# Patient Record
Sex: MALE | Race: WHITE | Employment: OTHER | URBAN - METROPOLITAN AREA
[De-identification: names, ages, dates, MRNs, and addresses within clinical notes are randomized per-mention and may not be internally consistent; named-entity substitution may affect disease eponyms.]

---

## 2021-10-30 ENCOUNTER — APPOINTMENT (OUTPATIENT)
Dept: GENERAL RADIOLOGY | Age: 61
End: 2021-10-30
Payer: MEDICARE

## 2021-10-30 ENCOUNTER — HOSPITAL ENCOUNTER (EMERGENCY)
Age: 61
Discharge: HOME | End: 2021-10-31
Attending: EMERGENCY MEDICINE
Payer: MEDICARE

## 2021-10-30 ENCOUNTER — APPOINTMENT (OUTPATIENT)
Dept: CT IMAGING | Age: 61
End: 2021-10-30
Payer: MEDICARE

## 2021-10-30 VITALS
RESPIRATION RATE: 19 BRPM | HEIGHT: 67 IN | HEART RATE: 85 BPM | DIASTOLIC BLOOD PRESSURE: 84 MMHG | BODY MASS INDEX: 22.76 KG/M2 | TEMPERATURE: 98.4 F | OXYGEN SATURATION: 100 % | WEIGHT: 145 LBS | SYSTOLIC BLOOD PRESSURE: 138 MMHG

## 2021-10-30 DIAGNOSIS — K70.9 ALCOHOLIC LIVER DISEASE (HCC): ICD-10-CM

## 2021-10-30 DIAGNOSIS — F41.1 ANXIETY STATE: Primary | ICD-10-CM

## 2021-10-30 DIAGNOSIS — E87.1 HYPONATREMIA: ICD-10-CM

## 2021-10-30 DIAGNOSIS — E87.8 HYPOCHLOREMIA: ICD-10-CM

## 2021-10-30 DIAGNOSIS — E87.6 HYPOKALEMIA: ICD-10-CM

## 2021-10-30 LAB
A/G RATIO: 0.9 (ref 1.1–2.2)
ALBUMIN SERPL-MCNC: 2.8 G/DL (ref 3.4–5)
ALP BLD-CCNC: 933 U/L (ref 40–129)
ALT SERPL-CCNC: 90 U/L (ref 10–40)
AMPHETAMINE SCREEN, URINE: ABNORMAL
ANION GAP SERPL CALCULATED.3IONS-SCNC: 9 MMOL/L (ref 3–16)
AST SERPL-CCNC: 75 U/L (ref 15–37)
BANDED NEUTROPHILS RELATIVE PERCENT: 3 % (ref 0–7)
BARBITURATE SCREEN URINE: ABNORMAL
BASOPHILS ABSOLUTE: 0 K/UL (ref 0–0.2)
BASOPHILS RELATIVE PERCENT: 0 %
BENZODIAZEPINE SCREEN, URINE: ABNORMAL
BILIRUB SERPL-MCNC: 2.9 MG/DL (ref 0–1)
BILIRUBIN URINE: ABNORMAL
BLOOD, URINE: NEGATIVE
BUN BLDV-MCNC: 10 MG/DL (ref 7–20)
CALCIUM SERPL-MCNC: 8.9 MG/DL (ref 8.3–10.6)
CANNABINOID SCREEN URINE: POSITIVE
CHLORIDE BLD-SCNC: 91 MMOL/L (ref 99–110)
CLARITY: CLEAR
CO2: 25 MMOL/L (ref 21–32)
COCAINE METABOLITE SCREEN URINE: ABNORMAL
COLOR: YELLOW
CREAT SERPL-MCNC: 1.4 MG/DL (ref 0.8–1.3)
EOSINOPHILS ABSOLUTE: 0 K/UL (ref 0–0.6)
EOSINOPHILS RELATIVE PERCENT: 0 %
EPITHELIAL CELLS, UA: ABNORMAL /HPF (ref 0–5)
ETHANOL: NORMAL MG/DL (ref 0–0.08)
GFR AFRICAN AMERICAN: >60
GFR NON-AFRICAN AMERICAN: 51
GLUCOSE BLD-MCNC: 143 MG/DL (ref 70–99)
GLUCOSE URINE: NEGATIVE MG/DL
HCT VFR BLD CALC: 29.9 % (ref 40.5–52.5)
HEMOGLOBIN: 9.7 G/DL (ref 13.5–17.5)
KETONES, URINE: NEGATIVE MG/DL
LEUKOCYTE ESTERASE, URINE: ABNORMAL
LITHIUM DOSE AMOUNT: ABNORMAL
LITHIUM LEVEL: 1.7 MMOL/L (ref 0.6–1.2)
LYMPHOCYTES ABSOLUTE: 0.6 K/UL (ref 1–5.1)
LYMPHOCYTES RELATIVE PERCENT: 4 %
Lab: ABNORMAL
MAGNESIUM: 2 MG/DL (ref 1.8–2.4)
MCH RBC QN AUTO: 32.5 PG (ref 26–34)
MCHC RBC AUTO-ENTMCNC: 32.3 G/DL (ref 31–36)
MCV RBC AUTO: 100.7 FL (ref 80–100)
METHADONE SCREEN, URINE: ABNORMAL
MICROCYTES: ABNORMAL
MICROSCOPIC EXAMINATION: YES
MONOCYTES ABSOLUTE: 0.7 K/UL (ref 0–1.3)
MONOCYTES RELATIVE PERCENT: 5 %
NEUTROPHILS ABSOLUTE: 12.6 K/UL (ref 1.7–7.7)
NEUTROPHILS RELATIVE PERCENT: 88 %
NITRITE, URINE: NEGATIVE
OPIATE SCREEN URINE: ABNORMAL
OXYCODONE URINE: ABNORMAL
PDW BLD-RTO: 15 % (ref 12.4–15.4)
PH UA: 7
PH UA: 7 (ref 5–8)
PHENCYCLIDINE SCREEN URINE: ABNORMAL
PLATELET # BLD: 226 K/UL (ref 135–450)
PLATELET SLIDE REVIEW: ADEQUATE
PMV BLD AUTO: 8.1 FL (ref 5–10.5)
POLYCHROMASIA: ABNORMAL
POTASSIUM REFLEX MAGNESIUM: 3.3 MMOL/L (ref 3.5–5.1)
PROPOXYPHENE SCREEN: ABNORMAL
PROTEIN UA: ABNORMAL MG/DL
RBC # BLD: 2.97 M/UL (ref 4.2–5.9)
RBC UA: ABNORMAL /HPF (ref 0–4)
SLIDE REVIEW: ABNORMAL
SODIUM BLD-SCNC: 125 MMOL/L (ref 136–145)
SPECIFIC GRAVITY UA: 1.01 (ref 1–1.03)
STOMATOCYTES: ABNORMAL
TOTAL PROTEIN: 5.8 G/DL (ref 6.4–8.2)
TROPONIN: <0.01 NG/ML
URINE REFLEX TO CULTURE: ABNORMAL
URINE TYPE: ABNORMAL
UROBILINOGEN, URINE: 2 E.U./DL
WBC # BLD: 13.9 K/UL (ref 4–11)
WBC UA: ABNORMAL /HPF (ref 0–5)

## 2021-10-30 PROCEDURE — 84484 ASSAY OF TROPONIN QUANT: CPT

## 2021-10-30 PROCEDURE — 2580000003 HC RX 258: Performed by: EMERGENCY MEDICINE

## 2021-10-30 PROCEDURE — 80178 ASSAY OF LITHIUM: CPT

## 2021-10-30 PROCEDURE — 99284 EMERGENCY DEPT VISIT MOD MDM: CPT

## 2021-10-30 PROCEDURE — 96375 TX/PRO/DX INJ NEW DRUG ADDON: CPT

## 2021-10-30 PROCEDURE — 83735 ASSAY OF MAGNESIUM: CPT

## 2021-10-30 PROCEDURE — 85025 COMPLETE CBC W/AUTO DIFF WBC: CPT

## 2021-10-30 PROCEDURE — 2500000003 HC RX 250 WO HCPCS: Performed by: EMERGENCY MEDICINE

## 2021-10-30 PROCEDURE — 80053 COMPREHEN METABOLIC PANEL: CPT

## 2021-10-30 PROCEDURE — 96365 THER/PROPH/DIAG IV INF INIT: CPT

## 2021-10-30 PROCEDURE — 70450 CT HEAD/BRAIN W/O DYE: CPT

## 2021-10-30 PROCEDURE — 93005 ELECTROCARDIOGRAM TRACING: CPT | Performed by: NURSE PRACTITIONER

## 2021-10-30 PROCEDURE — 6360000002 HC RX W HCPCS: Performed by: EMERGENCY MEDICINE

## 2021-10-30 PROCEDURE — 81001 URINALYSIS AUTO W/SCOPE: CPT

## 2021-10-30 PROCEDURE — 80307 DRUG TEST PRSMV CHEM ANLYZR: CPT

## 2021-10-30 PROCEDURE — 82077 ASSAY SPEC XCP UR&BREATH IA: CPT

## 2021-10-30 PROCEDURE — 71046 X-RAY EXAM CHEST 2 VIEWS: CPT

## 2021-10-30 RX ORDER — LORAZEPAM 1 MG/1
4 TABLET ORAL
Status: DISCONTINUED | OUTPATIENT
Start: 2021-10-30 | End: 2021-10-31 | Stop reason: HOSPADM

## 2021-10-30 RX ORDER — LORAZEPAM 1 MG/1
2 TABLET ORAL
Status: DISCONTINUED | OUTPATIENT
Start: 2021-10-30 | End: 2021-10-31 | Stop reason: HOSPADM

## 2021-10-30 RX ORDER — SODIUM CHLORIDE 0.9 % (FLUSH) 0.9 %
5-40 SYRINGE (ML) INJECTION PRN
Status: DISCONTINUED | OUTPATIENT
Start: 2021-10-30 | End: 2021-10-31 | Stop reason: HOSPADM

## 2021-10-30 RX ORDER — SODIUM CHLORIDE 9 MG/ML
25 INJECTION, SOLUTION INTRAVENOUS PRN
Status: DISCONTINUED | OUTPATIENT
Start: 2021-10-30 | End: 2021-10-31 | Stop reason: HOSPADM

## 2021-10-30 RX ORDER — LORAZEPAM 1 MG/1
1 TABLET ORAL
Status: DISCONTINUED | OUTPATIENT
Start: 2021-10-30 | End: 2021-10-31 | Stop reason: HOSPADM

## 2021-10-30 RX ORDER — LORAZEPAM 2 MG/ML
1 INJECTION INTRAMUSCULAR ONCE
Status: COMPLETED | OUTPATIENT
Start: 2021-10-30 | End: 2021-10-30

## 2021-10-30 RX ORDER — LORAZEPAM 1 MG/1
3 TABLET ORAL
Status: DISCONTINUED | OUTPATIENT
Start: 2021-10-30 | End: 2021-10-31 | Stop reason: HOSPADM

## 2021-10-30 RX ORDER — LORAZEPAM 2 MG/ML
3 INJECTION INTRAMUSCULAR
Status: DISCONTINUED | OUTPATIENT
Start: 2021-10-30 | End: 2021-10-31 | Stop reason: HOSPADM

## 2021-10-30 RX ORDER — LORAZEPAM 2 MG/ML
2 INJECTION INTRAMUSCULAR ONCE
Status: DISCONTINUED | OUTPATIENT
Start: 2021-10-30 | End: 2021-10-30

## 2021-10-30 RX ORDER — LORAZEPAM 2 MG/ML
4 INJECTION INTRAMUSCULAR
Status: DISCONTINUED | OUTPATIENT
Start: 2021-10-30 | End: 2021-10-31 | Stop reason: HOSPADM

## 2021-10-30 RX ORDER — LORAZEPAM 2 MG/ML
1 INJECTION INTRAMUSCULAR
Status: DISCONTINUED | OUTPATIENT
Start: 2021-10-30 | End: 2021-10-31 | Stop reason: HOSPADM

## 2021-10-30 RX ORDER — LORAZEPAM 2 MG/ML
2 INJECTION INTRAMUSCULAR
Status: DISCONTINUED | OUTPATIENT
Start: 2021-10-30 | End: 2021-10-31 | Stop reason: HOSPADM

## 2021-10-30 RX ORDER — SODIUM CHLORIDE 0.9 % (FLUSH) 0.9 %
5-40 SYRINGE (ML) INJECTION EVERY 12 HOURS SCHEDULED
Status: DISCONTINUED | OUTPATIENT
Start: 2021-10-30 | End: 2021-10-31 | Stop reason: HOSPADM

## 2021-10-30 RX ADMIN — FOLIC ACID: 5 INJECTION, SOLUTION INTRAMUSCULAR; INTRAVENOUS; SUBCUTANEOUS at 22:39

## 2021-10-30 RX ADMIN — LORAZEPAM 1 MG: 2 INJECTION INTRAMUSCULAR; INTRAVENOUS at 21:47

## 2021-10-31 LAB
EKG ATRIAL RATE: 95 BPM
EKG DIAGNOSIS: NORMAL
EKG P AXIS: 72 DEGREES
EKG P-R INTERVAL: 174 MS
EKG Q-T INTERVAL: 398 MS
EKG QRS DURATION: 96 MS
EKG QTC CALCULATION (BAZETT): 500 MS
EKG R AXIS: 47 DEGREES
EKG T AXIS: 55 DEGREES
EKG VENTRICULAR RATE: 95 BPM

## 2021-10-31 PROCEDURE — 93010 ELECTROCARDIOGRAM REPORT: CPT | Performed by: INTERNAL MEDICINE

## 2021-10-31 RX ORDER — HYDROXYZINE 50 MG/1
50 TABLET, FILM COATED ORAL EVERY 6 HOURS PRN
Qty: 30 TABLET | Refills: 0 | Status: ON HOLD | OUTPATIENT
Start: 2021-10-31 | End: 2021-11-11

## 2021-10-31 RX ORDER — MULTIVIT-MIN/IRON FUM/FOLIC AC 7.5 MG-4
1 TABLET ORAL DAILY
Qty: 30 TABLET | Refills: 3 | Status: SHIPPED | OUTPATIENT
Start: 2021-10-31

## 2021-10-31 RX ORDER — POTASSIUM CHLORIDE 20 MEQ/1
20 TABLET, EXTENDED RELEASE ORAL 2 TIMES DAILY
Qty: 10 TABLET | Refills: 0 | Status: SHIPPED | OUTPATIENT
Start: 2021-10-31 | End: 2021-11-18

## 2021-10-31 RX ORDER — SODIUM CHLORIDE 1000 MG
1 TABLET, SOLUBLE MISCELLANEOUS 3 TIMES DAILY
Qty: 15 TABLET | Refills: 0 | Status: ON HOLD | OUTPATIENT
Start: 2021-10-31 | End: 2021-11-11 | Stop reason: HOSPADM

## 2021-10-31 ASSESSMENT — ENCOUNTER SYMPTOMS
SHORTNESS OF BREATH: 1
ABDOMINAL PAIN: 0
DIARRHEA: 0
COUGH: 0
NAUSEA: 0
SORE THROAT: 0
COLOR CHANGE: 0
BACK PAIN: 0
VOMITING: 0
WHEEZING: 0

## 2021-10-31 NOTE — ED NOTES
Bed: 27  Expected date:   Expected time:   Means of arrival:   Comments:  803 Mike Yin RN  10/30/21 6117

## 2021-10-31 NOTE — ED NOTES
Discharge instructions gone over extensively with patient. Cab called for patient. Patient upon discharge stated \"Didn't you give me any ativan to go home with. \" Patient instructed on his prescriptions (4) given to him.       Amaris Davenport RN  10/31/21 8904

## 2021-10-31 NOTE — ED PROVIDER NOTES
**ADVANCED PRACTICE PROVIDER, I HAVE EVALUATED THIS [facility]  ED  EMERGENCY DEPARTMENT ENCOUNTER      Pt Name: Siri Henry  KBJ:[id number]  Yessi 1960  Date of evaluation: 10/30/2021  Provider: ED Freire CNP      Chief Complaint:    Chief Complaint   Patient presents with    Anxiety     pt to ED via EMS with c/o anxiety. pt walked to [place] and EMS call was for \"convulsions\" per EMS pt has not taken lithium or drank water in 2 weeks, pt states he doesn't know why he hasnt taken it         Nursing Notes, Past Medical Hx, Past Surgical Hx, Social Hx, Allergies, and Family Hx were all reviewed and agreed with or any disagreements were addressed in the HPI.    HPI: (Location, Duration, Timing, Severity, Quality, Assoc Sx, Context, Modifying factors)    Chief Complaint of tremors    This is a  57 y.o. male who presents to the emergency department via EMS after walking to the gas station, patient is complaining of tremors and walk to the gas station because the fire department is right next door, paramedics came to evaluate him and brought him to the ER. Patient states \"I need help I cannot quit seeking\". He does admit to cigarette smoking, smokes 2 packs a day. Does report having chronic shortness of breath no acute cough, congestion, fever or chills. No chest pain pleuritic chest pain associated with the shortness of breath and smoking. Patient states that he used to drink alcohol however he just quit on Monday. Patient states that he used to use all kinds of drugs however, when I ask him about drug use he denies it. He states \"I just need help\". Only thing he is concerned about is the tremors and shaking. He denies any headache neck pain or neck stiffness. No one-sided weakness or neglect. No abdominal pain, no nausea vomiting or diarrhea. No pain on exam, no additional complaints, no additional aggravating relieving factors.   Patient presents awake, alert and in no acute distress or toxic appearance. PastMedical/Surgical History:  History reviewed. No pertinent past medical history. Procedure Laterality Date    HERNIA REPAIR         Medications:  Previous Medications    No medications on file         Review of Systems:  (2-9 systems needed)  Review of Systems   Constitutional: Negative for chills and fever. HENT: Negative for congestion and sore throat. Respiratory: Positive for shortness of breath. Negative for cough and wheezing. He does admit to being a cigarette smoker, smokes 2 packs a day, states that he has chronic shortness of breath because of it. However no chest pain pleuritic chest pain cough or congestion   Cardiovascular: Negative for chest pain. Gastrointestinal: Negative for abdominal pain, diarrhea, nausea and vomiting. Genitourinary: Negative for difficulty urinating, dysuria, frequency and hematuria. Musculoskeletal: Negative for back pain. Skin: Negative for color change. Neurological: Positive for tremors. Negative for weakness, numbness and headaches. Patient is complaining of tremors and walk to the gas station because the fire department is right next door, paramedics came to evaluate him and brought him to the ER. Patient states \"I need help I cannot quit seeking\". \"Positives and Pertinent negatives as per HPI\"    Physical Exam:  Physical Exam  Vitals and nursing note reviewed. Constitutional:       Appearance: He is well-developed. He is not diaphoretic. HENT:      Head: Normocephalic. Right Ear: External ear normal.      Left Ear: External ear normal.   Eyes:      General: No scleral icterus. Right eye: No discharge. Left eye: No discharge. Cardiovascular:      Rate and Rhythm: Normal rate and regular rhythm. Pulmonary:      Effort: Pulmonary effort is normal. No respiratory distress. Breath sounds: Normal breath sounds.    Abdominal: Palpations: Abdomen is soft. Tenderness: There is no abdominal tenderness. Musculoskeletal:         General: Normal range of motion. Cervical back: Normal range of motion and neck supple. Skin:     General: Skin is warm. Capillary Refill: Capillary refill takes less than 2 seconds. Coloration: Skin is not pale. Neurological:      General: No focal deficit present. Mental Status: He is alert and oriented to person, place, and time. GCS: GCS eye subscore is 4. GCS verbal subscore is 5. GCS motor subscore is 6. Cranial Nerves: Cranial nerves are intact. Comments: Patient is awake, alert following all commands correctly however he does have some fine tremors and shaking on exam.  But they are controlled when I asked him to stop.    Psychiatric:         Behavior: Behavior normal.         MEDICAL DECISION MAKING    Vitals:    Vitals:    10/30/21 2119 10/30/21 2147 10/30/21 2356   BP: 102/78 127/88 138/84   Pulse: 103 94 85   Resp: 18  19   Temp: 98.4 °F (36.9 °C)     TempSrc: Oral     SpO2: 99%  100%   Weight: 145 lb (65.8 kg)     Height: 5' 7\" (1.702 m)         LABS:  Labs Reviewed   CBC WITH AUTO DIFFERENTIAL - Abnormal; Notable for the following components:       Result Value    WBC 13.9 (*)     RBC 2.97 (*)     Hemoglobin 9.7 (*)     Hematocrit 29.9 (*)     .7 (*)     Neutrophils Absolute 12.6 (*)     Lymphocytes Absolute 0.6 (*)     Microcytes Occasional (*)     Polychromasia Occasional (*)     Stomatocytes Occasional (*)     All other components within normal limits    Narrative:     Performed at:  [facility]) - [address]   Phone [phone]   COMPREHENSIVE METABOLIC PANEL W/ REFLEX TO MG FOR LOW K - Abnormal; Notable for the following components:    Sodium 125 (*)     Potassium reflex Magnesium 3.3 (*)     Chloride 91 (*)     Glucose 143 (*)     CREATININE 1.4 (*)     GFR Non- 51 (*) Total Protein 5.8 (*)     Albumin 2.8 (*)     Albumin/Globulin Ratio 0.9 (*)     Total Bilirubin 2.9 (*)     Alkaline Phosphatase 933 (*)     ALT 90 (*)     AST 75 (*)     All other components within normal limits    Narrative:     Performed at:  [address], [facility] [employer]   Phone [phone]   URINE RT REFLEX TO CULTURE - Abnormal; Notable for the following components:    Bilirubin Urine SMALL (*)     Protein, UA TRACE (*)     Urobilinogen, Urine 2.0 (*)     Leukocyte Esterase, Urine TRACE (*)     All other components within normal limits    Narrative:     Performed at:  [place] [address] [employer]   Phone [phone]   [address] - Abnormal; Notable for the following components:    Cannabinoid Scrn, Ur POSITIVE (*)     All other components within normal limits    Narrative:     Performed at:  [place] [address] [employer]   Phone [phone]   LITHIUM LEVEL - Abnormal; Notable for the following components:    Lithium Lvl 1.7 (*)     All other components within normal limits    Narrative:     Performed at:  [address], [facility] [employer]   Phone [phone]   MICROSCOPIC URINALYSIS - Abnormal; Notable for the following components:    Epithelial Cells, UA 6-10 (*)     All other components within normal limits    Narrative:     Performed at:  [place] [address] [employer]   Phone [phone]   TROPONIN    Narrative:     Performed at:  [place] [address] [employer]   Phone [phone]   ETHANOL    Narrative:     Performed at:  [address], [place] [employer]   Phone [phone]   MAGNESIUM    Narrative: Performed at:  [facility]) [facility]  [address] [place] [address],  [place], [address] [place] [address] [place]   Phone  of labs reviewed and were negative at this time or not returned at the time of this note. RADIOLOGY:   Non-plain film images such as CT, Ultrasound and MRI are read by the radiologist. Dayna CABALLERO APRN - CNP have directly visualized the radiologic plain film image(s) with the below findings:      Interpretation per the Radiologist below, if available at the time of this note:    XR CHEST (2 VW)   Final Result   No acute cardiopulmonary abnormality. CT Head WO Contrast   Final Result   1. No acute intracranial abnormality. 2. Opacification of the left maxillary sinus that likely represents chronic   sinusitis. MEDICAL DECISION MAKING / ED COURSE:    Because of high probability of sudden clinical deterioration of the patient's condition and risk of further deterioration, critical care time required my full attention to the patient's condition; which included chart data review, documentation, medication ordering, reviewing the patient's old records, reevaluation patient's cardiac, pulmonary and neurological status. Reevaluation of vital signs. Consultations with ED attending and admitting physician. Ordering, interpreting reviewing diagnostic testing. Therefore a critical care time was 18 minutes of direct attention to the patient's condition did not include time spent on procedures.     PROCEDURES:   Procedures    None    Patient was given:  Medications   sodium chloride flush 0.9 % injection 5-40 mL (has no administration in time range)   sodium chloride flush 0.9 % injection 5-40 mL (has no administration in time range)   0.9 % sodium chloride infusion (has no administration in time range)   LORazepam (ATIVAN) tablet 1 mg (has no administration in time range)     Or   LORazepam (ATIVAN) injection 1 mg (has no administration in time range)     Or LORazepam (ATIVAN) tablet 2 mg (has no administration in time range)     Or   LORazepam (ATIVAN) injection 2 mg (has no administration in time range)     Or   LORazepam (ATIVAN) tablet 3 mg (has no administration in time range)     Or   LORazepam (ATIVAN) injection 3 mg (has no administration in time range)     Or   LORazepam (ATIVAN) tablet 4 mg (has no administration in time range)     Or   LORazepam (ATIVAN) injection 4 mg (has no administration in time range)   sodium chloride 0.9 % 2,548 mL with folic acid 1 mg, adult multi-vitamin with vitamin k 10 mL, thiamine 100 mg ( IntraVENous Stopped 10/30/21 0218)   LORazepam (ATIVAN) injection 1 mg (1 mg IntraVENous Given 10/30/21 2147)        Patient is complaining of tremors and walk to the gas station because the fire department is right next door, paramedics came to evaluate him and brought him to the ER. Patient states \"I need help I cannot quit seeking\". He does admit to cigarette smoking, smokes 2 packs a day. Does report having chronic shortness of breath no acute cough, congestion, fever or chills. After evaluation and examination patient IV access, blood work, urinalysis, chest x-ray, EKG and CT scan of the head were ordered I do believe that the some of this could be related to his alcohol use although he states he has not drink since Monday. I did place the patient on the [facility] recall and he was given a rally pack. Urinalysis shows no acute infection. Drug screen is positive for cannabinoids. EKG shows sinus rhythm rate of 95 bpm, no acute ST ovation, please see the attending physician documentation for EKG interpretation. CBC shows some underlying anemia with an H&H of 9.7 and 29.9, I do believe this is most likely related to his history of alcohol use. Liver functions are elevated with alk phos of 933, ALT of 90 and AST of 75 and the bilirubin is 2.9. He does have some underlying dehydration. ?  Sodium is 125, potassium is 3.3 however his gap is closed. I believe the creatinine of 1.4 and GFR 51 improved with fluids, patient was ordered a rally pack. Troponin is negative. EtOH is negative. Magnesium level is 2. Patient states not been taking his lithium, his lithium is 1.7 today. Chest x-ray shows no acute cardiopulmonary findings. CT of the head shows no acute intracranial abnormality. Opacification of the left maxillary sinus that likely represents chronic sinusitis. Patient was given fluids, rally pack, and after fluids he states he is feeling much better, I again I not sure why he was having these tremors and why he was feeling so terrible. However, he is denying any acute complaints on exam, no concerns for acute coronary syndrome, pneumonia, pneumothorax, PE or other emergent etiology. Therefore, shared medical decision was made to the patient, the attending physician and myself only agreed the patient could be discharged home with outpatient follow-up. Patient was discharged home by the attending physician with instructions to follow-up with the PCP. Discharged home with prescription for hydroxyzine, multivitamin, potassium and sodium tablets. Educated follow-up with PCP on Monday for follow-up. Educated to stop using marijuana as well. The patient tolerated their visit well. I evaluated the patient. The physician was available for consultation as needed. The patient and / or the family were informed of the results of any tests, a time was given to answer questions, a plan was proposed and they agreed with plan. Patient verbalized understanding of discharge instructions and was discharged in the department in stable condition. CLINICAL IMPRESSION:  1. Anxiety state    2. Alcoholic liver disease ([address]    3. Hypokalemia    4. Hyponatremia    5.  Hypochloremia        DISPOSITION Decision To Discharge 10/31/2021 12:30:33 AM      PATIENT REFERRED TO:  [facility]) Pre-Services  [phone]  Schedule an appointment as soon as possible for a visit         DISCHARGE MEDICATIONS:  New Prescriptions    HYDROXYZINE (ATARAX) 50 MG TABLET    Take 1 tablet by mouth every 6 hours as needed for Anxiety    MULTIPLE VITAMINS-MINERALS (MULTIVITAMIN WITH MINERALS) TABLET    Take 1 tablet by mouth daily    POTASSIUM CHLORIDE (KLOR-CON M) 20 MEQ EXTENDED RELEASE TABLET    Take 1 tablet by mouth 2 times daily for 5 days    SODIUM CHLORIDE 1 G TABLET    Take 1 tablet by mouth 3 times daily for 5 days       DISCONTINUED MEDICATIONS:  Discontinued Medications    No medications on file              (Please note the MDM and HPI sections of this note were completed with a voice recognition program.  Efforts were made to edit the dictations but occasionally words are mis-transcribed.)    Electronically signed, ED Prajapati CNP,          ED Prajapati CNP  10/31/21 0125       ED Prajapati CNP  11/02/21 0034

## 2021-10-31 NOTE — ED PROVIDER NOTES
I independently performed a history and physical on [facility]. All diagnostic, treatment, and disposition decisions were made by myself in conjunction with the advanced practice provider. For further details of [facility] [place] emergency department encounter, please see Lidia Lang NP's documentation. Patient reports to me that he has not felt well for over a week. He states he last had alcohol last Monday. He states that he has had some nausea and is drinking very little water. He has not taken his lithium. He has difficulty elaborating on his symptoms but clearly denies any chest pain or shortness of breath or abdominal pain. States \"I just do not feel right\". On exam patient does have a slight resting tremor and is jittery and anxious in appearance. Heart tachycardic but regular and lungs clear to auscultation and abdomen benign. No significant injuries noted. EKG  The Ekg interpreted by me shows  normal sinus rhythm with a rate of 95  Axis is   Normal  QTc is  500ms  Intervals and Durations are unremarkable. ST Segments: normal  No prior EKG available for comparison. The total Critical Care time is 40 minutes which excludes separately billable procedures. The critical care was concerning IV fluid bolus for dehydration. This time is exclusive of any time documented by any other providers. XR CHEST (2 VW)  No acute cardiopulmonary abnormality. CT Head WO Contrast  1. No acute intracranial abnormality. 2. Opacification of the left maxillary sinus that likely represents chronic sinusitis.      Results for orders placed or performed during the hospital encounter of 10/30/21   CBC Auto Differential   Result Value Ref Range    WBC 13.9 (H) 4.0 - 11.0 K/uL    RBC 2.97 (L) 4.20 - 5.90 M/uL    Hemoglobin 9.7 (L) 13.5 - 17.5 g/dL    Hematocrit 29.9 (L) 40.5 - 52.5 %    .7 (H) 80.0 - 100.0 fL    MCH 32.5 26.0 - 34.0 pg    MCHC 32.3 31.0 - 36.0 g/dL    RDW 15.0 12.4 - 15.4 %    Platelets 761 056 - 283 K/uL    MPV 8.1 5.0 - 10.5 fL    PLATELET SLIDE REVIEW Adequate     SLIDE REVIEW see below     Neutrophils % 88.0 %    Lymphocytes % 4.0 %    Monocytes % 5.0 %    Eosinophils % 0.0 %    Basophils % 0.0 %    Neutrophils Absolute 12.6 (H) 1.7 - 7.7 K/uL    Lymphocytes Absolute 0.6 (L) 1.0 - 5.1 K/uL    Monocytes Absolute 0.7 0.0 - 1.3 K/uL    Eosinophils Absolute 0.0 0.0 - 0.6 K/uL    Basophils Absolute 0.0 0.0 - 0.2 K/uL    Bands Relative 3 0 - 7 %    Microcytes Occasional (A)     Polychromasia Occasional (A)     Stomatocytes Occasional (A)    Comprehensive Metabolic Panel w/ Reflex to MG   Result Value Ref Range    Sodium 125 (L) 136 - 145 mmol/L    Potassium reflex Magnesium 3.3 (L) 3.5 - 5.1 mmol/L    Chloride 91 (L) 99 - 110 mmol/L    CO2 25 21 - 32 mmol/L    Anion Gap 9 3 - 16    Glucose 143 (H) 70 - 99 mg/dL    BUN 10 7 - 20 mg/dL    CREATININE 1.4 (H) 0.8 - 1.3 mg/dL    GFR Non- 51 (A) >60    GFR African American >60 >60    Calcium 8.9 8.3 - 10.6 mg/dL    Total Protein 5.8 (L) 6.4 - 8.2 g/dL    Albumin 2.8 (L) 3.4 - 5.0 g/dL    Albumin/Globulin Ratio 0.9 (L) 1.1 - 2.2    Total Bilirubin 2.9 (H) 0.0 - 1.0 mg/dL    Alkaline Phosphatase 933 (H) 40 - 129 U/L    ALT 90 (H) 10 - 40 U/L    AST 75 (H) 15 - 37 U/L   Urinalysis Reflex to Culture    Specimen: Urine, clean catch   Result Value Ref Range    Color, UA Yellow Straw/Yellow    Clarity, UA Clear Clear    Glucose, Ur Negative Negative mg/dL    Bilirubin Urine SMALL (A) Negative    Ketones, Urine Negative Negative mg/dL    Specific Gravity, UA 1.010 1.005 - 1.030    Blood, Urine Negative Negative    pH, UA 7.0 5.0 - 8.0    Protein, UA TRACE (A) Negative mg/dL    Urobilinogen, Urine 2.0 (A) <2.0 E.U./dL    Nitrite, Urine Negative Negative    Leukocyte Esterase, Urine TRACE (A) Negative    Microscopic Examination YES     Urine Type NotGiven     Urine Reflex to Culture Not Indicated    Troponin   Result Value Ref Range Troponin <0.01 <0.01 ng/mL   Urine Drug Screen   Result Value Ref Range    Amphetamine Screen, Urine Neg Negative <1000ng/mL    Barbiturate Screen, Ur Neg Negative <200 ng/mL    Benzodiazepine Screen, Urine Neg Negative <200 ng/mL    Cannabinoid Scrn, Ur POSITIVE (A) Negative <50 ng/mL    Cocaine Metabolite Screen, Urine Neg Negative <300 ng/mL    Opiate Scrn, Ur Neg Negative <300 ng/mL    PCP Screen, Urine Neg Negative <25 ng/mL    Methadone Screen, Urine Neg Negative <300 ng/mL    Propoxyphene Scrn, Ur Neg Negative <300 ng/mL    Oxycodone Urine Neg Negative <100 ng/ml    pH, UA 7.0     Drug Screen Comment: see below    Ethanol   Result Value Ref Range    Ethanol Lvl None Detected mg/dL   Lithium level   Result Value Ref Range    Lithium Lvl 1.7 (H) 0.6 - 1.2 mmol/L    Lithium Dose Amount Unknown    Magnesium   Result Value Ref Range    Magnesium 2.00 1.80 - 2.40 mg/dL   Microscopic Urinalysis   Result Value Ref Range    WBC, UA 3-5 0 - 5 /HPF    RBC, UA None seen 0 - 4 /HPF    Epithelial Cells, UA 6-10 (A) 0 - 5 /HPF   EKG 12 Lead   Result Value Ref Range    Ventricular Rate 95 BPM    Atrial Rate 95 BPM    P-R Interval 174 ms    QRS Duration 96 ms    Q-T Interval 398 ms    QTc Calculation (Bazett) 500 ms    P Axis 72 degrees    R Axis 47 degrees    T Axis 55 degrees    Diagnosis       Normal sinus rhythmProlonged QTAbnormal ECGNo previous ECGs available     Patient is feeling better and would like to be discharged. Advised return immediately for any new or worsening symptoms and to take all prescriptions as prescribed. Also advised to discontinue using any illegal drugs and abusing alcohol. I estimate there is LOW risk for ACUTE CORONARY SYNDROME, INTRACRANIAL HEMORRHAGE, MALIGNANT DYSRHYTHMIA, MENINGITIS, PNEUMONIA, PULMONARY EMBOLISM, SEPSIS, SUBARACHNOID HEMORRHAGE, SUBDURAL HEMATOMA, STROKE, or URINARY TRACT INFECTION, thus I consider the discharge disposition reasonable.  Mohsen Yu and I have discussed the diagnosis and risks, and we agree with discharging home to follow-up with their primary doctor. We also discussed returning to the Emergency Department immediately if new or worsening symptoms occur. We have discussed the symptoms which are most concerning (e.g., changing or worsening pain, weakness, vomiting, fever) that necessitate immediate return. Final Impression    1. Anxiety state    2. Alcoholic liver disease ([address]    3. Hypokalemia    4. Hyponatremia    5. Hypochloremia        Blood pressure 138/84, pulse 85, temperature 98.4 °F (36.9 °C), temperature source Oral, resp. rate 19, height 5' 7\" (1.702 m), weight 145 lb (65.8 kg), SpO2 100 %.             Jose Selby MD  10/31/21 4345

## 2021-11-02 NOTE — ED PROVIDER NOTES
**ADVANCED PRACTICE PROVIDER, I HAVE EVALUATED THIS [facility]  ED  EMERGENCY DEPARTMENT ENCOUNTER      Pt Name: Dane Natarajan  HKV:[id number]  Nikkiegfafsaneh 1960  Date of evaluation: 10/30/2021  Provider: ED Yang CNP      Chief Complaint:    Chief Complaint   Patient presents with    Anxiety     pt to ED via EMS with c/o anxiety. pt walked to [facility] and EMS call was for \"convulsions\" per EMS pt has not taken lithium or drank water in 2 weeks, pt states he doesn't know why he hasnt taken it         Nursing Notes, Past Medical Hx, Past Surgical Hx, Social Hx, Allergies, and Family Hx were all reviewed and agreed with or any disagreements were addressed in the HPI.    HPI: (Location, Duration, Timing, Severity, Quality, Assoc Sx, Context, Modifying factors)    Chief Complaint of tremors    This is a  57 y.o. male who presents to the emergency department via EMS after walking to the gas station, patient is complaining of tremors and walk to the [employer] station because the fire department is right next door, paramedics came to evaluate him and brought him to the ER. Patient states \"I need help I cannot quit seeking\". He does admit to cigarette smoking, smokes 2 packs a day. Does report having chronic shortness of breath no acute cough, congestion, fever or chills. No chest pain pleuritic chest pain associated with the shortness of breath and smoking. Patient states that he used to drink alcohol however he just quit on Monday. Patient states that he used to use all kinds of drugs however, when I ask him about drug use he denies it. He states \"I just need help\". Only thing he is concerned about is the tremors and shaking. He denies any headache neck pain or neck stiffness. No one-sided weakness or neglect. No abdominal pain, no nausea vomiting or diarrhea. No pain on exam, no additional complaints, no additional aggravating relieving factors.   Patient presents awake, alert and in no acute distress or toxic appearance. PastMedical/Surgical History:  History reviewed. No pertinent past medical history. Procedure Laterality Date    HERNIA REPAIR         Medications:  Previous Medications    No medications on file         Review of Systems:  (2-9 systems needed)  Review of Systems   Constitutional: Negative for chills and fever. HENT: Negative for congestion and sore throat. Respiratory: Positive for shortness of breath. Negative for cough and wheezing. He does admit to being a cigarette smoker, smokes 2 packs a day, states that he has chronic shortness of breath because of it. However no chest pain pleuritic chest pain cough or congestion   Cardiovascular: Negative for chest pain. Gastrointestinal: Negative for abdominal pain, diarrhea, nausea and vomiting. Genitourinary: Negative for difficulty urinating, dysuria, frequency and hematuria. Musculoskeletal: Negative for back pain. Skin: Negative for color change. Neurological: Positive for tremors. Negative for weakness, numbness and headaches. Patient is complaining of tremors and walk to the gas station because the fire department is right next door, paramedics came to evaluate him and brought him to the ER. Patient states \"I need help I cannot quit seeking\". \"Positives and Pertinent negatives as per HPI\"    Physical Exam:  Physical Exam  Vitals and nursing note reviewed. Constitutional:       Appearance: He is well-developed. He is not diaphoretic. HENT:      Head: Normocephalic. Right Ear: External ear normal.      Left Ear: External ear normal.   Eyes:      General: No scleral icterus. Right eye: No discharge. Left eye: No discharge. Cardiovascular:      Rate and Rhythm: Normal rate and regular rhythm. Pulmonary:      Effort: Pulmonary effort is normal. No respiratory distress. Breath sounds: Normal breath sounds.    Abdominal: Palpations: Abdomen is soft. Tenderness: There is no abdominal tenderness. Musculoskeletal:         General: Normal range of motion. Cervical back: Normal range of motion and neck supple. Skin:     General: Skin is warm. Capillary Refill: Capillary refill takes less than 2 seconds. Coloration: Skin is not pale. Neurological:      General: No focal deficit present. Mental Status: He is alert and oriented to person, place, and time. GCS: GCS eye subscore is 4. GCS verbal subscore is 5. GCS motor subscore is 6. Cranial Nerves: Cranial nerves are intact. Comments: Patient is awake, alert following all commands correctly however he does have some fine tremors and shaking on exam.  But they are controlled when I asked him to stop.    Psychiatric:         Behavior: Behavior normal.         MEDICAL DECISION MAKING    Vitals:    Vitals:    10/30/21 2119 10/30/21 2147 10/30/21 2356   BP: 102/78 127/88 138/84   Pulse: 103 94 85   Resp: 18  19   Temp: 98.4 °F (36.9 °C)     TempSrc: Oral     SpO2: 99%  100%   Weight: 145 lb (65.8 kg)     Height: 5' 7\" (1.702 m)         LABS:  Labs Reviewed   CBC WITH AUTO DIFFERENTIAL - Abnormal; Notable for the following components:       Result Value    WBC 13.9 (*)     RBC 2.97 (*)     Hemoglobin 9.7 (*)     Hematocrit 29.9 (*)     .7 (*)     Neutrophils Absolute 12.6 (*)     Lymphocytes Absolute 0.6 (*)     Microcytes Occasional (*)     Polychromasia Occasional (*)     Stomatocytes Occasional (*)     All other components within normal limits    Narrative:     Performed at:  [facility]) - [address]   Phone [phone]   COMPREHENSIVE METABOLIC PANEL W/ REFLEX TO MG FOR LOW K - Abnormal; Notable for the following components:    Sodium 125 (*)     Potassium reflex Magnesium 3.3 (*)     Chloride 91 (*)     Glucose 143 (*)     CREATININE 1.4 (*)     GFR Non- 51 (*) Total Protein 5.8 (*)     Albumin 2.8 (*)     Albumin/Globulin Ratio 0.9 (*)     Total Bilirubin 2.9 (*)     Alkaline Phosphatase 933 (*)     ALT 90 (*)     AST 75 (*)     All other components within normal limits    Narrative:     Performed at:  [address], [facility]   Phone [phone]   URINE RT REFLEX TO CULTURE - Abnormal; Notable for the following components:    Bilirubin Urine SMALL (*)     Protein, UA TRACE (*)     Urobilinogen, Urine 2.0 (*)     Leukocyte Esterase, Urine TRACE (*)     All other components within normal limits    Narrative:     Performed at:  [address], [facility]   Phone [phone]   [address] - Abnormal; Notable for the following components:    Cannabinoid Scrn, Ur POSITIVE (*)     All other components within normal limits    Narrative:     Performed at:  [address], [facility]   Phone [phone]   LITHIUM LEVEL - Abnormal; Notable for the following components:    Lithium Lvl 1.7 (*)     All other components within normal limits    Narrative:     Performed at:  [address], [facility]   Phone [phone]   MICROSCOPIC URINALYSIS - Abnormal; Notable for the following components:    Epithelial Cells, UA 6-10 (*)     All other components within normal limits    Narrative:     Performed at:  [place] [address] [facility]   Phone [phone]   TROPONIN    Narrative:     Performed at:  [address], [facility]   Phone [phone]   ETHANOL    Narrative:     Performed at:  [address], [facility]   Phone [phone]   MAGNESIUM    Narrative: LORazepam (ATIVAN) tablet 2 mg (has no administration in time range)     Or   LORazepam (ATIVAN) injection 2 mg (has no administration in time range)     Or   LORazepam (ATIVAN) tablet 3 mg (has no administration in time range)     Or   LORazepam (ATIVAN) injection 3 mg (has no administration in time range)     Or   LORazepam (ATIVAN) tablet 4 mg (has no administration in time range)     Or   LORazepam (ATIVAN) injection 4 mg (has no administration in time range)   sodium chloride 0.9 % 5,918 mL with folic acid 1 mg, adult multi-vitamin with vitamin k 10 mL, thiamine 100 mg ( IntraVENous Stopped 10/30/21 0958)   LORazepam (ATIVAN) injection 1 mg (1 mg IntraVENous Given 10/30/21 2147)        Patient is complaining of tremors and walk to the gas station because the fire department is right next door, paramedics came to evaluate him and brought him to the ER. Patient states \"I need help I cannot quit seeking\". He does admit to cigarette smoking, smokes 2 packs a day. Does report having chronic shortness of breath no acute cough, congestion, fever or chills. After evaluation and examination patient IV access, blood work, urinalysis, chest x-ray, EKG and CT scan of the head were ordered I do believe that the some of this could be related to his alcohol use although he states he has not drink since Monday. I did place the patient on the [facility] recall and he was given a rally pack. Urinalysis shows no acute infection. Drug screen is positive for cannabinoids. EKG shows sinus rhythm rate of 95 bpm, no acute ST ovation, please see the attending physician documentation for EKG interpretation. CBC shows some underlying anemia with an H&H of 9.7 and 29.9, I do believe this is most likely related to his history of alcohol use. Liver functions are elevated with alk phos of 933, ALT of 90 and AST of 75 and the bilirubin is 2.9. He does have some underlying dehydration. ?  Sodium is 125, potassium is 3.3 however his gap is closed. I believe the creatinine of 1.4 and GFR 51 improved with fluids, patient was ordered a rally pack. Troponin is negative. EtOH is negative. Magnesium level is 2. Patient states not been taking his lithium, his lithium is 1.7 today. Chest x-ray shows no acute cardiopulmonary findings. CT of the head shows no acute intracranial abnormality. Opacification of the left maxillary sinus that likely represents chronic sinusitis. Patient was given fluids, rally pack, and after fluids he states he is feeling much better, I again I not sure why he was having these tremors and why he was feeling so terrible. However, he is denying any acute complaints on exam, no concerns for acute coronary syndrome, pneumonia, pneumothorax, PE or other emergent etiology. Therefore, shared medical decision was made to the patient, the attending physician and myself only agreed the patient could be discharged home with outpatient follow-up. Patient was discharged home by the attending physician with instructions to follow-up with the PCP. Discharged home with prescription for hydroxyzine, multivitamin, potassium and sodium tablets. Educated follow-up with PCP on Monday for follow-up. Educated to stop using marijuana as well. The patient tolerated their visit well. I evaluated the patient. The physician was available for consultation as needed. The patient and / or the family were informed of the results of any tests, a time was given to answer questions, a plan was proposed and they agreed with plan. Patient verbalized understanding of discharge instructions and was discharged in the department in stable condition. CLINICAL IMPRESSION:  1. Anxiety state    2. Alcoholic liver disease ([address]    3. Hypokalemia    4. Hyponatremia    5.  Hypochloremia        DISPOSITION Decision To Discharge 10/31/2021 12:30:33 AM      PATIENT REFERRED TO:  [facility]) Pre-Services  [phone]  Schedule an appointment as soon as

## 2021-11-04 ENCOUNTER — APPOINTMENT (OUTPATIENT)
Dept: CT IMAGING | Age: 61
DRG: 091 | End: 2021-11-04
Payer: MEDICARE

## 2021-11-04 ENCOUNTER — OFFICE VISIT (OUTPATIENT)
Dept: FAMILY MEDICINE CLINIC | Age: 61
End: 2021-11-04
Payer: MEDICARE

## 2021-11-04 ENCOUNTER — APPOINTMENT (OUTPATIENT)
Dept: GENERAL RADIOLOGY | Age: 61
DRG: 091 | End: 2021-11-04
Payer: MEDICARE

## 2021-11-04 ENCOUNTER — HOSPITAL ENCOUNTER (EMERGENCY)
Age: 61
Discharge: HOME | DRG: 091 | End: 2021-11-04
Attending: EMERGENCY MEDICINE
Payer: MEDICARE

## 2021-11-04 VITALS
OXYGEN SATURATION: 100 % | HEIGHT: 67 IN | TEMPERATURE: 99.3 F | DIASTOLIC BLOOD PRESSURE: 72 MMHG | HEART RATE: 89 BPM | BODY MASS INDEX: 22.76 KG/M2 | SYSTOLIC BLOOD PRESSURE: 113 MMHG | RESPIRATION RATE: 18 BRPM | WEIGHT: 145 LBS

## 2021-11-04 VITALS
SYSTOLIC BLOOD PRESSURE: 112 MMHG | HEART RATE: 70 BPM | BODY MASS INDEX: 21.46 KG/M2 | DIASTOLIC BLOOD PRESSURE: 70 MMHG | OXYGEN SATURATION: 96 % | WEIGHT: 137 LBS

## 2021-11-04 DIAGNOSIS — K70.9 ALCOHOLIC LIVER DISEASE (HCC): ICD-10-CM

## 2021-11-04 DIAGNOSIS — R74.8 ELEVATED LIVER ENZYMES: ICD-10-CM

## 2021-11-04 DIAGNOSIS — Z79.899 LITHIUM USE: ICD-10-CM

## 2021-11-04 DIAGNOSIS — F10.10 ALCOHOL ABUSE: ICD-10-CM

## 2021-11-04 DIAGNOSIS — E87.1 HYPONATREMIA: ICD-10-CM

## 2021-11-04 DIAGNOSIS — D75.839 THROMBOCYTOSIS: ICD-10-CM

## 2021-11-04 DIAGNOSIS — Z76.89 ENCOUNTER TO ESTABLISH CARE: Primary | ICD-10-CM

## 2021-11-04 DIAGNOSIS — R53.83 FATIGUE, UNSPECIFIED TYPE: Primary | ICD-10-CM

## 2021-11-04 DIAGNOSIS — R41.82 ALTERED MENTAL STATUS, UNSPECIFIED ALTERED MENTAL STATUS TYPE: ICD-10-CM

## 2021-11-04 DIAGNOSIS — D53.9 MACROCYTIC ANEMIA: ICD-10-CM

## 2021-11-04 DIAGNOSIS — R17 JAUNDICE: ICD-10-CM

## 2021-11-04 LAB
ACETAMINOPHEN LEVEL: <5 UG/ML (ref 10–30)
ALBUMIN SERPL-MCNC: 3.1 G/DL (ref 3.4–5)
ALP BLD-CCNC: 582 U/L (ref 40–129)
ALT SERPL-CCNC: 51 U/L (ref 10–40)
AMMONIA: 24 UMOL/L (ref 16–60)
AMPHETAMINE SCREEN, URINE: NORMAL
ANION GAP SERPL CALCULATED.3IONS-SCNC: 7 MMOL/L (ref 3–16)
ANISOCYTOSIS: ABNORMAL
AST SERPL-CCNC: 41 U/L (ref 15–37)
BANDED NEUTROPHILS RELATIVE PERCENT: 3 % (ref 0–7)
BARBITURATE SCREEN URINE: NORMAL
BASOPHILS ABSOLUTE: 0 K/UL (ref 0–0.2)
BASOPHILS RELATIVE PERCENT: 0 %
BENZODIAZEPINE SCREEN, URINE: NORMAL
BILIRUB SERPL-MCNC: 1.2 MG/DL (ref 0–1)
BILIRUBIN DIRECT: 0.7 MG/DL (ref 0–0.3)
BILIRUBIN URINE: NEGATIVE
BILIRUBIN, INDIRECT: 0.5 MG/DL (ref 0–1)
BLOOD, URINE: NEGATIVE
BUN BLDV-MCNC: 9 MG/DL (ref 7–20)
CALCIUM SERPL-MCNC: 9.4 MG/DL (ref 8.3–10.6)
CANNABINOID SCREEN URINE: NORMAL
CHLORIDE BLD-SCNC: 101 MMOL/L (ref 99–110)
CLARITY: CLEAR
CO2: 24 MMOL/L (ref 21–32)
COCAINE METABOLITE SCREEN URINE: NORMAL
COLOR: YELLOW
CREAT SERPL-MCNC: 0.9 MG/DL (ref 0.8–1.3)
EKG ATRIAL RATE: 81 BPM
EKG DIAGNOSIS: NORMAL
EKG P AXIS: 75 DEGREES
EKG P-R INTERVAL: 172 MS
EKG Q-T INTERVAL: 404 MS
EKG QRS DURATION: 92 MS
EKG QTC CALCULATION (BAZETT): 469 MS
EKG R AXIS: 53 DEGREES
EKG T AXIS: 60 DEGREES
EKG VENTRICULAR RATE: 81 BPM
EOSINOPHILS ABSOLUTE: 0 K/UL (ref 0–0.6)
EOSINOPHILS RELATIVE PERCENT: 0 %
ETHANOL: NORMAL MG/DL (ref 0–0.08)
GFR AFRICAN AMERICAN: >60
GFR NON-AFRICAN AMERICAN: >60
GLUCOSE BLD-MCNC: 103 MG/DL (ref 70–99)
GLUCOSE URINE: NEGATIVE MG/DL
HCT VFR BLD CALC: 27 % (ref 40.5–52.5)
HEMOGLOBIN: 8.7 G/DL (ref 13.5–17.5)
HYPOCHROMIA: ABNORMAL
KETONES, URINE: NEGATIVE MG/DL
LEUKOCYTE ESTERASE, URINE: NEGATIVE
LITHIUM DOSE AMOUNT: ABNORMAL
LITHIUM LEVEL: 1.5 MMOL/L (ref 0.6–1.2)
LYMPHOCYTES ABSOLUTE: 1.1 K/UL (ref 1–5.1)
LYMPHOCYTES RELATIVE PERCENT: 10 %
Lab: NORMAL
MACROCYTES: ABNORMAL
MCH RBC QN AUTO: 33.5 PG (ref 26–34)
MCHC RBC AUTO-ENTMCNC: 32.3 G/DL (ref 31–36)
MCV RBC AUTO: 103.6 FL (ref 80–100)
METAMYELOCYTES RELATIVE PERCENT: 2 %
METHADONE SCREEN, URINE: NORMAL
MICROCYTES: ABNORMAL
MICROSCOPIC EXAMINATION: NORMAL
MONOCYTES ABSOLUTE: 0.5 K/UL (ref 0–1.3)
MONOCYTES RELATIVE PERCENT: 5 %
MYELOCYTE PERCENT: 2 %
NEUTROPHILS ABSOLUTE: 9 K/UL (ref 1.7–7.7)
NEUTROPHILS RELATIVE PERCENT: 78 %
NITRITE, URINE: NEGATIVE
OPIATE SCREEN URINE: NORMAL
OXYCODONE URINE: NORMAL
PDW BLD-RTO: 17 % (ref 12.4–15.4)
PH UA: 7.5
PH UA: 7.5 (ref 5–8)
PHENCYCLIDINE SCREEN URINE: NORMAL
PLATELET # BLD: 647 K/UL (ref 135–450)
PLATELET SLIDE REVIEW: ABNORMAL
PMV BLD AUTO: 7.3 FL (ref 5–10.5)
POTASSIUM REFLEX MAGNESIUM: 4.6 MMOL/L (ref 3.5–5.1)
PROPOXYPHENE SCREEN: NORMAL
PROTEIN UA: NEGATIVE MG/DL
RBC # BLD: 2.6 M/UL (ref 4.2–5.9)
SALICYLATE, SERUM: <0.3 MG/DL (ref 15–30)
SLIDE REVIEW: ABNORMAL
SODIUM BLD-SCNC: 132 MMOL/L (ref 136–145)
SPECIFIC GRAVITY UA: 1.01 (ref 1–1.03)
TOTAL PROTEIN: 6.1 G/DL (ref 6.4–8.2)
TROPONIN: <0.01 NG/ML
URINE REFLEX TO CULTURE: NORMAL
URINE TYPE: NORMAL
UROBILINOGEN, URINE: 0.2 E.U./DL
WBC # BLD: 10.6 K/UL (ref 4–11)

## 2021-11-04 PROCEDURE — 82077 ASSAY SPEC XCP UR&BREATH IA: CPT

## 2021-11-04 PROCEDURE — 80143 DRUG ASSAY ACETAMINOPHEN: CPT

## 2021-11-04 PROCEDURE — 80048 BASIC METABOLIC PNL TOTAL CA: CPT

## 2021-11-04 PROCEDURE — 85025 COMPLETE CBC W/AUTO DIFF WBC: CPT

## 2021-11-04 PROCEDURE — 81003 URINALYSIS AUTO W/O SCOPE: CPT

## 2021-11-04 PROCEDURE — 80178 ASSAY OF LITHIUM: CPT

## 2021-11-04 PROCEDURE — 80307 DRUG TEST PRSMV CHEM ANLYZR: CPT

## 2021-11-04 PROCEDURE — 71045 X-RAY EXAM CHEST 1 VIEW: CPT

## 2021-11-04 PROCEDURE — 93005 ELECTROCARDIOGRAM TRACING: CPT | Performed by: EMERGENCY MEDICINE

## 2021-11-04 PROCEDURE — 70450 CT HEAD/BRAIN W/O DYE: CPT

## 2021-11-04 PROCEDURE — 93010 ELECTROCARDIOGRAM REPORT: CPT | Performed by: INTERNAL MEDICINE

## 2021-11-04 PROCEDURE — G8484 FLU IMMUNIZE NO ADMIN: HCPCS | Performed by: NURSE PRACTITIONER

## 2021-11-04 PROCEDURE — 80076 HEPATIC FUNCTION PANEL: CPT

## 2021-11-04 PROCEDURE — 84484 ASSAY OF TROPONIN QUANT: CPT

## 2021-11-04 PROCEDURE — 82140 ASSAY OF AMMONIA: CPT

## 2021-11-04 PROCEDURE — 99386 PREV VISIT NEW AGE 40-64: CPT | Performed by: NURSE PRACTITIONER

## 2021-11-04 PROCEDURE — 99284 EMERGENCY DEPT VISIT MOD MDM: CPT

## 2021-11-04 PROCEDURE — 80179 DRUG ASSAY SALICYLATE: CPT

## 2021-11-04 SDOH — ECONOMIC STABILITY: HOUSING INSECURITY
IN THE LAST 12 MONTHS, WAS THERE A TIME WHEN YOU DID NOT HAVE A STEADY PLACE TO SLEEP OR SLEPT IN A SHELTER (INCLUDING NOW)?: NO

## 2021-11-04 SDOH — ECONOMIC STABILITY: INCOME INSECURITY: IN THE LAST 12 MONTHS, WAS THERE A TIME WHEN YOU WERE NOT ABLE TO PAY THE MORTGAGE OR RENT ON TIME?: NO

## 2021-11-04 SDOH — ECONOMIC STABILITY: TRANSPORTATION INSECURITY
IN THE PAST 12 MONTHS, HAS THE LACK OF TRANSPORTATION KEPT YOU FROM MEDICAL APPOINTMENTS OR FROM GETTING MEDICATIONS?: NO

## 2021-11-04 SDOH — ECONOMIC STABILITY: TRANSPORTATION INSECURITY
IN THE PAST 12 MONTHS, HAS LACK OF TRANSPORTATION KEPT YOU FROM MEETINGS, WORK, OR FROM GETTING THINGS NEEDED FOR DAILY LIVING?: NO

## 2021-11-04 SDOH — ECONOMIC STABILITY: FOOD INSECURITY: WITHIN THE PAST 12 MONTHS, THE FOOD YOU BOUGHT JUST DIDN'T LAST AND YOU DIDN'T HAVE MONEY TO GET MORE.: NEVER TRUE

## 2021-11-04 SDOH — ECONOMIC STABILITY: FOOD INSECURITY: WITHIN THE PAST 12 MONTHS, YOU WORRIED THAT YOUR FOOD WOULD RUN OUT BEFORE YOU GOT MONEY TO BUY MORE.: NEVER TRUE

## 2021-11-04 ASSESSMENT — SOCIAL DETERMINANTS OF HEALTH (SDOH): HOW HARD IS IT FOR YOU TO PAY FOR THE VERY BASICS LIKE FOOD, HOUSING, MEDICAL CARE, AND HEATING?: NOT HARD AT ALL

## 2021-11-04 ASSESSMENT — PATIENT HEALTH QUESTIONNAIRE - PHQ9
SUM OF ALL RESPONSES TO PHQ QUESTIONS 1-9: 5
SUM OF ALL RESPONSES TO PHQ QUESTIONS 1-9: 5
2. FEELING DOWN, DEPRESSED OR HOPELESS: 3
SUM OF ALL RESPONSES TO PHQ QUESTIONS 1-9: 5
1. LITTLE INTEREST OR PLEASURE IN DOING THINGS: 2
SUM OF ALL RESPONSES TO PHQ9 QUESTIONS 1 & 2: 5

## 2021-11-04 NOTE — PROGRESS NOTES
2021    Tyronne Cooks (:  1960) is a 57 y.o. male, here for a preventive medicine evaluation. Pt is here today with his sister to establish care as a new pt. The  staff came back to let me know that the pt was unable to ambulate and \"looked really bad\". Since I was not familiar with this pt, I recommended that he may need to go to the ER, but his sister wanted him to see me first.      Pt was recently in the ER  for alcoholic liver disease, hypokalemia, hyponatremia and hypochloremia. Pt was prescribed hydroxyzine, multi vitamin, potassium and sodium chloride tablets. Pt's sister states that the pt recently moved here from [place] one year ago. She states that he was sober for 15 years, but relapsed in 2020 - lost his wife, best friend and father all within 2 months. She states that he quit drinking a week ago Tuesday, but the pt states \"18 months ago\". He is continuoulsy falling asleep in the chair, and does not know where he is or what year it is. His sister states that he lives alone, and she is leaving to go OOT tomorrow, so there is no one to stay with him. His sister states that she did not know that he was in the ER until the next day. The pt walked to a gas station to call EMS, and \"a friend\" took him home. Pt states that he is taking the medication prescribed by the ER and that he is eating and drinking. When I asked him what he had to eat and drink today, he fell asleep and did not answer me. Patient Active Problem List   Diagnosis    Nondependent alcohol abuse       Review of Systems     D/t lethargy, the pt was unable to answer questions r/t ROS. Prior to Visit Medications    Medication Sig Taking?  Authorizing Provider   potassium chloride (KLOR-CON M) 20 MEQ extended release tablet Take 1 tablet by mouth 2 times daily for 5 days Yes Dane Chu MD   sodium chloride 1 g tablet Take 1 tablet by mouth 3 times daily for 5 days Club or Organization Meetings:     Marital Status:    Intimate Partner Violence:     Fear of Current or Ex-Partner:     Emotionally Abused:     Physically Abused:     Sexually Abused:         Family History   Problem Relation Age of Onset    Heart Failure Mother         heart disease age 61    Other Father         dementia    Cancer Father         prostate       ADVANCE DIRECTIVE: N, <no information>    Vitals:    11/04/21 1255   BP: 112/70   Site: Left Upper Arm   Position: Sitting   Cuff Size: Large Adult   Pulse: 70   SpO2: 96%   Weight: 137 lb (62.1 kg)     Estimated body mass index is 21.46 kg/m² as calculated from the following:    Height as of 10/30/21: 5' 7\" (1.702 m). Weight as of this encounter: 137 lb (62.1 kg). Physical Exam  Constitutional:       Appearance: He is ill-appearing. He is not diaphoretic. HENT:      Head: Normocephalic. Cardiovascular:      Rate and Rhythm: Normal rate and regular rhythm. Heart sounds: Normal heart sounds. Pulmonary:      Effort: No respiratory distress. Breath sounds: Decreased breath sounds and wheezing present. Abdominal:      General: Bowel sounds are normal.      Palpations: Abdomen is soft. Tenderness: There is abdominal tenderness. Skin:     General: Skin is dry. Coloration: Skin is jaundiced. Neurological:      Mental Status: He is lethargic, disoriented and confused. Motor: Weakness present. Coordination: Coordination abnormal.      Gait: Gait abnormal.         No flowsheet data found.     Lab Results   Component Value Date    GLUCOSE 143 10/30/2021       The ASCVD Risk score (Ned Chávez, et al., 2013) failed to calculate for the following reasons:    Cannot find a previous HDL lab    Cannot find a previous total cholesterol lab    Immunization History   Administered Date(s) Administered    COVID-19, Sofia Peter, PF, 30mcg/0.3mL 04/15/2021, 05/06/2021       Health Maintenance   Topic Date Due    Hepatitis C screen  Never done    Pneumococcal 0-64 years Vaccine (1 of 2 - PPSV23) Never done    HIV screen  Never done    DTaP/Tdap/Td vaccine (1 - Tdap) Never done    Lipid screen  Never done    Colon cancer screen colonoscopy  Never done    Shingles Vaccine (1 of 2) Never done    Flu vaccine (1) Never done   [employer] Visit (AWV)  Never done    COVID-19 Vaccine (3 - Pfizer booster) 11/06/2021    Hepatitis A vaccine  Aged Out    Hepatitis B vaccine  Aged Out    Hib vaccine  Aged Out    Meningococcal (ACWY) vaccine  Aged Out          ASSESSMENT/PLAN:    1. Encounter to establish care  See HPI. No previous records to review other than recent ER visit d/t the fact that the pt just moved here from [place] one year ago. 2. Altered mental status, unspecified altered mental status type  Pt was unable to stay awake during exam.  He was disoriented to time and place. I am concerned that his hyponatremia has worsened. His sister is concerned b/c she is leaving tomorrow to go OOT and the pt lives alone with his pets. D/t altered mental status and lethargy, I recommended that the pt return to the ER and will likely need to be admitted. Pt and his sister agree with POC. The pt was unable to stand on his own, call 911 to transport pt to ER. [facility] transported pt to [address] 3. Alcohol abuse  Pt's sister states that the pt has not drank alcohol in 9 days. The pt mumbled that it had been 18 months. 4. Jaundice    5. Elevated liver enzymes    Return for Pt will need to f/u after d/c from hospital.       An electronic signature was used to authenticate this note.     --John Wallace, ED - CNP on 11/4/2021 at 2:22 PM

## 2021-11-05 ENCOUNTER — HOSPITAL ENCOUNTER (EMERGENCY)
Age: 61
Discharge: LEFT BEFORE DISCHARGE | End: 2021-11-05
Attending: EMERGENCY MEDICINE
Payer: MEDICARE

## 2021-11-05 ENCOUNTER — TELEPHONE (OUTPATIENT)
Dept: FAMILY MEDICINE CLINIC | Age: 61
End: 2021-11-05

## 2021-11-05 VITALS
HEIGHT: 67 IN | HEART RATE: 54 BPM | DIASTOLIC BLOOD PRESSURE: 97 MMHG | RESPIRATION RATE: 16 BRPM | SYSTOLIC BLOOD PRESSURE: 135 MMHG | TEMPERATURE: 98.5 F | BODY MASS INDEX: 22.76 KG/M2 | OXYGEN SATURATION: 95 % | WEIGHT: 145 LBS

## 2021-11-05 DIAGNOSIS — R10.84 GENERALIZED ABDOMINAL PAIN: Primary | ICD-10-CM

## 2021-11-05 LAB
A/G RATIO: 1.1 (ref 1.1–2.2)
ACETAMINOPHEN LEVEL: <5 UG/ML (ref 10–30)
ALBUMIN SERPL-MCNC: 3.4 G/DL (ref 3.4–5)
ALP BLD-CCNC: 532 U/L (ref 40–129)
ALT SERPL-CCNC: 55 U/L (ref 10–40)
ANION GAP SERPL CALCULATED.3IONS-SCNC: 7 MMOL/L (ref 3–16)
ANISOCYTOSIS: ABNORMAL
AST SERPL-CCNC: 46 U/L (ref 15–37)
ATYPICAL LYMPHOCYTE RELATIVE PERCENT: 1 % (ref 0–6)
BANDED NEUTROPHILS RELATIVE PERCENT: 12 % (ref 0–7)
BASOPHILS ABSOLUTE: 0 K/UL (ref 0–0.2)
BASOPHILS RELATIVE PERCENT: 0 %
BILIRUB SERPL-MCNC: 1.3 MG/DL (ref 0–1)
BUN BLDV-MCNC: 10 MG/DL (ref 7–20)
CALCIUM SERPL-MCNC: 9.7 MG/DL (ref 8.3–10.6)
CHLORIDE BLD-SCNC: 100 MMOL/L (ref 99–110)
CO2: 25 MMOL/L (ref 21–32)
CREAT SERPL-MCNC: 1 MG/DL (ref 0.8–1.3)
EOSINOPHILS ABSOLUTE: 0.1 K/UL (ref 0–0.6)
EOSINOPHILS RELATIVE PERCENT: 1 %
ETHANOL: NORMAL MG/DL (ref 0–0.08)
GFR AFRICAN AMERICAN: >60
GFR NON-AFRICAN AMERICAN: >60
GLUCOSE BLD-MCNC: 113 MG/DL (ref 70–99)
HCT VFR BLD CALC: 26.8 % (ref 40.5–52.5)
HEMOGLOBIN: 9.1 G/DL (ref 13.5–17.5)
LACTIC ACID: 0.9 MMOL/L (ref 0.4–2)
LYMPHOCYTES ABSOLUTE: 2.9 K/UL (ref 1–5.1)
LYMPHOCYTES RELATIVE PERCENT: 21 %
MACROCYTES: ABNORMAL
MCH RBC QN AUTO: 34.6 PG (ref 26–34)
MCHC RBC AUTO-ENTMCNC: 34.1 G/DL (ref 31–36)
MCV RBC AUTO: 101.6 FL (ref 80–100)
METAMYELOCYTES RELATIVE PERCENT: 1 %
MICROCYTES: ABNORMAL
MONOCYTES ABSOLUTE: 0.8 K/UL (ref 0–1.3)
MONOCYTES RELATIVE PERCENT: 6 %
NEUTROPHILS ABSOLUTE: 9.4 K/UL (ref 1.7–7.7)
NEUTROPHILS RELATIVE PERCENT: 58 %
PDW BLD-RTO: 17.5 % (ref 12.4–15.4)
PLATELET # BLD: 699 K/UL (ref 135–450)
PLATELET SLIDE REVIEW: ABNORMAL
PMV BLD AUTO: 6.3 FL (ref 5–10.5)
POIKILOCYTES: ABNORMAL
POLYCHROMASIA: ABNORMAL
POTASSIUM REFLEX MAGNESIUM: 4.9 MMOL/L (ref 3.5–5.1)
RBC # BLD: 2.64 M/UL (ref 4.2–5.9)
SALICYLATE, SERUM: <0.3 MG/DL (ref 15–30)
SLIDE REVIEW: ABNORMAL
SODIUM BLD-SCNC: 132 MMOL/L (ref 136–145)
STOMATOCYTES: ABNORMAL
TOTAL PROTEIN: 6.5 G/DL (ref 6.4–8.2)
TROPONIN: <0.01 NG/ML
WBC # BLD: 13.2 K/UL (ref 4–11)

## 2021-11-05 PROCEDURE — 82077 ASSAY SPEC XCP UR&BREATH IA: CPT

## 2021-11-05 PROCEDURE — 80053 COMPREHEN METABOLIC PANEL: CPT

## 2021-11-05 PROCEDURE — 80143 DRUG ASSAY ACETAMINOPHEN: CPT

## 2021-11-05 PROCEDURE — 84484 ASSAY OF TROPONIN QUANT: CPT

## 2021-11-05 PROCEDURE — 80179 DRUG ASSAY SALICYLATE: CPT

## 2021-11-05 PROCEDURE — 85025 COMPLETE CBC W/AUTO DIFF WBC: CPT

## 2021-11-05 PROCEDURE — 83605 ASSAY OF LACTIC ACID: CPT

## 2021-11-05 PROCEDURE — 99284 EMERGENCY DEPT VISIT MOD MDM: CPT

## 2021-11-05 RX ORDER — LITHIUM CARBONATE 150 MG/1
150 CAPSULE ORAL
Status: ON HOLD | COMMUNITY
End: 2021-11-07 | Stop reason: HOSPADM

## 2021-11-05 NOTE — ED PROVIDER NOTES
[address] ED      CHIEF COMPLAINT  Fatigue (Quit drinking alcohol cold turkey about a week ago, not feeling well. Sent here for Jaundice, elevated livery enzymes and altered mental status.  ), Jaundice, and Altered Mental Status       HISTORY OF PRESENT ILLNESS  Angelina Rojas is a 57 y.o. male with a history of alcohol abuse presenting to the ED for evaluation. He was referred by primary care. Patient states that he has been feeling fatigued for a while, has not felt well. His sister is here and states that for the past several days he has had episodes where he becomes confused. She states that when she was at his house this morning he was not even able to stand or walk. The patient was alone which is concerning to his sister. He had an appointment with primary care today, he was seen in the office and was thought to be confused and lethargic, so he was sent here. On my evaluation of the patient he is fully alert and oriented. He states he does not remember being at the primary care's office or coming here to the ED, but he is alert and awake at this time and denies any complaints. He states he has been fatigued for a while, otherwise denies fever, chest pain, headache, shortness of breath, abdominal pain, or vomiting. He states he has occasional diarrhea but denies hematochezia or melena. He denies any focal numbness or weakness. He is a smoker. He quit drinking alcohol a week ago. Was seen in the emergency department on 10/30 and given prescription for hydroxyzine, multivitamin and potassium he states he has been taking these. He also takes lithium. No other complaints, modifying factors or associated symptoms. I have reviewed the following from the nursing documentation. No past medical history on file.   Past Surgical History:   Procedure Laterality Date    HERNIA REPAIR      SHOULDER SURGERY      left 3 times due to injury     Family History   Problem Relation Age of Onset    Heart Failure Mother         heart disease age 61    Other Father         dementia    Cancer Father         prostate     Social History     Socioeconomic History    Marital status: Single     Spouse name: Not on file    Number of children: Not on file    Years of education: Not on file    Highest education level: Not on file   Occupational History    Not on file   Tobacco Use    Smoking status: Current Every Day Smoker     Packs/day: 2.00     Types: Cigarettes    Smokeless tobacco: Never Used   Substance and Sexual Activity    Alcohol use: Yes     Comment: former user    Drug use: Yes     Types: Marijuana (Weed), Methamphetamines (Crystal Meth), Opiates     Sexual activity: Not on file   Other Topics Concern    Not on file   Social History Narrative    Not on file     Social Determinants of Health     Financial Resource Strain: Low Risk     Difficulty of Paying Living Expenses: Not hard at all   Food Insecurity: No Food Insecurity    Worried About [address] in the Last Year: Never true    [facility] [employer] in the Last Year: Never true   Transportation Needs: No Transportation Needs    Lack of Transportation (Medical): No    Lack of Transportation (Non-Medical): No   Physical Activity:     Days of Exercise per Week:     Minutes of Exercise per Session:    Stress:     Feeling of Stress :    Social Connections:     Frequency of Communication with Friends and Family:     Frequency of Social Gatherings with Friends and Family:     Attends [employer] Services:     Active Member of Clubs or Organizations:     Attends Club or Organization Meetings:     Marital Status:    Intimate Partner Violence:     Fear of Current or Ex-Partner:     Emotionally Abused:     Physically Abused:     Sexually Abused:      No current facility-administered medications for this encounter.      Current Outpatient Medications   Medication Sig Dispense Refill    potassium chloride (KLOR-CON M) 20 MEQ extended release tablet Take 1 tablet by mouth 2 times daily for 5 days 10 tablet 0    sodium chloride 1 g tablet Take 1 tablet by mouth 3 times daily for 5 days 15 tablet 0    Multiple Vitamins-Minerals (MULTIVITAMIN WITH MINERALS) tablet Take 1 tablet by mouth daily 30 tablet 3    hydrOXYzine (ATARAX) 50 MG tablet Take 1 tablet by mouth every 6 hours as needed for Anxiety 30 tablet 0     Allergies   Allergen Reactions    Sulfa Antibiotics      rash       REVIEW OF SYSTEMS  10 systems reviewed, pertinent positives per HPI otherwise noted to be negative. PHYSICAL EXAM  /72   Pulse 89   Temp 99.3 °F (37.4 °C) (Oral)   Resp 18   Ht 5' 7\" (1.702 m)   Wt 145 lb (65.8 kg)   SpO2 100%   BMI 22.71 kg/m²    Physical exam:  General appearance: awake and cooperative. no distress. Chronically ill appearing. Skin: Warm and dry. No rashes or lesions. HENT: Normocephalic. Atraumatic. Mucus membranes are moist.   Neck: supple. No meningismus  Eyes: PERNELL. EOM intact. Heart: RRR. No murmurs. Lungs: Respirations unlabored. Diminished breath sounds diffusely. No wheezes, rales, or rhonchi. fair air exchange  Abdomen: No tenderness. Soft. Non distended. No peritoneal signs. Musculoskeletal: No extremity edema. Compartments soft. No deformity. No tenderness in the extremities. All extremities neurovascularly intact. Radial, Dp, and PT pulses +2/4 bilaterally  Neurological: Alert and oriented. No focal deficits. No aphasia or dysarthria. No gait ataxia. Psychiatric: Normal mood and affect. LABS  I have reviewed all labs for this visit.    Results for orders placed or performed during the hospital encounter of 11/04/21   CBC auto differential   Result Value Ref Range    WBC 10.6 4.0 - 11.0 K/uL    RBC 2.60 (L) 4.20 - 5.90 M/uL    Hemoglobin 8.7 (L) 13.5 - 17.5 g/dL    Hematocrit 27.0 (L) 40.5 - 52.5 %    .6 (H) 80.0 - 100.0 fL    MCH 33.5 26.0 - 34.0 pg    MCHC 32.3 31.0 - 36.0 g/dL    RDW 17.0 (H) 12.4 - 15.4 %    Platelets 229 (H) 552 - 450 K/uL    MPV 7.3 5.0 - 10.5 fL    PLATELET SLIDE REVIEW Increased     SLIDE REVIEW see below     Neutrophils % 78.0 %    Lymphocytes % 10.0 %    Monocytes % 5.0 %    Eosinophils % 0.0 %    Basophils % 0.0 %    Neutrophils Absolute 9.0 (H) 1.7 - 7.7 K/uL    Lymphocytes Absolute 1.1 1.0 - 5.1 K/uL    Monocytes Absolute 0.5 0.0 - 1.3 K/uL    Eosinophils Absolute 0.0 0.0 - 0.6 K/uL    Basophils Absolute 0.0 0.0 - 0.2 K/uL    Bands Relative 3 0 - 7 %    Metamyelocytes Relative 2 (A) %    Myelocyte Percent 2 (A) %    Anisocytosis 2+ (A)     Macrocytes Occasional (A)     Microcytes 1+ (A)     Hypochromia 1+ (A)    Ethanol   Result Value Ref Range    Ethanol Lvl None Detected mg/dL   Hepatic function panel   Result Value Ref Range    Total Protein 6.1 (L) 6.4 - 8.2 g/dL    Albumin 3.1 (L) 3.4 - 5.0 g/dL    Alkaline Phosphatase 582 (H) 40 - 129 U/L    ALT 51 (H) 10 - 40 U/L    AST 41 (H) 15 - 37 U/L    Total Bilirubin 1.2 (H) 0.0 - 1.0 mg/dL    Bilirubin, Direct 0.7 (H) 0.0 - 0.3 mg/dL    Bilirubin, Indirect 0.5 0.0 - 1.0 mg/dL   Basic Metabolic Panel w/ Reflex to MG   Result Value Ref Range    Sodium 132 (L) 136 - 145 mmol/L    Potassium reflex Magnesium 4.6 3.5 - 5.1 mmol/L    Chloride 101 99 - 110 mmol/L    CO2 24 21 - 32 mmol/L    Anion Gap 7 3 - 16    Glucose 103 (H) 70 - 99 mg/dL    BUN 9 7 - 20 mg/dL    CREATININE 0.9 0.8 - 1.3 mg/dL    GFR Non-African American >60 >60    GFR African American >60 >60    Calcium 9.4 8.3 - 10.6 mg/dL   Ammonia   Result Value Ref Range    Ammonia 24 16 - 60 umol/L   Lithium level   Result Value Ref Range    Lithium Lvl 1.5 (H) 0.6 - 1.2 mmol/L    Lithium Dose Amount Unknown    Acetaminophen level   Result Value Ref Range    Acetaminophen Level <5 (L) 10 - 30 ug/mL   Salicylate   Result Value Ref Range    Salicylate, Serum <0.9 (L) 15.0 - 30.0 mg/dL   Urinalysis Reflex to Culture    Specimen: Urine, clean catch   Result Value Ref Range Color, UA Yellow Straw/Yellow    Clarity, UA Clear Clear    Glucose, Ur Negative Negative mg/dL    Bilirubin Urine Negative Negative    Ketones, Urine Negative Negative mg/dL    Specific Gravity, UA 1.015 1.005 - 1.030    Blood, Urine Negative Negative    pH, UA 7.5 5.0 - 8.0    Protein, UA Negative Negative mg/dL    Urobilinogen, Urine 0.2 <2.0 E.U./dL    Nitrite, Urine Negative Negative    Leukocyte Esterase, Urine Negative Negative    Microscopic Examination Not Indicated     Urine Type NotGiven     Urine Reflex to Culture Not Indicated    Troponin   Result Value Ref Range    Troponin <0.01 <0.01 ng/mL   Drug screen multi urine   Result Value Ref Range    Amphetamine Screen, Urine Neg Negative <1000ng/mL    Barbiturate Screen, Ur Neg Negative <200 ng/mL    Benzodiazepine Screen, Urine Neg Negative <200 ng/mL    Cannabinoid Scrn, Ur Neg Negative <50 ng/mL    Cocaine Metabolite Screen, Urine Neg Negative <300 ng/mL    Opiate Scrn, Ur Neg Negative <300 ng/mL    PCP Screen, Urine Neg Negative <25 ng/mL    Methadone Screen, Urine Neg Negative <300 ng/mL    Propoxyphene Scrn, Ur Neg Negative <300 ng/mL    Oxycodone Urine Neg Negative <100 ng/ml    pH, UA 7.5     Drug Screen Comment: see below    EKG 12 Lead   Result Value Ref Range    Ventricular Rate 81 BPM    Atrial Rate 81 BPM    P-R Interval 172 ms    QRS Duration 92 ms    Q-T Interval 404 ms    QTc Calculation (Bazett) 469 ms    P Axis 75 degrees    R Axis 53 degrees    T Axis 60 degrees    Diagnosis       Normal sinus rhythmpossible right atrial enlargementNo previous ECGs availableConfirmed by JUAN LOWERY MD ([id number]) on 11/4/2021 5:33:02 PM       ECG  The Ekg interpreted by me shows  normal sinus rhythm with a rate of 81  Axis is   Normal  QTc is  within an acceptable range  Intervals and Durations are unremarkable.       ST Segments: normal      RADIOLOGY  XR CHEST (2 VW)    Result Date: 10/30/2021  EXAMINATION: TWO XRAY VIEWS OF THE CHEST 10/30/2021 9:53 pm COMPARISON: None. HISTORY: ORDERING SYSTEM PROVIDED HISTORY: sob TECHNOLOGIST PROVIDED HISTORY: Reason for exam:->sob Reason for Exam: pt to ED via EMS with c/o anxiety. pt walked to [facility] and EMS call was for \"convulsions\" per EMS pt has not taken lithium or drank water in 2 weeks, pt states he doesn't know why he hasnt taken it Acuity: Acute Type of Exam: Initial FINDINGS: The heart is normal.  The pulmonary vessels are normal.  The lungs are mildly hyperinflated. No consolidation or effusion is seen. The bones are intact. There is a metallic prosthesis in the left shoulder which is appears in good position. No acute cardiopulmonary abnormality. CT Head WO Contrast    Result Date: 11/4/2021  EXAMINATION: CT OF THE HEAD WITHOUT CONTRAST  11/4/2021 3:45 pm TECHNIQUE: CT of the head was performed without the administration of intravenous contrast. Dose modulation, iterative reconstruction, and/or weight based adjustment of the mA/kV was utilized to reduce the radiation dose to as low as reasonably achievable. COMPARISON: 10/30/2021 HISTORY: ORDERING SYSTEM PROVIDED HISTORY: confusion TECHNOLOGIST PROVIDED HISTORY: Reason for exam:->confusion Has a \"code stroke\" or \"stroke alert\" been called? ->No Decision Support Exception - unselect if not a suspected or confirmed emergency medical condition->Emergency Medical Condition (MA) Reason for Exam: Fatigue (Quit drinking alcohol cold turkey about a week ago, not feeling well. Sent here for Jaundice, elevated livery enzymes and altered mental status.  ) FINDINGS: BRAIN/VENTRICLES: The ventricles and sulci are diffusely enlarged. Low attenuation is seen in the periventricular and subcortical white matter. No acute intracranial hemorrhage or acute infarct is identified. ORBITS: The visualized portion of the orbits demonstrate no acute abnormality. SINUSES: Unchanged opacity left maxillary sinus.  SOFT TISSUES/SKULL:  No acute abnormality of the visualized skull or soft tissues. No acute intracranial abnormality. Diffuse atrophic changes with findings suggesting chronic microvascular ischemia Unchanged opacity left maxillary sinus most likely representing chronic sinusitis     CT Head WO Contrast    Result Date: 10/30/2021  EXAMINATION: CT OF THE HEAD WITHOUT CONTRAST  10/30/2021 9:41 pm TECHNIQUE: CT of the head was performed without the administration of intravenous contrast. Dose modulation, iterative reconstruction, and/or weight based adjustment of the mA/kV was utilized to reduce the radiation dose to as low as reasonably achievable. COMPARISON: None. HISTORY: ORDERING SYSTEM PROVIDED HISTORY: confusion TECHNOLOGIST PROVIDED HISTORY: Reason for exam:->confusion Has a \"code stroke\" or \"stroke alert\" been called? ->No Decision Support Exception - unselect if not a suspected or confirmed emergency medical condition->Emergency Medical Condition (MA) Reason for Exam: had episodes of seizures at a convenience store. has not taken litheium ix 2 weeks. Confusion Acuity: Acute Type of Exam: Initial FINDINGS: BRAIN/VENTRICLES: The ventricles and sulci are prominent. Pattern is consistent with age-related atrophy. No extra-axial fluid collections, and no sign of recent intracranial hemorrhage. Decreased attenuation is noted within the periventricular white matter. Pattern is consistent with chronic small vessel ischemic change. No acute edema or mass effect. No mass lesions are detected. ORBITS: The visualized portion of the orbits demonstrate no acute abnormality. SINUSES: Severe mucosal thickening in the left maxillary sinus. Paranasal sinuses otherwise clear. SOFT TISSUES/SKULL:  No acute abnormality of the visualized skull or soft tissues. 1. No acute intracranial abnormality. 2. Opacification of the left maxillary sinus that likely represents chronic sinusitis.      XR CHEST PORTABLE    Result Date: 11/4/2021  EXAMINATION: ONE XRAY VIEW OF THE CHEST 11/4/2021 3:48 pm COMPARISON: 10/30/2021 HISTORY: ORDERING SYSTEM PROVIDED HISTORY: confusion TECHNOLOGIST PROVIDED HISTORY: Reason for exam:->confusion Reason for Exam: Fatigue (Quit drinking alcohol cold turkey about a week ago, not feeling well. Sent here for Jaundice, elevated livery enzymes and altered mental status.  ) FINDINGS: The cardiac silhouette, mediastinal and hilar contours are normal.  There is chronic blunting of the right costophrenic angle, most likely fibrotic. There is no focal airspace disease. No pneumothorax. Left reverse shoulder replacement is partially visualized and unremarkable. No acute cardiopulmonary disease. ED COURSE/MDM  Patient seen and evaluated. Old records reviewed. Labs and imaging reviewed and results discussed with patient. Patient is a 57-year-old male presenting for evaluation from primary care office. His vital signs are within normal limits. He is not tachycardic or hypoxic. Initially on the RNs evaluation of the patient, he was pausing before answering questions and appeared somewhat confused. On my evaluation of the patient, he is fully alert and oriented. His sister is in the room with him, states that he \"snapped out of it\" about a minute before he entered the room to examine him. The patient only vaguely remembers being at the primary care office today. I reviewed his emergency department visit from 10/30 and he states he has been taking the medications prescribed. He denies any complaints on my evaluation. He states he has been fatigued and not felt well for a while, but nothing new today. He shows no sign of infection on exam normal blood work. He has an anemia with hemoglobin 8.7, dropped a gram from last week but he denies any active bleeding. I told the this could be from drinking alcohol chronically, however he also needs to follow back up with primary care for further studies as well as GI and potentially have a colonoscopy.   He is given a referral.  Otherwise he is found to have thrombocytosis, with this as well as his anemia he is also given a referral to hematology. Otherwise he does not have an JCALROS, no significant electrolyte abnormalities sodium is 132 which is actually improved from a few days ago. His hepatic function panel also appears improved with a bilirubin of 1.2 today, and a mild transaminitis. 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 Otherwise his ammonia is normal.  His head CT is negative for acute process. His lithium level is borderline high at 1.5, I did discuss with him that he needs to hold his lithium for the next 48 hours and then halve his dose in case this is causing his transient fatigue confusion. He does not appear to be in acute alcohol withdrawal. Since the patient has periods of confusion and not being able to ambulate or function, I did recommend admission to him. However he declined, and given the fact that he is alert and oriented at this time and exhibits capacity, he will be discharged home with recommendations that were previously stated. I did tell the patient that he can return to the ED at any point for reevaluation or certainly if his symptoms worsen. He voices understanding. During the patient's ED course, the patient was given:  Medications - No data to display     CLINICAL IMPRESSION  1. Fatigue, unspecified type    2. Macrocytic anemia    3. Lithium use    4. Alcoholic liver disease ([facility] [address]    5. Hyponatremia    6. Thrombocytosis        Blood pressure 113/72, pulse 89, temperature 99.3 °F (37.4 °C), temperature source Oral, resp. rate 18, height 5' 7\" (1.702 m), weight 145 lb (65.8 kg), SpO2 100 %. Patient was given scripts for the following medications. I counseled patient how to take these medications.    Discharge Medication List as of 11/4/2021  5:22 PM          Follow-up with:  ED Trotter - CNP  [address]  [phone]    Call today  for appointment    Blade Sahu MD  [address], [address] [facility]          Alexia Nicholson MD  [address] [facility] [phone]            DISCLAIMER: This chart was created using Dragon dictation software. Efforts were made by me to ensure accuracy, however some errors may be present due to limitations of this technology and occasionally words are not transcribed correctly.        Raquel [place]  11/05/21 4421

## 2021-11-05 NOTE — TELEPHONE ENCOUNTER
Patient called back requesting an appointment for observation. He said he left the hospital yesterday because he had to feed his animals and he wants to go back for \"observation\" I informed him if he needs to go back to the hospital he does not need an appointment that he would be evaluated in the ER.  He said \"ok I will go back probably in a day or two\" I asked if he was having any new symptoms and he said no just wanted \"to do the observation\"

## 2021-11-06 ENCOUNTER — APPOINTMENT (OUTPATIENT)
Dept: CT IMAGING | Age: 61
DRG: 091 | End: 2021-11-06
Payer: MEDICARE

## 2021-11-06 ENCOUNTER — HOSPITAL ENCOUNTER (INPATIENT)
Age: 61
LOS: 1 days | Discharge: HOME | DRG: 091 | End: 2021-11-07
Attending: STUDENT IN AN ORGANIZED HEALTH CARE EDUCATION/TRAINING PROGRAM | Admitting: INTERNAL MEDICINE
Payer: MEDICARE

## 2021-11-06 DIAGNOSIS — D64.9 ANEMIA, UNSPECIFIED TYPE: ICD-10-CM

## 2021-11-06 DIAGNOSIS — F12.90 MARIJUANA SMOKER: ICD-10-CM

## 2021-11-06 DIAGNOSIS — T56.894A LITHIUM TOXICITY, UNDETERMINED INTENT, INITIAL ENCOUNTER: Primary | ICD-10-CM

## 2021-11-06 DIAGNOSIS — R41.82 ALTERED MENTAL STATUS, UNSPECIFIED ALTERED MENTAL STATUS TYPE: ICD-10-CM

## 2021-11-06 DIAGNOSIS — R79.89 ELEVATED LIVER FUNCTION TESTS: ICD-10-CM

## 2021-11-06 DIAGNOSIS — R17 TOTAL BILIRUBIN, ELEVATED: ICD-10-CM

## 2021-11-06 DIAGNOSIS — R65.10 SIRS (SYSTEMIC INFLAMMATORY RESPONSE SYNDROME) (HCC): ICD-10-CM

## 2021-11-06 PROBLEM — T56.891A LITHIUM TOXICITY: Status: ACTIVE | Noted: 2021-11-06

## 2021-11-06 LAB
A/G RATIO: 1 (ref 1.1–2.2)
ACETAMINOPHEN LEVEL: <5 UG/ML (ref 10–30)
ALBUMIN SERPL-MCNC: 3.2 G/DL (ref 3.4–5)
ALP BLD-CCNC: 452 U/L (ref 40–129)
ALT SERPL-CCNC: 49 U/L (ref 10–40)
AMMONIA: 51 UMOL/L (ref 16–60)
AMPHETAMINE SCREEN, URINE: ABNORMAL
ANION GAP SERPL CALCULATED.3IONS-SCNC: 11 MMOL/L (ref 3–16)
ANION GAP SERPL CALCULATED.3IONS-SCNC: 6 MMOL/L (ref 3–16)
ANISOCYTOSIS: ABNORMAL
AST SERPL-CCNC: 48 U/L (ref 15–37)
BANDED NEUTROPHILS RELATIVE PERCENT: 3 % (ref 0–7)
BARBITURATE SCREEN URINE: ABNORMAL
BASOPHILS ABSOLUTE: 0 K/UL (ref 0–0.2)
BASOPHILS RELATIVE PERCENT: 0 %
BENZODIAZEPINE SCREEN, URINE: ABNORMAL
BILIRUB SERPL-MCNC: 1.2 MG/DL (ref 0–1)
BILIRUBIN URINE: NEGATIVE
BLOOD, URINE: NEGATIVE
BUN BLDV-MCNC: 10 MG/DL (ref 7–20)
BUN BLDV-MCNC: 13 MG/DL (ref 7–20)
CALCIUM SERPL-MCNC: 9 MG/DL (ref 8.3–10.6)
CALCIUM SERPL-MCNC: 9.5 MG/DL (ref 8.3–10.6)
CANNABINOID SCREEN URINE: POSITIVE
CHLORIDE BLD-SCNC: 101 MMOL/L (ref 99–110)
CHLORIDE BLD-SCNC: 108 MMOL/L (ref 99–110)
CLARITY: CLEAR
CO2: 21 MMOL/L (ref 21–32)
CO2: 24 MMOL/L (ref 21–32)
COCAINE METABOLITE SCREEN URINE: ABNORMAL
COLOR: YELLOW
CREAT SERPL-MCNC: 1.1 MG/DL (ref 0.8–1.3)
CREAT SERPL-MCNC: 1.3 MG/DL (ref 0.8–1.3)
EOSINOPHILS ABSOLUTE: 0.1 K/UL (ref 0–0.6)
EOSINOPHILS RELATIVE PERCENT: 1 %
ETHANOL: NORMAL MG/DL (ref 0–0.08)
GFR AFRICAN AMERICAN: >60
GFR AFRICAN AMERICAN: >60
GFR NON-AFRICAN AMERICAN: 56
GFR NON-AFRICAN AMERICAN: >60
GLUCOSE BLD-MCNC: 106 MG/DL (ref 70–99)
GLUCOSE BLD-MCNC: 122 MG/DL (ref 70–99)
GLUCOSE URINE: NEGATIVE MG/DL
HCT VFR BLD CALC: 24.4 % (ref 40.5–52.5)
HEMOGLOBIN: 8.1 G/DL (ref 13.5–17.5)
KETONES, URINE: NEGATIVE MG/DL
LACTIC ACID, SEPSIS: 1.8 MMOL/L (ref 0.4–1.9)
LEUKOCYTE ESTERASE, URINE: NEGATIVE
LITHIUM DOSE AMOUNT: ABNORMAL
LITHIUM DOSE AMOUNT: NORMAL
LITHIUM LEVEL: 1 MMOL/L (ref 0.6–1.2)
LITHIUM LEVEL: 2.7 MMOL/L (ref 0.6–1.2)
LYMPHOCYTES ABSOLUTE: 2.4 K/UL (ref 1–5.1)
LYMPHOCYTES RELATIVE PERCENT: 16 %
Lab: ABNORMAL
MAGNESIUM: 2.3 MG/DL (ref 1.8–2.4)
MCH RBC QN AUTO: 34 PG (ref 26–34)
MCHC RBC AUTO-ENTMCNC: 33.1 G/DL (ref 31–36)
MCV RBC AUTO: 102.5 FL (ref 80–100)
METHADONE SCREEN, URINE: ABNORMAL
MICROSCOPIC EXAMINATION: NORMAL
MONOCYTES ABSOLUTE: 1 K/UL (ref 0–1.3)
MONOCYTES RELATIVE PERCENT: 7 %
NEUTROPHILS ABSOLUTE: 11.2 K/UL (ref 1.7–7.7)
NEUTROPHILS RELATIVE PERCENT: 73 %
NITRITE, URINE: NEGATIVE
OCCULT BLOOD DIAGNOSTIC: NORMAL
OPIATE SCREEN URINE: ABNORMAL
OXYCODONE URINE: ABNORMAL
PDW BLD-RTO: 18 % (ref 12.4–15.4)
PH UA: 7
PH UA: 7 (ref 5–8)
PHENCYCLIDINE SCREEN URINE: ABNORMAL
PLATELET # BLD: 669 K/UL (ref 135–450)
PLATELET SLIDE REVIEW: ABNORMAL
PMV BLD AUTO: 7.3 FL (ref 5–10.5)
POIKILOCYTES: ABNORMAL
POLYCHROMASIA: ABNORMAL
POTASSIUM REFLEX MAGNESIUM: 4.5 MMOL/L (ref 3.5–5.1)
POTASSIUM SERPL-SCNC: 4.3 MMOL/L (ref 3.5–5.1)
PRO-BNP: 455 PG/ML (ref 0–124)
PROCALCITONIN: 0.68 NG/ML (ref 0–0.15)
PROPOXYPHENE SCREEN: ABNORMAL
PROTEIN UA: NEGATIVE MG/DL
RBC # BLD: 2.38 M/UL (ref 4.2–5.9)
SALICYLATE, SERUM: 0.4 MG/DL (ref 15–30)
SARS-COV-2, NAAT: NOT DETECTED
SLIDE REVIEW: ABNORMAL
SODIUM BLD-SCNC: 133 MMOL/L (ref 136–145)
SODIUM BLD-SCNC: 138 MMOL/L (ref 136–145)
SPECIFIC GRAVITY UA: <=1.005 (ref 1–1.03)
TOTAL CK: 88 U/L (ref 39–308)
TOTAL PROTEIN: 6.3 G/DL (ref 6.4–8.2)
TOXIC GRANULATION: PRESENT
TROPONIN: <0.01 NG/ML
URINE REFLEX TO CULTURE: NORMAL
URINE TYPE: NORMAL
UROBILINOGEN, URINE: 0.2 E.U./DL
WBC # BLD: 14.7 K/UL (ref 4–11)

## 2021-11-06 PROCEDURE — 96365 THER/PROPH/DIAG IV INF INIT: CPT

## 2021-11-06 PROCEDURE — 80143 DRUG ASSAY ACETAMINOPHEN: CPT

## 2021-11-06 PROCEDURE — 84145 PROCALCITONIN (PCT): CPT

## 2021-11-06 PROCEDURE — 70450 CT HEAD/BRAIN W/O DYE: CPT

## 2021-11-06 PROCEDURE — 96367 TX/PROPH/DG ADDL SEQ IV INF: CPT

## 2021-11-06 PROCEDURE — 80307 DRUG TEST PRSMV CHEM ANLYZR: CPT

## 2021-11-06 PROCEDURE — 6360000002 HC RX W HCPCS: Performed by: NURSE PRACTITIONER

## 2021-11-06 PROCEDURE — 6360000002 HC RX W HCPCS: Performed by: INTERNAL MEDICINE

## 2021-11-06 PROCEDURE — 1200000000 HC SEMI PRIVATE

## 2021-11-06 PROCEDURE — 96361 HYDRATE IV INFUSION ADD-ON: CPT

## 2021-11-06 PROCEDURE — 83735 ASSAY OF MAGNESIUM: CPT

## 2021-11-06 PROCEDURE — 6370000000 HC RX 637 (ALT 250 FOR IP): Performed by: INTERNAL MEDICINE

## 2021-11-06 PROCEDURE — 80178 ASSAY OF LITHIUM: CPT

## 2021-11-06 PROCEDURE — 74177 CT ABD & PELVIS W/CONTRAST: CPT

## 2021-11-06 PROCEDURE — 2580000003 HC RX 258: Performed by: INTERNAL MEDICINE

## 2021-11-06 PROCEDURE — 85025 COMPLETE CBC W/AUTO DIFF WBC: CPT

## 2021-11-06 PROCEDURE — 82550 ASSAY OF CK (CPK): CPT

## 2021-11-06 PROCEDURE — 2580000003 HC RX 258: Performed by: NURSE PRACTITIONER

## 2021-11-06 PROCEDURE — 93005 ELECTROCARDIOGRAM TRACING: CPT | Performed by: NURSE PRACTITIONER

## 2021-11-06 PROCEDURE — 83605 ASSAY OF LACTIC ACID: CPT

## 2021-11-06 PROCEDURE — 84484 ASSAY OF TROPONIN QUANT: CPT

## 2021-11-06 PROCEDURE — 82140 ASSAY OF AMMONIA: CPT

## 2021-11-06 PROCEDURE — 82077 ASSAY SPEC XCP UR&BREATH IA: CPT

## 2021-11-06 PROCEDURE — 81003 URINALYSIS AUTO W/O SCOPE: CPT

## 2021-11-06 PROCEDURE — 83880 ASSAY OF NATRIURETIC PEPTIDE: CPT

## 2021-11-06 PROCEDURE — 6360000004 HC RX CONTRAST MEDICATION: Performed by: NURSE PRACTITIONER

## 2021-11-06 PROCEDURE — 87040 BLOOD CULTURE FOR BACTERIA: CPT

## 2021-11-06 PROCEDURE — 80053 COMPREHEN METABOLIC PANEL: CPT

## 2021-11-06 PROCEDURE — 82270 OCCULT BLOOD FECES: CPT

## 2021-11-06 PROCEDURE — 96372 THER/PROPH/DIAG INJ SC/IM: CPT

## 2021-11-06 PROCEDURE — 99285 EMERGENCY DEPT VISIT HI MDM: CPT

## 2021-11-06 PROCEDURE — 80179 DRUG ASSAY SALICYLATE: CPT

## 2021-11-06 PROCEDURE — 87635 SARS-COV-2 COVID-19 AMP PRB: CPT

## 2021-11-06 PROCEDURE — 2500000003 HC RX 250 WO HCPCS: Performed by: INTERNAL MEDICINE

## 2021-11-06 RX ORDER — SODIUM CHLORIDE 9 MG/ML
25 INJECTION, SOLUTION INTRAVENOUS PRN
Status: DISCONTINUED | OUTPATIENT
Start: 2021-11-06 | End: 2021-11-07 | Stop reason: HOSPADM

## 2021-11-06 RX ORDER — SODIUM CHLORIDE 9 MG/ML
INJECTION, SOLUTION INTRAVENOUS CONTINUOUS
Status: DISCONTINUED | OUTPATIENT
Start: 2021-11-06 | End: 2021-11-07

## 2021-11-06 RX ORDER — FOLIC ACID 1 MG/1
1 TABLET ORAL DAILY
Status: DISCONTINUED | OUTPATIENT
Start: 2021-11-07 | End: 2021-11-07 | Stop reason: HOSPADM

## 2021-11-06 RX ORDER — MAGNESIUM SULFATE IN WATER 40 MG/ML
2000 INJECTION, SOLUTION INTRAVENOUS PRN
Status: DISCONTINUED | OUTPATIENT
Start: 2021-11-06 | End: 2021-11-07 | Stop reason: HOSPADM

## 2021-11-06 RX ORDER — LANOLIN ALCOHOL/MO/W.PET/CERES
100 CREAM (GRAM) TOPICAL DAILY
Status: DISCONTINUED | OUTPATIENT
Start: 2021-11-07 | End: 2021-11-07 | Stop reason: HOSPADM

## 2021-11-06 RX ORDER — 0.9 % SODIUM CHLORIDE 0.9 %
30 INTRAVENOUS SOLUTION INTRAVENOUS ONCE
Status: COMPLETED | OUTPATIENT
Start: 2021-11-06 | End: 2021-11-06

## 2021-11-06 RX ORDER — SODIUM CHLORIDE 0.9 % (FLUSH) 0.9 %
5-40 SYRINGE (ML) INJECTION EVERY 12 HOURS SCHEDULED
Status: DISCONTINUED | OUTPATIENT
Start: 2021-11-06 | End: 2021-11-07 | Stop reason: HOSPADM

## 2021-11-06 RX ORDER — POTASSIUM CHLORIDE 7.45 MG/ML
10 INJECTION INTRAVENOUS PRN
Status: DISCONTINUED | OUTPATIENT
Start: 2021-11-06 | End: 2021-11-07 | Stop reason: HOSPADM

## 2021-11-06 RX ORDER — ACETAMINOPHEN 650 MG/1
650 SUPPOSITORY RECTAL EVERY 6 HOURS PRN
Status: DISCONTINUED | OUTPATIENT
Start: 2021-11-06 | End: 2021-11-07 | Stop reason: HOSPADM

## 2021-11-06 RX ORDER — SODIUM CHLORIDE 0.9 % (FLUSH) 0.9 %
5-40 SYRINGE (ML) INJECTION PRN
Status: DISCONTINUED | OUTPATIENT
Start: 2021-11-06 | End: 2021-11-07 | Stop reason: HOSPADM

## 2021-11-06 RX ORDER — ONDANSETRON 4 MG/1
4 TABLET, ORALLY DISINTEGRATING ORAL EVERY 8 HOURS PRN
Status: DISCONTINUED | OUTPATIENT
Start: 2021-11-06 | End: 2021-11-07

## 2021-11-06 RX ORDER — ACETAMINOPHEN 325 MG/1
650 TABLET ORAL EVERY 6 HOURS PRN
Status: DISCONTINUED | OUTPATIENT
Start: 2021-11-06 | End: 2021-11-07 | Stop reason: HOSPADM

## 2021-11-06 RX ORDER — ONDANSETRON 2 MG/ML
4 INJECTION INTRAMUSCULAR; INTRAVENOUS EVERY 6 HOURS PRN
Status: DISCONTINUED | OUTPATIENT
Start: 2021-11-06 | End: 2021-11-07

## 2021-11-06 RX ORDER — SODIUM CHLORIDE 9 MG/ML
INJECTION, SOLUTION INTRAVENOUS CONTINUOUS
Status: DISCONTINUED | OUTPATIENT
Start: 2021-11-06 | End: 2021-11-06 | Stop reason: ALTCHOICE

## 2021-11-06 RX ORDER — LORAZEPAM 2 MG/ML
2 INJECTION INTRAMUSCULAR ONCE
Status: COMPLETED | OUTPATIENT
Start: 2021-11-06 | End: 2021-11-06

## 2021-11-06 RX ORDER — HALOPERIDOL 5 MG/ML
5 INJECTION INTRAMUSCULAR ONCE
Status: COMPLETED | OUTPATIENT
Start: 2021-11-06 | End: 2021-11-06

## 2021-11-06 RX ORDER — POLYETHYLENE GLYCOL 3350 17 G/17G
17 POWDER, FOR SOLUTION ORAL DAILY PRN
Status: DISCONTINUED | OUTPATIENT
Start: 2021-11-06 | End: 2021-11-07 | Stop reason: HOSPADM

## 2021-11-06 RX ORDER — M-VIT,TX,IRON,MINS/CALC/FOLIC 27MG-0.4MG
1 TABLET ORAL DAILY
Status: DISCONTINUED | OUTPATIENT
Start: 2021-11-07 | End: 2021-11-07 | Stop reason: HOSPADM

## 2021-11-06 RX ORDER — 0.9 % SODIUM CHLORIDE 0.9 %
1000 INTRAVENOUS SOLUTION INTRAVENOUS ONCE
Status: COMPLETED | OUTPATIENT
Start: 2021-11-06 | End: 2021-11-06

## 2021-11-06 RX ORDER — NICOTINE 21 MG/24HR
1 PATCH, TRANSDERMAL 24 HOURS TRANSDERMAL DAILY
Status: DISCONTINUED | OUTPATIENT
Start: 2021-11-06 | End: 2021-11-07 | Stop reason: HOSPADM

## 2021-11-06 RX ADMIN — AMPICILLIN SODIUM 1000 MG: 1 INJECTION, POWDER, FOR SOLUTION INTRAMUSCULAR; INTRAVENOUS at 18:43

## 2021-11-06 RX ADMIN — Medication 10 ML: at 21:52

## 2021-11-06 RX ADMIN — FOLIC ACID: 5 INJECTION, SOLUTION INTRAMUSCULAR; INTRAVENOUS; SUBCUTANEOUS at 21:56

## 2021-11-06 RX ADMIN — IOPAMIDOL 75 ML: 755 INJECTION, SOLUTION INTRAVENOUS at 17:14

## 2021-11-06 RX ADMIN — SODIUM CHLORIDE 2109 ML: 9 INJECTION, SOLUTION INTRAVENOUS at 17:48

## 2021-11-06 RX ADMIN — SODIUM CHLORIDE: 9 INJECTION, SOLUTION INTRAVENOUS at 22:28

## 2021-11-06 RX ADMIN — CEFTRIAXONE 2000 MG: 2 INJECTION, POWDER, FOR SOLUTION INTRAMUSCULAR; INTRAVENOUS at 19:23

## 2021-11-06 RX ADMIN — LORAZEPAM 2 MG: 2 INJECTION INTRAMUSCULAR; INTRAVENOUS at 15:50

## 2021-11-06 RX ADMIN — SODIUM CHLORIDE 1000 ML: 9 INJECTION, SOLUTION INTRAVENOUS at 21:21

## 2021-11-06 RX ADMIN — HALOPERIDOL LACTATE 5 MG: 5 INJECTION, SOLUTION INTRAMUSCULAR at 15:50

## 2021-11-06 ASSESSMENT — PAIN SCALES - GENERAL: PAINLEVEL_OUTOF10: 0

## 2021-11-06 NOTE — ED NOTES
Writer in to medicate patient at this time. Patient began wiggling around in bed after needle insertion.  tech [employer] helping to secure leg and patient continued to wiggle and bent the needle. Needle removed and intact, just bent. Needle replaced and medication administered.       Edward Delgado RN  11/06/21 9924

## 2021-11-06 NOTE — ED PROVIDER NOTES
Emergency Physician Note        Note Open Time: 8:56 PM EDT    Chief Complaint  Altered Mental Status (Found walking down [place]. [address])       History of Present Illness  Boni Solomon is a 57 y.o. male who presents to the ED for walking on the road. Patient was found by the police walking on the road and then they brought him back to his home. They called EMS who brought the patient to the ER. Patient states he has no complaints other than some slight abdominal pain and wants to be discharged. He states he was out walking on the road because he lives near there and likes to go for walks. He is not confused and denies any other complaints. He states he is scheduled to have some lab tests and procedures performed tomorrow [facility].    10 systems reviewed, pertinent positives per HPI otherwise noted to be negative    I have reviewed the following from the nursing documentation:      Prior to Admission medications    Medication Sig Start Date End Date Taking? Authorizing Provider   lithium 150 MG capsule Take 150 mg by mouth 3 times daily (with meals)   Yes Historical Provider, MD   potassium chloride (KLOR-CON M) 20 MEQ extended release tablet Take 1 tablet by mouth 2 times daily for 5 days 10/31/21 11/5/21 Yes Jose Selby MD   sodium chloride 1 g tablet Take 1 tablet by mouth 3 times daily for 5 days 10/31/21 11/5/21 Yes Jose Selby MD   Multiple Vitamins-Minerals (MULTIVITAMIN WITH MINERALS) tablet Take 1 tablet by mouth daily 10/31/21  Yes Jose Selby MD   hydrOXYzine (ATARAX) 50 MG tablet Take 1 tablet by mouth every 6 hours as needed for Anxiety 10/31/21 11/10/21 Yes Jose Selby MD       Allergies as of 11/05/2021 - Fully Reviewed 11/04/2021   Allergen Reaction Noted    Sulfa antibiotics  11/04/2021       No past medical history on file.      Surgical History:   Past Surgical History:   Procedure Laterality Date    HERNIA REPAIR      SHOULDER SURGERY      left 3 times due to injury        Family History:    Family History   Problem Relation Age of Onset    Heart Failure Mother         heart disease age 58    Other Father         dementia    Cancer Father         prostate       Social History     Socioeconomic History    Marital status: Single     Spouse name: Not on file    Number of children: Not on file    Years of education: Not on file    Highest education level: Not on file   Occupational History    Not on file   Tobacco Use    Smoking status: Current Every Day Smoker     Packs/day: 2.00     Types: Cigarettes    Smokeless tobacco: Never Used   Substance and Sexual Activity    Alcohol use: Yes     Comment: former user    Drug use: Yes     Types: Marijuana (Weed), Methamphetamines (Crystal Meth), Opiates     Sexual activity: Not on file   Other Topics Concern    Not on file   Social History Narrative    Not on file     Social Determinants of Health     Financial Resource Strain: Low Risk     Difficulty of Paying Living Expenses: Not hard at all   Food Insecurity: No Food Insecurity    Worried About [address] in the Last Year: Never true    [facility] in the Last Year: Never true   Transportation Needs: No Transportation Needs    Lack of Transportation (Medical): No    Lack of Transportation (Non-Medical): No   Physical Activity:     Days of Exercise per Week:     Minutes of Exercise per Session:    Stress:     Feeling of Stress :    Social Connections:     Frequency of Communication with Friends and Family:     Frequency of Social Gatherings with Friends and Family:     Attends Synagogue Services:     Active Member of Clubs or Organizations:     Attends Club or Organization Meetings:     Marital Status:    Intimate Partner Violence:     Fear of Current or Ex-Partner:     Emotionally Abused:     Physically Abused:     Sexually Abused:        Nursing notes reviewed.     ED Triage Vitals [11/05/21 2008]   Enc Vitals Group BP (!) 135/97      Pulse 54      Resp 16      Temp 98.5 °F (36.9 °C)      Temp Source Oral      SpO2 95 %      Weight 145 lb (65.8 kg)      Height 5' 7\" (1.702 m)      Head Circumference       Peak Flow       Pain Score       Pain Loc       Pain Edu? Excl. in [address]? GENERAL:  Awake, alert. Well developed, well nourished with no apparent distress. HENT:  Normocephalic, Atraumatic, moist mucous membranes. EYES:  Pupils equal round and reactive to light, Conjunctiva normal, extraocular movements normal.  NECK:  No meningeal signs, Supple. CHEST:  Regular rate and rhythm, chest wall non-tender. LUNGS:  Clear to auscultation bilaterally. ABDOMEN:  Soft, mild midepigastric and suprapubic tenderness, no rebound, rigidity or guarding, non-distended, normal bowel sounds. No costovertebral angle tenderness to palpation. BACK:  No tenderness. EXTREMITIES:  Normal range of motion, no edema, no bony tenderness, no deformity, distal pulses present. SKIN: Warm, dry and intact. NEUROLOGIC: Patient is oriented to person but thought that he was at [facility] as opposed to [facility].  He also knows the year and the day but thought it was October and not November. Strength 5/5 in bilateral upper and lower extremities. Sensation is intact to light touch in the upper and lower extremities. Cranial Nerves 2-12 are intact. Patellar DTRs intact. Finger-to-nose intact. RADIOLOGY  X-RAYS:  I have reviewed radiologic plain film image(s). ALL OTHER NON-PLAIN FILM IMAGES SUCH AS CT, ULTRASOUND AND MRI HAVE BEEN READ BY THE RADIOLOGIST.   CT ABDOMEN PELVIS W IV CONTRAST Additional Contrast? None    (Results Pending)        LABS  Labs Reviewed   CBC WITH AUTO DIFFERENTIAL - Abnormal; Notable for the following components:       Result Value    WBC 13.2 (*)     RBC 2.64 (*)     Hemoglobin 9.1 (*)     Hematocrit 26.8 (*)     .6 (*)     MCH 34.6 (*)     RDW 17.5 (*)     Platelets 756 (*) All other components within normal limits    Narrative:     Performed at:  [address], [facility] [employer]   Phone [phone]   COMPREHENSIVE METABOLIC PANEL W/ REFLEX TO MG FOR LOW K - Abnormal; Notable for the following components:    Sodium 132 (*)     Glucose 113 (*)     Total Bilirubin 1.3 (*)     Alkaline Phosphatase 532 (*)     ALT 55 (*)     AST 46 (*)     All other components within normal limits    Narrative:     Performed at:  [address], [facility] [employer]   Phone [phone]   ACETAMINOPHEN LEVEL - Abnormal; Notable for the following components:    Acetaminophen Level <5 (*)     All other components within normal limits    Narrative:     Performed at:  [place] [address] [employer]   Phone [phone]   SALICYLATE LEVEL - Abnormal; Notable for the following components:    Salicylate, Serum <9.2 (*)     All other components within normal limits    Narrative:     Performed at:  [address], [facility] [employer]   Phone [phone]   TROPONIN    Narrative:     Performed at:  [address], [address] [employer]   Phone [phone]   LACTIC ACID, PLASMA    Narrative:     Performed at:  [place] [address] [employer]   Phone [phone]   ETHANOL    Narrative:     Performed at:  [address], [address] [employer]   Phone [phone]   URINE RT REFLEX TO CULTURE   URINE DRUG [address]          I discussed the nature and purpose, risks and benefits, as well as, the alternatives of further evaluation with CAT scan and laboratory work with Mohsen Yu.  Mohsen Yu was given the time and opportunity to ask questions and consider their options, and after the discussion, Arben Patton decided to refuse. I informed Arben Patton that refusal could lead to, but was not limited to, death, permanent disability, or severe pain. If present, I asked the relatives or significant others of Arben Patton to dissuade them without success. Prior to refusing, their nurse and I determined and agreed that Arben Patton had the capacity to make this decision and understood the consequences of that decision. Arben Patton signed the refusal of treatment form and their nurse signed the form agreeing that the patient/guardian had received informed consent. After refusal, I made every reasonable opportunity to treat Arben Patton to the best of my ability. I advised the patient to return to the emergency department immediately for any new or worsening symptoms, such as vomiting, fever or increased pain. The patient voiced agreement and understanding of the treatment plan. Final Impression    1. Generalized abdominal pain        Blood pressure (!) 135/97, pulse 54, temperature 98.5 °F (36.9 °C), temperature source Oral, resp. rate 16, height 5' 7\" (1.702 m), weight 145 lb (65.8 kg), SpO2 95 %. Patient was given scripts for the following medications. I counseled patient how to take these medications. New Prescriptions    No medications on file       Disposition  Pt is in stable condition upon Other Disposition: Left AMA. This chart was generated using the [address] [place] [address] [address] dictation system. I created this record but it may contain dictation errors.           Sisi Ramos MD  11/05/21 5514

## 2021-11-06 NOTE — H&P
Hospital Medicine History & Physical      PCP: Jessica Davies, APRN - CNP    Date of Service: Pt seen/examined on 11/6/21 and admitted on 11/6/21 to Inpatient    Chief Complaint   Patient presents with    Aggressive Behavior     Arrives / [facility] for combative behavior. Family in wtg room to privide hx. Unknown if + hx of psych, drugs or alcohol. Pt alert and screaming \"time to go! \"       History Of Present Illness: The patient is a 57 y.o. male with PMH below, presents with aggressive behavior, confusion. Pt knows his name and that he is at [facility].  He has been in ER multiple times over the last several days. He was found to have an elevated Li level. He reportedly has hx of ETOHism but has not had any ETOH since ~10/25. He otherwise refuses to answer questions. Past Medical History:    No past medical history on file. Past Surgical History:        Procedure Laterality Date    HERNIA REPAIR      SHOULDER SURGERY      left 3 times due to injury       Medications Prior to Admission:    Prior to Admission medications    Medication Sig Start Date End Date Taking? Authorizing Provider   lithium 150 MG capsule Take 150 mg by mouth 3 times daily (with meals)    Historical Provider, MD   potassium chloride (KLOR-CON M) 20 MEQ extended release tablet Take 1 tablet by mouth 2 times daily for 5 days 10/31/21 11/5/21  Sisi Ramos MD   sodium chloride 1 g tablet Take 1 tablet by mouth 3 times daily for 5 days 10/31/21 11/5/21  Sisi Ramos MD   Multiple Vitamins-Minerals (MULTIVITAMIN WITH MINERALS) tablet Take 1 tablet by mouth daily 10/31/21   Sisi Ramos MD   hydrOXYzine (ATARAX) 50 MG tablet Take 1 tablet by mouth every 6 hours as needed for Anxiety 10/31/21 11/10/21  Sisi Ramos MD       Allergies:  Sulfa antibiotics    Social History:    TOBACCO:   reports that he has been smoking cigarettes. He has been smoking about 2.00 packs per day.  He has never used smokeless tobacco.  ETOH:   reports current alcohol use. Family History:  Reviewed in detail and negative for DM, Early CAD, Cancer (except as below). Positive as follows:        Problem Relation Age of Onset    Heart Failure Mother         heart disease age 58    Other Father         dementia    Cancer Father         prostate       REVIEW OF SYSTEMS:   Unable to obtain 2/2 pt MS.      PHYSICAL EXAM PERFORMED:  /82   Pulse 85   Temp 98 °F (36.7 °C) (Oral)   Resp 19   Ht 5' 10\" (1.778 m)   Wt 155 lb (70.3 kg)   SpO2 98%   BMI 22.24 kg/m²     GEN:  A&Ox1, NAD. HEENT:  NC/AT,EOMI, semi dry MM, no erythema/exudates or visible masses. CVS:  Normal S1,S2. RRR. Without M/G/R.   LUNG:   CTA-B. no wheezes, rales or rhonchi  ABD:  Soft, ND/NT, BS+ x4. Without G/R.  EXT: 2+ pulses, no c/c/e. Brisk cap refill. PSY:  Thought process confused, affect angry/aggressive. RAHEEM:  Does not follow commands very well but grossly: CN III-XII intact, moves all 4 spontaneously, sensory grossly intact. SKIN: Numerous scabs over legs. Chart review shows recent radiographs:  XR CHEST (2 VW)    Result Date: 10/30/2021  EXAMINATION: TWO XRAY VIEWS OF THE CHEST 10/30/2021 9:53 pm COMPARISON: None. HISTORY: ORDERING SYSTEM PROVIDED HISTORY: sob TECHNOLOGIST PROVIDED HISTORY: Reason for exam:->sob Reason for Exam: pt to ED via EMS with c/o anxiety. pt walked to [facility] and EMS call was for \"convulsions\" per EMS pt has not taken lithium or drank water in 2 weeks, pt states he doesn't know why he hasnt taken it Acuity: Acute Type of Exam: Initial FINDINGS: The heart is normal.  The pulmonary vessels are normal.  The lungs are mildly hyperinflated. No consolidation or effusion is seen. The bones are intact. There is a metallic prosthesis in the left shoulder which is appears in good position. No acute cardiopulmonary abnormality.      CT HEAD WO CONTRAST    Result Date: 11/6/2021  EXAMINATION: CT OF THE HEAD WITHOUT CONTRAST  11/6/2021 5:15 pm TECHNIQUE: CT of the head was performed without the administration of intravenous contrast. Dose modulation, iterative reconstruction, and/or weight based adjustment of the mA/kV was utilized to reduce the radiation dose to as low as reasonably achievable. COMPARISON: None. HISTORY: ORDERING SYSTEM PROVIDED HISTORY: ams TECHNOLOGIST PROVIDED HISTORY: If patient is on cardiac monitor and/or pulse ox, they may be taken off cardiac monitor and pulse ox, left on O2 if currently on. All monitors reattached when patient returns to room. Has a \"code stroke\" or \"stroke alert\" been called? ->No Reason for exam:->ams Decision Support Exception - unselect if not a suspected or confirmed emergency medical condition->Emergency Medical Condition (MA) Reason for Exam: AMS, aggressive behavior. Acuity: Acute Type of Exam: Initial Additional signs and symptoms: had to repeat scan due to patient movement not holding still FINDINGS: BRAIN/VENTRICLES: No evidence of acute intracranial hemorrhage, mass effect or midline shift. Hypoattenuating periventricular nonspecific white matter disease. No evidence of hydrocephalus. Cerebral atrophy. Limitations of motion artifact. ORBITS: The visualized portion of the orbits demonstrate no acute abnormality. SINUSES: Left maxillary sinus disease. The visualized paranasal sinuses and mastoid air cells otherwise demonstrate no acute abnormality. SOFT TISSUES/SKULL:  No acute abnormality of the visualized skull or soft tissues. No acute intracranial abnormality Cerebral atrophy Hypoattenuating white matter disease, nonspecific, however most often related to chronic microvascular ischemic changes. Left maxillary sinus disease.      CT Head WO Contrast    Result Date: 11/4/2021  EXAMINATION: CT OF THE HEAD WITHOUT CONTRAST  11/4/2021 3:45 pm TECHNIQUE: CT of the head was performed without the administration of intravenous contrast. Dose modulation, iterative reconstruction, and/or weight based adjustment of the mA/kV was utilized to reduce the radiation dose to as low as reasonably achievable. COMPARISON: 10/30/2021 HISTORY: ORDERING SYSTEM PROVIDED HISTORY: confusion TECHNOLOGIST PROVIDED HISTORY: Reason for exam:->confusion Has a \"code stroke\" or \"stroke alert\" been called? ->No Decision Support Exception - unselect if not a suspected or confirmed emergency medical condition->Emergency Medical Condition (MA) Reason for Exam: Fatigue (Quit drinking alcohol cold turkey about a week ago, not feeling well. Sent here for Jaundice, elevated livery enzymes and altered mental status.  ) FINDINGS: BRAIN/VENTRICLES: The ventricles and sulci are diffusely enlarged. Low attenuation is seen in the periventricular and subcortical white matter. No acute intracranial hemorrhage or acute infarct is identified. ORBITS: The visualized portion of the orbits demonstrate no acute abnormality. SINUSES: Unchanged opacity left maxillary sinus. SOFT TISSUES/SKULL:  No acute abnormality of the visualized skull or soft tissues. No acute intracranial abnormality. Diffuse atrophic changes with findings suggesting chronic microvascular ischemia Unchanged opacity left maxillary sinus most likely representing chronic sinusitis     CT Head WO Contrast    Result Date: 10/30/2021  EXAMINATION: CT OF THE HEAD WITHOUT CONTRAST  10/30/2021 9:41 pm TECHNIQUE: CT of the head was performed without the administration of intravenous contrast. Dose modulation, iterative reconstruction, and/or weight based adjustment of the mA/kV was utilized to reduce the radiation dose to as low as reasonably achievable. COMPARISON: None. HISTORY: ORDERING SYSTEM PROVIDED HISTORY: confusion TECHNOLOGIST PROVIDED HISTORY: Reason for exam:->confusion Has a \"code stroke\" or \"stroke alert\" been called? ->No Decision Support Exception - unselect if not a suspected or confirmed emergency medical condition->Emergency Medical Condition (MA) Reason for Exam: had episodes of seizures at a convenience store. has not taken litheium ix 2 weeks. Confusion Acuity: Acute Type of Exam: Initial FINDINGS: BRAIN/VENTRICLES: The ventricles and sulci are prominent. Pattern is consistent with age-related atrophy. No extra-axial fluid collections, and no sign of recent intracranial hemorrhage. Decreased attenuation is noted within the periventricular white matter. Pattern is consistent with chronic small vessel ischemic change. No acute edema or mass effect. No mass lesions are detected. ORBITS: The visualized portion of the orbits demonstrate no acute abnormality. SINUSES: Severe mucosal thickening in the left maxillary sinus. Paranasal sinuses otherwise clear. SOFT TISSUES/SKULL:  No acute abnormality of the visualized skull or soft tissues. 1. No acute intracranial abnormality. 2. Opacification of the left maxillary sinus that likely represents chronic sinusitis. CT ABDOMEN PELVIS W IV CONTRAST Additional Contrast? None    Result Date: 11/6/2021  EXAMINATION: CT OF THE ABDOMEN AND PELVIS WITH CONTRAST 11/6/2021 4:15 pm TECHNIQUE: CT of the abdomen and pelvis was performed with the administration of intravenous contrast. Multiplanar reformatted images are provided for review. Dose modulation, iterative reconstruction, and/or weight based adjustment of the mA/kV was utilized to reduce the radiation dose to as low as reasonably achievable. COMPARISON: None. HISTORY: ORDERING SYSTEM PROVIDED HISTORY: general abd pain TECHNOLOGIST PROVIDED HISTORY: Additional Contrast?->None Reason for exam:->general abd pain Decision Support Exception - unselect if not a suspected or confirmed emergency medical condition->Emergency Medical Condition (MA) Reason for Exam: agressive behavior, general abd pain Acuity: Acute Type of Exam: Initial FINDINGS: Lower Chest: Subtle dependent subsegmental atelectasis was noted in the right lower lobe. Organs:  The liver, gallbladder, spleen, both adrenals and pancreas appeared normal.  No renal mass, hydronephrosis, renal or ureteral calculi were identified. GI/Bowel: No colitis, diverticulitis or appendicitis were noted. Pelvis: No urinary bladder wall thickening was noted. The prostate was not enlarged. No ascites was identified. Peritoneum/Retroperitoneum: No abdominal or pelvic lymphadenopathy were noted. Bones/Soft Tissues: Acquired spinal stenosis was noted at the L4-5 disc with marked decrease in disc height and subtle grade 1 cheryl listhesis of L4 over L5. No renal mass, hydronephrosis, renal or ureteral calculi. No colitis, diverticulitis or appendicitis. No mass, ascites or lymphadenopathy. Acquired spinal stenosis at the L4-5 disc. XR CHEST PORTABLE    Result Date: 11/4/2021  EXAMINATION: ONE XRAY VIEW OF THE CHEST 11/4/2021 3:48 pm COMPARISON: 10/30/2021 HISTORY: ORDERING SYSTEM PROVIDED HISTORY: confusion TECHNOLOGIST PROVIDED HISTORY: Reason for exam:->confusion Reason for Exam: Fatigue (Quit drinking alcohol cold turkey about a week ago, not feeling well. Sent here for Jaundice, elevated livery enzymes and altered mental status.  ) FINDINGS: The cardiac silhouette, mediastinal and hilar contours are normal.  There is chronic blunting of the right costophrenic angle, most likely fibrotic. There is no focal airspace disease. No pneumothorax. Left reverse shoulder replacement is partially visualized and unremarkable. No acute cardiopulmonary disease.        EKG:    EKG 12 Lead [[id number]]    Collected: 11/06/21 1637    Updated: 11/06/21 1809     Ventricular Rate 99 BPM    Atrial Rate 99 BPM    P-R Interval 168 ms    QRS Duration 92 ms    Q-T Interval 388 ms    QTc Calculation (Bazett) 497 ms    P Axis 82 degrees    R Axis 76 degrees    T Axis 76 degrees    Diagnosis Normal sinus rhythmProlonged QTAbnormal ECGNo previous ECGs available     CBC:  Recent Labs     11/04/21  1450 11/05/21 2025 11/06/21  1625   WBC 10.6 13.2* 14.7*   HGB 8.7* 9.1* 8.1*   HCT 27.0* 26.8* 24.4*   * 699* 669*        RENAL  Recent Labs     11/04/21  1450 11/05/21 2025 11/06/21  1625   * 132* 133*   K 4.6 4.9 4.5    100 101   CO2 24 25 21   BUN 9 10 13   CREATININE 0.9 1.0 1.3   GLUCOSE 103* 113* 122*     LFT'S:  Recent Labs     11/04/21  1450 11/05/21 2025 11/06/21  1625   AST 41* 46* 48*   ALT 51* 55* 49*   BILIDIR 0.7*  --   --    BILITOT 1.2* 1.3* 1.2*   ALKPHOS 582* 532* 452*     CARDIAC ENZYMES:   Recent Labs     11/04/21  1450 11/05/21 2025 11/06/21  1625   TROPONINI <0.01 <0.01 <0.01     Lab Results   Component Value Date    PROBNP 455 (H) 11/06/2021       LACTIC ACID:  Recent Labs     11/05/21 2025 11/06/21  1625   LACTA 0.9  --    LACTSEPSIS  --  1.8       U/A:  Recent Labs     11/06/21  1810   LEUKOCYTESUR Negative   COLORU Yellow   CLARITYU Clear   SPECGRAV <=1.005   BLOODU Negative   GLUCOSEU Negative       Urine Tox Screen:  Recent Labs     11/04/21  1530 11/06/21  1810   LABAMPH Neg Neg   BARBSCNU Neg Neg   LABBENZ Neg Neg   CANSU Neg POSITIVE*   COCAIMETSCRU Neg Neg   OPIATESCREENURINE Neg Neg   PHENCYCLIDINESCREENURINE Neg Neg   LABMETH Neg Neg   PROPOX Neg Neg   PHUR 7.5  7.5 7.0  7.0   OXYCODONEUR Neg Neg     PHYSICIAN CERTIFICATION  I certify that Vesta Ravi is expected to be hospitalized for 2 midnights based on the following assessment and plan:    ASSESSMENT/PLAN:  1. Lithium toxicity w/ acute encephalopathy, Li level 2.7, was 1.5 on 11/4 and 1.7 on 10/30. IVF, renal panel and Li levels q6h. Nephro and intensivist c/s. Cont restraints and emergency hold for now. Pt may require a dose change as he no longer drinks ETOH. [facility] ICU. 2. Anemia, HGB 8.1 was 9.7 on 10/30. Chk occult stool. Monitor for signs of bleeding. Defer to DD for GI c/s if needed. 3. SIRS (HR, RR, WBC, PCT), unclear source. UA, CT a/p clear. CXR on 10/30 and 11/4 neg for acute process.   PCT mildly elevated at 0.68. IV ceftriaxone. F/u Cx. 4. Probable JCARLOS, Cr 1.3, likely related to Helena Babinski tox/DI. Multiple ER visits over last few days. No values prior to 10/30 but has ranged from 0.9-1.4. Suspect baseline is lower than these values as Helena Babinski has been elevated for all of them. IVF and repeats ordered as above. 5. Hx ETOH but reportedly none recently around 10/25 per notes. LEILA: none detected. MVT, thiamine and folate given. 6. Tobacco Abuse, unable to  cessation at this time 2/2 MS, 21 mg nicotine patch. DVT Prophylaxis: Lx  Diet: Full Liq  Code Status: Full Code   PT/OT Eval Status: Will order if needed and as patient condition allows  Dispo - Admit to inpatient ICU    Total critical care time caring for this patient with life threatening, unstable organ failure, including direct patient contact, management of life support systems, review of data including imaging and labs, discussions with other team members and physicians is 32 minutes so far today, excluding procedures. Cindy Lance MD    Thank you ED Saxena CNP for the opportunity to be involved in this patient's care. If you have any questions or concerns please feel free to contact me via the [employer] at [phone]. This chart was generated using the [address] [place] [address] [address]  [employer] system. I created this record but it may contain dictation errors given the limitations of this technology.

## 2021-11-06 NOTE — ED NOTES
Restraint 1 Hour RN assessment      Carmela Francis was aggressive with staff upon arrival to the ED. He was attempting to hit staff with attempts to deescalate unsuccessful. Carmela Francis was placed in Leather/Alternative Bilateral Wrist and Leather/Alternative Bilateral Ankle restraints due to Behavioral management problems. Currently patient is agitated and not accepting redirection. When asked if he understood what behavior caused him to be restrained he replied, \" because I was a bad boy\". He then began gyrating his hips while making groaning noises. Current mental status-awake and alert to person. At this time the patient does meet criteria for continued restraint AEB Agitated, Excessive Activity/Restless, Disorganized and Resistive behavior. Respirations 16. Skin-several small scabbed and some open areas on bilateral lower extremities.      Current vitals include- see flowsheet    Most recent lab values include:   Admission on 11/05/2021, Discharged on 11/05/2021   Component Date Value Ref Range Status    WBC 11/05/2021 13.2* 4.0 - 11.0 K/uL Final    RBC 11/05/2021 2.64* 4.20 - 5.90 M/uL Final    Hemoglobin 11/05/2021 9.1* 13.5 - 17.5 g/dL Final    Hematocrit 11/05/2021 26.8* 40.5 - 52.5 % Final    MCV 11/05/2021 101.6* 80.0 - 100.0 fL Final    MCH 11/05/2021 34.6* 26.0 - 34.0 pg Final    MCHC 11/05/2021 34.1  31.0 - 36.0 g/dL Final    RDW 11/05/2021 17.5* 12.4 - 15.4 % Final    Platelets 39/82/4974 699* 135 - 450 K/uL Final    MPV 11/05/2021 6.3  5.0 - 10.5 fL Final    PLATELET SLIDE REVIEW 11/05/2021 Increased   Final    SLIDE REVIEW 11/05/2021 see below   Final    Slide review agrees with reported results    Neutrophils % 11/05/2021 58.0  % Final    Lymphocytes % 11/05/2021 21.0  % Final    Monocytes % 11/05/2021 6.0  % Final    Eosinophils % 11/05/2021 1.0  % Final    Basophils % 11/05/2021 0.0  % Final    Neutrophils Absolute 11/05/2021 9.4* 1.7 - 7.7 K/uL Final    Lymphocytes Absolute 11/05/2021 2.9  1.0 - 5.1 K/uL Final    Monocytes Absolute 11/05/2021 0.8  0.0 - 1.3 K/uL Final    Eosinophils Absolute 11/05/2021 0.1  0.0 - 0.6 K/uL Final    Basophils Absolute 11/05/2021 0.0  0.0 - 0.2 K/uL Final    Bands Relative 11/05/2021 12* 0 - 7 % Final    Atypical Lymphocytes Relative 11/05/2021 1  0 - 6 % Final    Metamyelocytes Relative 11/05/2021 1* % Final    Anisocytosis 11/05/2021 1+*  Final    Macrocytes 11/05/2021 Occasional*  Final    Microcytes 11/05/2021 Occasional*  Final    Polychromasia 11/05/2021 Occasional*  Final    Poikilocytes 11/05/2021 Occasional*  Final    Stomatocytes 11/05/2021 Occasional*  Final    Sodium 11/05/2021 132* 136 - 145 mmol/L Final    Potassium reflex Magnesium 11/05/2021 4.9  3.5 - 5.1 mmol/L Final    Chloride 11/05/2021 100  99 - 110 mmol/L Final    CO2 11/05/2021 25  21 - 32 mmol/L Final    Anion Gap 11/05/2021 7  3 - 16 Final    Glucose 11/05/2021 113* 70 - 99 mg/dL Final    BUN 11/05/2021 10  7 - 20 mg/dL Final    CREATININE 11/05/2021 1.0  0.8 - 1.3 mg/dL Final    GFR Non- 11/05/2021 >60  >60 Final    Comment: >60 mL/min/1.73m2 EGFR, calc. for ages 25 and older using the  MDRD formula (not corrected for weight), is valid for stable  renal function.  GFR  11/05/2021 >60  >60 Final    Comment: Chronic Kidney Disease: less than 60 ml/min/1.73 sq.m. Kidney Failure: less than 15 ml/min/1.73 sq.m. Results valid for patients 18 years and older.       Calcium 11/05/2021 9.7  8.3 - 10.6 mg/dL Final    Total Protein 11/05/2021 6.5  6.4 - 8.2 g/dL Final    Albumin 11/05/2021 3.4  3.4 - 5.0 g/dL Final    Albumin/Globulin Ratio 11/05/2021 1.1  1.1 - 2.2 Final    Total Bilirubin 11/05/2021 1.3* 0.0 - 1.0 mg/dL Final    Alkaline Phosphatase 11/05/2021 532* 40 - 129 U/L Final    ALT 11/05/2021 55* 10 - 40 U/L Final    AST 11/05/2021 46* 15 - 37 U/L Final    Troponin 11/05/2021 <0.01  <0.01 ng/mL Final    Methodology by Troponin T    Lactic Acid 11/05/2021 0.9  0.4 - 2.0 mmol/L Final    Ethanol Lvl 11/05/2021 None Detected  mg/dL Final    Comment:    None Detected  Conversion factor:  100 mg/dl = .100 g/dl  For Medical Purposes Only      Acetaminophen Level 11/05/2021 <5* 10 - 30 ug/mL Final    Comment: Therapeutic Range: 10.0-30.0 ug/mL  Toxic: >=569 ug/mL      Salicylate, Serum 73/61/3654 <0.3* 15.0 - 30.0 mg/dL Final    Comment: Therapeutic Range: 15.0-30.0 mg/dL  Toxic: >30.0 mg/dL              Venecia Puga RN  11/06/21 [id number]

## 2021-11-06 NOTE — ED NOTES
Calm.  Restraints removed. Placed posey waist restraint on. Resting quietly.        Radames Zaman RN  11/06/21 [id number]

## 2021-11-06 NOTE — ED NOTES
Pt removed own IV, leaving AMA.   Pt calling hyun Grijalva, [address] [place] [address]  11/05/21 2054

## 2021-11-06 NOTE — ED PROVIDER NOTES
I independently examined and evaluated Glen Jennings. In brief, Glen Jennings is a 57 y.o. male with a past medical history of alcohol abuse, who presents to the ED complaining of altered mental status. Family called EMS due to aggression. Patient has been seen multiple times over the last few days for transient altered mental status. At this time, patient is unable to answer orientation questions. He is aggressive with staff. Patient will not answer any questions regarding his current symptoms. Patient seen on 11/4 for confusion and abdominal pain. His work-up at that time was remarkable for mild hyponatremia, mild transaminitis, mild lithium level elevation. Provider at that time did recommend admission but patient declined. At that time patient was alert and oriented and demonstrated capacity and thus was discharged. Patient was seen again yesterday for abdominal pain. Provider at that time recommended CT of the abdomen pelvis but patient declined. Patient was signed out [address] Head 11/4/21  Impression   No acute intracranial abnormality.       Diffuse atrophic changes with findings suggesting chronic microvascular   ischemia       Unchanged opacity left maxillary sinus most likely representing chronic   sinusitis         REVIEW OF SYSTEMS  All systems reviewed, pertinent positives per HPI otherwise noted to be negative. Focused exam revealed   PHYSICAL EXAM  BP (!) 157/88   Pulse 110   Temp 98.4 °F (36.9 °C) (Oral)   Resp 18   Ht 5' 10\" (1.778 m)   Wt 155 lb (70.3 kg)   SpO2 100%   BMI 22.24 kg/m²    GENERAL APPEARANCE: Awake and alert. uncooperative. Aggressive with staff. HENT: Normocephalic. Atraumatic. Mucous membranes are moist. Bilateral TMs without hemotympanum, no septal hematoma. No blood in the oropharynx. NECK: Supple. Full range of motion of the neck without stiffness or pain.   Does not specifically cooperate with commands but noted to be moving her head from side to side without any difficulty. EYES: PERRL. EOM's grossly intact. Questionable intermittent nystagmus. HEART/CHEST: Tachycardia, RR. No murmurs. Chest wall is not tender to palpation. LUNGS: Respirations unlabored. CTAB. Good air exchange. Speaking comfortably in full sentences. ABDOMEN: No tenderness. Soft. Non-distended. No masses. No organomegaly. No guarding or rebound. MUSCULOSKELETAL: No extremity edema. Compartments soft. No deformity. No tenderness in the extremities. All extremities neurovascularly intact. SKIN: Warm and dry. No acute rashes. Scattered abrasions. NEUROLOGICAL: Alert, patient oriented to place but not to self or time. No gross facial drooping. Strength 5/5, sensation intact. PSYCHIATRIC: Normal mood and affect. ED course / MDM:   Overall patient presenting for altered mental status. This is patient's third visit within the past few days for complaints of confusion and abdominal pain. On previous visits he has left AGAINST MEDICAL ADVICE. At this time, patient symptoms have progressed, he does not demonstrate capacity at this time. Physical exam remarkable for intermittent aggression, lack of cooperation, no appreciable trauma. Differential diagnosis includes but is not limited to: Hypoxemia, ischemic encephalopathy, hepatic encephalopathy, seizure or postictal state, hypoglycemia and hyperglycemia,  hypotension and hypoperfusion, electrolytes disturbances, Infectious processes such as pneumonia or urinary tract infection, Substance use or withdrawal, Medication adverse event or interaction, CVA, subdural hematoma, meningitis, encephalitis, thyroid disease, arrhythmia, MI or CHF, hyperthermia or hypothermia, Dehydration, sleep deprivation    Labs, EKG, and imaging obtained. Workup showed:    CT ABDOMEN PELVIS W IV CONTRAST Additional Contrast? None   Final Result   No renal mass, hydronephrosis, renal or ureteral calculi.       No Given elevated procalcitonin will start on antibiotics. Will provide coverage for meningitis given confusion and sepsis criteria. Patient does not have any obvious meningitis on exam.  At this time, due to patient agitation whenever we attempt to engage with him, do not feel that a lumbar puncture could be safely performed. [ER]     1839 Urinalysis shows no evidence of blood or infection. [ER]     1857 UDS positive for cannabinoids [ER]     1857 CT abd/pelvis: IMPRESSION:  No renal mass, hydronephrosis, renal or ureteral calculi.     No colitis, diverticulitis or appendicitis.     No mass, ascites or lymphadenopathy.     Acquired spinal stenosis at the L4-5 disc. [ER]     1915 Covid swab negative [ER]     1916 CT Head: IMPRESSION:  No acute intracranial abnormality     Cerebral atrophy     Hypoattenuating white matter disease, nonspecific, however most often related  to chronic microvascular ischemic changes.     Left maxillary sinus disease. [ER]      ED Course User Index  [ER] Jory Quiñones MD       Based on results of work-up, I am concerned for altered mental status of unknown etiology, lithium toxicity, and concern for infection. At this time, do feel the patient requires admission for further work-up and management. Discussed the patient with hospital team.      CLINICAL IMPRESSION  1. Lithium toxicity, undetermined intent, initial encounter    2. SIRS (systemic inflammatory response syndrome) (HCC)    3. Altered mental status, unspecified altered mental status type    4. Anemia, unspecified type    5. Elevated liver function tests    6. Total bilirubin, elevated    7. Marijuana smoker        Blood pressure (!) 163/84, pulse 90, temperature 98 °F (36.7 °C), temperature source Oral, resp. rate 22, height 5' 7\" (1.702 m), weight 138 lb 7.2 oz (62.8 kg), SpO2 100 %. Irwin Bowen was admitted in stable condition.       All diagnostic, treatment, and disposition decisions were made by myself in conjunction with the advanced practice provider. For all further details of the patient's emergency department visit, please see the advanced practice provider's documentation. Comment: Please note this report has been produced using speech recognition software and may contain errors related to that system including errors in grammar, punctuation, and spelling, as well as words and phrases that may be inappropriate. If there are any questions or concerns please feel free to contact the dictating provider for clarification.         Raleigh Santiago MD  11/12/21 5181

## 2021-11-06 NOTE — ED NOTES
PerfectServe sent to Dr. Bernard Ash at 48 [facility]  11/06/21 1836    PerfectServe sent to Dr. Pb Samaniego at 1901 due to Dr. Bernard Ash not calling back before [address] [place] [address]  11/06/21 1902

## 2021-11-06 NOTE — ED PROVIDER NOTES
[address] ED  EMERGENCY DEPARTMENT ENCOUNTER        Pt Name: Arben Olivera  MRN: [id number]  [place] 1960  Date of evaluation: 11/6/2021  Provider: ED Cerna CNP  PCP: ED Arenas CNP  ED Attending: Rc Chu MD    CHIEF COMPLAINT       Chief Complaint   Patient presents with   [facility] Aggressive Behavior     Arrives / [facility] for combative behavior. Family in wtg room to privide hx. Unknown if + hx of psych, drugs or alcohol. Pt alert and screaming \"time to go! \"       HISTORY OF PRESENT ILLNESS   (Location/Symptom, Timing/Onset, Context/Setting, Quality, Duration, Modifying Factors, Severity)  Note limiting factors. Arben Olivera is a 57 y.o. male for concerns for violent behavior. Onset was today. Context includes pt was brought in by ems after family called due to his violent behaviors. Pt was see yesterday after he was found walking in the road but he was discharged yesterday. Alleviating factors include nothing. Aggravating factors include nothing. Pain is unable to assess. nothing has been used for pain today. After looking at the chart the patient has been seen multiple times. He was seen on 10/30/2021 for anxiety state and alcohol disease. He was also seen 11/4/2021 for fatigue. He was also seen yesterday on 11/5/2021 and had tests ordered but elected to leave AMA. Nursing Notes were all reviewed and agreed with or any disagreements were addressed  in the HPI. REVIEW OF SYSTEMS  (2-9 systems for level 4, 10 or more for level 5)     Review of Systems   Unable to perform ROS: Psychiatric disorder       Positivesand Pertinent negatives as per HPI. Except as noted above in the ROS, all other systems were reviewed and negative. PAST MEDICAL HISTORY   No past medical history on file.       SURGICAL HISTORY       Past Surgical History:   Procedure Laterality Date    HERNIA REPAIR      SHOULDER SURGERY      left 3 times due to injury         CURRENT MEDICATIONS       Previous Medications    HYDROXYZINE (ATARAX) 50 MG TABLET    Take 1 tablet by mouth every 6 hours as needed for Anxiety    LITHIUM 150 MG CAPSULE    Take 150 mg by mouth 3 times daily (with meals)    MULTIPLE VITAMINS-MINERALS (MULTIVITAMIN WITH MINERALS) TABLET    Take 1 tablet by mouth daily    POTASSIUM CHLORIDE (KLOR-CON M) 20 MEQ EXTENDED RELEASE TABLET    Take 1 tablet by mouth 2 times daily for 5 days    SODIUM CHLORIDE 1 G TABLET    Take 1 tablet by mouth 3 times daily for 5 days         ALLERGIES     Sulfa antibiotics    FAMILY HISTORY       Family History   Problem Relation Age of Onset    Heart Failure Mother         heart disease age 61    Other Father         dementia    Cancer Father         prostate         SOCIAL HISTORY       Social History     Socioeconomic History    Marital status:      Spouse name: Not on file    Number of children: Not on file    Years of education: Not on file    Highest education level: Not on file   Occupational History    Not on file   Tobacco Use    Smoking status: Current Every Day Smoker     Packs/day: 2.00     Types: Cigarettes    Smokeless tobacco: Never Used   Substance and Sexual Activity    Alcohol use: Yes     Comment: former user    Drug use: Yes     Types: Marijuana (Jarethtarah Cisneros), [facility]hetamines ([facility]), Opiates     Sexual activity: Not on file   Other Topics Concern    Not on file   Social History Narrative    Not on file     Social Determinants of Health     Financial Resource Strain: Low Risk     Difficulty of Paying Living Expenses: Not hard at all   Food Insecurity: No Food Insecurity    Worried About [address] in the Last Year: Never true    [address] in the Last Year: Never true   Transportation Needs: No Transportation Needs    Lack of Transportation (Medical): No    Lack of Transportation (Non-Medical):  No   Physical Activity:     Days of Exercise per Laboratory  [facility] [address],  [place], [place]   Phone [phone]   BRAIN NATRIURETIC PEPTIDE - Abnormal; Notable for the following components:    Pro- (*)     All other components within normal limits    Narrative:     Performed at:  [facility] [address],  [place], [place]   Phone [phone]   PROCALCITONIN - Abnormal; Notable for the following components:    Procalcitonin 0.68 (*)     All other components within normal limits    Narrative:     Performed at:  [place] [address],  [place], [place]   Phone [phone]   LITHIUM LEVEL - Abnormal; Notable for the following components:    Lithium Lvl 2.7 (*)     All other components within normal limits    Narrative:     Karolina Cronin tel. [phone],  Chemistry results called to and read back by Jovani Blue RN, 11/06/2021  17:06, by Indu Drew  Performed at:  [place] [address],  [place], [place]   Phone [phone], [place]    Narrative:     Performed at:  [place] [address],  [place], [facility]   Phone [phone]   CULTURE, BLOOD 1   CULTURE, BLOOD 1   URINE RT REFLEX TO CULTURE    Narrative:     Performed at:  [facility] [address],  [place], [place]   Phone [phone]   ETHANOL    Narrative:     Performed at:  [place] [address],  [place], [place]   Phone [phone]   TROPONIN    Narrative:     Performed at:  [place] [address],  [place], [place]   Phone [phone]   CK    Narrative:     Performed at:  [place] [address],  [place], [place]   Phone [phone]   LACTATE, SEPSIS    Narrative:     Performed at:  [facility] Cerebral atrophy      Hypoattenuating white matter disease, nonspecific, however most often related   to chronic microvascular ischemic changes. Left maxillary sinus disease. No results found. PROCEDURES   Unless otherwise noted below, none     Procedures    CRITICAL CARE TIME   N/A    CONSULTS:  IP CONSULT TO HOSPITALIST      EMERGENCY DEPARTMENT COURSE and DIFFERENTIAL DIAGNOSIS/MDM:   Vitals:    Vitals:    11/06/21 1545 11/06/21 1600 11/06/21 1630 11/06/21 1924   BP: (!) 157/88 (!) 147/79 (!) 142/89 129/82   Pulse: 105  102 85   Resp:   23 19   Temp:    98 °F (36.7 °C)   TempSrc:    Oral   SpO2: 97%  97% 98%   Weight:       Height:           Patient was given the following medications:  Medications   cefTRIAXone (ROCEPHIN) 2000 mg IVPB in D5W 50ml minibag (0 mg IntraVENous Stopped 11/6/21 1948)   vancomycin 1500 mg in dextrose 5% 300 mL IVPB (1,500 mg IntraVENous New Bag 11/6/21 1951)   0.9 % sodium chloride infusion (has no administration in time range)   sodium chloride 0.9 % 394 mL with folic acid 1 mg, adult multi-vitamin with vitamin k 10 mL, thiamine 100 mg (has no administration in time range)   haloperidol lactate (HALDOL) injection 5 mg (5 mg IntraMUSCular Given 11/6/21 1550)   LORazepam (ATIVAN) injection 2 mg (2 mg IntraMUSCular Given 11/6/21 1550)   0.9 % sodium chloride bolus (2,109 mLs IntraVENous New Bag 11/6/21 1748)   iopamidol (ISOVUE-370) 76 % injection 75 mL (75 mLs IntraVENous Given 11/6/21 1714)   ampicillin 1000 mg ivpb mini bag (0 mg IntraVENous Stopped 11/6/21 1919)       Patient was seen and evaluated by Dr. Dwayne Cee and myself. Patient here today for complaints of aggressive behavior. Patient was brought in by EMS after the family called stating that he was becoming violent at home. On arriving to the ED the patient was disoriented and kept repeating phrases and was aggressive. Patient states that he is going home.   He was unable to articulate his location or the Coty Arreola MD who agreed with the assessment and plan. The patient and / or the family were informed of the results of any tests, a time was given to answer questions, a plan was proposed and they agreed with plan. FINAL IMPRESSION      1. Lithium toxicity, undetermined intent, initial encounter    2. SIRS (systemic inflammatory response syndrome) (HCC)    3. Altered mental status, unspecified altered mental status type    4. Anemia, unspecified type    5. Elevated liver function tests    6. Total bilirubin, elevated    7. Marijuana smoker          DISPOSITION/PLAN   DISPOSITION        PATIENT REFERRED TO:  No follow-up provider specified.     DISCHARGE MEDICATIONS:  New Prescriptions    No medications on file       DISCONTINUED MEDICATIONS:  Discontinued Medications    No medications on file              (Please note that portions of this note were completed with a voice recognition program.  Efforts were made to edit the dictations but occasionally words are mis-transcribed.)    ED Islas CNP (electronically signed)       ED Islas CNP  11/06/21 2009       ED Islas CNP  11/06/21 2130       ED Islas CNP  11/06/21 2136       ED Islas CNP  11/06/21 2139       ED Islas CNP  11/06/21 2152

## 2021-11-06 NOTE — ED NOTES
Brother here at bedside states pt is al alcoholic but has not been drinking as far as they know. States today is the first time that they have seen him confused like this. He wandered off into the woods. They found him crawling on the ground confused and combative. Pt has been depressed since his wife  2 yrs ago and his father 1 yr ago. Disabled from a work injury many years ago.        Darcus Sandifer, RN  [phone]

## 2021-11-07 VITALS
HEART RATE: 90 BPM | SYSTOLIC BLOOD PRESSURE: 163 MMHG | TEMPERATURE: 98 F | BODY MASS INDEX: 21.73 KG/M2 | RESPIRATION RATE: 22 BRPM | WEIGHT: 138.45 LBS | DIASTOLIC BLOOD PRESSURE: 84 MMHG | HEIGHT: 67 IN | OXYGEN SATURATION: 100 %

## 2021-11-07 LAB
ALBUMIN SERPL-MCNC: 2.8 G/DL (ref 3.4–5)
ANION GAP SERPL CALCULATED.3IONS-SCNC: 7 MMOL/L (ref 3–16)
ANISOCYTOSIS: ABNORMAL
BASOPHILS ABSOLUTE: 0 K/UL (ref 0–0.2)
BASOPHILS RELATIVE PERCENT: 0 %
BUN BLDV-MCNC: 9 MG/DL (ref 7–20)
CALCIUM SERPL-MCNC: 8.1 MG/DL (ref 8.3–10.6)
CHLORIDE BLD-SCNC: 111 MMOL/L (ref 99–110)
CO2: 21 MMOL/L (ref 21–32)
CREAT SERPL-MCNC: 0.8 MG/DL (ref 0.8–1.3)
EKG ATRIAL RATE: 99 BPM
EKG DIAGNOSIS: NORMAL
EKG P AXIS: 82 DEGREES
EKG P-R INTERVAL: 168 MS
EKG Q-T INTERVAL: 388 MS
EKG QRS DURATION: 92 MS
EKG QTC CALCULATION (BAZETT): 497 MS
EKG R AXIS: 76 DEGREES
EKG T AXIS: 76 DEGREES
EKG VENTRICULAR RATE: 99 BPM
EOSINOPHILS ABSOLUTE: 0 K/UL (ref 0–0.6)
EOSINOPHILS RELATIVE PERCENT: 0 %
GFR AFRICAN AMERICAN: >60
GFR NON-AFRICAN AMERICAN: >60
GLUCOSE BLD-MCNC: 97 MG/DL (ref 70–99)
HCT VFR BLD CALC: 22.4 % (ref 40.5–52.5)
HEMOGLOBIN: 7.4 G/DL (ref 13.5–17.5)
LITHIUM DOSE AMOUNT: NORMAL
LITHIUM DOSE AMOUNT: NORMAL
LITHIUM LEVEL: 0.6 MMOL/L (ref 0.6–1.2)
LITHIUM LEVEL: 0.8 MMOL/L (ref 0.6–1.2)
LYMPHOCYTES ABSOLUTE: 2 K/UL (ref 1–5.1)
LYMPHOCYTES RELATIVE PERCENT: 16 %
MACROCYTES: ABNORMAL
MCH RBC QN AUTO: 34.2 PG (ref 26–34)
MCHC RBC AUTO-ENTMCNC: 33 G/DL (ref 31–36)
MCV RBC AUTO: 103.8 FL (ref 80–100)
MONOCYTES ABSOLUTE: 0.5 K/UL (ref 0–1.3)
MONOCYTES RELATIVE PERCENT: 4 %
NEUTROPHILS ABSOLUTE: 9.8 K/UL (ref 1.7–7.7)
NEUTROPHILS RELATIVE PERCENT: 80 %
NUCLEATED RED BLOOD CELLS: 1 /100 WBC
PDW BLD-RTO: 18.3 % (ref 12.4–15.4)
PHOSPHORUS: 3.4 MG/DL (ref 2.5–4.9)
PLATELET # BLD: 689 K/UL (ref 135–450)
PLATELET SLIDE REVIEW: ABNORMAL
PMV BLD AUTO: 7.2 FL (ref 5–10.5)
POLYCHROMASIA: ABNORMAL
POTASSIUM SERPL-SCNC: 3.9 MMOL/L (ref 3.5–5.1)
RBC # BLD: 2.16 M/UL (ref 4.2–5.9)
SLIDE REVIEW: ABNORMAL
SODIUM BLD-SCNC: 139 MMOL/L (ref 136–145)
TOXIC GRANULATION: PRESENT
WBC # BLD: 12.2 K/UL (ref 4–11)

## 2021-11-07 PROCEDURE — 80069 RENAL FUNCTION PANEL: CPT

## 2021-11-07 PROCEDURE — 6370000000 HC RX 637 (ALT 250 FOR IP): Performed by: PSYCHIATRY & NEUROLOGY

## 2021-11-07 PROCEDURE — 6360000002 HC RX W HCPCS: Performed by: INTERNAL MEDICINE

## 2021-11-07 PROCEDURE — 36415 COLL VENOUS BLD VENIPUNCTURE: CPT

## 2021-11-07 PROCEDURE — G0008 ADMIN INFLUENZA VIRUS VAC: HCPCS | Performed by: INTERNAL MEDICINE

## 2021-11-07 PROCEDURE — 2500000003 HC RX 250 WO HCPCS: Performed by: INTERNAL MEDICINE

## 2021-11-07 PROCEDURE — 2580000003 HC RX 258: Performed by: INTERNAL MEDICINE

## 2021-11-07 PROCEDURE — 80178 ASSAY OF LITHIUM: CPT

## 2021-11-07 PROCEDURE — 85025 COMPLETE CBC W/AUTO DIFF WBC: CPT

## 2021-11-07 PROCEDURE — 99223 1ST HOSP IP/OBS HIGH 75: CPT | Performed by: PSYCHIATRY & NEUROLOGY

## 2021-11-07 PROCEDURE — 90686 IIV4 VACC NO PRSV 0.5 ML IM: CPT | Performed by: INTERNAL MEDICINE

## 2021-11-07 PROCEDURE — 99239 HOSP IP/OBS DSCHRG MGMT >30: CPT | Performed by: INTERNAL MEDICINE

## 2021-11-07 PROCEDURE — 93010 ELECTROCARDIOGRAM REPORT: CPT | Performed by: INTERNAL MEDICINE

## 2021-11-07 PROCEDURE — 6370000000 HC RX 637 (ALT 250 FOR IP): Performed by: INTERNAL MEDICINE

## 2021-11-07 PROCEDURE — 99223 1ST HOSP IP/OBS HIGH 75: CPT | Performed by: INTERNAL MEDICINE

## 2021-11-07 RX ORDER — RISPERIDONE 1 MG/1
1 TABLET, FILM COATED ORAL 2 TIMES DAILY
Status: DISCONTINUED | OUTPATIENT
Start: 2021-11-07 | End: 2021-11-07 | Stop reason: HOSPADM

## 2021-11-07 RX ORDER — RISPERIDONE 1 MG/1
1 TABLET, FILM COATED ORAL 2 TIMES DAILY
Qty: 60 TABLET | Refills: 0 | Status: SHIPPED | OUTPATIENT
Start: 2021-11-07 | End: 2021-11-18 | Stop reason: SDUPTHER

## 2021-11-07 RX ORDER — DOXYCYCLINE HYCLATE 100 MG
100 TABLET ORAL 2 TIMES DAILY
Qty: 14 TABLET | Refills: 0 | Status: SHIPPED | OUTPATIENT
Start: 2021-11-07 | End: 2021-11-14

## 2021-11-07 RX ORDER — PROCHLORPERAZINE EDISYLATE 5 MG/ML
10 INJECTION INTRAMUSCULAR; INTRAVENOUS EVERY 6 HOURS PRN
Status: DISCONTINUED | OUTPATIENT
Start: 2021-11-07 | End: 2021-11-07 | Stop reason: HOSPADM

## 2021-11-07 RX ORDER — LANOLIN ALCOHOL/MO/W.PET/CERES
100 CREAM (GRAM) TOPICAL DAILY
Qty: 30 TABLET | Refills: 3 | Status: SHIPPED | OUTPATIENT
Start: 2021-11-08 | End: 2022-03-18 | Stop reason: SDUPTHER

## 2021-11-07 RX ORDER — RISPERIDONE 1 MG/1
1 TABLET, FILM COATED ORAL 2 TIMES DAILY
Qty: 60 TABLET | Refills: 3 | OUTPATIENT
Start: 2021-11-07

## 2021-11-07 RX ORDER — SODIUM CHLORIDE, SODIUM LACTATE, POTASSIUM CHLORIDE, CALCIUM CHLORIDE 600; 310; 30; 20 MG/100ML; MG/100ML; MG/100ML; MG/100ML
INJECTION, SOLUTION INTRAVENOUS CONTINUOUS
Status: DISCONTINUED | OUTPATIENT
Start: 2021-11-07 | End: 2021-11-07 | Stop reason: HOSPADM

## 2021-11-07 RX ADMIN — SODIUM CHLORIDE, POTASSIUM CHLORIDE, SODIUM LACTATE AND CALCIUM CHLORIDE: 600; 310; 30; 20 INJECTION, SOLUTION INTRAVENOUS at 10:08

## 2021-11-07 RX ADMIN — DOXYCYCLINE 100 MG: 100 INJECTION, POWDER, LYOPHILIZED, FOR SOLUTION INTRAVENOUS at 08:14

## 2021-11-07 RX ADMIN — Medication 100 MG: at 10:02

## 2021-11-07 RX ADMIN — ENOXAPARIN SODIUM 40 MG: 40 INJECTION SUBCUTANEOUS at 10:03

## 2021-11-07 RX ADMIN — RISPERIDONE 1 MG: 1 TABLET ORAL at 12:32

## 2021-11-07 RX ADMIN — FOLIC ACID 1 MG: 1 TABLET ORAL at 10:02

## 2021-11-07 RX ADMIN — INFLUENZA A VIRUS A/VICTORIA/2570/2019 IVR-215 (H1N1) ANTIGEN (PROPIOLACTONE INACTIVATED), INFLUENZA A VIRUS A/CAMBODIA/E0826360/2020 IVR-224 (H3N2) ANTIGEN (PROPIOLACTONE INACTIVATED), INFLUENZA B VIRUS B/VICTORIA/705/2018 BVR-11 ANTIGEN (PROPIOLACTONE INACTIVATED), INFLUENZA B VIRUS B/PHUKET/3073/2013 BVR-1B ANTIGEN (PROPIOLACTONE INACTIVATED) 0.5 ML: 15; 15; 15; 15 INJECTION, SUSPENSION INTRAMUSCULAR at 12:56

## 2021-11-07 RX ADMIN — MULTIPLE VITAMINS W/ MINERALS TAB 1 TABLET: TAB at 10:02

## 2021-11-07 RX ADMIN — SODIUM CHLORIDE: 9 INJECTION, SOLUTION INTRAVENOUS at 04:38

## 2021-11-07 RX ADMIN — SODIUM CHLORIDE: 9 INJECTION, SOLUTION INTRAVENOUS at 06:02

## 2021-11-07 NOTE — ED NOTES
Presenting Problem: Patient presents to ICU on a SOB. He was brought in yesterday by police after family reported violent behaviors. Pt was combative upon arrival to ED. His Lithium level was at 2.7 at that time. Pt states he does not remember the events of yesterday and describes it as a, \"blackout. \" Pt states he is unsure why his Lithium level was so elevated and that he had recently decreased his dose by half. He states he has been prescribed Lithium for about 13 years for dx of Bipolar D/O. Pt states he has a long hx of alcohol abuse. He was sober for around 13 years and relapsed on alcohol in 05/20 after losing his wife, his father, and his best friend, all in a 80 day period. He states he quit drinking 2 weeks ago and reports this is when he cut his Lithium dose in half. He denies outpt psychiatry and states a PCP from [place] has been prescribing this. Pt states he would be willing to start in outpt tx for psychiatry and counseling and has plans to follow up. At present time, pt is alert and oriented x4. His thoughts are linear and eye-contact is good. He answers all questions appropriately, denies all AVH, and does not appear to be RTIS. Pt denies all HI, SI, plan, and intent. He does not want to stay in the hospital and is unwilling to sign in on a voluntary basis. Pt states he would like to go home so he can take care of his animals. Appearance/Hygiene:  hospital attire, lying in bed, fair grooming and fair hygiene   Motor Behavior: WNL   Attitude: cooperative  Affect: normal affect   Speech: normal pitch and normal volume  Mood: euthymic   Thought Processes: Goal directed  Perceptions: Absent   Thought content: future oriented    Orientation: A&Ox4   Memory: impaired recent as a result of Lithium toxicity   Concentration: Good    Insight/ judgement: fair judgment      Psychosocial and contextual factors: Pt lives at home alone. He moved to [place] from [place] last year.  He describes adequate support from his brother and sister. He is currently unemployed and on [address]. C-SSRS flowsheet is complete. Psychiatric History (including current outpatient provider and past inpatient admissions): Pt reports previous psychiatric hospitalization in 1995 which he describes as alcohol related. He was dx with Bipolar D/O and has taken Lithium for several years. He is taking a prescription he still has from a PCP when he lived in [place]. He denies active outpt psychiatry, however, is willing to start in services and is requesting referrals. Access to Firearms: Denies    ASSESSMENT FOR IMMINENT FUTURE DANGER:    RISK FACTORS:    [x]  Age <25 or >49   [x]  Male gender   []  Depressed mood   []  Active suicidal ideation   []  Suicide plan   []  Suicide attempt   []  Access to lethal means   []  Prior suicide attempt   []  Active substance abuse    []  Highly impulsive behaviors   []  Not attending to self-care/ADLs    []  Recent significant loss   []  Chronic pain or medical illness   []  Social isolation   []  History of violence    []  Active psychosis   []  Cognitive impairment    []  No outpatient services in place   []  Medication noncompliance   []  No collateral information to support safety  [] Self- injurious/ Self-harm behavior    PROTECTIVE FACTORS:  [] Age >25 and <55  [] Female gender   [x] Denies depression  [x] Denies suicidal ideation  [x] Does not have lethal plan   [x] Does not have access to guns or weapons  [x] Patient is verbally maxx for safety  [x] No prior suicide attempts  [] No active substance abuse  [x] Patient has social or family support  [x] No active psychosis or cognitive dysfunction  [] Physically healthy  [] Has outpatient services in place  [] Compliant with recommended medications  [] Collateral information from. ..supports patient safety   [x] Patient is future oriented with plans to follow up in outpt psychiatry     [facility]  11/7/2021

## 2021-11-07 NOTE — CONSULTS
Daily    folic acid  1 mg Oral Daily    sodium chloride flush  5-40 mL IntraVENous 2 times per day    enoxaparin  40 mg SubCUTAneous Daily    nicotine  1 patch TransDERmal Daily     Continuous Infusions:   sodium chloride      sodium chloride 150 mL/hr at 11/07/21 0602     PRN Meds:  prochlorperazine, sodium chloride flush, sodium chloride, polyethylene glycol, acetaminophen **OR** acetaminophen, potassium chloride, magnesium sulfate    ALLERGIES:  Patient is allergic to sulfa antibiotics. REVIEW OF SYSTEMS:  Constitutional: Negative for fever  HENT: Negative for sore throat  Eyes: Negative for redness   Respiratory: Negative for dyspnea, cough  Cardiovascular: Negative for chest pain  Gastrointestinal: Negative for vomiting, diarrhea   Genitourinary: Negative for hematuria   Musculoskeletal: Negative for arthralgias   Skin: Negative for rash  Neurological: Negative for syncope  Hematological: Negative for adenopathy  Psychiatric/Behavorial: Negative for anxiety    PHYSICAL EXAM:  Blood pressure 136/76, pulse 80, temperature 97.9 °F (36.6 °C), temperature source Axillary, resp. rate 26, height 5' 7\" (1.702 m), weight 138 lb 7.2 oz (62.8 kg), SpO2 94 %.' on room air  General: ill appearing. Eyes: PERRL. No sclera icterus. No conjunctival injection. ENT: No discharge. Pharynx clear. Neck: Trachea midline. Normal thyroid. Resp: No accessory muscle use. No crackles. No wheezing. No rhonchi. No dullness on percussion. CV: Regular rate. Regular rhythm. No mumur or rub. No edema. Peripheral pulses are 2+. Capillary refill is less than 3 seconds. GI: Non-tender. Non-distended. No masses. No organomegaly. Normal bowel sounds. No hernia. Skin: Warm and dry. No nodule on exposed extremities. No rash on exposed extremities. Lymph: No cervical LAD. No supraclavicular LAD. M/S: No cyanosis. No joint deformity. No clubbing. Neuro: Alert and oriented x3. Patellar reflexes are symmetric.   Psych: No agitation, no anxiety, affect is full. LABS:  CBC:   Recent Labs     11/05/21 2025 11/06/21  1625 11/07/21  0315   WBC 13.2* 14.7* 12.2*   HGB 9.1* 8.1* 7.4*   HCT 26.8* 24.4* 22.4*   .6* 102.5* 103.8*   * 669* 689*     BMP:   Recent Labs     11/06/21  1625 11/06/21  2100 11/07/21  0315   * 138 139   K 4.5 4.3 3.9    108 111*   CO2 21 24 21   PHOS  --   --  3.4   BUN 13 10 9   CREATININE 1.3 1.1 0.8     LIVER PROFILE:   Recent Labs     11/04/21  1450 11/05/21 2025 11/06/21 1625   AST 41* 46* 48*   ALT 51* 55* 49*   BILIDIR 0.7*  --   --    BILITOT 1.2* 1.3* 1.2*   ALKPHOS 582* 532* 452*     PT/INR: No results for input(s): PROTIME, INR in the last 72 hours. APTT: No results for input(s): APTT in the last 72 hours. UA:  Recent Labs     11/06/21  1810   COLORU Yellow   PHUR 7.0  7.0   CLARITYU Clear   SPECGRAV <=1.005   LEUKOCYTESUR Negative   UROBILINOGEN 0.2   BILIRUBINUR Negative   BLOODU Negative   GLUCOSEU Negative     No results for input(s): PHART, PNJ5DOJ, PO2ART in the last 72 hours. Cultures:      11/6/2021 SARS-CoV-2 negative  11/6/2021 blood sent    Chest imaging was reviewed by me and showed:   CXR 11/4/2021 no focal airspace disease    Abdominal CT 11/6/2021  Lower Chest: Subtle dependent subsegmental atelectasis was noted in the right   lower lobe.       Organs:  The liver, gallbladder, spleen, both adrenals and pancreas appeared   normal.  No renal mass, hydronephrosis, renal or ureteral calculi were   identified.       GI/Bowel: No colitis, diverticulitis or appendicitis were noted.       Pelvis: No urinary bladder wall thickening was noted.  The prostate was not   enlarged.  No ascites was identified.       Peritoneum/Retroperitoneum: No abdominal or pelvic lymphadenopathy were noted.       Bones/Soft Tissues: Acquired spinal stenosis was noted at the L4-5 disc with   marked decrease in disc height and subtle grade 1 cheryl listhesis of L4 over   L5.         Impression   No renal mass, hydronephrosis, renal or ureteral calculi.       No colitis, diverticulitis or appendicitis.       No mass, ascites or lymphadenopathy.       Acquired spinal stenosis at the L4-5 disc. Head CT 11/6/2021 no acute intracranial abnormality, chronic microvascular changes    ASSESSMENT:  Acute lithium toxicity with acute toxic encephalopathy  CAP with bilateral lower lobe ground glass infiltrates, could be atelectasis, but in this clinical setting more likely to be an acute pneumonia  Acute on chronic anemia  Leukocytosis and elevated procalcitonin without clear source of infection  JCARLOS-resolved with IV fluids  H/O alcohol abuse, none since 10/25/2021    Tobacco abuse  Cannabis abuse    PLAN:  Poison control was contacted, recommended serial lithium levels, QTc monitoring and IV hydration. Benzodiazepines for agitation  Doxycycline D#1/7  Folate, thiamine, MVI  Psychiatry to see for recommendations on Lithium dosing  Nephrology consulted due to concern that patient could require HD, his lithium levels have now normalized  Prophylaxis: Lovenox, Bactroban  Okay to leave the ICU. I d/w Gonzalo Poole and Olga CABALLERO will not plan to follow once transferred out of ICU. Please call with any questions or concerns: [phone] (personal cell) or [phone] (service).

## 2021-11-07 NOTE — PROGRESS NOTES
Patient stating he is cutting his Lithium tablets at home, because he does not feel he needs the full dose. States he does not understand why he keeps having repeated blackouts.

## 2021-11-07 NOTE — DISCHARGE SUMMARY
Name:  [facility]  Room:  3011/3011-01  MRN:    [id number]    Discharge Summary      This discharge summary is in conjunction with a complete physical exam done on the day of discharge. Discharging Physician: Alec Palmer MD      Admit: 11/6/2021  Discharge:   11/7/2021    Diagnoses this Admission    Active Problems:    Lithium toxicity    Community acquired pneumonia  Resolved Problems:    * No resolved hospital problems. *          Procedures (Please Review Full Report for Details)  N/A    Consults    IP CONSULT TO HOSPITALIST  IP CONSULT TO CRITICAL CARE  IP CONSULT TO NEPHROLOGY  IP CONSULT TO PSYCHIATRY      HPI:      The patient is a 57 y.o. male with PMH below, presents with aggressive behavior, confusion. Pt knows his name and that he is at [facility].  He has been in ER multiple times over the last several days. He was found to have an elevated Li level. He reportedly has hx of ETOHism but has not had any ETOH since ~10/25. He otherwise refuses to answer questions. Physical Exam at Discharge:  BP (!) 154/85   Pulse 90   Temp 98 °F (36.7 °C) (Oral)   Resp 24   Ht 5' 7\" (1.702 m)   Wt 138 lb 7.2 oz (62.8 kg)   SpO2 100%   BMI 21.68 kg/m²       GEN:        A&Ox1, NAD. HEENT:   NC/AT,EOMI, semi dry MM, no erythema/exudates or visible masses. CVS:        Normal S1,S2. RRR. Without M/G/R.   LUNG:     CTA-B. no wheezes, rales or rhonchi  ABD:        Soft, ND/NT, BS+ x4. Without G/R.  EXT:        2+ pulses, no c/c/e. Brisk cap refill. PSY:        Thought process confused, affect angry/aggressive. RAHEEM:        Does not follow commands very well but grossly: CN III-XII intact, moves all 4 spontaneously, sensory grossly intact. SKIN:       Numerous scabs over legs. Hospital Course  Lithium Toxicity  Acute toxic encephalopathy  - Lithium level 2.7, on recheck it was down to 0.6  - IVF's.  Monitored renal panel and Lithium levels  - consulted nephrology and pulmonology  - seen by psychiatry. Cleared. Stop lithium at discharge. Start Risperdal at discharge. Anemia  - Hgb 8.1--7.4  - occult ordered. Follow up OP with GI.     SIRS  - leukocytosis, elevated procal, tachcyardia  - unclear source. UA, CT A/P negative. CXR negative. - Given IV Rocephin. D/c on Doxycycline. JCARLOS  - creatinine 1.3. Likely due to Lithium toxicity. Resolved with IVF's. Creatinine 0.8    H/o Alcohol  - LEILA negative. MV, thiamine, folate given    Tobacco Dependence  -Recommended cessation  - nicotine patch ordered     DVT prophylaxis: Lovenox    CBC:   Recent Labs     11/05/21 2025 11/06/21  1625 11/07/21  0315   WBC 13.2* 14.7* 12.2*   HGB 9.1* 8.1* 7.4*   HCT 26.8* 24.4* 22.4*   .6* 102.5* 103.8*   * 669* 689*     BMP:   Recent Labs     11/06/21  1625 11/06/21  2100 11/07/21  0315   * 138 139   K 4.5 4.3 3.9    108 111*   CO2 21 24 21   PHOS  --   --  3.4   BUN 13 10 9   CREATININE 1.3 1.1 0.8     LIVER PROFILE:   Recent Labs     11/04/21  1450 11/05/21 2025 11/06/21  1625   AST 41* 46* 48*   ALT 51* 55* 49*   BILIDIR 0.7*  --   --    BILITOT 1.2* 1.3* 1.2*   ALKPHOS 582* 532* 452*     PT/INR: No results for input(s): PROTIME, INR in the last 72 hours. APTT: No results for input(s): APTT in the last 72 hours. UA:  Recent Labs     11/06/21  1810   COLORU Yellow   PHUR 7.0  7.0   CLARITYU Clear   SPECGRAV <=1.005   LEUKOCYTESUR Negative   UROBILINOGEN 0.2   BILIRUBINUR Negative   BLOODU Negative   GLUCOSEU Negative     Results for Samy Oseguera (MRN [id number]) as of 11/7/2021 15:44   Ref. Range 11/4/2021 14:50 11/6/2021 16:25 11/6/2021 21:00 11/7/2021 03:14 11/7/2021 09:04   Lithium Lvl Latest Ref Range: 0.6 - 1.2 mmol/L 1.5 (H) 2.7 (HH) 1.0 0.8 0.6     Cultures  COVID: not detected  Blood: pending    Radiology  CT ABDOMEN PELVIS W IV CONTRAST Additional Contrast? None   Final Result   No renal mass, hydronephrosis, renal or ureteral calculi. No colitis, diverticulitis or appendicitis. No mass, ascites or lymphadenopathy. Acquired spinal stenosis at the L4-5 disc. CT HEAD WO CONTRAST   Final Result   No acute intracranial abnormality      Cerebral atrophy      Hypoattenuating white matter disease, nonspecific, however most often related   to chronic microvascular ischemic changes. Left maxillary sinus disease. Discharge Medications     Medication List      START taking these medications    doxycycline hyclate 100 MG tablet  Commonly known as: VIBRA-TABS  Take 1 tablet by mouth 2 times daily for 7 days     risperiDONE 1 MG tablet  Commonly known as: RISPERDAL  Take 1 tablet by mouth 2 times daily     thiamine 100 MG tablet  Take 1 tablet by mouth daily  Start taking on: November 8, 2021        CONTINUE taking these medications    hydrOXYzine 50 MG tablet  Commonly known as: ATARAX  Take 1 tablet by mouth every 6 hours as needed for Anxiety     multivitamin with minerals tablet  Take 1 tablet by mouth daily     potassium chloride 20 MEQ extended release tablet  Commonly known as: KLOR-CON M  Take 1 tablet by mouth 2 times daily for 5 days     sodium chloride 1 g tablet  Take 1 tablet by mouth 3 times daily for 5 days        STOP taking these medications    lithium 150 MG capsule           Where to Get Your Medications      These medications were sent to [facility] #[id number], [address] [place] [address], [address] [place] [id number]    Phone: [phone]   · doxycycline hyclate 100 MG tablet  · risperiDONE 1 MG tablet  · thiamine 100 MG tablet           Discharge Condition/Location: Stable/home     Follow Up: Follow up with PCP.     More than 30 mts spent    Parth Pinedo MD 11/7/2021 12:51 PM

## 2021-11-07 NOTE — PROGRESS NOTES
Patient resting comfortably at this time. Medication given per eMar. Denies any needs at this time. NSR on monitor. VSS. Call light is within reach, bed is locked and placed in lowest position with bed alarm set.     Electronically signed by Rhiannon Hurley RN on 11/7/2021 at 12:35 PM    Vitals:    11/07/21 1200   BP: (!) 154/85   Pulse: 90   Resp: 24   Temp:    SpO2: 100%

## 2021-11-07 NOTE — CONSULTS
Admit Date:  11/6/2021    Consult Date:  11/7/2021     Reason for Consult: treatment recommendations  Summary Present Illness: 58 y/o with hx of bad and alcohol abuse that moved to [place] after losing his wife (whom he care for while she had cancer), his father, and his best friend (who shot himself). Pt stated that he was sober for 15 years until he relapsed 11 months ago. Pt stated that 2 weeks ago he stopped drinking and resumed his lithium at 1200 mg since he had stopped taking while he was drinking alcohol. Pt stated that he is not sure what happened but he was blacking out and feeling shaky and confused. Pt stated that he has no f/u here in [place]. Pt stated that he is not depressed and he denies any justin at this times and reports I love myself too much to hurt myself and I have to many animals depending on me. Pt stated that his family is very good support. Pt's bro present during interview and he is supportive and does not have any concerns at this time. Psychiatric Hx: Hosp: hospitalization at [place] years ago when he was dx and stated that they kept him for 4 weeks. No suicide attempts  Tx: none Onle med trial Lithium    Substance Abuse History: Pt admits to smoke cannabis and alcohol abuse and repost that he was sobe for 15 yrs and he did it cold turkey when in senior care. Pt stated that relapse almost a yr ago. Social Hx: Pt lives at home alone. He moved to [place] from [place] last year. He describes adequate support from his brother and sister. He is currently unemployed and on [address]. No hx of trauma reported. No legal issues.       MSE: Mental Status Examination:  Level of consciousness:  within normal limits and awake  Appearance:  well-appearing, hospital attire, seated in bed, good grooming and good hygiene well-developed, well-nourished  Behavior/Motor:  no abnormalities noted normal gait and station and normal balance  Attitude toward examiner:  cooperative, attentive and good eye contact  Speech: spontaneous, normal rate and normal volume  Mood:  better  Affect:  mood congruent and euthymic  Hallucinations: Absent  Thought processes:  linear, goal directed and coherent Attention:  attention span and concentration were age appropriate  Thought content:  Reality based no evidence of delusions Abstraction: inatct  OCD:   Insight: fair  Judgement: impaired judgment  Fund of Knowledge: average  IQ:average Memory: intact  Cognition:  oriented to person, place, and time  Suicide:  no specific plan to harm self  Sleep: no sleep issues  Appetite: ok  Inventory of strengths and weaknesses:Family support  PE: VITALS:  BP (!) 163/84   Pulse 90   Temp 98 °F (36.7 °C) (Oral)   Resp 22   Ht 5' 7\" (1.702 m)   Wt 138 lb 7.2 oz (62.8 kg)   SpO2 100%   BMI 21.68 kg/m²     Cranial Nerves: 1-12 appear to be intact   ROS:  Reviewed ED and Med notes and agree with findings  Labs:    Admission on 11/06/2021, Discharged on 11/07/2021   Component Date Value Ref Range Status    WBC 11/06/2021 14.7* 4.0 - 11.0 K/uL Final    RBC 11/06/2021 2.38* 4.20 - 5.90 M/uL Final    Hemoglobin 11/06/2021 8.1* 13.5 - 17.5 g/dL Final    Hematocrit 11/06/2021 24.4* 40.5 - 52.5 % Final    MCV 11/06/2021 102.5* 80.0 - 100.0 fL Final    MCH 11/06/2021 34.0  26.0 - 34.0 pg Final    MCHC 11/06/2021 33.1  31.0 - 36.0 g/dL Final    RDW 11/06/2021 18.0* 12.4 - 15.4 % Final    Platelets 23/32/1568 669* 135 - 450 K/uL Final    MPV 11/06/2021 7.3  5.0 - 10.5 fL Final    PLATELET SLIDE REVIEW 11/06/2021 Increased   Final    SLIDE REVIEW 11/06/2021 see below   Final    Slide review agrees with reported results    Neutrophils % 11/06/2021 73.0  % Final    Lymphocytes % 11/06/2021 16.0  % Final    Monocytes % 11/06/2021 7.0  % Final    Eosinophils % 11/06/2021 1.0  % Final    Basophils % 11/06/2021 0.0  % Final    Neutrophils Absolute 11/06/2021 11.2* 1.7 - 7.7 K/uL Final    Lymphocytes Absolute 11/06/2021 2.4  1.0 - 5.1 K/uL Final    Monocytes Absolute 11/06/2021 1.0  0.0 - 1.3 K/uL Final    Eosinophils Absolute 11/06/2021 0.1  0.0 - 0.6 K/uL Final    Basophils Absolute 11/06/2021 0.0  0.0 - 0.2 K/uL Final    Bands Relative 11/06/2021 3  0 - 7 % Final    Toxic Granulation 11/06/2021 Present*  Final    Anisocytosis 11/06/2021 2+*  Final    Polychromasia 11/06/2021 Occasional*  Final    Poikilocytes 11/06/2021 Occasional*  Final    Sodium 11/06/2021 133* 136 - 145 mmol/L Final    Potassium reflex Magnesium 11/06/2021 4.5  3.5 - 5.1 mmol/L Final    Chloride 11/06/2021 101  99 - 110 mmol/L Final    CO2 11/06/2021 21  21 - 32 mmol/L Final    Anion Gap 11/06/2021 11  3 - 16 Final    Glucose 11/06/2021 122* 70 - 99 mg/dL Final    BUN 11/06/2021 13  7 - 20 mg/dL Final    CREATININE 11/06/2021 1.3  0.8 - 1.3 mg/dL Final    GFR Non- 11/06/2021 56* >60 Final    Comment: >60 mL/min/1.73m2 EGFR, calc. for ages 25 and older using the  MDRD formula (not corrected for weight), is valid for stable  renal function.  GFR  11/06/2021 >60  >60 Final    Comment: Chronic Kidney Disease: less than 60 ml/min/1.73 sq.m. Kidney Failure: less than 15 ml/min/1.73 sq.m. Results valid for patients 18 years and older.       Calcium 11/06/2021 9.5  8.3 - 10.6 mg/dL Final    Total Protein 11/06/2021 6.3* 6.4 - 8.2 g/dL Final    Albumin 11/06/2021 3.2* 3.4 - 5.0 g/dL Final    Albumin/Globulin Ratio 11/06/2021 1.0* 1.1 - 2.2 Final    Total Bilirubin 11/06/2021 1.2* 0.0 - 1.0 mg/dL Final    Alkaline Phosphatase 11/06/2021 452* 40 - 129 U/L Final    ALT 11/06/2021 49* 10 - 40 U/L Final    AST 11/06/2021 48* 15 - 37 U/L Final    Color, UA 11/06/2021 Yellow  Straw/Yellow Final    Clarity, UA 11/06/2021 Clear  Clear Final    Glucose, Ur 11/06/2021 Negative  Negative mg/dL Final    Bilirubin Urine 11/06/2021 Negative  Negative Final    Ketones, Urine 11/06/2021 Negative  Negative mg/dL Final    Specific Gravity, UA 11/06/2021 <=1.005  1.005 - 1.030 Final    Blood, Urine 11/06/2021 Negative  Negative Final    pH, UA 11/06/2021 7.0  5.0 - 8.0 Final    Protein, UA 11/06/2021 Negative  Negative mg/dL Final    Urobilinogen, Urine 11/06/2021 0.2  <2.0 E.U./dL Final    Nitrite, Urine 11/06/2021 Negative  Negative Final    Leukocyte Esterase, Urine 11/06/2021 Negative  Negative Final    Microscopic Examination 11/06/2021 Not Indicated   Final    Urine Type 11/06/2021 NotGiven   Final    Urine Reflex to Culture 11/06/2021 Not Indicated   Final    Amphetamine Screen, Urine 11/06/2021 Neg  Negative <1000ng/mL Final    Barbiturate Screen, Ur 11/06/2021 Neg  Negative <200 ng/mL Final    Benzodiazepine Screen, Urine 11/06/2021 Neg  Negative <200 ng/mL Final    Cannabinoid Scrn, Ur 11/06/2021 POSITIVE* Negative <50 ng/mL Final    Cocaine Metabolite Screen, Urine 11/06/2021 Neg  Negative <300 ng/mL Final    Opiate Scrn, Ur 11/06/2021 Neg  Negative <300 ng/mL Final    Comment: \"Therapeutic levels of pain medication, especially oxycontin and synthetic  opioids, may not be detected by this Methodology. Pain management screen  panel  Drug panel-PM-Hi Res Ur, Interp (PAIN) should be considered for drug  monitoring \".  PCP Screen, Urine 11/06/2021 Neg  Negative <25 ng/mL Final    Methadone Screen, Urine 11/06/2021 Neg  Negative <300 ng/mL Final    Propoxyphene Scrn, Ur 11/06/2021 Neg  Negative <300 ng/mL Final    Oxycodone Urine 11/06/2021 Neg  Negative <100 ng/ml Final    pH, UA 11/06/2021 7.0   Final    Comment: Urine pH less than 5.0 or greater than 8.0 may indicate sample adulteration. Another sample should be collected if clinically  indicated.  Drug Screen Comment: 11/06/2021 see below   Final    Comment: This method is a screening test to detect only these drug  classes as part of a medical workup. Confirmatory testing  by another method should be ordered if clinically indicated.       Acetaminophen Level 11/06/2021 <5* 10 - 30 ug/mL Final    Comment: Therapeutic Range: 10.0-30.0 ug/mL  Toxic: >=150 ug/mL      Ethanol Lvl 11/06/2021 None Detected  mg/dL Final    Comment:    None Detected  Conversion factor:  100 mg/dl = .100 g/dl  For Medical Purposes Only      Salicylate, Serum 27/50/4151 0.4* 15.0 - 30.0 mg/dL Final    Comment: Therapeutic Range: 15.0-30.0 mg/dL  Toxic: >30.0 mg/dL      Troponin 11/06/2021 <0.01  <0.01 ng/mL Final    Methodology by Troponin T    Pro-BNP 11/06/2021 455* 0 - 124 pg/mL Final    Comment: Methodology by NT-proBNP    An age-independent cutoff point of 300 pg/ml has a 98%  negative predictive value excluding acute heart failure. Values exceeding the age-related cutoff values (450 pg/mL if  age<50, 900 if 50-75 and 1800 if >75) has 90% sensitivity and  84% specificity for diagnosing acute HF. In patients with  renal compromise (eGFR<60) values greater than 1200pg/ml have  a diagnostic sensitivity and specificity of 89% and 72% for  acute HF.       Total CK 11/06/2021 88  39 - 308 U/L Final    Ventricular Rate 11/06/2021 99  BPM Final    Atrial Rate 11/06/2021 99  BPM Final    P-R Interval 11/06/2021 168  ms Final    QRS Duration 11/06/2021 92  ms Final    Q-T Interval 11/06/2021 388  ms Final    QTc Calculation (Bazett) 11/06/2021 497  ms Final    P Axis 11/06/2021 82  degrees Final    R Axis 11/06/2021 76  degrees Final    T Axis 11/06/2021 76  degrees Final    Diagnosis 11/06/2021 Normal sinus rhythmProlonged QTAbnormal ECGConfirmed by Omid White MD, [facility] ([id number]) on 11/7/2021 10:25:45 AM   Final    Procalcitonin 11/06/2021 0.68* 0.00 - 0.15 ng/mL Final    Comment: Suspected Sepsis:  Low likelihood of sepsis  <.50 ng/mL    Increased likelihood of sepsis 0.50-2.00 ng/mL  Antibiotics encouraged    High risk of sepsis/shock   >2.00 ng/mL  Antibiotics strongly encouraged    Suspected Lower Respiratory Tract Infections:  Low likelihood of bacterial infection  <0.24 ng/mL    Increased likelihood of bacterial infection >0.24 ng/mL  Antibiotics encouraged    With successful antibiotic therapy, PCT levels should decrease  rapidly. (Half-life of 24 to 36 hours.)    Procalcitonin values from samples collected within the first  6 hours of systemic infection may still be low. Retesting may be indicated. Values from day 1 and day 4 can be entered into the Change in  Procalcitonin Calculator to determine the patient's  Mortality Risk Prognosis  (www.[facility]-pct-calculator. [employer])    In healthy neonates, plasma Procalcitonin (PCT) concentrations  increase gradually after birth, reaching peak values at about  24 hours of age then decrease to normal values below 0.5                            ng/mL  by 48-72 hours of age.  Lactic Acid, Sepsis 11/06/2021 1.8  0.4 - 1.9 mmol/L Final    SARS-CoV-2, NAAT 11/06/2021 Not Detected  Not Detected Final    Comment: Rapid NAAT:   Negative results should be treated as presumptive and,  if inconsistent with clinical signs and symptoms or necessary for  patient management, should be tested with an alternative molecular  assay. Negative results do not preclude SARS-CoV-2 infection and  should not be used as the sole basis for patient management decisions. This test has been authorized by the FDA under an Emergency Use  Authorization (EUA) for use by authorized laboratories.     Fact sheet for Healthcare Providers:  Ledy.yesica  Fact sheet for Patients: Ledy.yesica    METHODOLOGY: Isothermal Nucleic Acid Amplification      Ammonia 11/06/2021 51  16 - 60 umol/L Final    Lithium Lvl 11/06/2021 2.7* 0.6 - 1.2 mmol/L Final    Lithium Dose Amount 11/06/2021 Unknown   Final    Magnesium 11/06/2021 2.30  1.80 - 2.40 mg/dL Final    Lithium Lvl 11/06/2021 1.0  0.6 - 1.2 mmol/L Final    Lithium Dose Amount 11/06/2021 Unknown   Final    Sodium 11/06/2021 138  136 - 145 mmol/L Final    Potassium 11/06/2021 4.3  3.5 - 5.1 mmol/L Final    Chloride 11/06/2021 108  99 - 110 mmol/L Final    CO2 11/06/2021 24  21 - 32 mmol/L Final    Anion Gap 11/06/2021 6  3 - 16 Final    Glucose 11/06/2021 106* 70 - 99 mg/dL Final    BUN 11/06/2021 10  7 - 20 mg/dL Final    CREATININE 11/06/2021 1.1  0.8 - 1.3 mg/dL Final    GFR Non- 11/06/2021 >60  >60 Final    Comment: >60 mL/min/1.73m2 EGFR, calc. for ages 25 and older using the  MDRD formula (not corrected for weight), is valid for stable  renal function.  GFR  11/06/2021 >60  >60 Final    Comment: Chronic Kidney Disease: less than 60 ml/min/1.73 sq.m. Kidney Failure: less than 15 ml/min/1.73 sq.m. Results valid for patients 18 years and older.  Calcium 11/06/2021 9.0  8.3 - 10.6 mg/dL Final    Sodium 11/07/2021 139  136 - 145 mmol/L Final    Potassium 11/07/2021 3.9  3.5 - 5.1 mmol/L Final    Chloride 11/07/2021 111* 99 - 110 mmol/L Final    CO2 11/07/2021 21  21 - 32 mmol/L Final    Anion Gap 11/07/2021 7  3 - 16 Final    Glucose 11/07/2021 97  70 - 99 mg/dL Final    BUN 11/07/2021 9  7 - 20 mg/dL Final    CREATININE 11/07/2021 0.8  0.8 - 1.3 mg/dL Final    GFR Non- 11/07/2021 >60  >60 Final    Comment: >60 mL/min/1.73m2 EGFR, calc. for ages 25 and older using the  MDRD formula (not corrected for weight), is valid for stable  renal function.  GFR  11/07/2021 >60  >60 Final    Comment: Chronic Kidney Disease: less than 60 ml/min/1.73 sq.m. Kidney Failure: less than 15 ml/min/1.73 sq.m. Results valid for patients 18 years and older.       Calcium 11/07/2021 8.1* 8.3 - 10.6 mg/dL Final    Phosphorus 11/07/2021 3.4  2.5 - 4.9 mg/dL Final    Albumin 11/07/2021 2.8* 3.4 - 5.0 g/dL Final    Lithium Lvl 11/07/2021 0.8  0.6 - 1.2 mmol/L Final    Lithium Dose Amount 11/07/2021 Unknown   Final    Lithium Lvl 11/07/2021 0.6  0.6 - 1.2 mmol/L Final    Lithium Dose

## 2021-11-07 NOTE — PROGRESS NOTES
Pt showing improved mental status, now alert and oriented x4. Lithium level @ 0314 down to 0.8 mmol/L.  Will continue to monitor

## 2021-11-07 NOTE — PROGRESS NOTES
Pt pulled PIV on L and R arm. Catheter intact x2. No signs of bleeding on both sites. 22G IV on R hand inserted by this RN.  Will continue to monitor

## 2021-11-07 NOTE — PROGRESS NOTES
Updated provided to poison control with plan of care, events leading up to the admission, and labs. No questions at this time.

## 2021-11-07 NOTE — CONSULTS
[facility]  Nephrology Consult Note           Reason for Consult:  Taisha, lithium toxicity  Requesting Physician:  Dr. Mitchell Carpio    Chief Complaint:    Chief Complaint   Patient presents with    Aggressive Behavior     Arrives / [facility] for combative behavior. Family in wtg room to privide hx. Unknown if + hx of psych, drugs or alcohol. Pt alert and screaming \"time to go! \"     History of Present Illness on 11/7/21:    57 y.o. yo male with PMH of ETOH abuse  who is admitted for mental status change and increased Li level  He presented to the emergency department on 11/6/2021 with severe agitation, with aggressive behavior and confusion. He was found by family crawling on the ground, confused and combative after wandering into the woods. Found to have an elevated lithium level. Was in ED on 10/30; 11/4, 11/5 and then on 11/6 when he was admitted  He had tried to stop drinking alcohol cold turkey and was seen on Ed on 11/4 when his Yarelis Florina level was noted to be high and was asked to hold lithium for 48 h and reduce the dose to half   He reports that he did it and was taking 600 mg daily     Pt though oriented times 3, cannot give a coherent history    Past Medical History:    No past medical history on file. Past Surgical History:        Procedure Laterality Date    HERNIA REPAIR      SHOULDER SURGERY      left 3 times due to injury       Home Medications:    No current facility-administered medications on file prior to encounter.      Current Outpatient Medications on File Prior to Encounter   Medication Sig Dispense Refill    lithium 150 MG capsule Take 150 mg by mouth 3 times daily (with meals)      potassium chloride (KLOR-CON M) 20 MEQ extended release tablet Take 1 tablet by mouth 2 times daily for 5 days 10 tablet 0    sodium chloride 1 g tablet Take 1 tablet by mouth 3 times daily for 5 days 15 tablet 0    Multiple Vitamins-Minerals (MULTIVITAMIN WITH MINERALS) tablet Take 1 tablet by mouth daily 30 tablet 3    hydrOXYzine (ATARAX) 50 MG tablet Take 1 tablet by mouth every 6 hours as needed for Anxiety 30 tablet 0       Allergies:  Sulfa antibiotics    Social History:    Social History     Socioeconomic History    Marital status:      Spouse name: Not on file    Number of children: Not on file    Years of education: Not on file    Highest education level: Not on file   Occupational History    Not on file   Tobacco Use    Smoking status: Current Every Day Smoker     Packs/day: 2.00     Types: Cigarettes    Smokeless tobacco: Never Used   Substance and Sexual Activity    Alcohol use: Yes     Comment: former user    Drug use: Yes     Types: Marijuana ([place]), Methamphetamines (Inclinix Meth), Opiates     Sexual activity: Not on file   Other Topics Concern    Not on file   Social History Narrative    Not on file     Social Determinants of Health     Financial Resource Strain: Low Risk     Difficulty of Paying Living Expenses: Not hard at all   Food Insecurity: No Food Insecurity    Worried About [address] in the Last Year: Never true    [address] in the Last Year: Never true   Transportation Needs: No Transportation Needs    Lack of Transportation (Medical): No    Lack of Transportation (Non-Medical):  No   Physical Activity:     Days of Exercise per Week: Not on file    Minutes of Exercise per Session: Not on file   Stress:     Feeling of Stress : Not on file   Social Connections:     Frequency of Communication with Friends and Family: Not on file    Frequency of Social Gatherings with Friends and Family: Not on file    Attends Druze Services: Not on file    Active Member of Clubs or Organizations: Not on file    Attends Club or Organization Meetings: Not on file    Marital Status: Not on file   Intimate Partner Violence:     Fear of Current or Ex-Partner: Not on file    Emotionally Abused: Not on file    Physically Abused: Not on file   Johana Roman Sexually Abused: Not on file   Housing Stability: Unknown    Unable to Pay for Housing in the Last Year: No    Number of Places Lived in the Last Year: Not on file    Unstable Housing in the Last Year: No       Family History:   Family History   Problem Relation Age of Onset    Heart Failure Mother         heart disease age 61    Other Father         dementia    Cancer Father         prostate       Review of Systems:   Pertinent positives stated above in HPI. All other 10 systems were reviewed and were negative. Physical exam:   Constitutional:  VITALS:  BP (!) 151/77   Pulse 87   Temp 97.9 °F (36.6 °C) (Oral)   Resp (!) 75   Ht 5' 7\" (1.702 m)   Wt 138 lb 7.2 oz (62.8 kg)   SpO2 99%   BMI 21.68 kg/m²   Gen: alert, awake, nad  HEENT: pupils reactive  Neck: no bruits or jvd noted  Cardiovascular:  S1, S2 without m/r/g; no lower extremity edema  Respiratory: CTA B without w/r/r; respiratory effort normal  Abdomen:  +bs, soft, nt, nd, no hepatosplenomegaly  Neuro/Psy: AAoriented times 3 ; moves all 4 ext    Data/  Recent Labs     11/05/21 2025 11/06/21  1625 11/07/21  0315   WBC 13.2* 14.7* 12.2*   HGB 9.1* 8.1* 7.4*   HCT 26.8* 24.4* 22.4*   .6* 102.5* 103.8*   * 669* 689*     Recent Labs     11/06/21  1625 11/06/21  2100 11/07/21  0315   * 138 139   K 4.5 4.3 3.9    108 111*   CO2 21 24 21   GLUCOSE 122* 106* 97   PHOS  --   --  3.4   MG 2.30  --   --    BUN 13 10 9   CREATININE 1.3 1.1 0.8   LABGLOM 56* >60 >60   GFRAA >60 >60 >60     UA bland     Assessment  -JCARLOS in the setting of Li , improved     -Lithium toxicity   Was 1.7 on 10/30.  1.5 on 11/4 and 2.7 on 11/6    -Acute metabolic encephalopathy    Resolved     -thrombocytosis per primary team    -h/o alcohol abuse     - hyponatremia noted on 10/30     Plan  -cont IVF while in the hospital  -await psych input  -would strongly consider alternative to lithium as concerns for renal toxicity is high  -Li level has normalized with resolution of katlyn and symptoms, so no indication for HD     Thank you for the consultation. Please do not hesitate to call with questions. Rosa Duque MD  Office: [phone]  Fax:    [phone]  [email or web address]

## 2021-11-07 NOTE — PROGRESS NOTES
Patient A/O x4. Follows commands and responds appropriately. Patient is a poor historian, and does not recall the events that led to his admission because he blacked out. Patient denies pain at this time, repositioned for comfort. Currently on RA, oxygen saturation 97%. Lung sounds clear bilaterally. Abdomen non-tender to palpation, bowel sounds active x4. [place] belt in place at this time. Patient denies any needs at this time. NSR on monitor. VSS.

## 2021-11-07 NOTE — PROGRESS NOTES
Rounding completed with Dr. Bhavesh Chung, labs, lines and assessment reviewed. RN to change diet to regular, NS to be d/c, LR to be added, psych to see patient today, unit transfer out of ICU after review with Dr. Steve Dotson.

## 2021-11-07 NOTE — PROGRESS NOTES
Discharge instructions reviewed with patient and family. Educated patient on when next medications are due and importance of follow up. Denies any needs at this time. No questions at this time. All peripheral IV's removed. All patient belongings removed from room with patient. Transportation bringing patient to main lobby. Cab was called previous to patient departure from room.

## 2021-11-07 NOTE — PROGRESS NOTES
Pt admitted from ED to ICU 3011  /71, HR 90, SpO2 97% on RA, temp 97.7 F axillary.  Pt oriented to room, bed locked and in lowest position, pt alert and oriented to person and time but disoriented to place and situation, repeat lithium level down to 1 mmol/L    Will continue to monitor

## 2021-11-07 NOTE — BH NOTE
Call placed to pt's sister, Urmila Perales ([phone]) for collateral. Urmila Perales stated pt has a long hx of alcoholism. He was sober for 15 years until he relapsed 11 months ago. She believes he was drinking 6-12 beers a day (Budweiser) but could not be certain of that amount. He quit cold turkey about a week and a half ago. Stated he started taking his lithium when he stopped drinking because he knew he was not supposed to take lithium while drinking. He moved from [place] one year ago to be closer to his siblings after suffering three losses, his wife (whom he care for while she had cancer), his father, and his best friend (who shot himself). Urmila Peralse stated the lithium was prescribed by a Dr in [place] and was from an old prescription. Stated his bouts of confusion coincide with when he stopped drinking and starting the lithium. Stated he has \"black out\" periods where he does not remember chunks of time. He has also been having difficulty walking and getting his words out. Stated he has been suffering from depression since osing three people in his life. He has become friends with his neighbor and he also walks to a nearby store once a day to keep busy. He does not have a hx of suicide attempts or gestures. He did tell her about 2 weeks ago (while he was still drinking) that he was 'having bad thoughts'. \" He never told me anything more about what those thoughts were or what he meant\". Stated she does not believe he would ever harm himself. He does not own any firearms. Pt lives alone with his animals that he cherishes (5 cats and 2 dogs). Stated between her and their other 4 siblings, someone is physically checking in on him at least once a day and she touches base with him by phone at least once a day. Stated he has a good relationship with his daughter and grandson who live in [place]. Treated for Bipolar while in [place].

## 2021-11-08 ENCOUNTER — CARE COORDINATION (OUTPATIENT)
Dept: CASE MANAGEMENT | Age: 61
End: 2021-11-08

## 2021-11-08 ENCOUNTER — HOSPITAL ENCOUNTER (INPATIENT)
Age: 61
LOS: 3 days | Discharge: HOME | DRG: 381 | End: 2021-11-11
Attending: STUDENT IN AN ORGANIZED HEALTH CARE EDUCATION/TRAINING PROGRAM | Admitting: INTERNAL MEDICINE
Payer: MEDICARE

## 2021-11-08 ENCOUNTER — TELEPHONE (OUTPATIENT)
Dept: FAMILY MEDICINE CLINIC | Age: 61
End: 2021-11-08

## 2021-11-08 ENCOUNTER — APPOINTMENT (OUTPATIENT)
Dept: GENERAL RADIOLOGY | Age: 61
DRG: 381 | End: 2021-11-08
Payer: MEDICARE

## 2021-11-08 DIAGNOSIS — D64.9 SYMPTOMATIC ANEMIA: Primary | ICD-10-CM

## 2021-11-08 DIAGNOSIS — F99 PSYCHIATRIC PROBLEM: ICD-10-CM

## 2021-11-08 DIAGNOSIS — D64.9 LOW HEMOGLOBIN: ICD-10-CM

## 2021-11-08 DIAGNOSIS — R55 SYNCOPE AND COLLAPSE: ICD-10-CM

## 2021-11-08 LAB
ABO/RH: NORMAL
ACETAMINOPHEN LEVEL: <5 UG/ML (ref 10–30)
ALBUMIN SERPL-MCNC: 3.3 G/DL (ref 3.4–5)
ALP BLD-CCNC: 405 U/L (ref 40–129)
ALT SERPL-CCNC: 45 U/L (ref 10–40)
AMPHETAMINE SCREEN, URINE: ABNORMAL
ANION GAP SERPL CALCULATED.3IONS-SCNC: 10 MMOL/L (ref 3–16)
ANISOCYTOSIS: ABNORMAL
ANTIBODY SCREEN: NORMAL
AST SERPL-CCNC: 51 U/L (ref 15–37)
BANDED NEUTROPHILS RELATIVE PERCENT: 1 % (ref 0–7)
BARBITURATE SCREEN URINE: ABNORMAL
BASOPHILS ABSOLUTE: 0 K/UL (ref 0–0.2)
BASOPHILS RELATIVE PERCENT: 0 %
BENZODIAZEPINE SCREEN, URINE: ABNORMAL
BILIRUB SERPL-MCNC: 1 MG/DL (ref 0–1)
BILIRUBIN DIRECT: 0.5 MG/DL (ref 0–0.3)
BILIRUBIN URINE: NEGATIVE
BILIRUBIN, INDIRECT: 0.5 MG/DL (ref 0–1)
BLOOD, URINE: NEGATIVE
BUN BLDV-MCNC: 7 MG/DL (ref 7–20)
CALCIUM SERPL-MCNC: 8.6 MG/DL (ref 8.3–10.6)
CANNABINOID SCREEN URINE: POSITIVE
CHLORIDE BLD-SCNC: 107 MMOL/L (ref 99–110)
CLARITY: CLEAR
CO2: 20 MMOL/L (ref 21–32)
COCAINE METABOLITE SCREEN URINE: ABNORMAL
COLOR: YELLOW
CREAT SERPL-MCNC: 0.9 MG/DL (ref 0.8–1.3)
EKG ATRIAL RATE: 127 BPM
EKG DIAGNOSIS: NORMAL
EKG P AXIS: 55 DEGREES
EKG P-R INTERVAL: 174 MS
EKG Q-T INTERVAL: 398 MS
EKG QRS DURATION: 94 MS
EKG QTC CALCULATION (BAZETT): 462 MS
EKG R AXIS: 48 DEGREES
EKG T AXIS: 56 DEGREES
EKG VENTRICULAR RATE: 81 BPM
EOSINOPHILS ABSOLUTE: 0.1 K/UL (ref 0–0.6)
EOSINOPHILS RELATIVE PERCENT: 1 %
ETHANOL: NORMAL MG/DL (ref 0–0.08)
GFR AFRICAN AMERICAN: >60
GFR NON-AFRICAN AMERICAN: >60
GLUCOSE BLD-MCNC: 108 MG/DL (ref 70–99)
GLUCOSE URINE: NEGATIVE MG/DL
HCT VFR BLD CALC: 23.9 % (ref 40.5–52.5)
HEMOGLOBIN: 7.6 G/DL (ref 13.5–17.5)
HYPOCHROMIA: ABNORMAL
INFLUENZA A: NOT DETECTED
INFLUENZA B: NOT DETECTED
IRON SATURATION: 25 % (ref 20–50)
IRON: 58 UG/DL (ref 59–158)
KETONES, URINE: NEGATIVE MG/DL
LEUKOCYTE ESTERASE, URINE: NEGATIVE
LITHIUM DOSE AMOUNT: ABNORMAL
LITHIUM LEVEL: 0.3 MMOL/L (ref 0.6–1.2)
LYMPHOCYTES ABSOLUTE: 1.6 K/UL (ref 1–5.1)
LYMPHOCYTES RELATIVE PERCENT: 11 %
Lab: ABNORMAL
MCH RBC QN AUTO: 33.5 PG (ref 26–34)
MCHC RBC AUTO-ENTMCNC: 32 G/DL (ref 31–36)
MCV RBC AUTO: 104.8 FL (ref 80–100)
METHADONE SCREEN, URINE: ABNORMAL
MICROSCOPIC EXAMINATION: NORMAL
MONOCYTES ABSOLUTE: 0.6 K/UL (ref 0–1.3)
MONOCYTES RELATIVE PERCENT: 4 %
NEUTROPHILS ABSOLUTE: 11.9 K/UL (ref 1.7–7.7)
NEUTROPHILS RELATIVE PERCENT: 83 %
NITRITE, URINE: NEGATIVE
OCCULT BLOOD DIAGNOSTIC: NORMAL
OPIATE SCREEN URINE: ABNORMAL
OXYCODONE URINE: ABNORMAL
PDW BLD-RTO: 18.9 % (ref 12.4–15.4)
PH UA: 6
PH UA: 6 (ref 5–8)
PHENCYCLIDINE SCREEN URINE: ABNORMAL
PHOSPHORUS: 2.5 MG/DL (ref 2.5–4.9)
PLATELET # BLD: 735 K/UL (ref 135–450)
PLATELET SLIDE REVIEW: ABNORMAL
PMV BLD AUTO: 6.8 FL (ref 5–10.5)
POIKILOCYTES: ABNORMAL
POTASSIUM SERPL-SCNC: 3.9 MMOL/L (ref 3.5–5.1)
PRO-BNP: 813 PG/ML (ref 0–124)
PROPOXYPHENE SCREEN: ABNORMAL
PROTEIN UA: NEGATIVE MG/DL
RBC # BLD: 2.28 M/UL (ref 4.2–5.9)
SALICYLATE, SERUM: <0.3 MG/DL (ref 15–30)
SARS-COV-2 RNA, RT PCR: NOT DETECTED
SLIDE REVIEW: ABNORMAL
SODIUM BLD-SCNC: 137 MMOL/L (ref 136–145)
SPECIFIC GRAVITY UA: 1.01 (ref 1–1.03)
TOTAL IRON BINDING CAPACITY: 234 UG/DL (ref 260–445)
TOTAL PROTEIN: 6 G/DL (ref 6.4–8.2)
TROPONIN: <0.01 NG/ML
TROPONIN: <0.01 NG/ML
TSH REFLEX: 1.55 UIU/ML (ref 0.27–4.2)
URINE REFLEX TO CULTURE: NORMAL
URINE TYPE: NORMAL
UROBILINOGEN, URINE: 0.2 E.U./DL
WBC # BLD: 14.2 K/UL (ref 4–11)

## 2021-11-08 PROCEDURE — 80179 DRUG ASSAY SALICYLATE: CPT

## 2021-11-08 PROCEDURE — 84443 ASSAY THYROID STIM HORMONE: CPT

## 2021-11-08 PROCEDURE — 82746 ASSAY OF FOLIC ACID SERUM: CPT

## 2021-11-08 PROCEDURE — 84484 ASSAY OF TROPONIN QUANT: CPT

## 2021-11-08 PROCEDURE — 2580000003 HC RX 258: Performed by: NURSE PRACTITIONER

## 2021-11-08 PROCEDURE — 84100 ASSAY OF PHOSPHORUS: CPT

## 2021-11-08 PROCEDURE — 93005 ELECTROCARDIOGRAM TRACING: CPT

## 2021-11-08 PROCEDURE — 81003 URINALYSIS AUTO W/O SCOPE: CPT

## 2021-11-08 PROCEDURE — 80076 HEPATIC FUNCTION PANEL: CPT

## 2021-11-08 PROCEDURE — 6360000002 HC RX W HCPCS: Performed by: PHYSICIAN ASSISTANT

## 2021-11-08 PROCEDURE — 82607 VITAMIN B-12: CPT

## 2021-11-08 PROCEDURE — 86900 BLOOD TYPING SEROLOGIC ABO: CPT

## 2021-11-08 PROCEDURE — 36415 COLL VENOUS BLD VENIPUNCTURE: CPT

## 2021-11-08 PROCEDURE — 85025 COMPLETE CBC W/AUTO DIFF WBC: CPT

## 2021-11-08 PROCEDURE — 99221 1ST HOSP IP/OBS SF/LOW 40: CPT | Performed by: NURSE PRACTITIONER

## 2021-11-08 PROCEDURE — 80143 DRUG ASSAY ACETAMINOPHEN: CPT

## 2021-11-08 PROCEDURE — 71045 X-RAY EXAM CHEST 1 VIEW: CPT

## 2021-11-08 PROCEDURE — 6370000000 HC RX 637 (ALT 250 FOR IP): Performed by: NURSE PRACTITIONER

## 2021-11-08 PROCEDURE — 87636 SARSCOV2 & INF A&B AMP PRB: CPT

## 2021-11-08 PROCEDURE — 86850 RBC ANTIBODY SCREEN: CPT

## 2021-11-08 PROCEDURE — 80178 ASSAY OF LITHIUM: CPT

## 2021-11-08 PROCEDURE — C9113 INJ PANTOPRAZOLE SODIUM, VIA: HCPCS | Performed by: PHYSICIAN ASSISTANT

## 2021-11-08 PROCEDURE — 82270 OCCULT BLOOD FECES: CPT

## 2021-11-08 PROCEDURE — 86901 BLOOD TYPING SEROLOGIC RH(D): CPT

## 2021-11-08 PROCEDURE — 80048 BASIC METABOLIC PNL TOTAL CA: CPT

## 2021-11-08 PROCEDURE — 82077 ASSAY SPEC XCP UR&BREATH IA: CPT

## 2021-11-08 PROCEDURE — 2060000000 HC ICU INTERMEDIATE R&B

## 2021-11-08 PROCEDURE — 93010 ELECTROCARDIOGRAM REPORT: CPT | Performed by: INTERNAL MEDICINE

## 2021-11-08 PROCEDURE — 83540 ASSAY OF IRON: CPT

## 2021-11-08 PROCEDURE — 96374 THER/PROPH/DIAG INJ IV PUSH: CPT

## 2021-11-08 PROCEDURE — 83550 IRON BINDING TEST: CPT

## 2021-11-08 PROCEDURE — 80307 DRUG TEST PRSMV CHEM ANLYZR: CPT

## 2021-11-08 PROCEDURE — 86880 COOMBS TEST DIRECT: CPT

## 2021-11-08 PROCEDURE — 99285 EMERGENCY DEPT VISIT HI MDM: CPT

## 2021-11-08 PROCEDURE — 83880 ASSAY OF NATRIURETIC PEPTIDE: CPT

## 2021-11-08 RX ORDER — PANTOPRAZOLE SODIUM 40 MG/10ML
40 INJECTION, POWDER, LYOPHILIZED, FOR SOLUTION INTRAVENOUS DAILY
Status: DISCONTINUED | OUTPATIENT
Start: 2021-11-09 | End: 2021-11-10

## 2021-11-08 RX ORDER — SODIUM CHLORIDE 0.9 % (FLUSH) 0.9 %
5-40 SYRINGE (ML) INJECTION PRN
Status: DISCONTINUED | OUTPATIENT
Start: 2021-11-08 | End: 2021-11-11 | Stop reason: HOSPADM

## 2021-11-08 RX ORDER — DOXYCYCLINE HYCLATE 100 MG
100 TABLET ORAL 2 TIMES DAILY
Status: COMPLETED | OUTPATIENT
Start: 2021-11-08 | End: 2021-11-11

## 2021-11-08 RX ORDER — PANTOPRAZOLE SODIUM 40 MG/10ML
80 INJECTION, POWDER, LYOPHILIZED, FOR SOLUTION INTRAVENOUS ONCE
Status: COMPLETED | OUTPATIENT
Start: 2021-11-08 | End: 2021-11-08

## 2021-11-08 RX ORDER — M-VIT,TX,IRON,MINS/CALC/FOLIC 27MG-0.4MG
1 TABLET ORAL
Status: DISCONTINUED | OUTPATIENT
Start: 2021-11-09 | End: 2021-11-11 | Stop reason: HOSPADM

## 2021-11-08 RX ORDER — ACETAMINOPHEN 650 MG/1
650 SUPPOSITORY RECTAL EVERY 6 HOURS PRN
Status: DISCONTINUED | OUTPATIENT
Start: 2021-11-08 | End: 2021-11-11 | Stop reason: HOSPADM

## 2021-11-08 RX ORDER — SODIUM CHLORIDE 9 MG/ML
25 INJECTION, SOLUTION INTRAVENOUS PRN
Status: DISCONTINUED | OUTPATIENT
Start: 2021-11-08 | End: 2021-11-11 | Stop reason: HOSPADM

## 2021-11-08 RX ORDER — SODIUM CHLORIDE 0.9 % (FLUSH) 0.9 %
5-40 SYRINGE (ML) INJECTION EVERY 12 HOURS SCHEDULED
Status: DISCONTINUED | OUTPATIENT
Start: 2021-11-08 | End: 2021-11-11 | Stop reason: HOSPADM

## 2021-11-08 RX ORDER — POLYETHYLENE GLYCOL 3350 17 G/17G
17 POWDER, FOR SOLUTION ORAL DAILY PRN
Status: DISCONTINUED | OUTPATIENT
Start: 2021-11-08 | End: 2021-11-11 | Stop reason: HOSPADM

## 2021-11-08 RX ORDER — SODIUM CHLORIDE 9 MG/ML
INJECTION, SOLUTION INTRAVENOUS CONTINUOUS
Status: DISCONTINUED | OUTPATIENT
Start: 2021-11-08 | End: 2021-11-09

## 2021-11-08 RX ORDER — RISPERIDONE 1 MG/1
1 TABLET, FILM COATED ORAL 2 TIMES DAILY
Status: DISCONTINUED | OUTPATIENT
Start: 2021-11-08 | End: 2021-11-09

## 2021-11-08 RX ORDER — ONDANSETRON 2 MG/ML
4 INJECTION INTRAMUSCULAR; INTRAVENOUS EVERY 6 HOURS PRN
Status: DISCONTINUED | OUTPATIENT
Start: 2021-11-08 | End: 2021-11-11 | Stop reason: HOSPADM

## 2021-11-08 RX ORDER — ONDANSETRON 4 MG/1
4 TABLET, ORALLY DISINTEGRATING ORAL EVERY 8 HOURS PRN
Status: DISCONTINUED | OUTPATIENT
Start: 2021-11-08 | End: 2021-11-11 | Stop reason: HOSPADM

## 2021-11-08 RX ORDER — HYDROXYZINE PAMOATE 50 MG/1
50 CAPSULE ORAL EVERY 6 HOURS PRN
Status: DISCONTINUED | OUTPATIENT
Start: 2021-11-08 | End: 2021-11-11 | Stop reason: HOSPADM

## 2021-11-08 RX ORDER — LANOLIN ALCOHOL/MO/W.PET/CERES
100 CREAM (GRAM) TOPICAL DAILY
Status: DISCONTINUED | OUTPATIENT
Start: 2021-11-08 | End: 2021-11-11 | Stop reason: HOSPADM

## 2021-11-08 RX ORDER — ACETAMINOPHEN 325 MG/1
650 TABLET ORAL EVERY 6 HOURS PRN
Status: DISCONTINUED | OUTPATIENT
Start: 2021-11-08 | End: 2021-11-11 | Stop reason: HOSPADM

## 2021-11-08 RX ADMIN — PANTOPRAZOLE SODIUM 80 MG: 40 INJECTION, POWDER, FOR SOLUTION INTRAVENOUS at 15:29

## 2021-11-08 RX ADMIN — Medication 100 MG: at 20:42

## 2021-11-08 RX ADMIN — DOXYCYCLINE HYCLATE 100 MG: 100 TABLET, COATED ORAL at 20:42

## 2021-11-08 RX ADMIN — SODIUM CHLORIDE: 9 INJECTION, SOLUTION INTRAVENOUS at 20:41

## 2021-11-08 RX ADMIN — RISPERIDONE 1 MG: 1 TABLET ORAL at 20:42

## 2021-11-08 NOTE — ED NOTES
Pt presents to ED with C/o anxiety and brother states he is not acting right. Pt a/o x3 but confused as to date time. Able to state his name, the president and month but can not do simple math problems or sequence of numbers. Moves all ext x4 without diff. Pt states he smoked pot this am and does this daily. Pt recent hospitalization for lithium tox dc yesterday.        Josh Hansen  11/08/21 1139

## 2021-11-08 NOTE — ED PROVIDER NOTES
[address] ED  EMERGENCY DEPARTMENT ENCOUNTER        Pt Name: Arben Olivera  MRN: [id number]  [place] 1960  Date of evaluation: 11/8/2021  Provider: Mckayla Domingo PA-C  PCP: ED Arenas CNP  Note Started: 11:02 AM EST        I have seen and evaluated this patient with my supervising physician Alden Moritz, [address]       Chief Complaint   Patient presents with    Anxiety       HISTORY OF PRESENT ILLNESS   (Location, Timing/Onset, Context/Setting, Quality, Duration, Modifying Factors, Severity, Associated Signs and Symptoms)  Note limiting factors. Chief Complaint: Loyd Matthews is a 57 y.o. male who presents with brother. This patient was seen at admitted this facility November 5 to the ICU with lithium toxicity measured at 2.7. Discharged yesterday lithium was not detected at discharge. Seen by psychiatry with bipolar disorder and started on Risperdal 1 mg twice daily. He does not report chest pain, shortness of breath, gastrointestinal or urinary complaints. No arthralgia, myalgia or headache. The patient reports passing out a few times yesterday. Unclear reason whether related to his decreasing hemoglobin? .    Patient accompanied by his brother. Brother indicates unable to recall events. Appears confused with regard to answering questions and behavior patterns. Patient was on lithium until noted lithium toxicity. He has been started on risperidone 1 mg twice daily. It may take time to get medication adjusted. He was discharged yesterday. Referred to [facility] and have not yet made contact to see psychiatry. Concern expressed by brother, who accompanies, that he cannot be alone until stabilized on medication. This patient was apparently found wandering in the woods and confused due to lithium toxicity and this is why he was originally brought in and admitted. Patient also found to be anemic, anemic and hyponatremic.     At this time the patient has no specific complaints. He states he just is sick. I did ask a variety of review of system questions not identifying any specific. He does appear somewhat icteric and also noted to be anemic with a hemoglobin 8.1 during admission. Medications thiamine 100 mg daily, MVI daily, sodium chloride 1 g daily x5 days, hydroxyzine 50 every 6 as needed anxiety, doxycycline 100 mg twice daily x7 days, Klor-Con 20 mill equivalents daily x5 days and Risperdal 1 mg twice daily. Nursing Notes were all reviewed and agreed with or any disagreements were addressed in the HPI. REVIEW OF SYSTEMS    (2-9 systems for level 4, 10 or more for level 5)     Review of Systems    Positives and Pertinent negatives as per HPI. Except as noted above in the ROS, all other systems were reviewed and negative.        PAST MEDICAL HISTORY     Past Medical History:   Diagnosis Date    Anxiety     Bipolar 1 disorder ([facility] [address]          SURGICAL HISTORY     Past Surgical History:   Procedure Laterality Date    HERNIA REPAIR      SHOULDER SURGERY      left 3 times due to injury         CURRENTMEDICATIONS       Previous Medications    DOXYCYCLINE HYCLATE (VIBRA-TABS) 100 MG TABLET    Take 1 tablet by mouth 2 times daily for 7 days    HYDROXYZINE (ATARAX) 50 MG TABLET    Take 1 tablet by mouth every 6 hours as needed for Anxiety    MULTIPLE VITAMINS-MINERALS (MULTIVITAMIN WITH MINERALS) TABLET    Take 1 tablet by mouth daily    POTASSIUM CHLORIDE (KLOR-CON M) 20 MEQ EXTENDED RELEASE TABLET    Take 1 tablet by mouth 2 times daily for 5 days    RISPERIDONE (RISPERDAL) 1 MG TABLET    Take 1 tablet by mouth 2 times daily    SODIUM CHLORIDE 1 G TABLET    Take 1 tablet by mouth 3 times daily for 5 days    THIAMINE 100 MG TABLET    Take 1 tablet by mouth daily         ALLERGIES     Sulfa antibiotics    FAMILYHISTORY       Family History   Problem Relation Age of Onset    Heart Failure Mother         heart disease age 58    Other Father         dementia    Cancer Father         prostate          SOCIAL HISTORY       Social History     Tobacco Use    Smoking status: Current Every Day Smoker     Packs/day: 2.00     Types: Cigarettes    Smokeless tobacco: Never Used   Substance Use Topics    Alcohol use: Yes     Comment: former user    Drug use: Yes     Types: Marijuana (Weed), Methamphetamines (Crystal Meth)       SCREENINGS    Shannan Coma Scale  Eye Opening: Spontaneous  Best Verbal Response: Confused  Best Motor Response: Obeys commands  Shannan Coma Scale Score: 14        PHYSICAL EXAM    (up to 7 for level 4, 8 or more for level 5)     ED Triage Vitals [11/08/21 1010]   BP Temp Temp src Pulse Resp SpO2 Height Weight   (!) 159/92 -- -- 93 16 99 % 5' 7\" (1.702 m) 138 lb (62.6 kg)       Physical Exam  Vitals and nursing note reviewed. Constitutional:       Appearance: Normal appearance. He is well-developed and normal weight. HENT:      Head: Normocephalic and atraumatic. Right Ear: External ear normal.      Left Ear: External ear normal.   Eyes:      General: No scleral icterus. Right eye: No discharge. Left eye: No discharge. Extraocular Movements: Extraocular movements intact. Conjunctiva/sclera: Conjunctivae normal.      Pupils: Pupils are equal, round, and reactive to light. Cardiovascular:      Rate and Rhythm: Normal rate and regular rhythm. Heart sounds: Normal heart sounds. Pulmonary:      Effort: Pulmonary effort is normal.      Breath sounds: Normal breath sounds. Abdominal:      General: Abdomen is flat. Bowel sounds are normal.      Palpations: Abdomen is soft. Tenderness: There is no abdominal tenderness. Musculoskeletal:         General: Normal range of motion. Cervical back: Normal range of motion and neck supple. Skin:     General: Skin is warm and dry. Neurological:      General: No focal deficit present.       Mental Status: He is alert and oriented to Glucose 108 (*)     All other components within normal limits    Narrative:     Performed at:  [facility]) - [facility],  [place], [place]   Phone [phone]   BRAIN NATRIURETIC PEPTIDE - Abnormal; Notable for the following components:    Pro- (*)     All other components within normal limits    Narrative:     Performed at:  [facility],  [place]   Phone [phone]   ACETAMINOPHEN LEVEL - Abnormal; Notable for the following components:    Acetaminophen Level <5 (*)     All other components within normal limits    Narrative:     Performed at:  [facility],  [place], [place]   Phone [phone]   SALICYLATE LEVEL - Abnormal; Notable for the following components:    Salicylate, Serum <5.4 (*)     All other components within normal limits    Narrative:     Performed at:  [facility],  [place], [place]   Phone [phone]    Narrative:     Performed at:  [facility],  [place]   Phone [phone]   URINE RT REFLEX TO CULTURE    Narrative:     Performed at:  [facility],  [place], [place]   Phone [phone]   TROPONIN    Narrative:     Performed at:  [facility],  [place], [place]   Phone [phone]   ETHANOL    Narrative:     Performed at:  [facility],  [place], [place]   Phone [phone]   PHOSPHORUS    Narrative:     Performed at:  [facility],  [place], [place]   Phone [phone]   BLOOD OCCULT STOOL DIAGNOSTIC    Narrative:     ORDER#: [id number] ORDERED BY: Merced Robles  SOURCE: Stool                              COLLECTED:  11/08/21 13:29  ANTIBIOTICS AT JALIL.:                      RECEIVED :  11/08/21 13:50  Performed at:  [facility]) [facility],  [place], [facility]   Phone [phone]   IRON AND TIBC   ETHANOL   TYPE AND SCREEN    Narrative:     Performed at:  [facility],  [place], [facility]   Phone [phone]       When ordered only abnormal lab results are displayed. All other labs were within normal range or not returned as of this dictation. EKG: When ordered, EKG's are interpreted by the Emergency Department Physician in the absence of a cardiologist.  Please see their note for interpretation of EKG. RADIOLOGY:   Non-plain film images such as CT, Ultrasound and MRI are read by the radiologist. Plain radiographic images are visualized and preliminarily interpreted by the ED Provider with the below findings:        Interpretation per the Radiologist below, if available at the time of this note:    XR CHEST PORTABLE   Final Result   No acute process. CT HEAD WO CONTRAST    Result Date: 11/6/2021  EXAMINATION: CT OF THE HEAD WITHOUT CONTRAST  11/6/2021 5:15 pm TECHNIQUE: CT of the head was performed without the administration of intravenous contrast. Dose modulation, iterative reconstruction, and/or weight based adjustment of the mA/kV was utilized to reduce the radiation dose to as low as reasonably achievable. COMPARISON: None. HISTORY: ORDERING SYSTEM PROVIDED HISTORY: ams TECHNOLOGIST PROVIDED HISTORY: If patient is on cardiac monitor and/or pulse ox, they may be taken off cardiac monitor and pulse ox, left on O2 if currently on. All monitors reattached when patient returns to room. Has a \"code stroke\" or \"stroke alert\" been called? ->No Reason for exam:->ams Decision Support Exception - unselect if not a suspected or confirmed attenuation is seen in the periventricular and subcortical white matter. No acute intracranial hemorrhage or acute infarct is identified. ORBITS: The visualized portion of the orbits demonstrate no acute abnormality. SINUSES: Unchanged opacity left maxillary sinus. SOFT TISSUES/SKULL:  No acute abnormality of the visualized skull or soft tissues. No acute intracranial abnormality. Diffuse atrophic changes with findings suggesting chronic microvascular ischemia Unchanged opacity left maxillary sinus most likely representing chronic sinusitis     CT ABDOMEN PELVIS W IV CONTRAST Additional Contrast? None    Result Date: 11/6/2021  EXAMINATION: CT OF THE ABDOMEN AND PELVIS WITH CONTRAST 11/6/2021 4:15 pm TECHNIQUE: CT of the abdomen and pelvis was performed with the administration of intravenous contrast. Multiplanar reformatted images are provided for review. Dose modulation, iterative reconstruction, and/or weight based adjustment of the mA/kV was utilized to reduce the radiation dose to as low as reasonably achievable. COMPARISON: None. HISTORY: ORDERING SYSTEM PROVIDED HISTORY: general abd pain TECHNOLOGIST PROVIDED HISTORY: Additional Contrast?->None Reason for exam:->general abd pain Decision Support Exception - unselect if not a suspected or confirmed emergency medical condition->Emergency Medical Condition (MA) Reason for Exam: agressive behavior, general abd pain Acuity: Acute Type of Exam: Initial FINDINGS: Lower Chest: Subtle dependent subsegmental atelectasis was noted in the right lower lobe. Organs: The liver, gallbladder, spleen, both adrenals and pancreas appeared normal.  No renal mass, hydronephrosis, renal or ureteral calculi were identified. GI/Bowel: No colitis, diverticulitis or appendicitis were noted. Pelvis: No urinary bladder wall thickening was noted. The prostate was not enlarged. No ascites was identified. Peritoneum/Retroperitoneum: No abdominal or pelvic lymphadenopathy were noted. Bones/Soft Tissues: Acquired spinal stenosis was noted at the L4-5 disc with marked decrease in disc height and subtle grade 1 cheryl listhesis of L4 over L5. No renal mass, hydronephrosis, renal or ureteral calculi. No colitis, diverticulitis or appendicitis. No mass, ascites or lymphadenopathy. Acquired spinal stenosis at the L4-5 disc. XR CHEST PORTABLE    Result Date: 11/4/2021  EXAMINATION: ONE XRAY VIEW OF THE CHEST 11/4/2021 3:48 pm COMPARISON: 10/30/2021 HISTORY: ORDERING SYSTEM PROVIDED HISTORY: confusion TECHNOLOGIST PROVIDED HISTORY: Reason for exam:->confusion Reason for Exam: Fatigue (Quit drinking alcohol cold turkey about a week ago, not feeling well. Sent here for Jaundice, elevated livery enzymes and altered mental status.  ) FINDINGS: The cardiac silhouette, mediastinal and hilar contours are normal.  There is chronic blunting of the right costophrenic angle, most likely fibrotic. There is no focal airspace disease. No pneumothorax. Left reverse shoulder replacement is partially visualized and unremarkable. No acute cardiopulmonary disease. PROCEDURES   Unless otherwise noted below, none     Procedures    CRITICAL CARE TIME   Critical Care  There was a high probability of life-threatening clinical deterioration in the patient's condition requiring my urgent intervention. Total critical care time with the patient was 35 minutes excluding separately reportable procedures. Critical care required due to patients finding of downtrending hemoglobin over the past few weeks. History of syncopal episode occurring yesterday after discharge from hospital.  Patient also with some degree of confusion as he was transitioning from lithium/lithium toxicity over to risperidone.   Patient will need a GI consult as well as psychiatric consult under direction of hospitalist.      CONSULTS:  IP CONSULT TO HOSPITALIST  IP CONSULT TO PSYCHIATRY  IP CONSULT TO GI      [address] [place] [address] and DIFFERENTIAL DIAGNOSIS/MDM:   Vitals:    Vitals:    11/08/21 1357 11/08/21 1402 11/08/21 1503 11/08/21 1600   BP: (!) 141/70 (!) 152/95 128/82 (!) 159/82   Pulse: 88 89 79 75   Resp: 16 21 23 21   SpO2: 99%  99% 100%   Weight:       Height:           Patient was given the following medications:  Medications   pantoprazole (PROTONIX) injection 80 mg (80 mg IntraVENous Given 11/8/21 1529)         November 6, 2021 or 2 days ago the patient had a negative Covid study. I did speak with behavioral health and recommended a repeat Covid study. Covid negative. This patient presenting due to confusion as noted by his brother. Brother reports overall lack of awareness with regard to self-care and self management. It may be related to the recent lithium toxicity with a high of 2.7. Lithium today is 0.3. Patient was seen by psychiatry during admission between November 5 and November 7. Discharged with Risperdal 1 mg twice daily. He is not likely had enough time to transition from the lithium to the Risperdal.    Also noted the patient is having a downtrending hemoglobin. On October 30, 2021 9.7 today 7.6. Patient reporting some lightheaded and syncopal event occurring yesterday after the admission. Uncertain if related. The patient's Hemoccult study is negative. Recommend admission by attending physician for transfusion consideration and evaluation by GI with downtrending hemoglobin as well as evaluation by mental health to assist with patient medication management and transitioning to self-care. FINAL IMPRESSION      1. Symptomatic anemia    2. Low hemoglobin    3. Syncope and collapse    4. Psychiatric problem          DISPOSITION/PLAN   DISPOSITION        PATIENT REFERRED TO:  No follow-up provider specified.     DISCHARGE MEDICATIONS:  New Prescriptions    No medications on file       DISCONTINUED MEDICATIONS:  Discontinued Medications    No medications on file              (Please note that portions of this note were completed with a voice recognition program.  Efforts were made to edit the dictations but occasionally words are mis-transcribed. )    Birgit Faustin PA-C (electronically signed)           Birgit Faustin PA-C  11/08/21 2169

## 2021-11-09 ENCOUNTER — ANESTHESIA EVENT (OUTPATIENT)
Dept: ENDOSCOPY | Age: 61
DRG: 381 | End: 2021-11-09
Payer: MEDICARE

## 2021-11-09 LAB
ALBUMIN SERPL-MCNC: 2.6 G/DL (ref 3.4–5)
ALP BLD-CCNC: 299 U/L (ref 40–129)
ALT SERPL-CCNC: 32 U/L (ref 10–40)
ANION GAP SERPL CALCULATED.3IONS-SCNC: 8 MMOL/L (ref 3–16)
ANISOCYTOSIS: ABNORMAL
AST SERPL-CCNC: 32 U/L (ref 15–37)
ATYPICAL LYMPHOCYTE RELATIVE PERCENT: 3 % (ref 0–6)
BANDED NEUTROPHILS RELATIVE PERCENT: 1 % (ref 0–7)
BASOPHILS ABSOLUTE: 0.3 K/UL (ref 0–0.2)
BASOPHILS RELATIVE PERCENT: 3 %
BILIRUB SERPL-MCNC: 0.7 MG/DL (ref 0–1)
BILIRUBIN DIRECT: 0.4 MG/DL (ref 0–0.3)
BILIRUBIN, INDIRECT: 0.3 MG/DL (ref 0–1)
BUN BLDV-MCNC: 5 MG/DL (ref 7–20)
CALCIUM SERPL-MCNC: 8.3 MG/DL (ref 8.3–10.6)
CHLORIDE BLD-SCNC: 108 MMOL/L (ref 99–110)
CO2: 22 MMOL/L (ref 21–32)
CREAT SERPL-MCNC: 0.7 MG/DL (ref 0.8–1.3)
DAT POLYSPECIFIC: NORMAL
EKG ATRIAL RATE: 72 BPM
EKG DIAGNOSIS: NORMAL
EKG P AXIS: 82 DEGREES
EKG P-R INTERVAL: 180 MS
EKG Q-T INTERVAL: 430 MS
EKG QRS DURATION: 90 MS
EKG QTC CALCULATION (BAZETT): 470 MS
EKG R AXIS: 68 DEGREES
EKG T AXIS: 73 DEGREES
EKG VENTRICULAR RATE: 72 BPM
EOSINOPHILS ABSOLUTE: 0 K/UL (ref 0–0.6)
EOSINOPHILS RELATIVE PERCENT: 0 %
FERRITIN: 1184 NG/ML (ref 30–400)
FOLATE: 10.71 NG/ML (ref 4.78–24.2)
GFR AFRICAN AMERICAN: >60
GFR NON-AFRICAN AMERICAN: >60
GLUCOSE BLD-MCNC: 93 MG/DL (ref 70–99)
HCT VFR BLD CALC: 21.4 % (ref 40.5–52.5)
HCT VFR BLD CALC: 21.8 % (ref 40.5–52.5)
HEMATOLOGY PATH CONSULT: YES
HEMOGLOBIN: 7.1 G/DL (ref 13.5–17.5)
HEMOGLOBIN: 7.2 G/DL (ref 13.5–17.5)
HYPOCHROMIA: ABNORMAL
LACTATE DEHYDROGENASE: 216 U/L (ref 100–190)
LV EF: 58 %
LVEF MODALITY: NORMAL
LYMPHOCYTES ABSOLUTE: 2.1 K/UL (ref 1–5.1)
LYMPHOCYTES RELATIVE PERCENT: 18 %
MCH RBC QN AUTO: 35.4 PG (ref 26–34)
MCHC RBC AUTO-ENTMCNC: 33.4 G/DL (ref 31–36)
MCV RBC AUTO: 106 FL (ref 80–100)
METAMYELOCYTES RELATIVE PERCENT: 2 %
MONOCYTES ABSOLUTE: 0.7 K/UL (ref 0–1.3)
MONOCYTES RELATIVE PERCENT: 7 %
MYELOCYTE PERCENT: 3 %
NEUTROPHILS ABSOLUTE: 7 K/UL (ref 1.7–7.7)
NEUTROPHILS RELATIVE PERCENT: 63 %
PDW BLD-RTO: 19.6 % (ref 12.4–15.4)
PLATELET # BLD: 707 K/UL (ref 135–450)
PLATELET SLIDE REVIEW: ABNORMAL
PMV BLD AUTO: 6.8 FL (ref 5–10.5)
POIKILOCYTES: ABNORMAL
POLYCHROMASIA: ABNORMAL
POTASSIUM REFLEX MAGNESIUM: 3.7 MMOL/L (ref 3.5–5.1)
RBC # BLD: 2.02 M/UL (ref 4.2–5.9)
SLIDE REVIEW: ABNORMAL
SODIUM BLD-SCNC: 138 MMOL/L (ref 136–145)
TOTAL PROTEIN: 5.2 G/DL (ref 6.4–8.2)
TOXIC GRANULATION: PRESENT
TROPONIN: <0.01 NG/ML
VITAMIN B-12: 767 PG/ML (ref 211–911)
WBC # BLD: 10.2 K/UL (ref 4–11)

## 2021-11-09 PROCEDURE — 2580000003 HC RX 258: Performed by: NURSE PRACTITIONER

## 2021-11-09 PROCEDURE — 96376 TX/PRO/DX INJ SAME DRUG ADON: CPT

## 2021-11-09 PROCEDURE — C9113 INJ PANTOPRAZOLE SODIUM, VIA: HCPCS | Performed by: NURSE PRACTITIONER

## 2021-11-09 PROCEDURE — 6370000000 HC RX 637 (ALT 250 FOR IP): Performed by: PSYCHIATRY & NEUROLOGY

## 2021-11-09 PROCEDURE — 6360000002 HC RX W HCPCS: Performed by: NURSE PRACTITIONER

## 2021-11-09 PROCEDURE — 85018 HEMOGLOBIN: CPT

## 2021-11-09 PROCEDURE — 6370000000 HC RX 637 (ALT 250 FOR IP): Performed by: PHYSICIAN ASSISTANT

## 2021-11-09 PROCEDURE — 85014 HEMATOCRIT: CPT

## 2021-11-09 PROCEDURE — 84484 ASSAY OF TROPONIN QUANT: CPT

## 2021-11-09 PROCEDURE — 6370000000 HC RX 637 (ALT 250 FOR IP): Performed by: NURSE PRACTITIONER

## 2021-11-09 PROCEDURE — 99232 SBSQ HOSP IP/OBS MODERATE 35: CPT | Performed by: INTERNAL MEDICINE

## 2021-11-09 PROCEDURE — 36415 COLL VENOUS BLD VENIPUNCTURE: CPT

## 2021-11-09 PROCEDURE — 80048 BASIC METABOLIC PNL TOTAL CA: CPT

## 2021-11-09 PROCEDURE — 93010 ELECTROCARDIOGRAM REPORT: CPT | Performed by: INTERNAL MEDICINE

## 2021-11-09 PROCEDURE — 2060000000 HC ICU INTERMEDIATE R&B

## 2021-11-09 PROCEDURE — 84238 ASSAY NONENDOCRINE RECEPTOR: CPT

## 2021-11-09 PROCEDURE — 93306 TTE W/DOPPLER COMPLETE: CPT

## 2021-11-09 PROCEDURE — 85045 AUTOMATED RETICULOCYTE COUNT: CPT

## 2021-11-09 PROCEDURE — 6370000000 HC RX 637 (ALT 250 FOR IP): Performed by: INTERNAL MEDICINE

## 2021-11-09 PROCEDURE — 93005 ELECTROCARDIOGRAM TRACING: CPT | Performed by: NURSE PRACTITIONER

## 2021-11-09 PROCEDURE — 99221 1ST HOSP IP/OBS SF/LOW 40: CPT | Performed by: PSYCHIATRY & NEUROLOGY

## 2021-11-09 PROCEDURE — 80076 HEPATIC FUNCTION PANEL: CPT

## 2021-11-09 PROCEDURE — 82728 ASSAY OF FERRITIN: CPT

## 2021-11-09 PROCEDURE — 85025 COMPLETE CBC W/AUTO DIFF WBC: CPT

## 2021-11-09 PROCEDURE — 83010 ASSAY OF HAPTOGLOBIN QUANT: CPT

## 2021-11-09 PROCEDURE — 83615 LACTATE (LD) (LDH) ENZYME: CPT

## 2021-11-09 PROCEDURE — 81270 JAK2 GENE: CPT

## 2021-11-09 RX ORDER — POLYETHYLENE GLYCOL 3350 17 G/17G
119 POWDER, FOR SOLUTION ORAL ONCE
Status: DISCONTINUED | OUTPATIENT
Start: 2021-11-09 | End: 2021-11-09 | Stop reason: CLARIF

## 2021-11-09 RX ORDER — SODIUM CHLORIDE, SODIUM LACTATE, POTASSIUM CHLORIDE, CALCIUM CHLORIDE 600; 310; 30; 20 MG/100ML; MG/100ML; MG/100ML; MG/100ML
INJECTION, SOLUTION INTRAVENOUS CONTINUOUS
Status: CANCELLED | OUTPATIENT
Start: 2021-11-09

## 2021-11-09 RX ORDER — POLYETHYLENE GLYCOL 3350 17 G/17G
119 POWDER, FOR SOLUTION ORAL ONCE
Status: COMPLETED | OUTPATIENT
Start: 2021-11-10 | End: 2021-11-10

## 2021-11-09 RX ORDER — SODIUM CHLORIDE 9 MG/ML
25 INJECTION, SOLUTION INTRAVENOUS PRN
Status: CANCELLED | OUTPATIENT
Start: 2021-11-09

## 2021-11-09 RX ORDER — POLYETHYLENE GLYCOL 3350 17 G/17G
119 POWDER, FOR SOLUTION ORAL ONCE
Status: DISCONTINUED | OUTPATIENT
Start: 2021-11-10 | End: 2021-11-09 | Stop reason: CLARIF

## 2021-11-09 RX ORDER — POLYETHYLENE GLYCOL 3350 17 G/17G
119 POWDER, FOR SOLUTION ORAL ONCE
Status: COMPLETED | OUTPATIENT
Start: 2021-11-09 | End: 2021-11-09

## 2021-11-09 RX ORDER — SODIUM CHLORIDE 0.9 % (FLUSH) 0.9 %
10 SYRINGE (ML) INJECTION EVERY 12 HOURS SCHEDULED
Status: CANCELLED | OUTPATIENT
Start: 2021-11-09

## 2021-11-09 RX ORDER — RISPERIDONE 0.25 MG/1
0.5 TABLET, FILM COATED ORAL 2 TIMES DAILY
Status: DISCONTINUED | OUTPATIENT
Start: 2021-11-09 | End: 2021-11-11 | Stop reason: HOSPADM

## 2021-11-09 RX ORDER — SODIUM CHLORIDE 0.9 % (FLUSH) 0.9 %
10 SYRINGE (ML) INJECTION PRN
Status: CANCELLED | OUTPATIENT
Start: 2021-11-09

## 2021-11-09 RX ORDER — NICOTINE 21 MG/24HR
1 PATCH, TRANSDERMAL 24 HOURS TRANSDERMAL DAILY
Status: DISCONTINUED | OUTPATIENT
Start: 2021-11-09 | End: 2021-11-11 | Stop reason: HOSPADM

## 2021-11-09 RX ADMIN — DOXYCYCLINE HYCLATE 100 MG: 100 TABLET, COATED ORAL at 10:46

## 2021-11-09 RX ADMIN — ACETAMINOPHEN 650 MG: 325 TABLET ORAL at 23:08

## 2021-11-09 RX ADMIN — ACETAMINOPHEN 650 MG: 325 TABLET ORAL at 10:46

## 2021-11-09 RX ADMIN — POLYETHYLENE GLYCOL (3350) 119 G: 17 POWDER, FOR SOLUTION ORAL at 18:00

## 2021-11-09 RX ADMIN — PANTOPRAZOLE SODIUM 40 MG: 40 INJECTION, POWDER, FOR SOLUTION INTRAVENOUS at 10:46

## 2021-11-09 RX ADMIN — SODIUM CHLORIDE, PRESERVATIVE FREE 10 ML: 5 INJECTION INTRAVENOUS at 20:05

## 2021-11-09 RX ADMIN — Medication 100 MG: at 10:46

## 2021-11-09 RX ADMIN — RISPERIDONE 1 MG: 1 TABLET ORAL at 10:46

## 2021-11-09 RX ADMIN — Medication 1 TABLET: at 13:40

## 2021-11-09 RX ADMIN — SODIUM CHLORIDE 25 ML: 9 INJECTION, SOLUTION INTRAVENOUS at 17:03

## 2021-11-09 RX ADMIN — RISPERIDONE 0.5 MG: 0.25 TABLET ORAL at 20:05

## 2021-11-09 RX ADMIN — DOXYCYCLINE HYCLATE 100 MG: 100 TABLET, COATED ORAL at 20:05

## 2021-11-09 RX ADMIN — ACETAMINOPHEN 650 MG: 325 TABLET ORAL at 17:06

## 2021-11-09 RX ADMIN — SODIUM CHLORIDE, PRESERVATIVE FREE 10 ML: 5 INJECTION INTRAVENOUS at 10:47

## 2021-11-09 RX ADMIN — ACETAMINOPHEN 650 MG: 325 TABLET ORAL at 03:14

## 2021-11-09 RX ADMIN — BISACODYL 20 MG: 5 TABLET, COATED ORAL at 15:02

## 2021-11-09 ASSESSMENT — PAIN SCALES - GENERAL
PAINLEVEL_OUTOF10: 4
PAINLEVEL_OUTOF10: 3
PAINLEVEL_OUTOF10: 8
PAINLEVEL_OUTOF10: 4
PAINLEVEL_OUTOF10: 4
PAINLEVEL_OUTOF10: 8

## 2021-11-09 NOTE — ACP (ADVANCE CARE PLANNING)
Advance Care Planning   Healthcare Decision Maker:    Primary Decision Maker: Shea Linares - Brother/Sister - [phone]    Secondary Decision Maker: Marni Kilpatrick - Child - [phone]    Click here to complete Healthcare Decision Makers including selection of the Healthcare Decision Maker Relationship (ie \"Primary\").     requested RN to consult spiritual care for advance directives

## 2021-11-09 NOTE — CONSULTS
Gastroenterology Consult Note    Patient:   Mikie Wyman   :    1960   Facility:   [facility]  Referring/PCP: Jessica Davies APRN - CNP  Date:     2021  Consultant:   Kai Cleary PA-C      Chief Complaint   Patient presents with    Anxiety        History of Present illness     57year old male with a history of bipolar disorder, anxiety, arthritis, anemia, tobacco abuse, and lithium toxicity presented to the ED with anxiety. He claims he \"blacked out\" 5x over the past week. He admits to heartburn which improves with milk. He passes 2-3x BMs per day on average. He denies nausea, vomiting, dysphagia, abdominal pain, cramping, bloating, rectal bleed, melena, weight loss, and early satiety. He quit alcohol 2 weeks ago. He smokes 2.0 packs of cigarettes daily. He stopped lithium one week ago. He claims his last colonoscopy ~5-6 years ago in [place] showed polyp. GI was consulted for evaluation of anemia. He was occult negative in the ED. Past Medical History:   Diagnosis Date    Anxiety     Arthritis     Bipolar 1 disorder ([facility] [address]      Past Surgical History:   Procedure Laterality Date    HERNIA REPAIR      NECK SURGERY      SHOULDER SURGERY      left 3 times due to injury       Social:   Social History     Tobacco Use    Smoking status: Current Every Day Smoker     Packs/day: 2.00     Types: Cigarettes    Smokeless tobacco: Never Used   Substance Use Topics    Alcohol use: Yes     Comment: former user     Family:   Family History   Problem Relation Age of Onset    Heart Failure Mother         heart disease age 61    Other Father         dementia    Cancer Father         prostate     No current facility-administered medications on file prior to encounter.      Current Outpatient Medications on File Prior to Encounter   Medication Sig Dispense Refill    doxycycline hyclate (VIBRA-TABS) 100 MG tablet Take 1 tablet by mouth 2 times daily for 7 days 14 tablet 0    risperiDONE (RISPERDAL) 1 MG tablet Take 1 tablet by mouth 2 times daily 60 tablet 0    thiamine 100 MG tablet Take 1 tablet by mouth daily 30 tablet 3    potassium chloride (KLOR-CON M) 20 MEQ extended release tablet Take 1 tablet by mouth 2 times daily for 5 days 10 tablet 0    sodium chloride 1 g tablet Take 1 tablet by mouth 3 times daily for 5 days 15 tablet 0    Multiple Vitamins-Minerals (MULTIVITAMIN WITH MINERALS) tablet Take 1 tablet by mouth daily 30 tablet 3    hydrOXYzine (ATARAX) 50 MG tablet Take 1 tablet by mouth every 6 hours as needed for Anxiety 30 tablet 0      Infusions:    sodium chloride       PRN Medications: hydrOXYzine, sodium chloride flush, sodium chloride, ondansetron **OR** ondansetron, polyethylene glycol, acetaminophen **OR** acetaminophen, perflutren lipid microspheres  Allergies: Allergies   Allergen Reactions    Sulfa Antibiotics      rash       ROS:   Constitutional: negative for chills, fevers and sweats  Eyes: negative for cataracts, icterus and redness  Ears, nose, mouth, throat, and face: negative for epistaxis, hearing loss and sore throat  Respiratory: negative for cough, hemoptysis and sputum  Cardiovascular: negative for chest pain, dyspnea and lower extremity edema  Gastrointestinal: as per HPI  Genitourinary:negative for dysuria, frequency and hematuria  Neurological: negative for coordination problems, and gait problems. Positive for dizziness and syncope. Behavioral/Psych: negative for depression and mood swings. Positive for anxiety. Physical Exam   BP (!) 158/94   Pulse 69   Temp 97.2 °F (36.2 °C) (Oral)   Resp 18   Ht 5' 7\" (1.702 m)   Wt 146 lb 4.8 oz (66.4 kg)   SpO2 98%   BMI 22.91 kg/m²       General appearance: alert, appears stated age, cooperative and no distress  Head: Normocephalic, without obvious abnormality, atraumatic  Eyes: conjunctivae/corneas clear. PERRL, EOM's intact. Fundi benign.   Neck: no adenopathy and supple, symmetrical, trachea midline  Lungs: clear to auscultation bilaterally  Heart: regular rate and rhythm, S1, S2 normal, no murmur, click, rub or gallop  Abdomen: soft, non-tender; bowel sounds normal; no masses,  no organomegaly  Extremities: extremities normal, atraumatic, no cyanosis or edema    Lab and Imaging Review   Labs:  CBC:   Recent Labs     11/07/21  0315 11/07/21  0315 11/08/21  1027 11/09/21  0052 11/09/21  0455   WBC 12.2*  --  14.2*  --  10.2   HGB 7.4*   < > 7.6* 7.1* 7.2*   HCT 22.4*   < > 23.9* 21.8* 21.4*   .8*  --  104.8*  --  106.0*   *  --  735*  --  707*    < > = values in this interval not displayed. BMP:   Recent Labs     11/07/21  0315 11/08/21  1027 11/09/21  0455    137 138   K 3.9 3.9 3.7   * 107 108   CO2 21 20* 22   PHOS 3.4 2.5  --    BUN 9 7 5*   CREATININE 0.8 0.9 0.7*     LIVER PROFILE:   Recent Labs     11/06/21  1625 11/08/21  1027 11/09/21  0455   AST 48* 51* 32   ALT 49* 45* 32   PROT 6.3* 6.0* 5.2*   BILIDIR  --  0.5* 0.4*   BILITOT 1.2* 1.0 0.7   ALKPHOS 452* 405* 299*     PT/INR: No results for input(s): INR in the last 72 hours. Invalid input(s): PT      IMAGING:  XR CHEST PORTABLE   Final Result   No acute process. CT ABDOMEN PELVIS W IV CONTRAST - 11/6/2021  Impression   No renal mass, hydronephrosis, renal or ureteral calculi.       No colitis, diverticulitis or appendicitis.       No mass, ascites or lymphadenopathy.       Acquired spinal stenosis at the L4-5 disc. Attending Supervising [de-identified] Attestation Statement  The patient is a 57 y.o. male. I have performed a history and physical examination of the patient. I discussed the case with my physician assistant Jorge Celaya PA-C    I reviewed the patient's Past Medical History, Past Surgical History, Medications, and Allergies.      Physical Exam:  Vitals:    11/08/21 2049 11/09/21 0208 11/09/21 1030 11/09/21 1500   BP: (!) 142/83 125/71 (!) 158/94 (!) 152/78   Pulse:  77 69 78 Resp:  16 18 16   Temp:  98.1 °F (36.7 °C) 97.2 °F (36.2 °C) 97.1 °F (36.2 °C)   TempSrc:  Oral Oral Oral   SpO2:  97% 98% 96%   Weight:  146 lb 4.8 oz (66.4 kg)     Height:           Physical Examination: General appearance - alert, well appearing, and in no distress  Mental status - alert, oriented to person, place, and time  Eyes - pupils equal and reactive, extraocular eye movements intact  Neck - supple, no significant adenopathy  Chest - clear to auscultation, no wheezes, rales or rhonchi, symmetric air entry  Heart - normal rate, regular rhythm, normal S1, S2, no murmurs, rubs, clicks or gallops  Abdomen - soft, nontender, nondistended, no masses or organomegaly  Extremities - no pedal edema noted        Assessment:     57year old male with a history of bipolar disorder, anxiety, arthritis, anemia, tobacco abuse, and lithium toxicity admitted with acute on chronic anemia concerning for GI blood loss. Plan:   Continue supportive care  Monitor Hgb  Observe for signs of bleeding  Monitor and document output  Continue Pantoprazole 40 mg qAM  Check iron studies  Continue clear liquid diet as tolerated  NPO after 5:00 AM tomorrow  EGD + colonoscopy tomorrow  Hematology consulted  Will follow     Arlene Jeffries PA-C  12:09 PM 11/9/2021                      57year old male with a history of arthritis, bipolar disorder, anxiety admitted with severe symptomatic anemia concerning for GI blood loss. Continue supportive care. Monitor Hgb. Observe for signs of bleeding. EGD+Colon tomorrow.       Sera Berkowitz MD          [phone]  [phone]

## 2021-11-09 NOTE — H&P
Hospital Medicine History & Physical      PCP: ED Byrd CNP    Date of Admission: 11/8/2021    Date of Service: Pt seen/examined on 11/08/21      Chief Complaint:    Chief Complaint   Patient presents with    Anxiety       History Of Present Illness: The patient is a 57 y.o. male with PMH of bipolar, anxiety, arthritis, anemia, tobacco abuse, and lithium toxicity  who presents to [facility] with brother and complaints of anxiety. History obtained from the patient, brother and review of EMR. Pt was recently admitted for lithium toxicity, lithium discontinued and pt was started on Risperdal per psychiatry. Pt also noted to be anemic, dx with SIRS- sent home on doxycycline and acute metabolic encephalopathy. Upon assessment patient he stated he was in [place] mode. \"  He stated since discharge yesterday he has not slept and has been up cleaning house today. Pt told ED doc that he had a syncopal episode last night. Upon my assessment today, pt denies any syncopal episodes, he stated \" the opposite of that, he has been on the go 24/7. \"  Per permission of patient I spoke with brother who stated he told him that he recently had a syncopal episode while in doctors office. Brother is unsure when that was. Pt does complain of occasional dizziness and being lightheaded only when he gets up too fast. Denies any chest pain, palpitations, shortness of breath, orthopnea, hemoptysis , or dark or bloody stools. Pt was admitted to PCU for further care, GI consulted. Past Medical History:        Diagnosis Date    Anxiety     Bipolar 1 disorder ([facility] [address]        Past Surgical History:        Procedure Laterality Date    HERNIA REPAIR      SHOULDER SURGERY      left 3 times due to injury       Medications Prior to Admission:    Prior to Admission medications    Medication Sig Start Date End Date Taking?  Authorizing Provider   doxycycline hyclate (VIBRA-TABS) 100 MG tablet Take 1 tablet by mouth 2 times daily for 7 days 11/7/21 11/14/21 Yes Eleanor Nicholson MD   risperiDONE (RISPERDAL) 1 MG tablet Take 1 tablet by mouth 2 times daily 11/7/21   Eleanor Nicholson MD   thiamine 100 MG tablet Take 1 tablet by mouth daily 11/8/21   Eleanor Nicholson MD   potassium chloride (KLOR-CON M) 20 MEQ extended release tablet Take 1 tablet by mouth 2 times daily for 5 days 10/31/21 11/5/21  Ingrid Knott MD   sodium chloride 1 g tablet Take 1 tablet by mouth 3 times daily for 5 days 10/31/21 11/5/21  Ingrid Knott MD   Multiple Vitamins-Minerals (MULTIVITAMIN WITH MINERALS) tablet Take 1 tablet by mouth daily 10/31/21   Ingrid Knott MD   hydrOXYzine (ATARAX) 50 MG tablet Take 1 tablet by mouth every 6 hours as needed for Anxiety 10/31/21 11/10/21  Ingrid Knott MD       Allergies:  Sulfa antibiotics    Social History:  The patient currently lives home     TOBACCO:   reports that he has been smoking cigarettes. He has been smoking about 2.00 packs per day. He has never used smokeless tobacco.  ETOH:   reports current alcohol use. Family History:   Positive as follows:        Problem Relation Age of Onset    Heart Failure Mother         heart disease age 61    Other Father         dementia    Cancer Father         prostate       REVIEW OF SYSTEMS:       Constitutional: Negative for fever   HENT: Negative for sore throat   Eyes: Negative for redness   Respiratory: Negative  for dyspnea, cough   Cardiovascular: Negative for chest pain   Gastrointestinal: Negative for vomiting, diarrhea   Genitourinary: Negative for hematuria   Musculoskeletal: Negative for arthralgias   Skin: Negative for rash   Neurological: + syncope + dizziness   Hematological: Negative for adenopathy   Psychiatric/Behavorial:+ anxiety   PHYSICAL EXAM:    /75   Pulse 75   Temp 97.4 °F (36.3 °C) (Oral)   Resp 16   Ht 5' 7\" (1.702 m)   Wt 144 lb 3.2 oz (65.4 kg)   SpO2 100%   BMI 22.58 kg/m²     Gen: No distress. Alert. Eyes: PERRL. No sclera icterus. No conjunctival injection. ENT: No discharge. Pharynx clear. Neck: No JVD. No Carotid Bruit. Trachea midline. Resp: No accessory muscle use. No crackles. No wheezes. No rhonchi. CV: Regular rate. Regular rhythm. No murmur. No rub. No edema. GI: Non-tender. Non-distended. No masses. No organomegaly. Normal bowel sounds. No hernia. Skin: Warm and dry. No nodule on exposed extremities. No rash on exposed extremities. M/S: No cyanosis. No joint deformity. No clubbing. Neuro: Awake. Grossly nonfocal    Psych: Oriented x 3. No anxiety or agitation.      CBC:   Recent Labs     11/06/21 1625 11/07/21 0315 11/08/21  1027   WBC 14.7* 12.2* 14.2*   HGB 8.1* 7.4* 7.6*   HCT 24.4* 22.4* 23.9*   .5* 103.8* 104.8*   * 689* 735*     BMP:   Recent Labs     11/06/21  2100 11/07/21  0315 11/08/21  1027    139 137   K 4.3 3.9 3.9    111* 107   CO2 24 21 20*   PHOS  --  3.4 2.5   BUN 10 9 7   CREATININE 1.1 0.8 0.9     LIVER PROFILE:   Recent Labs     11/05/21 2025 11/06/21  1625 11/08/21  1027   AST 46* 48* 51*   ALT 55* 49* 45*   BILIDIR  --   --  0.5*   BILITOT 1.3* 1.2* 1.0   ALKPHOS 532* 452* 405*     UA:  Recent Labs     11/08/21  1115   COLORU Yellow   PHUR 6.0  6.0   CLARITYU Clear   SPECGRAV 1.010   LEUKOCYTESUR Negative   UROBILINOGEN 0.2   BILIRUBINUR Negative   BLOODU Negative   GLUCOSEU Negative       CARDIAC ENZYMES  Recent Labs     11/05/21 2025 11/06/21  1625 11/08/21  1027   CKTOTAL  --  88  --    TROPONINI <0.01 <0.01 <0.01       U/A:    Lab Results   Component Value Date    COLORU Yellow 11/08/2021    WBCUA 3-5 10/30/2021    RBCUA None seen 10/30/2021    CLARITYU Clear 11/08/2021    SPECGRAV 1.010 11/08/2021    LEUKOCYTESUR Negative 11/08/2021    BLOODU Negative 11/08/2021    GLUCOSEU Negative 11/08/2021         CULTURES  SARS-CoV-2 RNA, RT PCR NOT DETECTED NOT DETECTED      INFLUENZA A NOT DETECTED NOT DETECTED     INFLUENZA B NOT DETECTED NOT DETECTED           EKG:    Sinus tachycardia with Premature atrial complexesOtherwise normal ECGConfirmed by Reynolds Lefort MD, [address] ([id number]) on 11/8/2021 5:50:00 PM    RADIOLOGY  XR CHEST PORTABLE   Final Result   No acute process. Active Problems:    Syncope  Resolved Problems:    * No resolved hospital problems. *    ASSESSMENT/PLAN:  Reported syncope episodes at home  - pt told ED doc that he had recent episodes of syncope  - pt denies any syncopal episodes with my assessment  - spoke to brother who states patient told him that he has had some syncopal episodes most recent in doctors office. None since pt was discharged yesterday.   - will order echo   - check TSH  - trend troponin- <0.01   - trend H/H  - orthostatic blood pressures ordered  - monitor on telemetry     Anemia  - 7.6 on admit---> 7.4  - pt was 9.7 on 10/30  - was supposed to follow up outpatient  - hx of alcohol abuse  - denies any blood in stools or dark stools  - PPI IV daily   - add iron, TIBC, folate and Vitamin B12 to labs  - stool occult negative in ED   - GI consult placed, appreciate recommendations   - NPO after midnight   - IVF at 100 ml/hr     Bipolar with justin  - pt reported being \"manic\" at this time  - recently admitted with Lithium toxicity and Lithium discontinued  - psych consulted last admission and started on Risperdal   - psych consulted this admission as well    Hx of alcohol abuse  - quit two weeks ago  - per family pt was drinking amelie 12 pack a day for over a year  - brother stated he was sober for a while and thinks he started drinking due to depression  - LEILA negative on admission  - continue multivitamin and thiamine     Elevated Liver Enzymes  - ALT 45  AST 51- alk phosphate elevated 405---improved from previous admit  - repeat in am  - continue to monitor, likely related to alcohol abuse    Pt recently admitted with SIRS  - was + leukocytosis, elevated procal, tachycardia  - + leukocytosis this admission

## 2021-11-09 NOTE — CONSULTS
HEMATOLOGY/ONCOLOGY CONSULTATION:     11/9/2021 8:33 AM    REASON FOR CONSULT: Anemia    PROVIDERS:  ED Fernandez - CNP    CHIEF COMPLAINT:     Chief Complaint   Patient presents with    Anxiety       HISTORY OF PRESENT ILLNESS:     HPI:  57 WM with pmh bipolar d/o, alcohol and drug abuse and anxiety. He was just discharged 2 days ago after admission for lithium toxicity and JCARLOS. He had a new onset anemia and was referred to GI outpatient. He has now been readmitted after a syncopal epsidoe. His Hgb is 7.4. Plt are elevated at 707. MCV is high. PAST MEDICAL HISTORY:     Past Medical History:   Diagnosis Date    Anxiety     Arthritis     Bipolar 1 disorder ([facility] [address]        PAST SURGICAL HISTORY:        Past Surgical History:   Procedure Laterality Date    HERNIA REPAIR      NECK SURGERY      SHOULDER SURGERY      left 3 times due to injury       SOCIAL HISTORY:     Social History     Socioeconomic History    Marital status:      Spouse name: Not on file    Number of children: Not on file    Years of education: Not on file    Highest education level: Not on file   Occupational History    Not on file   Tobacco Use    Smoking status: Current Every Day Smoker     Packs/day: 2.00     Types: Cigarettes    Smokeless tobacco: Never Used   Substance and Sexual Activity    Alcohol use: Yes     Comment: former user    Drug use: Yes     Types: Marijuana (Weed), Methamphetamines ([facility])    Sexual activity: Not on file   Other Topics Concern    Not on file   Social History Narrative    Not on file     Social Determinants of Health     Financial Resource Strain: Low Risk     Difficulty of Paying Living Expenses: Not hard at all   Food Insecurity: No Food Insecurity    Worried About [address] in the Last Year: Never true    [address] in the Last Year: Never true   Transportation Needs: No Transportation Needs    Lack of Transportation (Medical):  No    Lack of Transportation (Non-Medical): No   Physical Activity:     Days of Exercise per Week: Not on file    Minutes of Exercise per Session: Not on file   Stress:     Feeling of Stress : Not on file   Social Connections:     Frequency of Communication with Friends and Family: Not on file    Frequency of Social Gatherings with Friends and Family: Not on file    Attends Zoroastrian Services: Not on file    Active Member of [address] [place] [address] or Organizations: Not on file    Attends Club or Organization Meetings: Not on file    Marital Status: Not on file   Intimate Partner Violence:     Fear of Current or Ex-Partner: Not on file    Emotionally Abused: Not on file    Physically Abused: Not on file    Sexually Abused: Not on file   Housing Stability: Unknown    Unable to Pay for Housing in the Last Year: No    Number of [place] in the Last Year: Not on file    Unstable Housing in the Last Year: No       FAMILY HISTORY:     Family History   Problem Relation Age of Onset    Heart Failure Mother         heart disease age 61    Other Father         dementia    Cancer Father         prostate       ALLERGIES:     Allergies as of 11/08/2021 - Fully Reviewed 11/06/2021   Allergen Reaction Noted    Sulfa antibiotics  11/04/2021       MEDICATIONS:     No current facility-administered medications on file prior to encounter.      Current Outpatient Medications on File Prior to Encounter   Medication Sig Dispense Refill    doxycycline hyclate (VIBRA-TABS) 100 MG tablet Take 1 tablet by mouth 2 times daily for 7 days 14 tablet 0    risperiDONE (RISPERDAL) 1 MG tablet Take 1 tablet by mouth 2 times daily 60 tablet 0    thiamine 100 MG tablet Take 1 tablet by mouth daily 30 tablet 3    potassium chloride (KLOR-CON M) 20 MEQ extended release tablet Take 1 tablet by mouth 2 times daily for 5 days 10 tablet 0    sodium chloride 1 g tablet Take 1 tablet by mouth 3 times daily for 5 days 15 tablet 0    Multiple Vitamins-Minerals (MULTIVITAMIN WITH MINERALS) tablet Take 1 tablet by mouth daily 30 tablet 3    hydrOXYzine (ATARAX) 50 MG tablet Take 1 tablet by mouth every 6 hours as needed for Anxiety 30 tablet 0       PHYSICAL EXAM:       Vitals:    11/09/21 0208   BP: 125/71   Pulse: 77   Resp: 16   Temp: 98.1 °F (36.7 °C)   SpO2: 97%       General appearance: alert and cooperative  Head: Normocephalic, without obvious abnormality, atraumatic  Neck: No palpable lymphadenopathy in supraclavicular or cervical chains  Lungs: Clear to auscultation bilaterally, no audible rales, wheezes or crackles  Heart: Regular rate and rhythm, S1, S2 normal  Abdomen: Soft, non-tender; bowel sounds normal; no masses,  no organomegaly  Extremities: without cyanosis, clubbing, edema or asymmetry  Skin: No jaundice, purpura or petechiae      LABS:     Lab Results   Component Value Date    WBC 10.2 11/09/2021    HGB 7.2 (L) 11/09/2021    HCT 21.4 (L) 11/09/2021    .0 (H) 11/09/2021     (H) 11/09/2021       Lab Results   Component Value Date    GLUCOSE 93 11/09/2021    BUN 5 (L) 11/09/2021    CREATININE 0.7 (L) 11/09/2021    K 3.7 11/09/2021    PHOS 2.5 11/08/2021       Lab Results   Component Value Date    ALKPHOS 299 (H) 11/09/2021    ALT 32 11/09/2021    AST 32 11/09/2021    BILITOT 0.7 11/09/2021    BILIDIR 0.4 (H) 11/09/2021    PROT 5.2 (L) 11/09/2021       IMAGING:     XR CHEST (2 VW)  Result Date: 10/30/2021  No acute cardiopulmonary abnormality. CT HEAD WO CONTRAST  Result Date: 11/6/2021  No acute intracranial abnormality Cerebral atrophy Hypoattenuating white matter disease, nonspecific, however most often related to chronic microvascular ischemic changes. Left maxillary sinus disease. CT Head WO Contrast  Result Date: 11/4/2021  No acute intracranial abnormality.  Diffuse atrophic changes with findings suggesting chronic microvascular ischemia Unchanged opacity left maxillary sinus most likely representing chronic sinusitis     CT Head WO Contrast  Result Date: 10/30/2021  1. No acute intracranial abnormality. 2. Opacification of the left maxillary sinus that likely represents chronic sinusitis. CT ABDOMEN PELVIS W IV CONTRAST Additional Contrast? None  Result Date: 11/6/2021  No renal mass, hydronephrosis, renal or ureteral calculi. No colitis, diverticulitis or appendicitis. No mass, ascites or lymphadenopathy. Acquired spinal stenosis at the L4-5 disc. XR CHEST PORTABLE  Result Date: 11/8/2021  No acute process. XR CHEST PORTABLE  Result Date: 11/4/2021  No acute cardiopulmonary disease. ASSESSMENT:     Problem List Items Addressed This Visit     Symptomatic anemia - Primary      Other Visit Diagnoses     Low hemoglobin        Syncope and collapse        Psychiatric problem                    PLAN:     1. Anemia    - macrocytic  - check B12, folate, iron studies, hemolytic panel  - GI consulted  - transfuse if Hgb < 7    2.  Thrombocytosis    - appears acute  - suspect reactive changes to anemia and acute illness  - check iron studies and KARAN-2 to assess for MPD    MD Power Collazo MD

## 2021-11-09 NOTE — PROGRESS NOTES
Admit: 2021    Name:  Sofie Devine  Room:  /4728-66  MRN:    [id number]     Daily Progress Note for 2021     Admitted for syncope   Found to have acute on chronic anemia    Interval History:       Scheduled Meds:   doxycycline hyclate  100 mg Oral BID    risperiDONE  1 mg Oral BID    therapeutic multivitamin-minerals  1 tablet Oral Lunch    thiamine  100 mg Oral Daily    sodium chloride flush  5-40 mL IntraVENous 2 times per day    pantoprazole  40 mg IntraVENous Daily       Continuous Infusions:   sodium chloride      sodium chloride 100 mL/hr at 21       PRN Meds:  hydrOXYzine, sodium chloride flush, sodium chloride, ondansetron **OR** ondansetron, polyethylene glycol, acetaminophen **OR** acetaminophen, perflutren lipid microspheres                  Objective:     Temp  Av.8 °F (36.6 °C)  Min: 97.4 °F (36.3 °C)  Max: 98.1 °F (36.7 °C)  Pulse  Av.9  Min: 75  Max: 93  BP  Min: 125/71  Max: 172/96  SpO2  Av.2 %  Min: 97 %  Max: 100 %  No data found. Intake/Output Summary (Last 24 hours) at 2021 0741  Last data filed at 2021 0642  Gross per 24 hour   Intake 60 ml   Output 1000 ml   Net -940 ml       Physical Exam:    Gen: No distress. Alert. Eyes: PERRL. No sclera icterus. No conjunctival injection. ENT: No discharge. Pharynx clear. Neck: No JVD. No Carotid Bruit. Trachea midline. Resp: No accessory muscle use. No crackles. No wheezes. No rhonchi. CV: Regular rate. Regular rhythm. No murmur. No rub. No edema. GI: Non-tender. Non-distended. No masses. No organomegaly. Normal bowel sounds. No hernia. Skin: Warm and dry. No nodule on exposed extremities. No rash on exposed extremities. M/S: No cyanosis. No joint deformity. No clubbing. Neuro: Awake. Grossly nonfocal    Psych: Oriented x 3. No anxiety or agitation.    Lab Data:  CBC:   Recent Labs     21  0315 21  0315 21  1027 21  0052 21  0455   WBC 12.2* --  14.2*  --  10.2   RBC 2.16*  --  2.28*  --  2.02*   HGB 7.4*   < > 7.6* 7.1* 7.2*   HCT 22.4*   < > 23.9* 21.8* 21.4*   .8*  --  104.8*  --  106.0*   RDW 18.3*  --  18.9*  --  19.6*   *  --  735*  --  707*    < > = values in this interval not displayed. BMP:   Recent Labs     11/07/21  0315 11/08/21  1027 11/09/21  0455    137 138   K 3.9 3.9 3.7   * 107 108   CO2 21 20* 22   PHOS 3.4 2.5  --    BUN 9 7 5*   CREATININE 0.8 0.9 0.7*     BNP: No results for input(s): BNP in the last 72 hours. PT/INR: No results for input(s): PROTIME, INR in the last 72 hours. APTT:No results for input(s): APTT in the last 72 hours. CARDIAC ENZYMES:   Recent Labs     11/08/21  1027 11/08/21  1941 11/09/21  0052   TROPONINI <0.01 <0.01 <0.01     FASTING LIPID PANEL:No results found for: CHOL, HDL, TRIG  LIVER PROFILE:   Recent Labs     11/06/21  1625 11/08/21  1027 11/09/21  0455   AST 48* 51* 32   ALT 49* 45* 32   BILIDIR  --  0.5* 0.4*   BILITOT 1.2* 1.0 0.7   ALKPHOS 452* 405* 299*         XR CHEST PORTABLE   Final Result   No acute process. Assessment & Plan:     Patient Active Problem List    Diagnosis Date Noted    Syncope 11/08/2021    History of alcohol abuse     Tobacco abuse     Cannabis abuse     Community acquired pneumonia     SIRS (systemic inflammatory response syndrome) (HCC)     Altered mental status     Symptomatic anemia     Elevated liver function tests     Marijuana smoker     Lithium toxicity 11/06/2021    Nondependent alcohol abuse 11/04/2021     Reported syncope episodes at home  - pt told ED doc that he had recent episodes of syncope  - pt denies any syncopal episodes with my assessment  - spoke to brother who states patient told him that he has had some syncopal episodes most recent in doctors office. None since pt was discharged yesterday.   - will order echo   - check TSH  - trend troponin- <0.01   - trend H/H  - orthostatic blood pressures ordered  - monitor on telemetry      Anemia- acute on chronic   - 7.6 on admit---> 7.4  - pt was 9.7 on 10/30  - was supposed to follow up outpatient  - hx of alcohol abuse  - denies any blood in stools or dark stools  - PPI IV daily   - add iron, TIBC, folate and Vitamin B12 to labs  Add ferritin levels   - stool occult negative in ED   Recent abdominal CT did not reveal any acute findings. No ascites  - GI consult placed, appreciate recommendations   - NPO after midnight   - IVF at 100 ml/hr - dc IVF   He also has significant thrombocytosis.   Obtain hematology consult.     Bipolar with justin  - pt reported being \"manic\" at this time  - recently admitted with Lithium toxicity and Lithium discontinued  - psych consulted last admission and started on Risperdal   - psych consulted this admission as well     Hx of alcohol abuse  - quit two weeks ago  - per family pt was drinking amelie 12 pack a day for over a year  - brother stated he was sober for a while and thinks he started drinking due to depression  - LEILA negative on admission  - continue multivitamin and thiamine     Thrombocytosis, which is acute  Obtain hematology consult     Elevated Liver Enzymes  - ALT 45  AST 51- alk phosphate elevated 405---improved from previous admit  - repeat in am  - continue to monitor, likely related to alcohol abuse     Pt recently admitted with SIRS  - was + leukocytosis, elevated procal, tachycardia  - + leukocytosis this admission as well- 14.2, could also be reactive due to patient currently manic   - unclear source - sent home on doxycycline and resumed on this admission      Tobacco Dependence  -Recommended cessation  - nicotine patch ordered      Cannabis Abuse  - UDS +  - counseled cessation      DVT Prophylaxis: SCDs  Diet: general.  Code Status: Full Code      Phuong Brandon MD

## 2021-11-09 NOTE — PROGRESS NOTES
Pt started on once dose of Glycolax 119g at this time. Mixed with 2000mL of water. Pt request tiburcio mist to drink as well.

## 2021-11-09 NOTE — PROGRESS NOTES
Called lab to verify that Dr Ania Norton put in a consult, they advised to put in [place] for the pathologist

## 2021-11-09 NOTE — PROGRESS NOTES
Shift assessment complete, see flow sheet. Pt A&O x4. Neri Warner (pt son) called, updated on pt condition. Morning medications administered. Pt now on a full liquid diet, per GI. Educated pt that he will be NPO again tonight due to planned EGD/Colonoscopy for tomorrow. No other needs expressed by pt at this time. Call light left within reach, bed in lowest position.

## 2021-11-09 NOTE — PLAN OF CARE
Problem: Falls - Risk of:  Goal: Will remain free from falls  Description: Will remain free from falls  Outcome: Ongoing  Goal: Absence of physical injury  Description: Absence of physical injury  Outcome: Ongoing     Problem: Fluid Volume:  Goal: Ability to achieve a balanced intake and output will improve  Description: Ability to achieve a balanced intake and output will improve  Outcome: Ongoing     Problem: Physical Regulation:  Goal: Ability to maintain clinical measurements within normal limits will improve  Description: Ability to maintain clinical measurements within normal limits will improve  Outcome: Ongoing  Goal: Will show no signs and symptoms of electrolyte imbalance  Description: Will show no signs and symptoms of electrolyte imbalance  Outcome: Ongoing

## 2021-11-09 NOTE — FLOWSHEET NOTE
11/08/21 2049   Vital Signs   Pulse 69   BP (!) 142/83   BP Location Right upper arm   Patient Position Semi fowlers   Orthostatic B/P and Pulse? Yes   Pulse Lying 69 PER MINUTE   Blood Pressure Sitting 150/87   Pulse Sitting 84 PER MINUTE   Blood Pressure Standing 164/87   Pulse Standing 83 PER MINUTE   Level of Consciousness Alert (0)   Patient Currently in Pain Denies   Orthostatics as shown. Patient reports slight dizziness when standing. States he uses furniture around the house to keep his balance when this happens.      Bassem Shahid RN

## 2021-11-09 NOTE — CARE COORDINATION
Readmission Assessment  Number of Days since last admission?: 1-7 days (pt was at [facility] from 11/6-11/7)  Previous Disposition: Home Alone  Who is being Interviewed: Patient  What was the patient's/caregiver's perception as to why they think they needed to return back to the hospital?: Other (Comment) (pt stated he is having blackouts and is having stomach issues from the medications)  Did you visit your Primary Care Physician after you left the hospital, before you returned this time?: No  Why weren't you able to visit your PCP?: Did not have an appointment  Did you see a specialist, such as Cardiac, Pulmonary, Orthopedic Physician, etc. after you left the hospital?: No  Who advised the patient to return to the hospital?: Self-referral (pt stated his brother Mason Perez brought him to the ED)  Does the patient report anything that got in the way of taking their medications?: No  In our efforts to provide the best possible care to you and others like you, can you think of anything that we could have done to help you after you left the hospital the first time, so that you might not have needed to return so soon?: Other (Comment) (pt stated nothing additional when writer inquired)

## 2021-11-09 NOTE — CONSULTS
Psychiatry Consult   Reviewed recent consult from Dr. Jennifer Bell. Readmitted 11/8 after dc 11/7. He is scheduled with GI for work up of low hemoglobin. He was started on Risperdal 1 mg BID and stopped Lithium 11/7. He stated that  He ins not feeling side effects from Risperdal other than stomach fullness. Rec:   1. Reduced risperdal 0.5 mg BID. 2. Does not require inpatient psych.

## 2021-11-09 NOTE — PROGRESS NOTES
Patient admitted to room 306 from ED. Patient oriented to room, call light, bed rails, phone, lights and bathroom. Patient instructed about the schedule of the day including: vital sign frequency, lab draws, possible tests, frequency of MD and staff rounds, daily weights, I &O's and prescribed diet. bed alarm in place, patient aware of placement and reason. Telemetry box in place, patient aware of placement and reason. Bed locked, in lowest position, side rails up 2/4, call light within reach. Recliner Assessment  Patient is not able to demonstrated the ability to move from a reclining position to an upright position within the recliner. however patient is alert, oriented and able to provide informed consent    4 Eyes Skin Assessment     The patient is being assess for   Admission    I agree that 2 RN's have performed a thorough Head to Toe Skin Assessment on the patient. ALL assessment sites listed below have been assessed. Areas assessed for pressure by both nurses:   [x]   Head, Face, and Ears   [x]   Shoulders, Back, and Chest, Abdomen  [x]   Arms, Elbows, and Hands   []   Coccyx, Sacrum, and Ischium  [x]   Legs, Feet, and Heels    Scattered bruises noted, no open areas visible. Pt refuses 4 eyes to buttocks. Pt is alert and oriented x 4 and reports no open areas to skin. Skin Assessed Under all Medical Devices by both nurses:  N/A              All Mepilex Borders were peeled back and area peeked at by both nurses:  No: N/A  Please list where Mepilex Borders are located:               **SHARE this note so that the co-signing nurse is able to place an eSignature**    Co-signer eSignature: Electronically signed by Jeffrey Whitmore RN on 11/8/21 at 10:43 PM EST    Does the Patient have Skin Breakdown related to pressure?   No     Phil Prevention initiated:  No   Wound Care Orders initiated:  No      WOC nurse consulted for Pressure Injury (Stage 3,4, Unstageable, DTI, NWPT, Complex wounds)and New or Established Ostomies:  NA      Primary Nurse eSignature: Electronically signed by Ambar Decker RN on 11/8/21 at 8:58 PM EST

## 2021-11-09 NOTE — PROGRESS NOTES
Consult done thru perfect [facility] with Oncology, Dr Kana Mckeon. 11-9-21 @[facility].  Cinthia Gutierrez

## 2021-11-10 ENCOUNTER — ANESTHESIA (OUTPATIENT)
Dept: ENDOSCOPY | Age: 61
DRG: 381 | End: 2021-11-10
Payer: MEDICARE

## 2021-11-10 VITALS
SYSTOLIC BLOOD PRESSURE: 130 MMHG | RESPIRATION RATE: 23 BRPM | OXYGEN SATURATION: 100 % | DIASTOLIC BLOOD PRESSURE: 79 MMHG

## 2021-11-10 LAB
ANION GAP SERPL CALCULATED.3IONS-SCNC: 10 MMOL/L (ref 3–16)
BLOOD CULTURE, ROUTINE: NORMAL
BLOOD CULTURE, ROUTINE: NORMAL
BUN BLDV-MCNC: <2 MG/DL (ref 7–20)
CALCIUM SERPL-MCNC: 8.7 MG/DL (ref 8.3–10.6)
CHLORIDE BLD-SCNC: 107 MMOL/L (ref 99–110)
CO2: 21 MMOL/L (ref 21–32)
CREAT SERPL-MCNC: 0.7 MG/DL (ref 0.8–1.3)
GFR AFRICAN AMERICAN: >60
GFR NON-AFRICAN AMERICAN: >60
GLUCOSE BLD-MCNC: 111 MG/DL (ref 70–99)
HAPTOGLOBIN: 183 MG/DL (ref 30–200)
HCT VFR BLD CALC: 24.5 % (ref 40.5–52.5)
HCT VFR BLD CALC: 27.1 % (ref 40.5–52.5)
HEMATOLOGY PATH CONSULT: NORMAL
HEMOGLOBIN: 9 G/DL (ref 13.5–17.5)
IMMATURE RETIC FRACT: 0.69 (ref 0.21–0.37)
MCH RBC QN AUTO: 34.9 PG (ref 26–34)
MCHC RBC AUTO-ENTMCNC: 33.2 G/DL (ref 31–36)
MCV RBC AUTO: 105 FL (ref 80–100)
PDW BLD-RTO: 20.2 % (ref 12.4–15.4)
PLATELET # BLD: 801 K/UL (ref 135–450)
PMV BLD AUTO: 6.6 FL (ref 5–10.5)
POTASSIUM SERPL-SCNC: 4.2 MMOL/L (ref 3.5–5.1)
RBC # BLD: 2.58 M/UL (ref 4.2–5.9)
RETICULOCYTE ABSOLUTE COUNT: 0.15 M/UL
RETICULOCYTE COUNT PCT: 6.41 % (ref 0.5–2.18)
SODIUM BLD-SCNC: 138 MMOL/L (ref 136–145)
WBC # BLD: 8.6 K/UL (ref 4–11)

## 2021-11-10 PROCEDURE — 6370000000 HC RX 637 (ALT 250 FOR IP): Performed by: PSYCHIATRY & NEUROLOGY

## 2021-11-10 PROCEDURE — 2580000003 HC RX 258: Performed by: NURSE PRACTITIONER

## 2021-11-10 PROCEDURE — 6370000000 HC RX 637 (ALT 250 FOR IP): Performed by: NURSE PRACTITIONER

## 2021-11-10 PROCEDURE — 88342 IMHCHEM/IMCYTCHM 1ST ANTB: CPT

## 2021-11-10 PROCEDURE — 96376 TX/PRO/DX INJ SAME DRUG ADON: CPT

## 2021-11-10 PROCEDURE — 6370000000 HC RX 637 (ALT 250 FOR IP): Performed by: INTERNAL MEDICINE

## 2021-11-10 PROCEDURE — 2580000003 HC RX 258

## 2021-11-10 PROCEDURE — 6360000002 HC RX W HCPCS: Performed by: PHYSICIAN ASSISTANT

## 2021-11-10 PROCEDURE — 3609012400 HC EGD TRANSORAL BIOPSY SINGLE/MULTIPLE: Performed by: INTERNAL MEDICINE

## 2021-11-10 PROCEDURE — 6360000002 HC RX W HCPCS

## 2021-11-10 PROCEDURE — 0DB98ZX EXCISION OF DUODENUM, VIA NATURAL OR ARTIFICIAL OPENING ENDOSCOPIC, DIAGNOSTIC: ICD-10-PCS | Performed by: INTERNAL MEDICINE

## 2021-11-10 PROCEDURE — 7100000010 HC PHASE II RECOVERY - FIRST 15 MIN: Performed by: INTERNAL MEDICINE

## 2021-11-10 PROCEDURE — 99232 SBSQ HOSP IP/OBS MODERATE 35: CPT | Performed by: INTERNAL MEDICINE

## 2021-11-10 PROCEDURE — 36415 COLL VENOUS BLD VENIPUNCTURE: CPT

## 2021-11-10 PROCEDURE — 80048 BASIC METABOLIC PNL TOTAL CA: CPT

## 2021-11-10 PROCEDURE — 88341 IMHCHEM/IMCYTCHM EA ADD ANTB: CPT

## 2021-11-10 PROCEDURE — 88312 SPECIAL STAINS GROUP 1: CPT

## 2021-11-10 PROCEDURE — 88305 TISSUE EXAM BY PATHOLOGIST: CPT

## 2021-11-10 PROCEDURE — 2709999900 HC NON-CHARGEABLE SUPPLY: Performed by: INTERNAL MEDICINE

## 2021-11-10 PROCEDURE — 0DBH8ZZ EXCISION OF CECUM, VIA NATURAL OR ARTIFICIAL OPENING ENDOSCOPIC: ICD-10-PCS | Performed by: INTERNAL MEDICINE

## 2021-11-10 PROCEDURE — 85027 COMPLETE CBC AUTOMATED: CPT

## 2021-11-10 PROCEDURE — 3700000000 HC ANESTHESIA ATTENDED CARE: Performed by: INTERNAL MEDICINE

## 2021-11-10 PROCEDURE — 6370000000 HC RX 637 (ALT 250 FOR IP): Performed by: PHYSICIAN ASSISTANT

## 2021-11-10 PROCEDURE — C9113 INJ PANTOPRAZOLE SODIUM, VIA: HCPCS | Performed by: PHYSICIAN ASSISTANT

## 2021-11-10 PROCEDURE — 3700000001 HC ADD 15 MINUTES (ANESTHESIA): Performed by: INTERNAL MEDICINE

## 2021-11-10 PROCEDURE — 0DB48ZX EXCISION OF ESOPHAGOGASTRIC JUNCTION, VIA NATURAL OR ARTIFICIAL OPENING ENDOSCOPIC, DIAGNOSTIC: ICD-10-PCS | Performed by: INTERNAL MEDICINE

## 2021-11-10 PROCEDURE — 2060000000 HC ICU INTERMEDIATE R&B

## 2021-11-10 PROCEDURE — 7100000011 HC PHASE II RECOVERY - ADDTL 15 MIN: Performed by: INTERNAL MEDICINE

## 2021-11-10 PROCEDURE — 3609010600 HC COLONOSCOPY POLYPECTOMY SNARE/COLD BIOPSY: Performed by: INTERNAL MEDICINE

## 2021-11-10 PROCEDURE — 2500000003 HC RX 250 WO HCPCS

## 2021-11-10 RX ORDER — LIDOCAINE HYDROCHLORIDE 20 MG/ML
INJECTION, SOLUTION EPIDURAL; INFILTRATION; INTRACAUDAL; PERINEURAL PRN
Status: DISCONTINUED | OUTPATIENT
Start: 2021-11-10 | End: 2021-11-10 | Stop reason: SDUPTHER

## 2021-11-10 RX ORDER — PANTOPRAZOLE SODIUM 40 MG/10ML
40 INJECTION, POWDER, LYOPHILIZED, FOR SOLUTION INTRAVENOUS 2 TIMES DAILY
Status: DISCONTINUED | OUTPATIENT
Start: 2021-11-10 | End: 2021-11-11 | Stop reason: HOSPADM

## 2021-11-10 RX ORDER — AMLODIPINE BESYLATE 5 MG/1
5 TABLET ORAL DAILY
Status: DISCONTINUED | OUTPATIENT
Start: 2021-11-10 | End: 2021-11-11 | Stop reason: HOSPADM

## 2021-11-10 RX ORDER — PROPOFOL 10 MG/ML
INJECTION, EMULSION INTRAVENOUS PRN
Status: DISCONTINUED | OUTPATIENT
Start: 2021-11-10 | End: 2021-11-10 | Stop reason: SDUPTHER

## 2021-11-10 RX ORDER — SODIUM CHLORIDE, SODIUM LACTATE, POTASSIUM CHLORIDE, CALCIUM CHLORIDE 600; 310; 30; 20 MG/100ML; MG/100ML; MG/100ML; MG/100ML
INJECTION, SOLUTION INTRAVENOUS CONTINUOUS PRN
Status: DISCONTINUED | OUTPATIENT
Start: 2021-11-10 | End: 2021-11-10 | Stop reason: SDUPTHER

## 2021-11-10 RX ADMIN — SODIUM CHLORIDE, PRESERVATIVE FREE 20 ML: 5 INJECTION INTRAVENOUS at 21:03

## 2021-11-10 RX ADMIN — POLYETHYLENE GLYCOL (3350) 119 G: 17 POWDER, FOR SOLUTION ORAL at 03:00

## 2021-11-10 RX ADMIN — SODIUM CHLORIDE, POTASSIUM CHLORIDE, SODIUM LACTATE AND CALCIUM CHLORIDE: 600; 310; 30; 20 INJECTION, SOLUTION INTRAVENOUS at 12:48

## 2021-11-10 RX ADMIN — PROPOFOL 30 MG: 10 INJECTION, EMULSION INTRAVENOUS at 13:15

## 2021-11-10 RX ADMIN — AMLODIPINE BESYLATE 5 MG: 5 TABLET ORAL at 14:35

## 2021-11-10 RX ADMIN — Medication 100 MG: at 14:35

## 2021-11-10 RX ADMIN — LIDOCAINE HYDROCHLORIDE 60 MG: 20 INJECTION, SOLUTION EPIDURAL; INFILTRATION; INTRACAUDAL; PERINEURAL at 13:04

## 2021-11-10 RX ADMIN — RISPERIDONE 0.5 MG: 0.25 TABLET ORAL at 14:35

## 2021-11-10 RX ADMIN — DOXYCYCLINE HYCLATE 100 MG: 100 TABLET, COATED ORAL at 20:58

## 2021-11-10 RX ADMIN — ACETAMINOPHEN 650 MG: 325 TABLET ORAL at 05:07

## 2021-11-10 RX ADMIN — RISPERIDONE 0.5 MG: 0.25 TABLET ORAL at 20:58

## 2021-11-10 RX ADMIN — ACETAMINOPHEN 650 MG: 325 TABLET ORAL at 16:12

## 2021-11-10 RX ADMIN — Medication 1 TABLET: at 14:35

## 2021-11-10 RX ADMIN — PROPOFOL 50 MG: 10 INJECTION, EMULSION INTRAVENOUS at 13:08

## 2021-11-10 RX ADMIN — PROPOFOL 50 MG: 10 INJECTION, EMULSION INTRAVENOUS at 13:19

## 2021-11-10 RX ADMIN — PANTOPRAZOLE SODIUM 40 MG: 40 INJECTION, POWDER, FOR SOLUTION INTRAVENOUS at 20:58

## 2021-11-10 RX ADMIN — PROPOFOL 70 MG: 10 INJECTION, EMULSION INTRAVENOUS at 13:04

## 2021-11-10 RX ADMIN — DOXYCYCLINE HYCLATE 100 MG: 100 TABLET, COATED ORAL at 14:35

## 2021-11-10 RX ADMIN — PROPOFOL 50 MG: 10 INJECTION, EMULSION INTRAVENOUS at 13:13

## 2021-11-10 ASSESSMENT — PAIN SCALES - GENERAL
PAINLEVEL_OUTOF10: 8
PAINLEVEL_OUTOF10: 7

## 2021-11-10 ASSESSMENT — LIFESTYLE VARIABLES: SMOKING_STATUS: 1

## 2021-11-10 ASSESSMENT — PAIN - FUNCTIONAL ASSESSMENT
PAIN_FUNCTIONAL_ASSESSMENT: 0-10
PAIN_FUNCTIONAL_ASSESSMENT: 0-10

## 2021-11-10 NOTE — CONSULTS
Psychiatry Consult   Reviewed recent consult from Dr. Soto Bishop.     Readmitted 11/8 after dc 11/7. He is scheduled with GI for work up of low hemoglobin. He was started on Risperdal 1 mg BID and stopped Lithium 11/7. He stated that  He ins not feeling side effects from Risperdal other than stomach fullness.    Dx: Bipolar Affective DO          Alcohol Abuse  Rec:   1. Reduced risperdal 0.5 mg BID. 2. Does not require inpatient psych.

## 2021-11-10 NOTE — BRIEF OP NOTE
Brief Postoperative Note      Patient: Arben Olivera  YOB: 1960  MRN: [id number]    Date of Procedure: 11/10/2021    Pre-Op Diagnosis: ANEMIA    Post-Op Diagnosis: esophageal ulcer, cecal polyp, otherwise normal EGD and colonoscopy. no active bleeding       Procedure(s):  EGD BIOPSY  COLONOSCOPY POLYPECTOMY SNARE/COLD BIOPSY    Surgeon(s):  Patrick Herrera MD    Assistant:  * No surgical staff found *    Anesthesia: Monitor Anesthesia Care    Estimated Blood Loss (mL): Minimal    Complications: None    Specimens:   ID Type Source Tests Collected by Time Destination   A :  Tissue Biopsy SURGICAL PATHOLOGY Patrick Herrera MD 11/10/2021 1307    B :  Tissue Biopsy SURGICAL PATHOLOGY Patrick Herrera MD 11/10/2021 1308    C :  Tissue Biopsy SURGICAL PATHOLOGY Patrick Herrera MD 11/10/2021 1316        Implants:  * No implants in log *      Drains: * No LDAs found *    Findings: esophageal ulcer, cecal polyp, otherwise normal EGD and colonoscopy. no active bleeding    Recmmendation  1. Continue supportive care  2. PPI BID x 8wks  3. Monitor Hgb  4. Await pathology  5. Resume diet  6.  Hematology workup in progress    Electronically signed by Patrick Herrera MD on 11/10/2021 at 1:27 PM

## 2021-11-10 NOTE — PROGRESS NOTES
Lab contacted again to remind them of pending CBC and BMP. They will draw when patient returns from procedure.

## 2021-11-10 NOTE — ANESTHESIA PRE PROCEDURE
Department of Anesthesiology  Preprocedure Note       Name:  Elizabeth Newman   Age:  57 y.o.  :  1960                                          MRN:  [id number]         Date:  11/10/2021      Surgeon: Cheikh Moreno):  Haris Arauz MD    Procedure: Procedure(s):  EGD W/ANES. COLON W/ANES. Medications prior to admission:   Prior to Admission medications    Medication Sig Start Date End Date Taking?  Authorizing Provider   doxycycline hyclate (VIBRA-TABS) 100 MG tablet Take 1 tablet by mouth 2 times daily for 7 days 21 Yes Saúl Guerra MD   risperiDONE (RISPERDAL) 1 MG tablet Take 1 tablet by mouth 2 times daily 21   Saúl Guerra MD   thiamine 100 MG tablet Take 1 tablet by mouth daily 21   Saúl Guerra MD   potassium chloride (KLOR-CON M) 20 MEQ extended release tablet Take 1 tablet by mouth 2 times daily for 5 days 10/31/21 11/5/21  Kira Jackson MD   sodium chloride 1 g tablet Take 1 tablet by mouth 3 times daily for 5 days 10/31/21 11/5/21  Kira Jackson MD   Multiple Vitamins-Minerals (MULTIVITAMIN WITH MINERALS) tablet Take 1 tablet by mouth daily 10/31/21   Kira Jackson MD   hydrOXYzine (ATARAX) 50 MG tablet Take 1 tablet by mouth every 6 hours as needed for Anxiety 10/31/21 11/10/21  Kira Jackson MD       Current medications:    Current Facility-Administered Medications   Medication Dose Route Frequency Provider Last Rate Last Admin    nicotine (NICODERM CQ) 21 MG/24HR 1 patch  1 patch TransDERmal Daily Prem Gomez MD   1 patch at 11/10/21 0831    risperiDONE (RISPERDAL) tablet 0.5 mg  0.5 mg Oral BID Georgie Leach MD   0.5 mg at 21    doxycycline hyclate (VIBRA-TABS) tablet 100 mg  100 mg Oral BID ED Doherty - CNP   100 mg at 21    hydrOXYzine (VISTARIL) capsule 50 mg  50 mg Oral Q6H PRN ED Doherty - CNP        therapeutic multivitamin-minerals 1 tablet  1 tablet Oral Lunch Serge Althea, APRN - CNP   1 tablet at 11/09/21 1340    thiamine tablet 100 mg  100 mg Oral Daily Serge Althea, APRN - CNP   100 mg at 11/09/21 1046    sodium chloride flush 0.9 % injection 5-40 mL  5-40 mL IntraVENous 2 times per day Serge Althea, APRN - CNP   10 mL at 11/09/21 2005    sodium chloride flush 0.9 % injection 5-40 mL  5-40 mL IntraVENous PRN Serge Althea, APRN - CNP        0.9 % sodium chloride infusion  25 mL IntraVENous PRN Serge Althea, APRN -  mL/hr at 11/09/21 1703 25 mL at 11/09/21 1703    ondansetron (ZOFRAN-ODT) disintegrating tablet 4 mg  4 mg Oral Q8H PRN Serge Althea, APRN - CNP        Or    ondansetron [facility]) injection 4 mg  4 mg IntraVENous Q6H PRN Serge Althea, APRN - CNP        polyethylene glycol (GLYCOLAX) packet 17 g  17 g Oral Daily PRN Serge Althea, APRN - CNP        acetaminophen (TYLENOL) tablet 650 mg  650 mg Oral Q6H PRN Serge Althea, APRN - CNP   650 mg at 11/10/21 0507    Or    acetaminophen (TYLENOL) suppository 650 mg  650 mg Rectal Q6H PRN Serge Althea, APRN - CNP        perflutren lipid microspheres (DEFINITY) injection 1.65 mg  1.5 mL IntraVENous ONCE PRN Serge Althea, APRN - CNP        pantoprazole (PROTONIX) injection 40 mg  40 mg IntraVENous Daily Serge Althea, APRN - CNP   40 mg at 11/09/21 1046       Allergies:     Allergies   Allergen Reactions    Sulfa Antibiotics      rash       Problem List:    Patient Active Problem List   Diagnosis Code    Nondependent alcohol abuse F10.10    Lithium toxicity T56.891A    Community acquired pneumonia J18.9    SIRS (systemic inflammatory response syndrome) (HCC) R65.10    Altered mental status R41.82    Symptomatic anemia D64.9    Elevated LFTs R79.89    Marijuana smoker F12.90    Syncope R55    History of alcohol abuse F10.11    Tobacco abuse Z72.0    Cannabis abuse F12.10       Past Medical History:        Diagnosis Date    Anxiety     Arthritis     Bipolar 1 disorder ([facility] [address]     Hypertension        Past Surgical History:        Procedure Laterality Date    ELBOW SURGERY      HERNIA REPAIR      NECK SURGERY      SHOULDER SURGERY      left 3 times due to injury       Social History:    Social History     Tobacco Use    Smoking status: Current Every Day Smoker     Packs/day: 2.00     Types: Cigarettes    Smokeless tobacco: Never Used   Substance Use Topics    Alcohol use: Not Currently     Comment: former user                                Ready to quit: No  Counseling given: Yes      Vital Signs (Current):   Vitals:    11/09/21 2000 11/10/21 0441 11/10/21 0815 11/10/21 1201   BP: (!) 172/79  (!) 151/91 (!) 171/99   Pulse: 69  81 80   Resp: 16  16 16   Temp: 96.7 °F (35.9 °C)  97.3 °F (36.3 °C) 97.4 °F (36.3 °C)   TempSrc: Oral  Oral Temporal   SpO2: 97%  98% 98%   Weight:  145 lb 8 oz (66 kg)     Height:                                                  BP Readings from Last 3 Encounters:   11/10/21 (!) 171/99   11/07/21 (!) 163/84   11/05/21 (!) 135/97       NPO Status: Time of last liquid consumption: 0400                        Time of last solid consumption: 1900                        Date of last liquid consumption: 11/10/21                        Date of last solid food consumption: 11/08/21    BMI:   Wt Readings from Last 3 Encounters:   11/10/21 145 lb 8 oz (66 kg)   11/06/21 138 lb 7.2 oz (62.8 kg)   11/05/21 145 lb (65.8 kg)     Body mass index is 22.79 kg/m².     CBC:   Lab Results   Component Value Date    WBC 10.2 11/09/2021    RBC 2.02 11/09/2021    HGB 7.2 11/09/2021    HCT 24.5 11/09/2021    .0 11/09/2021    RDW 19.6 11/09/2021     11/09/2021       CMP:   Lab Results   Component Value Date     11/09/2021    K 3.7 11/09/2021     11/09/2021    CO2 22 11/09/2021    BUN 5 11/09/2021    CREATININE 0.7 11/09/2021    GFRAA >60 11/09/2021    AGRATIO 1.0 11/06/2021    LABGLOM >60 11/09/2021    GLUCOSE 93 11/09/2021    PROT 5.2 11/09/2021    CALCIUM 8.3 11/09/2021    BILITOT 0.7 11/09/2021    ALKPHOS 299 11/09/2021    AST 32 11/09/2021    ALT 32 11/09/2021       POC Tests: No results for input(s): POCGLU, POCNA, POCK, POCCL, POCBUN, POCHEMO, POCHCT in the last 72 hours. Coags: No results found for: PROTIME, INR, APTT    HCG (If Applicable): No results found for: PREGTESTUR, PREGSERUM, HCG, HCGQUANT     ABGs: No results found for: PHART, PO2ART, HCT0PIE, SFT7ZJO, BEART, N6ZWDYUE     Type & Screen (If Applicable):  No results found for: LABABO, LABRH    Drug/Infectious Status (If Applicable):  No results found for: HIV, HEPCAB    COVID-19 Screening (If Applicable):   Lab Results   Component Value Date    COVID19 NOT DETECTED 11/08/2021           Anesthesia Evaluation  Patient summary reviewed and Nursing notes reviewed no history of anesthetic complications:   Airway: Mallampati: II     Neck ROM: full   Dental:          Pulmonary:   (+) pneumonia:  current smoker                           Cardiovascular:    (+) hypertension:,                   Neuro/Psych:   (+) psychiatric history:            GI/Hepatic/Renal:             Endo/Other:                     Abdominal:             Vascular: Other Findings:             Anesthesia Plan      MAC     ASA 3     (Medications & allergies reviewed  All available lab & EKG data reviewed)  Induction: intravenous. Anesthetic plan and risks discussed with patient. Plan discussed with CRNA.                   Aston Crabtree MD   11/10/2021

## 2021-11-10 NOTE — PROGRESS NOTES
ONCOLOGY FOLLOW-UP:         PROBLEM LIST:       Patient Active Problem List   Diagnosis Code    Nondependent alcohol abuse F10.10    Lithium toxicity T56.891A    Community acquired pneumonia J18.9    SIRS (systemic inflammatory response syndrome) (HCC) R65.10    Altered mental status R41.82    Symptomatic anemia D64.9    Elevated LFTs R79.89    Marijuana smoker F12.90    Syncope R55    History of alcohol abuse F10.11    Tobacco abuse Z72.0    Cannabis abuse F12.10       INTERVAL HISTORY:       Afebrile. BP elevated. EGD and c-scope planned today. REVIEW OF SYSTEMS:       10 point ROS completed. Pertinent positives in HPI, otherwise negative.      PHYSICAL EXAM:       Vitals:    11/09/21 2000   BP: (!) 172/79   Pulse: 69   Resp: 16   Temp: 96.7 °F (35.9 °C)   SpO2: 97%       General appearance: alert and cooperative  Head: Normocephalic, without obvious abnormality, atraumatic  Neck: No palpable lymphadenopathy in supraclavicular or cervical chains  Lungs: Clear to auscultation bilaterally, no audible rales, wheezes or crackles  Heart: Regular rate and rhythm, S1, S2 normal  Abdomen: Soft, non-tender; bowel sounds normal; no masses,  no organomegaly  Extremities: without cyanosis, clubbing, edema or asymmetry  Skin: No jaundice, purpura or petechiae      LABS:     Lab Results   Component Value Date    WBC 10.2 11/09/2021    HGB 7.2 (L) 11/09/2021    HCT 24.5 (L) 11/09/2021    .0 (H) 11/09/2021     (H) 11/09/2021       Lab Results   Component Value Date    GLUCOSE 93 11/09/2021    BUN 5 (L) 11/09/2021    CREATININE 0.7 (L) 11/09/2021    K 3.7 11/09/2021    PHOS 2.5 11/08/2021       Lab Results   Component Value Date    ALKPHOS 299 (H) 11/09/2021    ALT 32 11/09/2021    AST 32 11/09/2021    BILITOT 0.7 11/09/2021    BILIDIR 0.4 (H) 11/09/2021    PROT 5.2 (L) 11/09/2021     Lab Results   Component Value Date    IRON 58 (L) 11/08/2021    TIBC 234 (L) 11/08/2021    FERRITIN 1,184.0 (H) 11/09/2021     Lab Results   Component Value Date    REMOYWUD00 929 11/08/2021     Lab Results   Component Value Date    FOLATE 10.71 11/08/2021       IMAGING:     XR CHEST (2 VW)  Result Date: 10/30/2021  No acute cardiopulmonary abnormality. CT HEAD WO CONTRAST  Result Date: 11/6/2021  No acute intracranial abnormality Cerebral atrophy Hypoattenuating white matter disease, nonspecific, however most often related to chronic microvascular ischemic changes. Left maxillary sinus disease. CT Head WO Contrast  Result Date: 11/4/2021  No acute intracranial abnormality. Diffuse atrophic changes with findings suggesting chronic microvascular ischemia Unchanged opacity left maxillary sinus most likely representing chronic sinusitis     CT Head WO Contrast  Result Date: 10/30/2021  1. No acute intracranial abnormality. 2. Opacification of the left maxillary sinus that likely represents chronic sinusitis. CT ABDOMEN PELVIS W IV CONTRAST Additional Contrast? None  Result Date: 11/6/2021  No renal mass, hydronephrosis, renal or ureteral calculi. No colitis, diverticulitis or appendicitis. No mass, ascites or lymphadenopathy. Acquired spinal stenosis at the L4-5 disc. XR CHEST PORTABLE  Result Date: 11/8/2021  No acute process. XR CHEST PORTABLE  Result Date: 11/4/2021  No acute cardiopulmonary disease. ASSESSMENT:     Problem List Items Addressed This Visit     Symptomatic anemia - Primary      Other Visit Diagnoses     Low hemoglobin        Syncope and collapse        Psychiatric problem                    PLAN:     1. Anemia    - macrocytic  - check B12, folate, iron studies, hemolytic panel  - iron studies mixed - await STR  - suspect ferriitn elevation reactive - check hemochromatosis screen  - B12/folate normal  - GI consulted - EGD and c-scope today  - transfuse if Hgb < 7    2.  Thrombocytosis    - appears acute  - suspect reactive changes to anemia and acute illness  - check iron studies and KARAN-2 to assess for MPD  - JAK2 pending    MD Cleo Guillaume MD

## 2021-11-10 NOTE — ACP (ADVANCE CARE PLANNING)
Advance Care Planning     Advance Care Planning Inpatient Note  Spiritual Care Department    Today's Date: 11/10/2021  Unit: [facility]    Received request from IDT Member and patient. Upon review of chart and communication with care team, patient's decision making abilities are not in question. . Patient was/were present in the room during visit. Goals of ACP Conversation:  Discuss advance care planning documents  Facilitate a discussion related to patient's goals of care as they align with the patient's values and beliefs. Health Care Decision Makers:       Primary Decision Maker: Radha Ram - Brother/Sister - [phone]    Secondary Decision Maker: Mathewreeds Tee - Child - [phone]    Supplemental (Other) Decision Maker: Simba Roland - Brother/Sister - [phone]    Summary:  Completed New Documents  Updated Healthcare Decision Maker    Advance Care Planning Documents (Patient Wishes):  Healthcare Power of /Advance Directive Appointment of [address]  Living Will/Advance Directive  Anatomical Gift/Organ Donation     Assessment:  Patient clearly wishes for his siblings to be his medical decision-makers, per the above noted order. Patient has a daughter, but she lives away and [facility] an autistic child\" (per patient's report) that requires much of her time, and patient prefers to name his siblings. Patient carefully reviewed his new documents and expressed thanks for completing the process. Interventions:  Provided education on documents for clarity and greater understanding  Discussed and provided education on state decision maker hierarchy  Assisted in the completion of documents according to patient's wishes at this time  Encouraged ongoing ACP conversation with future decision makers and loved ones    Care Preferences Communicated:   No    Outcomes/Plan:  New advance directive completed.   Returned original document(s) to patient, as well as copies for distribution to appointed agents  Copy of advance directive given to staff to scan into medical record.   Teach Back Method used to verify the patient's and/or Healthcare Decision Maker's understanding of key information in the advance directive documents    Electronically signed by Leodan Ghosh on 11/10/2021 at 12:20 PM

## 2021-11-10 NOTE — PROGRESS NOTES
Received report from Jayleen Butcher CRNA and Ana Greco [facility]. VSS with Sp02 100% on r/a. Pt awake and oriented with no c/o any.

## 2021-11-10 NOTE — ANESTHESIA POSTPROCEDURE EVALUATION
Department of Anesthesiology  Postprocedure Note    Patient: Nicolette Hargrove  MRN: [id number]  YOB: 1960  Date of evaluation: 11/10/2021  Time:  2:29 PM     Procedure Summary     Date: 11/10/21 Room / Location: [facility] 01 / [facility]    Anesthesia Start: 1302 Anesthesia Stop: 1322    Procedures:       EGD BIOPSY (N/A )      COLONOSCOPY POLYPECTOMY SNARE/COLD BIOPSY (N/A ) Diagnosis: (ANEMIA)    Surgeons: Jones Parish MD Responsible Provider: Ranjan Martinez MD    Anesthesia Type: MAC ASA Status: 3          Anesthesia Type: MAC    Sunni Phase I: Sunni Score: 10    Sunni Phase II: Sunni Score: 10    Last vitals: Reviewed and per EMR flowsheets.        Anesthesia Post Evaluation    Comments: Postoperative Anesthesia Note    Name:    Nicolette Hargrove  MRN:      [id number]    Patient Vitals in the past 12 hrs:  11/10/21 1400, BP:(!) 171/95, Temp:96.9 °F (36.1 °C), Temp src:Oral, Pulse:83, Resp:16, SpO2:100 %  11/10/21 1342, BP:(!) 151/89, Temp src:Temporal, Pulse:74, Resp:16, SpO2:100 %  11/10/21 1332, BP:128/78, Temp:98.1 °F (36.7 °C), Temp src:Temporal, Pulse:70, Resp:16, SpO2:100 %  11/10/21 1201, BP:(!) 171/99, Temp:97.4 °F (36.3 °C), Temp src:Temporal, Pulse:80, Resp:16, SpO2:98 %  11/10/21 0815, BP:(!) 151/91, Temp:97.3 °F (36.3 °C), Temp src:Oral, Pulse:81, Resp:16, SpO2:98 %  11/10/21 0441, Weight:145 lb 8 oz (66 kg)     LABS:    CBC  Lab Results       Component                Value               Date/Time                  WBC                      10.2                11/09/2021 04:55 AM        HGB                      7.2 (L)             11/09/2021 04:55 AM        HCT                      24.5 (L)            11/09/2021 04:28 PM        PLT                      707 (H)             11/09/2021 04:55 AM   RENAL  Lab Results       Component                Value               Date/Time                  NA                       138                 11/09/2021 04:55 AM        K                        3.7                 11/09/2021 04:55 AM        CL                       108                 11/09/2021 04:55 AM        CO2                      22                  11/09/2021 04:55 AM        BUN                      5 (L)               11/09/2021 04:55 AM        CREATININE               0.7 (L)             11/09/2021 04:55 AM        GLUCOSE                  93                  11/09/2021 04:55 AM   COAGS  No results found for: PROTIME, INR, APTT    Intake & Output: In: 610 (P.O.:360; I.V.:250)  Out: -     Nausea & Vomiting:  No    Level of Consciousness:  Awake    Pain Assessment:  Adequate analgesia    Anesthesia Complications:  No apparent anesthetic complications    SUMMARY      Vital signs stable  OK to discharge from Stage I post anesthesia care.   Care transferred from Anesthesiology department on discharge from perioperative area

## 2021-11-10 NOTE — PROGRESS NOTES
Report given to Dori Padron RN at patient bedside. Pt is stable, call light in reach, with no needs at this time. Care transferred at this time.      Brian Cardona RN

## 2021-11-10 NOTE — PROGRESS NOTES
Admit: 2021    Name:  Felix Macias  Room:  /9497-36  MRN:    [id number]     Daily Progress Note for 11/10/2021     Admitted for syncope   Found to have acute on chronic anemia    Interval History:       C/o headache - 3 days   Head CT negative    Labs not drawn yet today    Scheduled Meds:   nicotine  1 patch TransDERmal Daily    risperiDONE  0.5 mg Oral BID    doxycycline hyclate  100 mg Oral BID    therapeutic multivitamin-minerals  1 tablet Oral Lunch    thiamine  100 mg Oral Daily    sodium chloride flush  5-40 mL IntraVENous 2 times per day    pantoprazole  40 mg IntraVENous Daily       Continuous Infusions:   sodium chloride 25 mL (21 1703)       PRN Meds:  hydrOXYzine, sodium chloride flush, sodium chloride, ondansetron **OR** ondansetron, polyethylene glycol, acetaminophen **OR** acetaminophen, perflutren lipid microspheres                  Objective:     Temp  Av °F (36.1 °C)  Min: 96.7 °F (35.9 °C)  Max: 97.2 °F (36.2 °C)  Pulse  Av  Min: 69  Max: 78  BP  Min: 152/78  Max: 172/79  SpO2  Av %  Min: 96 %  Max: 98 %  Patient Vitals for the past 4 hrs:   Weight   11/10/21 0441 145 lb 8 oz (66 kg)         Intake/Output Summary (Last 24 hours) at 11/10/2021 0756  Last data filed at 2021 1749  Gross per 24 hour   Intake 720 ml   Output 400 ml   Net 320 ml       Physical Exam:    Gen: No distress. Alert. Eyes: PERRL. No sclera icterus. No conjunctival injection. ENT: No discharge. Pharynx clear. Neck: No JVD. No Carotid Bruit. Trachea midline. Resp: No accessory muscle use. No crackles. No wheezes. No rhonchi. CV: Regular rate. Regular rhythm. No murmur. No rub. No edema. GI: Non-tender. Non-distended. No masses. No organomegaly. Normal bowel sounds. No hernia. Skin: Warm and dry. No nodule on exposed extremities. No rash on exposed extremities. M/S: No cyanosis. No joint deformity. No clubbing. Neuro: Awake. Grossly nonfocal    Psych: Oriented x 3. No anxiety or agitation. Lab Data:  CBC:   Recent Labs     11/08/21  1027 11/09/21  0052 11/09/21 0455   WBC 14.2*  --  10.2   RBC 2.28*  --  2.02*   HGB 7.6* 7.1* 7.2*   HCT 23.9* 21.8* 21.4*   .8*  --  106.0*   RDW 18.9*  --  19.6*   *  --  707*     BMP:   Recent Labs     11/08/21  1027 11/09/21  0455    138   K 3.9 3.7    108   CO2 20* 22   PHOS 2.5  --    BUN 7 5*   CREATININE 0.9 0.7*     BNP: No results for input(s): BNP in the last 72 hours. PT/INR: No results for input(s): PROTIME, INR in the last 72 hours. APTT:No results for input(s): APTT in the last 72 hours. CARDIAC ENZYMES:   Recent Labs     11/08/21  1027 11/08/21  1941 11/09/21 0052   TROPONINI <0.01 <0.01 <0.01     FASTING LIPID PANEL:No results found for: CHOL, HDL, TRIG  LIVER PROFILE:   Recent Labs     11/08/21  1027 11/09/21  0455   AST 51* 32   ALT 45* 32   BILIDIR 0.5* 0.4*   BILITOT 1.0 0.7   ALKPHOS 405* 299*         XR CHEST PORTABLE   Final Result   No acute process. Assessment & Plan:     Patient Active Problem List    Diagnosis Date Noted    Syncope 11/08/2021    History of alcohol abuse     Tobacco abuse     Cannabis abuse     Community acquired pneumonia     SIRS (systemic inflammatory response syndrome) (HCC)     Altered mental status     Symptomatic anemia     Elevated LFTs     Marijuana smoker     Lithium toxicity 11/06/2021    Nondependent alcohol abuse 11/04/2021     Reported syncope episodes at home  - pt told ED doc that he had recent episodes of syncope  - pt denies any syncopal episodes with my assessment  - spoke to brother who states patient told him that he has had some syncopal episodes most recent in doctors office. None since pt was discharged yesterday.   - will order echo   - check TSH  - trend troponin- <0.01   - trend H/H  - orthostatic blood pressures ordered  - monitor on telemetry      Anemia- acute on chronic   Thrombocytosis  - 7.6 on admit---> 7.4  - pt was 9.7 on 10/30  - was supposed to follow up outpatient  - hx of alcohol abuse  - denies any blood in stools or dark stools  - PPI IV daily   - add iron, TIBC, folate and Vitamin B12 to labs  Add ferritin levels   - stool occult negative in ED   Recent abdominal CT did not reveal any acute findings. No ascites  - GI consult placed, EGD,colon today   - IVF at 100 ml/hr - dc IVF   He also has significant thrombocytosis.   Obtain hematology consult.     Bipolar with justin  - pt reported being \"manic\" at this time  - recently admitted with Lithium toxicity and Lithium discontinued  - psych consulted last admission and started on Risperdal   - psych consulted this admission as well     Hx of alcohol abuse  - quit two weeks ago  - per family pt was drinking amelie 12 pack a day for over a year  - brother stated he was sober for a while and thinks he started drinking due to depression  - LEILA negative on admission  - continue multivitamin and thiamine     Thrombocytosis, which is acute  Obtain hematology consult     Elevated Liver Enzymes  - ALT 45  AST 51- alk phosphate elevated 405---improved from previous admit  - repeat in am  - continue to monitor, likely related to alcohol abuse     Pt recently admitted with SIRS  - was + leukocytosis, elevated procal, tachycardia  - + leukocytosis this admission as well- 14.2, could also be reactive due to patient currently manic   - unclear source - sent home on doxycycline and resumed on this admission      Tobacco Dependence  -Recommended cessation  - nicotine patch ordered      Cannabis Abuse  - UDS +  - counseled cessation      DVT Prophylaxis: SCDs  Diet: general.  Code Status: Full Code      Marshall Dobbs MD

## 2021-11-10 NOTE — FLOWSHEET NOTE
was referred to help pt complete ADs, which was accomplished, but the encounter also included extended life review. Pt lost \"the love of his life\" back in 2020, and some short time after a close friend (who committed suicide), and then also his father. Still processing all. Pt had had several years of sobriety before wife's death, and subsequently moved back to be near his family here in the area. Soon after he resumed his drinking. Pt reports that he recently stopped \"cold turkey,\" but then had several episodes of passing out, only to find himself in the ED. Pt also notes that he is \"bi-polar\" and that he was having some problems with his medications, which is now being resolved. Pt is emotionally very thankful for his siblings who have been \"looking out for him. \" Pt also has a daughter, who lives away, and has an autistic child that requires much of her time. Pt named his siblings as his medical decision-makers (see separate ACP note for detail).  listened and helped pt reflect upon his life journey, encouraging the \"bree\" that pt reports has been central to finding his strength and ongoing recover. Pt welcomed conversation and prayer. 11/10/21 1227   Encounter Summary   Services provided to: Patient   Referral/Consult From: Nurse; Patient   Support System Family members   Continue Visiting   (8/10 - Facilitated AD completion & spiritual support)   Complexity of Encounter Moderate   Length of Encounter 1 hour; 30 minutes   Spiritual Assessment Completed Yes   Advance Care Planning Yes   Spiritual/Sikh   Type Spiritual support   Assessment Approachable; Calm; Hopeful   Intervention Explored feelings, thoughts, concerns; Nurtured hope; Prayer; Discussed relationship with God   Outcome Expressed gratitude; Engaged in conversation;  Shared life review; Expressed feelings/needs/concerns; Encouraged; Receptive; Venting emotion

## 2021-11-10 NOTE — PROGRESS NOTES
Report given to [facility] \[facility]\""-FORT ELENI, RN. Care of pt transferred at this time.

## 2021-11-10 NOTE — PROGRESS NOTES
Assessment completed, see flowsheets. Night meds given. Pt with no reports of pain at this time. Pt has finished first dose of glycolax and will have next dose at 3 am. Pt is aware of NPO status at 5 am. Bed in lowest position with call light in reach.     Bruce Damon RN

## 2021-11-10 NOTE — PROGRESS NOTES
Patient returned from procedure. Vitals stable. Patient denies further needs at this time. Call light within reach.

## 2021-11-10 NOTE — H&P
History and Physical / Pre-Sedation Assessment    Patient:  Pao File   :   1960     Intended Procedure:  EGD+colon    HPI: 57year old male with a history of arthritis, bipolar disorder, anxiety admitted with severe symptomatic anemia concerning for GI blood loss    Past Medical History:   Diagnosis Date    Anxiety     Arthritis     Bipolar 1 disorder ([facility] [address]     Hypertension      Past Surgical History:   Procedure Laterality Date    ELBOW SURGERY      HERNIA REPAIR      NECK SURGERY      SHOULDER SURGERY      left 3 times due to injury       Medications reviewed  Prior to Admission medications    Medication Sig Start Date End Date Taking? Authorizing Provider   doxycycline hyclate (VIBRA-TABS) 100 MG tablet Take 1 tablet by mouth 2 times daily for 7 days 21 Yes Alpa Sam MD   risperiDONE (RISPERDAL) 1 MG tablet Take 1 tablet by mouth 2 times daily 21   Alpa Sam MD   thiamine 100 MG tablet Take 1 tablet by mouth daily 21   Alpa Sam MD   potassium chloride (KLOR-CON M) 20 MEQ extended release tablet Take 1 tablet by mouth 2 times daily for 5 days 10/31/21 11/5/21  Gamaliel Freed MD   sodium chloride 1 g tablet Take 1 tablet by mouth 3 times daily for 5 days 10/31/21 11/5/21  Gamaliel Freed MD   Multiple Vitamins-Minerals (MULTIVITAMIN WITH MINERALS) tablet Take 1 tablet by mouth daily 10/31/21   Gamaliel Freed MD   hydrOXYzine (ATARAX) 50 MG tablet Take 1 tablet by mouth every 6 hours as needed for Anxiety 10/31/21 11/10/21  Gamaliel Freed MD        Allergies: Allergies   Allergen Reactions    Sulfa Antibiotics      rash       Nurses notes reviewed and agreed.     Physical Exam:  Vital Signs: BP (!) 171/99   Pulse 80   Temp 97.4 °F (36.3 °C) (Temporal)   Resp 16   Ht 5' 7\" (1.702 m)   Wt 145 lb 8 oz (66 kg)   SpO2 98%   BMI 22.79 kg/m²    Airway: Mallampati: II (soft palate, uvula, fauces visible)  Pulmonary:Normal  Cardiac:Normal  Abdomen:Normal    Pre-Procedure Assessment / Plan:  ASA: Class 3 - A patient with severe systemic disease that limits activity but is not incapacitating  Level of Sedation Plan: Moderate sedation  Post Procedure plan: Return to same level of care    I assessed the patient and find that the patient is in satisfactory condition to proceed with the planned procedure and sedation plan. I have explained the risk, benefits, and alternatives to the procedure; the patient understands and agrees to proceed.        Kaylyn Bowen MD  11/10/2021

## 2021-11-10 NOTE — CARE COORDINATION
INTERDISCIPLINARY PLAN OF CARE CONFERENCE    Date/Time: 11/10/2021 11:52 AM  Completed by: Kym Natarajan RN, Case Management      Patient Name:  Babak Augustin  YOB: 1960  Admitting Diagnosis: Syncope and collapse [R55]  Psychiatric problem [F99]  Low hemoglobin [D64.9]  Symptomatic anemia [D64.9]  Syncope, unspecified syncope type [R55]     Admit Date/Time:  11/8/2021 10:06 AM    Chart reviewed. Interdisciplinary team contacted or reviewed plan related to patient progress and discharge plans. Disciplines included Case Management, Nursing, and Dietitian. Current Status: 11/08/2021  PT/OT recommendation for discharge plan of care: NA    Expected D/C Disposition:  Home  Confirmed plan with patient  Discharge Plan Comments: Lives alone. IPTA. Cleared by psych. Resources on AVS. No DCP needs.      Home O2 in place on admit: No  Pt informed of need to bring portable home O2 tank on day of discharge for nursing to connect prior to leaving:  No  Verbalized agreement/Understanding:  Not Indicated

## 2021-11-11 VITALS
HEART RATE: 97 BPM | DIASTOLIC BLOOD PRESSURE: 85 MMHG | RESPIRATION RATE: 18 BRPM | WEIGHT: 142.9 LBS | HEIGHT: 67 IN | SYSTOLIC BLOOD PRESSURE: 145 MMHG | OXYGEN SATURATION: 100 % | BODY MASS INDEX: 22.43 KG/M2 | TEMPERATURE: 96.9 F

## 2021-11-11 LAB
ANION GAP SERPL CALCULATED.3IONS-SCNC: 10 MMOL/L (ref 3–16)
BUN BLDV-MCNC: 5 MG/DL (ref 7–20)
CALCIUM SERPL-MCNC: 8.6 MG/DL (ref 8.3–10.6)
CHLORIDE BLD-SCNC: 106 MMOL/L (ref 99–110)
CO2: 22 MMOL/L (ref 21–32)
CREAT SERPL-MCNC: 0.8 MG/DL (ref 0.8–1.3)
GFR AFRICAN AMERICAN: >60
GFR NON-AFRICAN AMERICAN: >60
GLUCOSE BLD-MCNC: 79 MG/DL (ref 70–99)
HCT VFR BLD CALC: 23.7 % (ref 40.5–52.5)
HEMOGLOBIN: 7.8 G/DL (ref 13.5–17.5)
MCH RBC QN AUTO: 34.4 PG (ref 26–34)
MCHC RBC AUTO-ENTMCNC: 33 G/DL (ref 31–36)
MCV RBC AUTO: 104.3 FL (ref 80–100)
PDW BLD-RTO: 20.4 % (ref 12.4–15.4)
PLATELET # BLD: 777 K/UL (ref 135–450)
PMV BLD AUTO: 6.8 FL (ref 5–10.5)
POTASSIUM SERPL-SCNC: 3.7 MMOL/L (ref 3.5–5.1)
RBC # BLD: 2.27 M/UL (ref 4.2–5.9)
SODIUM BLD-SCNC: 138 MMOL/L (ref 136–145)
SOLUBLE TRANSFERRIN RECEPT: 3.9 MG/L (ref 2.2–5)
WBC # BLD: 8.6 K/UL (ref 4–11)

## 2021-11-11 PROCEDURE — 83020 HEMOGLOBIN ELECTROPHORESIS: CPT

## 2021-11-11 PROCEDURE — 82668 ASSAY OF ERYTHROPOIETIN: CPT

## 2021-11-11 PROCEDURE — 6360000002 HC RX W HCPCS: Performed by: PHYSICIAN ASSISTANT

## 2021-11-11 PROCEDURE — 85027 COMPLETE CBC AUTOMATED: CPT

## 2021-11-11 PROCEDURE — 80048 BASIC METABOLIC PNL TOTAL CA: CPT

## 2021-11-11 PROCEDURE — 99239 HOSP IP/OBS DSCHRG MGMT >30: CPT | Performed by: INTERNAL MEDICINE

## 2021-11-11 PROCEDURE — 6370000000 HC RX 637 (ALT 250 FOR IP): Performed by: INTERNAL MEDICINE

## 2021-11-11 PROCEDURE — 6370000000 HC RX 637 (ALT 250 FOR IP): Performed by: PSYCHIATRY & NEUROLOGY

## 2021-11-11 PROCEDURE — 81256 HFE GENE: CPT

## 2021-11-11 PROCEDURE — C9113 INJ PANTOPRAZOLE SODIUM, VIA: HCPCS | Performed by: PHYSICIAN ASSISTANT

## 2021-11-11 PROCEDURE — 96376 TX/PRO/DX INJ SAME DRUG ADON: CPT

## 2021-11-11 PROCEDURE — 36415 COLL VENOUS BLD VENIPUNCTURE: CPT

## 2021-11-11 PROCEDURE — 6370000000 HC RX 637 (ALT 250 FOR IP): Performed by: NURSE PRACTITIONER

## 2021-11-11 PROCEDURE — 2580000003 HC RX 258: Performed by: NURSE PRACTITIONER

## 2021-11-11 RX ORDER — AMLODIPINE BESYLATE 5 MG/1
5 TABLET ORAL DAILY
Qty: 30 TABLET | Refills: 0 | Status: SHIPPED | OUTPATIENT
Start: 2021-11-11 | End: 2021-11-18 | Stop reason: SDUPTHER

## 2021-11-11 RX ORDER — PANTOPRAZOLE SODIUM 40 MG/1
40 TABLET, DELAYED RELEASE ORAL
Qty: 60 TABLET | Refills: 1 | Status: SHIPPED | OUTPATIENT
Start: 2021-11-11 | End: 2022-03-18 | Stop reason: SDUPTHER

## 2021-11-11 RX ORDER — HYDROXYZINE 50 MG/1
50 TABLET, FILM COATED ORAL EVERY 8 HOURS PRN
Qty: 30 TABLET | Refills: 0 | Status: SHIPPED | OUTPATIENT
Start: 2021-11-11 | End: 2021-11-18 | Stop reason: SDUPTHER

## 2021-11-11 RX ADMIN — AMLODIPINE BESYLATE 5 MG: 5 TABLET ORAL at 08:50

## 2021-11-11 RX ADMIN — ACETAMINOPHEN 650 MG: 325 TABLET ORAL at 05:12

## 2021-11-11 RX ADMIN — DOXYCYCLINE HYCLATE 100 MG: 100 TABLET, COATED ORAL at 08:50

## 2021-11-11 RX ADMIN — PANTOPRAZOLE SODIUM 40 MG: 40 INJECTION, POWDER, FOR SOLUTION INTRAVENOUS at 08:49

## 2021-11-11 RX ADMIN — Medication 100 MG: at 08:50

## 2021-11-11 RX ADMIN — RISPERIDONE 0.5 MG: 0.25 TABLET ORAL at 08:50

## 2021-11-11 RX ADMIN — SODIUM CHLORIDE, PRESERVATIVE FREE 10 ML: 5 INJECTION INTRAVENOUS at 08:54

## 2021-11-11 ASSESSMENT — PAIN SCALES - GENERAL
PAINLEVEL_OUTOF10: 0
PAINLEVEL_OUTOF10: 8

## 2021-11-11 NOTE — FLOWSHEET NOTE
11/10/21 2045   Vital Signs   Temp 97 °F (36.1 °C)   Temp Source Oral   Pulse 90   Heart Rate Source Monitor   Resp 18   BP (!) 155/85   BP Location Right upper arm   Patient Position Sitting   Oxygen Therapy   SpO2 100 %   Pulse Oximeter Device Mode Intermittent   O2 Device None (Room air)   Vitals as shown above. Night meds given. Patient is sitting up in bed, denies pain or any further needs at this time. Bed is in lowest position with call light in reach.     Suyapa Archer RN

## 2021-11-11 NOTE — PROGRESS NOTES
Bedside report and transfer of care given to [facility], [facility]. Pt currently resting in bed with the call light within reach. Pt denies any other care needs at this time. Pt stable at this time.

## 2021-11-11 NOTE — PROGRESS NOTES
Admit: 2021    Name:  Harriet Jasmine  Room:  /5670-43  MRN:    [id number]     Daily Progress Note for 2021     Admitted for syncope   Found to have acute on chronic anemia    Interval History:       C/o headache - 3 days   Head CT negative      Scheduled Meds:   pantoprazole  40 mg IntraVENous BID    amLODIPine  5 mg Oral Daily    nicotine  1 patch TransDERmal Daily    risperiDONE  0.5 mg Oral BID    doxycycline hyclate  100 mg Oral BID    therapeutic multivitamin-minerals  1 tablet Oral Lunch    thiamine  100 mg Oral Daily    sodium chloride flush  5-40 mL IntraVENous 2 times per day       Continuous Infusions:   sodium chloride 25 mL (21 1703)       PRN Meds:  hydrOXYzine, sodium chloride flush, sodium chloride, ondansetron **OR** ondansetron, polyethylene glycol, acetaminophen **OR** acetaminophen, perflutren lipid microspheres                  Objective:     Temp  Av.3 °F (36.3 °C)  Min: 96.9 °F (36.1 °C)  Max: 98.1 °F (36.7 °C)  Pulse  Av.7  Min: 70  Max: 90  BP  Min: 125/77  Max: 174/91  SpO2  Av.8 %  Min: 98 %  Max: 100 %  No data found. Intake/Output Summary (Last 24 hours) at 2021 0751  Last data filed at 2021 0019  Gross per 24 hour   Intake 1196 ml   Output --   Net 1196 ml       Physical Exam:    Gen: No distress. Alert. Eyes: PERRL. No sclera icterus. No conjunctival injection. ENT: No discharge. Pharynx clear. Neck: No JVD. No Carotid Bruit. Trachea midline. Resp: No accessory muscle use. No crackles. No wheezes. No rhonchi. CV: Regular rate. Regular rhythm. No murmur. No rub. No edema. GI: Non-tender. Non-distended. No masses. No organomegaly. Normal bowel sounds. No hernia. Skin: Warm and dry. No nodule on exposed extremities. No rash on exposed extremities. M/S: No cyanosis. No joint deformity. No clubbing. Neuro: Awake. Grossly nonfocal    Psych: Oriented x 3. No anxiety or agitation.    Lab Data:  CBC:   Recent Labs 11/09/21  0455 11/09/21 0455 11/09/21  1628 11/10/21  1445 11/11/21  0450   WBC 10.2  --   --  8.6 8.6   RBC 2.02*  --   --  2.58* 2.27*   HGB 7.2*  --   --  9.0* 7.8*   HCT 21.4*   < > 24.5* 27.1* 23.7*   .0*  --   --  105.0* 104.3*   RDW 19.6*  --   --  20.2* 20.4*   *  --   --  801* 777*    < > = values in this interval not displayed. BMP:   Recent Labs     11/08/21  1027 11/08/21  1027 11/09/21 0455 11/10/21  1445 11/11/21  0450      < > 138 138 138   K 3.9  --  3.7 4.2 3.7      < > 108 107 106   CO2 20*   < > 22 21 22   PHOS 2.5  --   --   --   --    BUN 7   < > 5* <2* 5*   CREATININE 0.9   < > 0.7* 0.7* 0.8    < > = values in this interval not displayed. BNP: No results for input(s): BNP in the last 72 hours. PT/INR: No results for input(s): PROTIME, INR in the last 72 hours. APTT:No results for input(s): APTT in the last 72 hours. CARDIAC ENZYMES:   Recent Labs     11/08/21  1027 11/08/21  1941 11/09/21  0052   TROPONINI <0.01 <0.01 <0.01     FASTING LIPID PANEL:No results found for: CHOL, HDL, TRIG  LIVER PROFILE:   Recent Labs     11/08/21 1027 11/09/21 0455   AST 51* 32   ALT 45* 32   BILIDIR 0.5* 0.4*   BILITOT 1.0 0.7   ALKPHOS 405* 299*         XR CHEST PORTABLE   Final Result   No acute process. ECHO   Left ventricular systolic function is normal with ejection fraction   estimated at 55-60 %. No regional wall motion abnormalities are noted. Normal left ventricular diastolic filling pressure. Mild mitral regurgitation. Aortic valve sclerosis without aortic stenosis. Moderate-to-severe tricuspid regurgitation. Systolic pulmonary artery pressure (SPAP) estimated at 43 mmHg (RA pressure   8 mmHg), consistent with mild pulmonary hypertension.          Assessment & Plan:     Patient Active Problem List    Diagnosis Date Noted    Syncope 11/08/2021    History of alcohol abuse     Tobacco abuse     Cannabis abuse    [employer] acquired pneumonia     SIRS (systemic inflammatory response syndrome) (HCC)     Altered mental status     Symptomatic anemia     Elevated LFTs     Marijuana smoker     Lithium toxicity 11/06/2021    Nondependent alcohol abuse 11/04/2021     Reported syncope episodes at home  - pt told ED doc that he had recent episodes of syncope  - pt denies any syncopal episodes with my assessment  - spoke to brother who states patient told him that he has had some syncopal episodes most recent in doctors office. None since pt was discharged yesterday. - will order echo -normal EF. Moderate to severe mitral regurgitation. - check TSH-normal  - trend troponin- <0.01   - trend H/H  - orthostatic blood pressures ordered  - monitor on telemetry      Anemia- acute on chronic   Thrombocytosis  - 7.6 on admit---> 7.4  - pt was 9.7 on 10/30  - was supposed to follow up outpatient  - hx of alcohol abuse  - denies any blood in stools or dark stools  - PPI IV daily   - add iron, TIBC, folate and Vitamin B12 to labs  Add ferritin levels. No iron, X49, folic acid deficiency  - stool occult negative in ED   Recent abdominal CT did not reveal any acute findings. No ascites  - GI consult placed, EGD,colon -single esophageal ulcer. Otherwise normal esophagogastroduodenoscopy. Single cecal polyp. Otherwise normal colon to cecum. No active bleeding. Snare polypectomy done  PPI twice daily for 8 weeks  - IVF at 100 ml/hr - dc IVF   H&H stable  He also has significant thrombocytosis. Obtain hematology consult.   Suspect reactive changes secondary to anemia and acute illness     Bipolar with justin  - pt reported being \"manic\" at this time  - recently admitted with Lithium toxicity and Lithium discontinued  - psych consulted last admission and started on Risperdal   - psych consulted this admission as well     Hx of alcohol abuse  - quit two weeks ago  - per family pt was drinking amelie 12 pack a day for over a year  - brother stated he was sober for a while and thinks he started drinking due to depression  - LEILA negative on admission  - continue multivitamin and thiamine        Elevated Liver Enzymes  - ALT 45  AST 51- alk phosphate elevated 405---improved from previous admit  - repeat in am  - continue to monitor, likely related to alcohol abuse     Pt recently admitted with SIRS  - was + leukocytosis, elevated procal, tachycardia  - + leukocytosis this admission as well- 14.2, could also be reactive due to patient currently manic   - unclear source - sent home on doxycycline and resumed on this admission      Tobacco Dependence  -Recommended cessation  - nicotine patch ordered      Cannabis Abuse  - UDS +  - counseled cessation      DVT Prophylaxis: SCDs  Diet: general.  Code Status: Full Code    DC home  Follow-up with hematology for anemia and thrombocytosis    Rai Duque MD

## 2021-11-11 NOTE — PROGRESS NOTES
ONCOLOGY FOLLOW-UP:         PROBLEM LIST:       Patient Active Problem List   Diagnosis Code    Nondependent alcohol abuse F10.10    Lithium toxicity T56.891A    Community acquired pneumonia J18.9    SIRS (systemic inflammatory response syndrome) (HCC) R65.10    Altered mental status R41.82    Symptomatic anemia D64.9    Elevated LFTs R79.89    Marijuana smoker F12.90    Syncope R55    History of alcohol abuse F10.11    Tobacco abuse Z72.0    Cannabis abuse F12.10       INTERVAL HISTORY:       Tolerated scopes well Path pending. He is leaving today. REVIEW OF SYSTEMS:       10 point ROS completed. Pertinent positives in HPI, otherwise negative.      PHYSICAL EXAM:       Vitals:    11/11/21 0830   BP: (!) 145/85   Pulse: 97   Resp: 18   Temp: 96.9 °F (36.1 °C)   SpO2: 100%       General appearance: alert and cooperative  Head: Normocephalic, without obvious abnormality, atraumatic  Neck: No palpable lymphadenopathy in supraclavicular or cervical chains  Lungs: Clear to auscultation bilaterally, no audible rales, wheezes or crackles  Heart: Regular rate and rhythm, S1, S2 normal  Abdomen: Soft, non-tender; bowel sounds normal; no masses,  no organomegaly  Extremities: without cyanosis, clubbing, edema or asymmetry  Skin: No jaundice, purpura or petechiae      LABS:     Lab Results   Component Value Date    WBC 8.6 11/11/2021    HGB 7.8 (L) 11/11/2021    HCT 23.7 (L) 11/11/2021    .3 (H) 11/11/2021     (H) 11/11/2021       Lab Results   Component Value Date    GLUCOSE 79 11/11/2021    BUN 5 (L) 11/11/2021    CREATININE 0.8 11/11/2021    K 3.7 11/11/2021    PHOS 2.5 11/08/2021       Lab Results   Component Value Date    ALKPHOS 299 (H) 11/09/2021    ALT 32 11/09/2021    AST 32 11/09/2021    BILITOT 0.7 11/09/2021    BILIDIR 0.4 (H) 11/09/2021    PROT 5.2 (L) 11/09/2021     Lab Results   Component Value Date    IRON 58 (L) 11/08/2021    TIBC 234 (L) 11/08/2021    FERRITIN 1,184.0 (H) 11/09/2021     Lab Results   Component Value Date    DVGEELAR26 073 11/08/2021     Lab Results   Component Value Date    FOLATE 10.71 11/08/2021       IMAGING:     XR CHEST (2 VW)  Result Date: 10/30/2021  No acute cardiopulmonary abnormality. CT HEAD WO CONTRAST  Result Date: 11/6/2021  No acute intracranial abnormality Cerebral atrophy Hypoattenuating white matter disease, nonspecific, however most often related to chronic microvascular ischemic changes. Left maxillary sinus disease. CT Head WO Contrast  Result Date: 11/4/2021  No acute intracranial abnormality. Diffuse atrophic changes with findings suggesting chronic microvascular ischemia Unchanged opacity left maxillary sinus most likely representing chronic sinusitis     CT Head WO Contrast  Result Date: 10/30/2021  1. No acute intracranial abnormality. 2. Opacification of the left maxillary sinus that likely represents chronic sinusitis. CT ABDOMEN PELVIS W IV CONTRAST Additional Contrast? None  Result Date: 11/6/2021  No renal mass, hydronephrosis, renal or ureteral calculi. No colitis, diverticulitis or appendicitis. No mass, ascites or lymphadenopathy. Acquired spinal stenosis at the L4-5 disc. XR CHEST PORTABLE  Result Date: 11/8/2021  No acute process. XR CHEST PORTABLE  Result Date: 11/4/2021  No acute cardiopulmonary disease. ASSESSMENT:     Problem List Items Addressed This Visit     Symptomatic anemia - Primary      Other Visit Diagnoses     Low hemoglobin        Syncope and collapse        Psychiatric problem                    PLAN:     1. Anemia    - macrocytic  - check B12, folate, iron studies, hemolytic panel- hapto 183, , KARAN 2 pending, B12 767, folate is 10.   - iron studies mixed - await STR- pending   - suspect ferriitn elevation reactive - check hemochromatosis screen- pending   - B12/folate normal  - GI consulted - EGD and c-scope 11/10- path pending   - transfuse if Hgb < 7; Hgb 7.8 today     2. Thrombocytosis    - appears acute  - suspect reactive changes to anemia and acute illness  - check iron studies and KARAN-2 to assess for MPD; iron studies mixed; add EPO and Hgb electrophoresis per smear suggestion   - likely reactive as plt count trending down, now 777      Can dc today and follow up with Dr. Es Flores in 1-2 weeks.      Dayron Anne, APRN - CNP          Dayron Anne, APRN - CNP

## 2021-11-11 NOTE — CARE COORDINATION
DISCHARGE ORDER  Date/Time 2021 9:46 AM  Completed by: Kevin Pena RN, Case Management    Patient Name: Babak Augustin      : 1960  Admitting Diagnosis: Syncope and collapse [R55]  Psychiatric problem [F99]  Low hemoglobin [D64.9]  Symptomatic anemia [D64.9]  Syncope, unspecified syncope type [R55]      Admit order Date and Status:2021  (verify MD's last order for status of admission)      Noted discharge order. If applicable PT/OT recommendation at Discharge: n/a  DME recommendation by PT/OT:n/a  Confirmed discharge plan  (pt): Yes  with whom______________pt_  If pt confirmed DC plan does family need to be contacted by CM No if yes who____n/a__  Discharge Plan: Reviewed chart. Role of discharge planner explained and patient verbalized understanding. Pt is alert and oriented. Discharge order is noted. Pt is being d/c'd to home today. CM offered HC list and HC and pt declined. Pt states that his brother will be picking him up today. CM informed pt that per Dr. Chato Song note that he is to follow up with hematology once he is discharged for anemia and thrombocytosis. Pt verbalized understanding. Pt's O2 sats are 100% on RA. No further discharge needs needed or noted. Reviewed chart. Role of discharge planner explained and patient verbalized understanding. Discharge order is noted. Has Home O2 in place on admit:  No  Informed of need to bring portable home O2 tank on day of discharge for nursing to connect prior to leaving:   No  Verbalized agreement/Understanding:   No    Discharge timeout done with Zayra Richards RN. All discharge needs and concerns addressed.

## 2021-11-11 NOTE — PROGRESS NOTES
Physician Progress Note      Lio CONDE #:                  [id number]  :                       1960  ADMIT DATE:       2021 3:32 PM  Matt Flynn Kaw DATE:        2021 1:40 PM  RESPONDING  PROVIDER #:        Prem Hoover MD          QUERY TEXT:    Pt admitted with toxic encephalopathy in relation to lithium toxicity. Noted   documentation of pneumonia per pulmonary consultant progress note dated . If possible, please document in progress notes and discharge summary:    The medical record reflects the following:  Risk Factors: age, AMS-confusion  Clinical Indicators: per pulmonology progress note:  CAP with bilateral lower   lobe ground glass infiltrates, could be atelectasis, but in this clinical   setting more likely to be an acute pneumonia, CT=Subtle dependent   sub-segmental atelectasis was noted in the right  lower lob  Treatment: doxycycline, CT abd/pelvis, pulmonary consult    Thank you for your assistance,  Peg Gomez RN,BSN,CCDS,CRCR  Options provided:  -- Gram positive pneumonia confirmed present on admission  -- Pneumonia ruled out  -- Other - I will add my own diagnosis  -- Disagree - Not applicable / Not valid  -- Disagree - Clinically unable to determine / Unknown  -- Refer to Clinical Documentation Reviewer    PROVIDER RESPONSE TEXT:    The diagnosis of gram positive pneumonia was confirmed as present on   admission. Query created by: Hubert Guillaume on 11/10/2021 1:18 PM      QUERY TEXT:    Patient admitted with toxic encephalopathy and noted to have SIRS. If   possible, please document in progress notes and discharge summary if you are   evaluating and/or treating any of the following:     The medical record reflects the following:  Risk Factors: age, lithium toxicity, toxic encephalopathy, anemia  Clinical Indicators: SIRS - leukocytosis, elevated procal, tachycardia-unclear   source, JCARLOS with lithium toxicity  Treatment: ua, cxr, doxycycline, monitoring    Thank you for your assistance,  Chilango Rivera RN,BSN,CCDS,CRCR  Options provided:  -- SIRS without acute organ dysfunction  -- SIRS with associated acute organ dysfunction-JCARLOS  -- SIRS with active infection, not septic, please specify infection,  not   septic. -- Other - I will add my own diagnosis  -- Disagree - Not applicable / Not valid  -- Disagree - Clinically unable to determine / Unknown  -- Refer to Clinical Documentation Reviewer    PROVIDER RESPONSE TEXT:    This patient has SIRS with associated acute organ dysfunction-JCARLOS.     Query created by: Judith Lopez on 11/10/2021 1:28 PM      Electronically signed by:  [facility] MD 11/11/2021 8:58 AM

## 2021-11-11 NOTE — PROGRESS NOTES
PROGRESS NOTE  S:61 yrs Patient  admitted on 11/8/2021 with Syncope and collapse [R55]  Psychiatric problem [F99]  Low hemoglobin [D64.9]  Symptomatic anemia [D64.9]  Syncope, unspecified syncope type [R55] . Today he feels well. He is tolerating diet. Exam:   Vitals:    11/11/21 0830   BP: (!) 145/85   Pulse: 97   Resp: 18   Temp: 96.9 °F (36.1 °C)   SpO2: 100%      General appearance: alert, appears stated age, cooperative and no distress  HEENT: Neck supple with midline trachea  Neck: no adenopathy and supple, symmetrical, trachea midline  Lungs: clear to auscultation bilaterally  Heart: regular rate and rhythm, S1, S2 normal, no murmur, click, rub or gallop  Abdomen: soft, non-tender; bowel sounds normal; no masses,  no organomegaly  Extremities: extremities normal, atraumatic, no cyanosis or edema     Medications: Reviewed    Labs:  CBC:   Recent Labs     11/09/21  0455 11/09/21 0455 11/09/21  1628 11/10/21  1445 11/11/21  0450   WBC 10.2  --   --  8.6 8.6   HGB 7.2*  --   --  9.0* 7.8*   HCT 21.4*   < > 24.5* 27.1* 23.7*   .0*  --   --  105.0* 104.3*   *  --   --  801* 777*    < > = values in this interval not displayed. BMP:   Recent Labs     11/08/21  1027 11/08/21  1027 11/09/21  0455 11/10/21  1445 11/11/21  0450      < > 138 138 138   K 3.9  --  3.7 4.2 3.7      < > 108 107 106   CO2 20*   < > 22 21 22   PHOS 2.5  --   --   --   --    BUN 7   < > 5* <2* 5*   CREATININE 0.9   < > 0.7* 0.7* 0.8    < > = values in this interval not displayed. LIVER PROFILE:   Recent Labs     11/08/21  1027 11/09/21 0455   AST 51* 32   ALT 45* 32   PROT 6.0* 5.2*   BILIDIR 0.5* 0.4*   BILITOT 1.0 0.7   ALKPHOS 405* 299*     PT/INR: No results for input(s): INR in the last 72 hours. Invalid input(s): PT      PROCEDURE:  1. EGD with biopsy. 2.  Colonoscopy to cecum with snare polypectomy.   DATE OF PROCEDURE:  11/10/2021  PRE-PROCEDURE DIAGNOSES:  1. Severe anemia. 2.  Heartburn and dyspepsia. POST-PROCEDURE DIAGNOSES:  1. Single esophageal ulcer. 2.  Otherwise normal EGD. 3.  Single cecal polyp. 4.  Otherwise normal colon to cecum. 5.  No active bleeding. Impression: 57year old male with a history of arthritis, bipolar disorder, and anxiety admitted with severe symptomatic anemia concerning for GI blood loss. EGD showed esophageal ulcer, and colonoscopy showed cecal polyp, otherwise normal.      Recommendation:  1. Continue supportive care  2. Monitor Hgb  3. Observe for signs of bleeding  4. Continue Pantoprazole 40 mg BID x 8 weeks  5. Await pathology results  6. Continue diet as tolerated  7. Ok to d/c from GI standpoint  8. F/u with hematology upon d/c  9. Repeat colonoscopy in 5 years       Caryn Mckee PA-C  10:14 AM 11/11/2021                      I reviewed and agree with the findings and plan documented in her note with the appropriate changes made to it.     Electronically signed by Kailyn Horowitz MD on 11/11/21 at 7:05 PM EST          (O) [phone]  [phone]

## 2021-11-11 NOTE — OP NOTE
[address]                 [address] [facility], [place]                                OPERATIVE REPORT    PATIENT NAME: Everardo Guo                    :        1960  MED REC NO:   [id number]                          ROOM:         ACCOUNT NO:   [de-identified]                           ADMIT DATE: 2021  PROVIDER:     Dionna Ventura MD    DATE OF PROCEDURE:  11/10/2021    PREPROCEDURE DIAGNOSES:  1. Severe anemia. 2.  Heartburn and dyspepsia. POSTPROCEDURE DIAGNOSES:  1. Single esophageal ulcer. 2.  Otherwise normal EGD. 3.  Single cecal polyp. 4.  Otherwise normal colon to cecum. 5.  No active bleeding. PROCEDURE:  1. EGD with biopsy. 2.  Colonoscopy to cecum with snare polypectomy. SURGEON:  Dionna Ventura MD    PROCEDURE INDICATIONS:  A 65-year-old male with a history of bipolar  disorder, arthritis, anxiety admitted with severe symptomatic anemia  raising concern for GI blood loss. His last colonoscopy was five to six  years ago. He does mention occasional dark stools. EGD and colonoscopy  are being performed for diagnostic purposes. His hemoglobin trended  down from baseline of 10 down to 7.1. The iron studies are consistent  with iron deficiency. MEDICATIONS:  MAC per Anesthesia. PROCEDURE DETAILS:  Informed consent obtained after discussing risks,  benefits and alternatives. Full history and physical was performed. The patient was classified as ASA class III. Medications were given by  Anesthesia. Cardiopulmonary status was continuously monitored  throughout the procedure. The patient was placed in left lateral  decubitus position. Once adequately sedated, a standard upper  gastroscope was inserted in the mouth and advanced under direct  visualization to the second portion of the duodenum.   Entire mucosa of  the esophagus, stomach (retroflexion and forward views), duodenum (bulb,  sweep and second portion) were examined carefully during withdrawal.    While the patient was lying in the left lateral decubitus position, the  bed was repositioned and a standard pediatric colonoscope was inserted  in the anus and advanced to the cecum, identified by landmarks of  appendiceal orifice and IC valve. The terminal ileum was briefly  intubated for visualization. Entire mucosa of the cecum, ascending  colon, transverse colon, descending colon, sigmoid colon, rectum were  examined carefully during withdrawal.  The patient tolerated the  procedure well without any difficulties. Colon prep was good. FINDINGS:  ESOPHAGUS:  There was a single 1 cm ulcer right above the GE junction. Biopsies were taken. Otherwise, no active bleeding or high-risk  stigmata. STOMACH:  The entire stomach appeared normal both in forward and  retroflexed views. DUODENUM:  The entire duodenum mucosa appeared normal.  Biopsies were  taken to rule out celiac sprue. RECTUM:  The digital rectal exam was normal.  No masses were felt. COLON:  There was a single 6 mm sessile polyp in the cecum adjacent to  the appendiceal orifice. This was completely resected and retrieved  using snare polypectomy method. Remaining examined colon mucosa  appeared normal and healthy without any evidence of inflammation,  ulcers, pseudomembranes, polyps or masses. Retroflexed view of the  rectum showed internal hemorrhoids. SUMMARY:  1. Single esophageal ulcer. 2.  Otherwise normal EGD. 3.  No active bleeding. 4.  Single cecal polyp. 5. Internal hemorrhoids. RECOMMENDATIONS:  1. Return the patient to floor for continuous medical care. 2.  Monitor hemoglobin. 3.  Observe for signs of bleeding. 4.  Continue PPI b.i.d. for eight weeks. 5.  Await pathology results. 6.  Repeat colonoscopy in five years. 7.  Follow up Hematology.     EBL: <5mL    Antoine Bumpers, MD    D: 11/10/2021 13:27:27       T: 11/10/2021 13:29:54

## 2021-11-11 NOTE — PROGRESS NOTES
Report given to Deandre Morris RN at patient bedside. Pt is stable, call light in reach, with no needs at this time. Care transferred at this time.

## 2021-11-12 ENCOUNTER — CARE COORDINATION (OUTPATIENT)
Dept: CASE MANAGEMENT | Age: 61
End: 2021-11-12

## 2021-11-12 ENCOUNTER — TELEPHONE (OUTPATIENT)
Dept: FAMILY MEDICINE CLINIC | Age: 61
End: 2021-11-12

## 2021-11-12 DIAGNOSIS — R79.89 ELEVATED LFTS: Primary | ICD-10-CM

## 2021-11-12 PROCEDURE — 1111F DSCHRG MED/CURRENT MED MERGE: CPT | Performed by: NURSE PRACTITIONER

## 2021-11-12 NOTE — TELEPHONE ENCOUNTER
[facility] Transitions Initial Follow Up Call    Outreach made within 2 business days of discharge: Yes    Patient: Kimmy Barboza Patient : 1960   MRN: <[id number]>  Reason for Admission: There are no discharge diagnoses documented for the most recent discharge. Discharge Date: 21       Spoke with: Patient     Discharge department/facility: [facility] Interactive Patient Contact:  Was patient able to fill all prescriptions: Yes  Was patient instructed to bring all medications to the follow-up visit: Yes  Is patient taking all medications as directed in the discharge summary? Yes  Does patient understand their discharge instructions: Yes  Does patient have questions or concerns that need addressed prior to 7-14 day follow up office visit: no    Scheduled appointment with PCP within 7-14 days    Follow Up  No future appointments.     Tami Mora

## 2021-11-12 NOTE — CARE COORDINATION
[facility] Transitions Initial Follow Up Call    Call within 2 business days of discharge: Yes    Patient: Skye Aparicio Patient : 1960   MRN: [id number]  Reason for Admission: syncopal episodes, anemia, thrombocytosis, bipolar with justin, hx alcohol abuse, elevated liver enzymes, recently admitted with SIRS, tobacco dependence, cannabis abuse  Discharge Date: 21 RARS: Readmission Risk Score: 19 ( )      Last Discharge [address] [address]       Complaint Diagnosis Description Type Department Provider    21 Anxiety Symptomatic anemia . .. ED to Hosp-Admission (Discharged) (Dolores Ruiz) Daniela Sanchez MD; An. .. Spoke with: Skye Aparicio (patient)    Facility: [facility]    Non-face-to-face services provided:  Obtained and reviewed discharge summary and/or continuity of care documents  Education of patient/family/caregiver/guardian to support self-management-s/s monitor and report  Assessment and support for treatment adherence and medication management-1111F completed    Was this an external facility discharge? No Discharge Facility: NA    Challenges to be reviewed by the provider   Additional needs identified to be addressed with provider Yes  needs TCM visit - declined at this time because he has appt with [facility] [address] [facility] [address] and walks to his appointments       Method of communication with provider : chart routing    Advance Care Planning:   Does patient have an Advance Directive:  decision maker updated. Was this a readmission? No  Patient stated reason for admission: anxiety  Patients top risk factors for readmission: medical condition-see above    Care Transition Nurse (CTN) contacted the patient by telephone to perform post hospital discharge assessment. Verified name and  with patient as identifiers. Provided introduction to self, and explanation of the CTN role.      CTN reviewed discharge instructions, medical action plan and red flags with patient who verbalized understanding. Patient given an opportunity to ask questions and does not have any further questions or concerns at this time. Were discharge instructions available to patient? Yes. Reviewed appropriate site of care based on symptoms and resources available to patient including: PCP and Specialist. The patient agrees to contact the PCP office for questions related to their healthcare. Medication reconciliation was performed with patient, who verbalizes understanding of administration of home medications. COVID Risk Education    [id number] Not Detected on 11/8/2021. States he completed 2-steps of [address] vaccine at [facility]. Patient was given an opportunity to verbalize any questions and concerns and agrees to contact CTN or health care provider for questions related to their healthcare. Was patient discharged with a pulse oximeter? No     Reports he feels well, just tired from hospital stay. Denies needs/referrals. Has appt with Dr Marianna Landers 230pm on Tuesday. Walks to his appointments. He declines scheduling with new PCP Irais Garvey at this time. CTN provided contact information for future needs. Plan for follow-up call in 5-7 days based on severity of symptoms and risk factors. Care Transitions 24 Hour Call    Schedule Follow Up Appointment with PCP: Completed  Do you have any ongoing symptoms?: No  Do you have a copy of your discharge instructions?: Yes  Do you have all of your prescriptions and are they filled?: Yes  Have you been contacted by a [address]?: No  Have you scheduled your follow up appointment?: Yes  How are you going to get to your appointment?: Other  Were you discharged with any Home Care or Post Acute Services: No  Do you feel like you have everything you need to keep you well at home?: Yes  Care Transitions Interventions  No Identified Needs         Follow Up  No future appointments.     Gerri Owusu RN

## 2021-11-13 LAB — ERYTHROPOIETIN: 28 MU/ML (ref 4–27)

## 2021-11-15 ENCOUNTER — CARE COORDINATION (OUTPATIENT)
Dept: CASE MANAGEMENT | Age: 61
End: 2021-11-15

## 2021-11-15 NOTE — PROGRESS NOTES
Physician Progress Note      Gaby Guido  [facility] #:                  [id number]  :                       1960  ADMIT DATE:       2021 10:06 AM  DISCH DATE:        2021 11:05 AM  RESPONDING  PROVIDER #:        Rai Duque MD          QUERY TEXT:    Patient admitted with syncope, noted to have anemia and concern for GI blood   loss . If possible, please document in progress notes and discharge summary   the etiology of syncope: The medical record reflects the following:  Risk Factors: age, bipolar, arthritis, esophageal ulcer  Clinical Indicators: acute on chronic anemia, denies any blood in stools or   dark stools, no active bleeding  Treatment:  iron, TIBC, folate and Vitamin B12, egd/colonoscopy, GI/hematology   consult, PPI twice daily for 8 weeks    Thank you for your assistance,  Jailyn Jane RN,BSN,CCDS,CRCR  Options provided:  -- Syncope due to symptomatic anemia from GIB related to esophageal ulcer  -- Syncope due to symptomatic anemia, GIB ruled out  -- Syncope due to, please specify cause. -- Other - I will add my own diagnosis  -- Disagree - Not applicable / Not valid  -- Disagree - Clinically unable to determine / Unknown  -- Refer to Clinical Documentation Reviewer    PROVIDER RESPONSE TEXT:    Syncope due to symptomatic anemia from GIB related to esophageal ulcer. Query created by: Rhiannon Brasher on 2021 11:52 AM      QUERY TEXT:    Pt admitted with syncope and has acute on chronic anemia documented. If   possible, please document in progress notes and discharge summary further   specificity regarding the acuity and type of anemia:    The medical record reflects the following:  Risk Factors: thrombocytosis, concern for GIB, etoh abuse, esophageal ulcer  Clinical Indicators: 7.6 on admit---> 7.4, - pt was 9.7 on 10/30, No iron,   P86, folic acid deficiency  Treatment: ppi, iron, tibc, folate, vitamin b12, occult stool.  ct, h/g monitoring, hematology consult    Thank you for your assistance,  Peg Gomez RN,BSN,CCDS,CRCR  Options provided:  -- Anemia due to acute on chronic blood loss  -- Other - I will add my own diagnosis  -- Disagree - Not applicable / Not valid  -- Disagree - Clinically unable to determine / Unknown  -- Refer to Clinical Documentation Reviewer    PROVIDER RESPONSE TEXT:    This patient has acute on chronic blood loss anemia.     Query created by: Frida Gutierrez on 11/12/2021 11:56 AM      Electronically signed by:  Tutu Newton MD 11/15/2021 10:24 AM

## 2021-11-15 NOTE — CARE COORDINATION
[facility] Transitions Follow Up Call    11/15/2021    Patient: Shai Klein  Patient : 1960   MRN: [id number]  Reason for Admission: syncopal episodes, anemia, thrombocytosis, bipolar with justin, hx alcohol abuse, elevated liver enzymes, recently admitted with SIRS, tobacco dependence, cannabis abuse  Discharge Date: 21 RARS: Readmission Risk Score: 19 ( )         Spoke with: Shai Klein (patient)    Received inbound call from patient. He reports increased bilateral ankle edema since discharge to home. He denies SOB, YADAV, cough, orthopnea, cp, palpitations, decreased appetite. He has TCM visit in 2 days. CTN instructed him to review his diet, reduce/eliminate salt aka sodium. He states he already eats a low salt diet. Instructed him also to elevate BLE above the level of his heart at rest breaks and perform ROM exercises. Reviewed if he has any symptoms listed above to report to provider or CTN and/or discuss at [facility] visit. He voices understanding. Denies needs otherwise at this time. Care Transitions Subsequent and Final Call    Schedule Follow Up Appointment with PCP: Completed  Subsequent and Final Calls  Do you have any ongoing symptoms?: Yes  Onset of Patient-reported symptoms: Other  Patient-reported symptoms: Other  Interventions for patient-reported symptoms: Other  Have your medications changed?: No  Do you have any questions related to your medications?: No  Do you currently have any active services?: No  Do you have any needs or concerns that I can assist you with?: No  Identified Barriers: Impairment  Care Transitions Interventions  Other Interventions:            Follow Up  Future Appointments   Date Time Provider Jimy Laird   2021  4:00 PM ED Bearden CNP, RN

## 2021-11-16 LAB
HEMOGLOBIN ELECTROPHORESIS: NORMAL
HGB ELECTROPHORESIS INTERP: NORMAL

## 2021-11-16 NOTE — DISCHARGE SUMMARY
admitted to PCU for further care, GI consulted. Hospital Course      Reported syncope episodes at home  - pt told ED doc that he had recent episodes of syncope  - pt denies any syncopal episodes with my assessment  - spoke to brother who states patient told him that he has had some syncopal episodes most recent in doctors office. None since pt was discharged yesterday. - will order echo -normal EF. Moderate to severe mitral regurgitation. - check TSH-normal  - trend troponin- <0.01   - trend H/H  - orthostatic blood pressures ordered  - monitor on telemetry      Anemia- acute on chronic   Thrombocytosis  - 7.6 on admit---> 7.4  - pt was 9.7 on 10/30  - was supposed to follow up outpatient  - hx of alcohol abuse  - denies any blood in stools or dark stools  - PPI IV daily   - add iron, TIBC, folate and Vitamin B12 to labs  Add ferritin levels. No iron, R77, folic acid deficiency  - stool occult negative in ED   Recent abdominal CT did not reveal any acute findings. No ascites  - GI consult placed, EGD,colon -single esophageal ulcer. Otherwise normal esophagogastroduodenoscopy. Single cecal polyp. Otherwise normal colon to cecum. No active bleeding. Snare polypectomy done  PPI twice daily for 8 weeks  - IVF at 100 ml/hr - dc IVF   H&H stable  He also has significant thrombocytosis. Obtain hematology consult.   Suspect reactive changes secondary to anemia and acute illness     Bipolar with justin  - pt reported being \"manic\" at this time  - recently admitted with Lithium toxicity and Lithium discontinued  - psych consulted last admission and started on Risperdal   - psych consulted this admission as well     Hx of alcohol abuse  - quit two weeks ago  - per family pt was drinking amelie 12 pack a day for over a year  - brother stated he was sober for a while and thinks he started drinking due to depression  - LEILA negative on admission  - continue multivitamin and thiamine         Elevated Liver Enzymes  - ALT 45  AST 51- alk phosphate elevated 405---improved from previous admit  - repeat in am  - continue to monitor, likely related to alcohol abuse     Pt recently admitted with SIRS  - was + leukocytosis, elevated procal, tachycardia  - + leukocytosis this admission as well- 14.2, could also be reactive due to patient currently manic   - unclear source - sent home on doxycycline and resumed on this admission      Tobacco Dependence  -Recommended cessation  - nicotine patch ordered      Cannabis Abuse  - UDS +  - counseled cessation      DVT Prophylaxis: SCDs  Diet: general.  Code Status: Full Code     DC home  Follow-up with hematology for anemia and thrombocytosis           XR CHEST PORTABLE   Final Result   No acute process. Discharge Medications     Medication List      START taking these medications    amLODIPine 5 MG tablet  Commonly known as: NORVASC  Take 1 tablet by mouth daily     pantoprazole 40 MG tablet  Commonly known as: PROTONIX  Take 1 tablet by mouth 2 times daily (before meals)        CHANGE how you take these medications    hydrOXYzine 50 MG tablet  Commonly known as: ATARAX  Take 1 tablet by mouth every 8 hours as needed for Anxiety  What changed: when to take this        CONTINUE taking these medications    multivitamin with minerals tablet  Take 1 tablet by mouth daily     potassium chloride 20 MEQ extended release tablet  Commonly known as: KLOR-CON M  Take 1 tablet by mouth 2 times daily for 5 days     risperiDONE 1 MG tablet  Commonly known as: RISPERDAL  Take 1 tablet by mouth 2 times daily     thiamine 100 MG tablet  Take 1 tablet by mouth daily        STOP taking these medications    sodium chloride 1 g tablet        ASK your doctor about these medications    doxycycline hyclate 100 MG tablet  Commonly known as: VIBRA-TABS  Take 1 tablet by mouth 2 times daily for 7 days  Ask about: Should I take this medication?            Where to Get Your Medications      These medications were sent to [address] [place] [address], [address] [facility], [address] [place] [id number]    Phone: [phone]   · amLODIPine 5 MG tablet  · hydrOXYzine 50 MG tablet  · pantoprazole 40 MG tablet           Discharge Condition/Location: Stable to home     Follow Up: Follow up with PCP.     Total time spent on discharge is 35 minutes      Cosmo Jean MD 11/16/2021 9:46 AM

## 2021-11-18 ENCOUNTER — OFFICE VISIT (OUTPATIENT)
Dept: FAMILY MEDICINE CLINIC | Age: 61
End: 2021-11-18
Payer: MEDICARE

## 2021-11-18 VITALS
BODY MASS INDEX: 23.84 KG/M2 | RESPIRATION RATE: 18 BRPM | DIASTOLIC BLOOD PRESSURE: 70 MMHG | WEIGHT: 152.2 LBS | SYSTOLIC BLOOD PRESSURE: 118 MMHG | OXYGEN SATURATION: 96 % | HEART RATE: 106 BPM

## 2021-11-18 DIAGNOSIS — F31.62 BIPOLAR DISORDER, CURRENT EPISODE MIXED, MODERATE (HCC): Primary | ICD-10-CM

## 2021-11-18 DIAGNOSIS — I10 PRIMARY HYPERTENSION: ICD-10-CM

## 2021-11-18 DIAGNOSIS — F41.9 ANXIETY: ICD-10-CM

## 2021-11-18 DIAGNOSIS — F10.11 HISTORY OF ALCOHOL ABUSE: ICD-10-CM

## 2021-11-18 DIAGNOSIS — D50.9 IRON DEFICIENCY ANEMIA, UNSPECIFIED IRON DEFICIENCY ANEMIA TYPE: ICD-10-CM

## 2021-11-18 DIAGNOSIS — Z72.0 TOBACCO ABUSE: ICD-10-CM

## 2021-11-18 LAB
C282Y HEMOCHROMATOSIS MUT: NEGATIVE
H63D HEMOCHROMATOSIS MUT: NEGATIVE
HEMOCHROMATOSIS GENE ANALYSIS: NORMAL
HFE PCR SPECIMEN: NORMAL
S65C HEMOCHROMATOSIS MUT: NEGATIVE

## 2021-11-18 PROCEDURE — 99214 OFFICE O/P EST MOD 30 MIN: CPT | Performed by: NURSE PRACTITIONER

## 2021-11-18 PROCEDURE — 3017F COLORECTAL CA SCREEN DOC REV: CPT | Performed by: NURSE PRACTITIONER

## 2021-11-18 PROCEDURE — 4004F PT TOBACCO SCREEN RCVD TLK: CPT | Performed by: NURSE PRACTITIONER

## 2021-11-18 PROCEDURE — G8427 DOCREV CUR MEDS BY ELIG CLIN: HCPCS | Performed by: NURSE PRACTITIONER

## 2021-11-18 PROCEDURE — G8482 FLU IMMUNIZE ORDER/ADMIN: HCPCS | Performed by: NURSE PRACTITIONER

## 2021-11-18 PROCEDURE — 1111F DSCHRG MED/CURRENT MED MERGE: CPT | Performed by: NURSE PRACTITIONER

## 2021-11-18 PROCEDURE — G8420 CALC BMI NORM PARAMETERS: HCPCS | Performed by: NURSE PRACTITIONER

## 2021-11-18 RX ORDER — RISPERIDONE 1 MG/1
1 TABLET, FILM COATED ORAL 2 TIMES DAILY
Qty: 60 TABLET | Refills: 2 | Status: SHIPPED | OUTPATIENT
Start: 2021-11-18 | End: 2022-03-18 | Stop reason: SDUPTHER

## 2021-11-18 RX ORDER — HYDROXYZINE 50 MG/1
50 TABLET, FILM COATED ORAL EVERY 8 HOURS PRN
Qty: 30 TABLET | Refills: 2 | Status: SHIPPED | OUTPATIENT
Start: 2021-11-18 | End: 2021-11-28

## 2021-11-18 RX ORDER — AMLODIPINE BESYLATE 5 MG/1
5 TABLET ORAL DAILY
Qty: 30 TABLET | Refills: 5 | Status: SHIPPED | OUTPATIENT
Start: 2021-11-18 | End: 2021-12-16 | Stop reason: SINTOL

## 2021-11-23 ENCOUNTER — CARE COORDINATION (OUTPATIENT)
Dept: CASE MANAGEMENT | Age: 61
End: 2021-11-23

## 2021-11-23 NOTE — CARE COORDINATION
[facility] Transitions Follow Up Call    2021    Patient: Blanca Duffy  Patient : 1960   MRN: [id number]  Reason for Admission: syncopal episodes, anemia, thrombocytosis, bipolar with justin, hx alcohol abuse, elevated liver enzymes, recently admitted with SIRS, tobacco dependence, cannabis abuse  Discharge Date: 21 RARS: Readmission Risk Score: 19 ( )         Spoke with: Blanca Duffy (patient)    Completed OV with [facility] and states he also completed OV with Casie Ricks. No notes in chart from visit with PCP office. States LE still with edema. Forgot to mention at PCP appt. Will have [facility] look at on next appt on Monday, . No redness, warmth, weeping, pain. Denies cough, SOB, cp, palpitations. Able to wear his normal shoes and socks. Reviewed low sodium and fluid restrictions, elevating above level of heart, light compression socks. He voices understanding. Will make some changes and monitor. Care Transitions Subsequent and Final Call    Schedule Follow Up Appointment with PCP: Completed  Subsequent and Final Calls  Do you have any ongoing symptoms?: Yes  Onset of Patient-reported symptoms: Other  Patient-reported symptoms: Other  Interventions for patient-reported symptoms: Other  Have your medications changed?: No  Do you have any questions related to your medications?: No  Do you currently have any active services?: No  Do you have any needs or concerns that I can assist you with?: No  Identified Barriers: Lack of Support  Care Transitions Interventions  No Identified Needs  Other Interventions:            Follow Up  Future Appointments   Date Time Provider Jimy Laird   2022  9:00 AM ED Bazan CNP, RN

## 2021-11-28 PROBLEM — D50.9 IRON DEFICIENCY ANEMIA: Status: ACTIVE | Noted: 2021-11-28

## 2021-11-29 NOTE — PROGRESS NOTES
Ranjan Loera (:  1960) is a 57 y.o. male,Established patient, here for evaluation of the following chief complaint(s):  Manic Behavior, Altered Mental Status, and Alcohol Intoxication         ASSESSMENT/PLAN:  1. Bipolar disorder, current episode mixed, moderate (HCC)  -     risperiDONE (RISPERDAL) 1 MG tablet; Take 1 tablet by mouth 2 times daily, Disp-60 tablet, R-2Normal  -     External Referral to Psychiatry  2. Primary hypertension  -     amLODIPine (NORVASC) 5 MG tablet; Take 1 tablet by mouth daily, Disp-30 tablet, R-5Normal  3. Anxiety  -     hydrOXYzine (ATARAX) 50 MG tablet; Take 1 tablet by mouth every 8 hours as needed for Anxiety, Disp-30 tablet, R-2Normal  -     External Referral to Psychiatry  4. Tobacco abuse      Recommend to establish care with [address] [place] [address]. Verbalized agreement. Phone number given. Refill medication to last until established. Return in about 2 months (around 2022) for [facility]--bipolar disorder, anemia. Continue follow up with [facility] regarding anemia. Subjective   SUBJECTIVE/OBJECTIVE:  HPI    For follow up after multiple ER visits and hospitalization related to lithium toxicity, confusion, aggression, manic behavior. Past hx of alcohol abuse. Lived in [place]. 1-2 years ago relocated to [facility] to be closer to  brothers and sister. Wife passed away related to cancer and father passed away soon after. Became depressed and started drinking again. Lives by him self with pets. States stopped drinking about 2 weeks ago. Hx of anemia. States followed with [facility], not able to view note update from [facility] visit at this time. EGD with esophageal ulcer. Colonoscopy with polyp in cecum. No bleeding noted. Review of Systems   All other systems reviewed and are negative. Objective   Physical Exam  Constitutional:       Appearance: Normal appearance. Cardiovascular:      Rate and Rhythm: Normal rate. Heart sounds: Normal heart sounds. Pulmonary:      Effort: Pulmonary effort is normal.      Breath sounds: Normal breath sounds. Abdominal:      General: There is no distension. Palpations: Abdomen is soft. Musculoskeletal:         General: Normal range of motion. Skin:     General: Skin is warm and dry. Neurological:      Mental Status: He is alert and oriented to person, place, and time. Psychiatric:         Behavior: Behavior normal.      Comments: Pleasant in conversation, very relaxed. Complete sentences, good eye contact.                    An electronic signature was used to authenticate this note.    --ED HAWKINS - CNP

## 2021-11-30 ENCOUNTER — CARE COORDINATION (OUTPATIENT)
Dept: CASE MANAGEMENT | Age: 61
End: 2021-11-30

## 2021-11-30 NOTE — CARE COORDINATION
[facility] Transitions Follow Up Call    2021    Patient: Ophelia Spicer  Patient : 1960   MRN: [id number]  Reason for Admission: syncopal episodes, anemia, thrombocytosis, bipolar with justin, hx alcohol abuse, elevated liver enzymes, recently admitted with SIRS, tobacco dependence, cannabis abuse  Discharge Date: 21 RARS: Readmission Risk Score: 19 ( )         Spoke with: Ophelia Spicer (patient)    Leaving [facility] appt now. Feels great. Has plan for follow up. States LE edema is improved since last conversation. [facility] not [facility] with [facility] but confirms he as contact number. Denies needs/concerns today. Has CTN contact number for future needs. Care Transitions Subsequent and Final Call    Schedule Follow Up Appointment with PCP: Completed  Subsequent and Final Calls  Do you have any ongoing symptoms?: No  Have your medications changed?: No  Do you have any questions related to your medications?: No  Do you currently have any active services?: No  Do you have any needs or concerns that I can assist you with?: No  Care Transitions Interventions  No Identified Needs  Other Interventions:            Follow Up  Future Appointments   Date Time Provider Jimy Laird   2022  9:00 AM [facility] [place] - JENNIFER Licona RN

## 2021-12-09 ENCOUNTER — CARE COORDINATION (OUTPATIENT)
Dept: CASE MANAGEMENT | Age: 61
End: 2021-12-09

## 2021-12-09 NOTE — CARE COORDINATION
[facility] Transitions Follow Up Call    2021    Patient: Keith Gann  Patient : 1960   MRN: [id number]  Reason for Admission: syncopal episodes, anemia, thrombocytosis, bipolar with justin, hx alcohol abuse, elevated liver enzymes, recently admitted with SIRS, tobacco dependence, cannabis abuse  Discharge Date: 21 RARS: Readmission Risk Score: 19 ( )         Spoke with: Keith Gann (patient)    Feeling good. Edema improved. No further episodes of syncope. Denies bleeding. Taking iron now and constipated. Plans to go to pharmacy later today. CTN recommended Miralax or Colace (or generic equivalents up to BID until BM starts and then back off to daily). He voices understanding. States he has gained 12# in two weeks. States he is not adding salt to foods. He does endorse cough he claims is \"smoker's cough\". It is occasionally productive and clear to white - no blood, not green or yellow. Denies fever, chills, cp, palpitations. States he has hx 2 neck and 2 shoulder surgeries and feels a knot by shoulder blade. Pants waist/underwear/belt are tighter. He does states he has some marks on his ankles from his socks. States he always prior had \"skinny legs\" but they look better than they did. He sleeps in a bed with 1 pillow. He does sometimes lay down when he coughs. He feels he is coughing more when laying down. Most recent weight 159# and that was on Tuesday last week. Instructed him to weigh daily dry weight and report weight gain >3# overnight or >5# week. CTN reviewed salt intake and foods high in sodium which he now states he eats (canned goods, freezer meals, bags of chips/pretzels). States he drinks three times over a 64 ounce fluid restriction. Encouraged him to increase mobility, elevate LE at rest, decrease salt intake and fluid intake and monitor his daily weights with parameters. He is not on diuretic. CTN routing note to PCP for recommendation.  Next appt is not until

## 2021-12-10 NOTE — TELEPHONE ENCOUNTER
That is b/c he has canceled I believe 4 or 5 new pt appointments with several providers here. His sister brought him in once to establish care as a new pt, and the  staff said that the pt was very ill. He ended up being sent to the ER via . I informed the staff that he would need to schedule a new pt appt to establish care. They scheduled him for a hospital f/u with me, and he canceled. I am not really sure what should happen from here, but I do not feel comfortable managing his care with his noncompliance.

## 2021-12-16 ENCOUNTER — OFFICE VISIT (OUTPATIENT)
Dept: FAMILY MEDICINE CLINIC | Age: 61
End: 2021-12-16
Payer: MEDICARE

## 2021-12-16 DIAGNOSIS — M25.541 ARTHRALGIA OF BOTH HANDS: ICD-10-CM

## 2021-12-16 DIAGNOSIS — G62.9 NEUROPATHY: ICD-10-CM

## 2021-12-16 DIAGNOSIS — Z11.4 ENCOUNTER FOR SCREENING FOR HIV: ICD-10-CM

## 2021-12-16 DIAGNOSIS — D50.9 IRON DEFICIENCY ANEMIA, UNSPECIFIED IRON DEFICIENCY ANEMIA TYPE: ICD-10-CM

## 2021-12-16 DIAGNOSIS — F41.9 ANXIETY: ICD-10-CM

## 2021-12-16 DIAGNOSIS — M25.542 ARTHRALGIA OF BOTH HANDS: ICD-10-CM

## 2021-12-16 DIAGNOSIS — Z91.89 ENCOUNTER FOR HEPATITIS C VIRUS SCREENING TEST FOR HIGH RISK PATIENT: ICD-10-CM

## 2021-12-16 DIAGNOSIS — Z11.59 ENCOUNTER FOR HEPATITIS C VIRUS SCREENING TEST FOR HIGH RISK PATIENT: ICD-10-CM

## 2021-12-16 DIAGNOSIS — I10 PRIMARY HYPERTENSION: Primary | ICD-10-CM

## 2021-12-16 LAB
BASOPHILS ABSOLUTE: 0.1 K/UL (ref 0–0.2)
BASOPHILS RELATIVE PERCENT: 1.1 %
EOSINOPHILS ABSOLUTE: 0.4 K/UL (ref 0–0.6)
EOSINOPHILS RELATIVE PERCENT: 5.1 %
HCT VFR BLD CALC: 37.5 % (ref 40.5–52.5)
HEMOGLOBIN: 12.4 G/DL (ref 13.5–17.5)
HEPATITIS C ANTIBODY INTERPRETATION: NORMAL
LYMPHOCYTES ABSOLUTE: 2.6 K/UL (ref 1–5.1)
LYMPHOCYTES RELATIVE PERCENT: 33.1 %
MCH RBC QN AUTO: 33.9 PG (ref 26–34)
MCHC RBC AUTO-ENTMCNC: 32.9 G/DL (ref 31–36)
MCV RBC AUTO: 103 FL (ref 80–100)
MONOCYTES ABSOLUTE: 0.9 K/UL (ref 0–1.3)
MONOCYTES RELATIVE PERCENT: 11.4 %
NEUTROPHILS ABSOLUTE: 3.9 K/UL (ref 1.7–7.7)
NEUTROPHILS RELATIVE PERCENT: 49.3 %
PDW BLD-RTO: 13.9 % (ref 12.4–15.4)
PLATELET # BLD: 523 K/UL (ref 135–450)
PMV BLD AUTO: 6.7 FL (ref 5–10.5)
RBC # BLD: 3.64 M/UL (ref 4.2–5.9)
WBC # BLD: 7.9 K/UL (ref 4–11)

## 2021-12-16 PROCEDURE — 4004F PT TOBACCO SCREEN RCVD TLK: CPT | Performed by: NURSE PRACTITIONER

## 2021-12-16 PROCEDURE — 99214 OFFICE O/P EST MOD 30 MIN: CPT | Performed by: NURSE PRACTITIONER

## 2021-12-16 PROCEDURE — G8427 DOCREV CUR MEDS BY ELIG CLIN: HCPCS | Performed by: NURSE PRACTITIONER

## 2021-12-16 PROCEDURE — G8419 CALC BMI OUT NRM PARAM NOF/U: HCPCS | Performed by: NURSE PRACTITIONER

## 2021-12-16 PROCEDURE — G8482 FLU IMMUNIZE ORDER/ADMIN: HCPCS | Performed by: NURSE PRACTITIONER

## 2021-12-16 PROCEDURE — 3017F COLORECTAL CA SCREEN DOC REV: CPT | Performed by: NURSE PRACTITIONER

## 2021-12-16 RX ORDER — HYDROXYZINE 50 MG/1
50 TABLET, FILM COATED ORAL EVERY 8 HOURS PRN
Qty: 60 TABLET | Refills: 2 | Status: SHIPPED | OUTPATIENT
Start: 2021-12-16 | End: 2022-03-18 | Stop reason: SDUPTHER

## 2021-12-16 RX ORDER — GABAPENTIN 300 MG/1
300 CAPSULE ORAL 2 TIMES DAILY
Qty: 60 CAPSULE | Refills: 2 | Status: SHIPPED | OUTPATIENT
Start: 2021-12-16 | End: 2022-03-15

## 2021-12-16 RX ORDER — HYDROXYZINE 50 MG/1
50 TABLET, FILM COATED ORAL EVERY 8 HOURS PRN
COMMUNITY
End: 2021-12-16 | Stop reason: SDUPTHER

## 2021-12-16 RX ORDER — LISINOPRIL 10 MG/1
10 TABLET ORAL DAILY
Qty: 30 TABLET | Refills: 5 | Status: SHIPPED | OUTPATIENT
Start: 2021-12-16 | End: 2021-12-18

## 2021-12-16 NOTE — PROGRESS NOTES
Cristian Beyer (:  1960) is a 57 y.o. male,Established patient, here for evaluation of the following chief complaint(s):  Hypertension         ASSESSMENT/PLAN:  1. Primary hypertension  2. Iron deficiency anemia, unspecified iron deficiency anemia type  -     CBC Auto Differential  3. Anxiety  -     hydrOXYzine (ATARAX) 50 MG tablet; Take 1 tablet by mouth every 8 hours as needed for Anxiety, Disp-60 tablet, R-2Normal  4. Encounter for screening for HIV  -     HIV Screen  5. Encounter for hepatitis C virus screening test for high risk patient  -     Hepatitis C Antibody  6. Arthralgia of both hands  7. Neuropathy  -     gabapentin (NEURONTIN) 300 MG capsule; Take 1 capsule by mouth 2 times daily for 30 days. Intended supply: 30 days, Disp-60 capsule, R-2Normal      Return in about 3 months (around 3/16/2022) for AWV, anemia, anxiety. Subjective   SUBJECTIVE/OBJECTIVE:  HPI     Having problems with pain in hands shoulder, neck. Feels has weakness in hands. Hands hurt to bend. Past hx of surgery shoulders and neck. Feels related to arthritis. More obvious \"now that not self medicating with alcohol\". Sleeping on heating pad    Appetite good. Weight up 15 pounds over past 4 weeks. Hasn't followed up with care at [facility]. Aware of opportunity to stop by there and     Review of Systems   All other systems reviewed and are negative. Objective   Physical Exam  Constitutional:       Appearance: Normal appearance. Cardiovascular:      Rate and Rhythm: Normal rate and regular rhythm. Heart sounds: Normal heart sounds. Pulmonary:      Effort: Pulmonary effort is normal.   Neurological:      Mental Status: He is alert and oriented to person, place, and time. Psychiatric:         Behavior: Behavior normal.      Comments: Slightly anxious during exam, wishes to hurry the encounter.                    An electronic signature was used to authenticate this note.    --ED HAWKINS - CNP

## 2021-12-17 LAB
HIV AG/AB: NORMAL
HIV ANTIGEN: NORMAL
HIV-1 ANTIBODY: NORMAL
HIV-2 AB: NORMAL

## 2021-12-28 VITALS
OXYGEN SATURATION: 97 % | DIASTOLIC BLOOD PRESSURE: 86 MMHG | RESPIRATION RATE: 18 BRPM | WEIGHT: 167.6 LBS | SYSTOLIC BLOOD PRESSURE: 138 MMHG | BODY MASS INDEX: 26.25 KG/M2 | HEART RATE: 98 BPM

## 2022-03-15 DIAGNOSIS — G62.9 NEUROPATHY: ICD-10-CM

## 2022-03-15 RX ORDER — GABAPENTIN 300 MG/1
CAPSULE ORAL
Qty: 60 CAPSULE | Refills: 2 | Status: SHIPPED | OUTPATIENT
Start: 2022-03-15 | End: 2022-05-23

## 2022-03-15 NOTE — TELEPHONE ENCOUNTER
Refill Request - Controlled Substance    Last Seen Department: 12/16/2021  Last Seen by PCP: 12/16/2021    Last Written: 12/16/2021 60 Capsule 2 Refills    Last UDS: 11/08/2021    Med Agreement Signed On: Not On File    Next Appointment: 3/18/2022    Future appointment scheduled    Requested Prescriptions     Pending Prescriptions Disp Refills    gabapentin (NEURONTIN) 300 MG capsule [Pharmacy Med Name: GABAPENTIN 300MG CAPSULES] 60 capsule 2     Sig: TAKE 1 CAPSULE BY MOUTH TWICE DAILY

## 2022-03-18 ENCOUNTER — OFFICE VISIT (OUTPATIENT)
Dept: FAMILY MEDICINE CLINIC | Age: 62
End: 2022-03-18
Payer: MEDICARE

## 2022-03-18 VITALS
DIASTOLIC BLOOD PRESSURE: 64 MMHG | HEART RATE: 101 BPM | HEIGHT: 67 IN | SYSTOLIC BLOOD PRESSURE: 110 MMHG | BODY MASS INDEX: 27.28 KG/M2 | WEIGHT: 173.8 LBS | OXYGEN SATURATION: 96 %

## 2022-03-18 DIAGNOSIS — D50.9 IRON DEFICIENCY ANEMIA, UNSPECIFIED IRON DEFICIENCY ANEMIA TYPE: ICD-10-CM

## 2022-03-18 DIAGNOSIS — I10 PRIMARY HYPERTENSION: ICD-10-CM

## 2022-03-18 DIAGNOSIS — F41.9 ANXIETY: ICD-10-CM

## 2022-03-18 DIAGNOSIS — E78.2 MIXED HYPERLIPIDEMIA: ICD-10-CM

## 2022-03-18 DIAGNOSIS — K21.9 GASTROESOPHAGEAL REFLUX DISEASE, UNSPECIFIED WHETHER ESOPHAGITIS PRESENT: ICD-10-CM

## 2022-03-18 DIAGNOSIS — F31.62 BIPOLAR DISORDER, CURRENT EPISODE MIXED, MODERATE (HCC): ICD-10-CM

## 2022-03-18 DIAGNOSIS — Z00.00 INITIAL MEDICARE ANNUAL WELLNESS VISIT: Primary | ICD-10-CM

## 2022-03-18 DIAGNOSIS — K70.9 ALCOHOLIC LIVER DISEASE (HCC): ICD-10-CM

## 2022-03-18 DIAGNOSIS — Z72.0 TOBACCO ABUSE: ICD-10-CM

## 2022-03-18 LAB
A/G RATIO: 1.7 (ref 1.1–2.2)
ALBUMIN SERPL-MCNC: 4.7 G/DL (ref 3.4–5)
ALP BLD-CCNC: 121 U/L (ref 40–129)
ALT SERPL-CCNC: 23 U/L (ref 10–40)
ANION GAP SERPL CALCULATED.3IONS-SCNC: 17 MMOL/L (ref 3–16)
AST SERPL-CCNC: 24 U/L (ref 15–37)
BANDED NEUTROPHILS RELATIVE PERCENT: 6 % (ref 0–7)
BASOPHILS ABSOLUTE: 0 K/UL (ref 0–0.2)
BASOPHILS RELATIVE PERCENT: 0 %
BILIRUB SERPL-MCNC: 0.4 MG/DL (ref 0–1)
BUN BLDV-MCNC: 12 MG/DL (ref 7–20)
CALCIUM SERPL-MCNC: 10.4 MG/DL (ref 8.3–10.6)
CHLORIDE BLD-SCNC: 99 MMOL/L (ref 99–110)
CHOLESTEROL, TOTAL: 321 MG/DL (ref 0–199)
CO2: 23 MMOL/L (ref 21–32)
CREAT SERPL-MCNC: 0.8 MG/DL (ref 0.8–1.3)
EOSINOPHILS ABSOLUTE: 0 K/UL (ref 0–0.6)
EOSINOPHILS RELATIVE PERCENT: 0 %
GFR AFRICAN AMERICAN: >60
GFR NON-AFRICAN AMERICAN: >60
GLUCOSE BLD-MCNC: 107 MG/DL (ref 70–99)
HCT VFR BLD CALC: 40.1 % (ref 40.5–52.5)
HDLC SERPL-MCNC: 58 MG/DL (ref 40–60)
HEMOGLOBIN: 13.4 G/DL (ref 13.5–17.5)
LDL CHOLESTEROL CALCULATED: ABNORMAL MG/DL
LDL CHOLESTEROL DIRECT: 143 MG/DL
LYMPHOCYTES ABSOLUTE: 2.6 K/UL (ref 1–5.1)
LYMPHOCYTES RELATIVE PERCENT: 32 %
MCH RBC QN AUTO: 31.5 PG (ref 26–34)
MCHC RBC AUTO-ENTMCNC: 33.4 G/DL (ref 31–36)
MCV RBC AUTO: 94.4 FL (ref 80–100)
MONOCYTES ABSOLUTE: 0.5 K/UL (ref 0–1.3)
MONOCYTES RELATIVE PERCENT: 6 %
NEUTROPHILS ABSOLUTE: 5.1 K/UL (ref 1.7–7.7)
NEUTROPHILS RELATIVE PERCENT: 56 %
PDW BLD-RTO: 14.7 % (ref 12.4–15.4)
PLATELET # BLD: 377 K/UL (ref 135–450)
PMV BLD AUTO: 6.7 FL (ref 5–10.5)
POTASSIUM REFLEX MAGNESIUM: 4.6 MMOL/L (ref 3.5–5.1)
RBC # BLD: 4.24 M/UL (ref 4.2–5.9)
RBC # BLD: NORMAL 10*6/UL
SODIUM BLD-SCNC: 139 MMOL/L (ref 136–145)
TOTAL PROTEIN: 7.4 G/DL (ref 6.4–8.2)
TRIGL SERPL-MCNC: 575 MG/DL (ref 0–150)
VLDLC SERPL CALC-MCNC: ABNORMAL MG/DL
WBC # BLD: 8.2 K/UL (ref 4–11)

## 2022-03-18 PROCEDURE — G8482 FLU IMMUNIZE ORDER/ADMIN: HCPCS | Performed by: NURSE PRACTITIONER

## 2022-03-18 PROCEDURE — G0438 PPPS, INITIAL VISIT: HCPCS | Performed by: NURSE PRACTITIONER

## 2022-03-18 PROCEDURE — 3017F COLORECTAL CA SCREEN DOC REV: CPT | Performed by: NURSE PRACTITIONER

## 2022-03-18 RX ORDER — LISINOPRIL 10 MG/1
10 TABLET ORAL DAILY
Qty: 90 TABLET | Refills: 1 | Status: SHIPPED | OUTPATIENT
Start: 2022-03-18 | End: 2022-06-29 | Stop reason: SDUPTHER

## 2022-03-18 RX ORDER — RISPERIDONE 1 MG/1
1 TABLET, FILM COATED ORAL 2 TIMES DAILY
Qty: 60 TABLET | Refills: 5 | Status: SHIPPED | OUTPATIENT
Start: 2022-03-18 | End: 2022-06-29 | Stop reason: SDUPTHER

## 2022-03-18 RX ORDER — LANOLIN ALCOHOL/MO/W.PET/CERES
100 CREAM (GRAM) TOPICAL DAILY
Qty: 30 TABLET | Refills: 3 | Status: SHIPPED | OUTPATIENT
Start: 2022-03-18 | End: 2022-06-29 | Stop reason: SDUPTHER

## 2022-03-18 RX ORDER — PANTOPRAZOLE SODIUM 40 MG/1
40 TABLET, DELAYED RELEASE ORAL
Qty: 60 TABLET | Refills: 5 | Status: SHIPPED | OUTPATIENT
Start: 2022-03-18 | End: 2022-06-29 | Stop reason: SDUPTHER

## 2022-03-18 RX ORDER — HYDROXYZINE 50 MG/1
50 TABLET, FILM COATED ORAL EVERY 8 HOURS PRN
Qty: 60 TABLET | Refills: 5 | Status: SHIPPED | OUTPATIENT
Start: 2022-03-18 | End: 2022-06-29 | Stop reason: SDUPTHER

## 2022-03-18 ASSESSMENT — LIFESTYLE VARIABLES
HOW OFTEN DURING THE LAST YEAR HAVE YOU BEEN UNABLE TO REMEMBER WHAT HAPPENED THE NIGHT BEFORE BECAUSE YOU HAD BEEN DRINKING: 0
HOW OFTEN DURING THE LAST YEAR HAVE YOU FOUND THAT YOU WERE NOT ABLE TO STOP DRINKING ONCE YOU HAD STARTED: 0
HOW OFTEN DURING THE LAST YEAR HAVE YOU FAILED TO DO WHAT WAS NORMALLY EXPECTED FROM YOU BECAUSE OF DRINKING: 0
HOW OFTEN DO YOU HAVE A DRINK CONTAINING ALCOHOL: 4 OR MORE TIMES A WEEK
HOW OFTEN DURING THE LAST YEAR HAVE YOU HAD A FEELING OF GUILT OR REMORSE AFTER DRINKING: 0
HAS A RELATIVE, FRIEND, DOCTOR, OR ANOTHER HEALTH PROFESSIONAL EXPRESSED CONCERN ABOUT YOUR DRINKING OR SUGGESTED YOU CUT DOWN: 0
HOW OFTEN DURING THE LAST YEAR HAVE YOU NEEDED AN ALCOHOLIC DRINK FIRST THING IN THE MORNING TO GET YOURSELF GOING AFTER A NIGHT OF HEAVY DRINKING: 0
HOW MANY STANDARD DRINKS CONTAINING ALCOHOL DO YOU HAVE ON A TYPICAL DAY: 3 OR 4
HAVE YOU OR SOMEONE ELSE BEEN INJURED AS A RESULT OF YOUR DRINKING: 0

## 2022-03-18 ASSESSMENT — PATIENT HEALTH QUESTIONNAIRE - PHQ9
SUM OF ALL RESPONSES TO PHQ QUESTIONS 1-9: 0
SUM OF ALL RESPONSES TO PHQ QUESTIONS 1-9: 0
SUM OF ALL RESPONSES TO PHQ9 QUESTIONS 1 & 2: 0
2. FEELING DOWN, DEPRESSED OR HOPELESS: 0
SUM OF ALL RESPONSES TO PHQ QUESTIONS 1-9: 0
1. LITTLE INTEREST OR PLEASURE IN DOING THINGS: 0
SUM OF ALL RESPONSES TO PHQ QUESTIONS 1-9: 0

## 2022-03-18 NOTE — PROGRESS NOTES
no  2 or more falls in past year?: (!) yes  Fall with injury in past year?: no     Fall Risk Interventions:    · Home safety tips provided        Tobacco Use:     Tobacco Use: High Risk    Smoking Tobacco Use: Current Every Day Smoker    Smokeless Tobacco Use: Never Used     E-Cigarettes/Vaping Use     Questions Responses    E-Cigarette/Vaping Use Never User    Start Date     Passive Exposure     Quit Date     Counseling Given     Comments         Substance Abuse - Tobacco Interventions:  patient is not ready to work toward tobacco cessation at this time    Alcohol Screening:  AUDIT Total Score: 6    A score of 8 or more is associated with harmful or hazardous drinking. A score of 13 or more in women, and 15 or more in men, is likely to indicate alcohol dependence.     Substance Abuse - Alcohol Interventions:  patient is not ready to change his/her alcohol consumption behavior at this time    Drug Use Screening:DAST-10 Score: 0  DAST-10 Score Interpretation:  1-2: Low level - Monitor, re-assess at a later date; 3-5: Moderate level - Further Investigation; 6-8: Substantial level - Intensive Assessment; 9-10: Severe level - Intensive Assessment    Substance Abuse - Drug Use Interventions:  patient is not ready to change his/her recreational drug use behavior at this time         General Health and ACP:  General  In general, how would you say your health is?: Good  In the past 7 days, have you experienced any of the following: New or Increased Pain, New or Increased Fatigue, Loneliness, Social Isolation, Stress or Anger?: (!) Yes  Select all that apply: (!) Loneliness,Social Isolation,Anger  Do you get the social and emotional support that you need?: Yes  Do you have a Living Will?: Yes    Advance Directives     Power of  Living Will ACP-Advance Directive ACP-Power of     Not on File Filed on 11/12/21 Filed Not on File      General Health Risk Interventions:  · Loneliness: patient declines any further intervention for this issue, continues to grieve sudden passing of wife and suicide of a friend soon after that. Hearing/Vision:  Do you or your family notice any trouble with your hearing that hasn't been managed with hearing aids?: No  Do you have difficulty driving, watching TV, or doing any of your daily activities because of your eyesight?: (!) Yes  Have you had an eye exam within the past year?: (!) No  No exam data present    Hearing/Vision Interventions:  · Vision concerns:  recommend yearly eye exam    Safety:  Do you have working smoke detectors?: Yes  Do you have any tripping hazards - loose or unsecured carpets or rugs?: No  Do you have any tripping hazards - clutter in doorways, halls, or stairs?: No  Do you have either shower bars, grab bars, non-slip mats or non-slip surfaces in your shower or bathtub?: (!) No  Do all of your stairways have a railing or banister?: (!) No  Do you always fasten your seatbelt when you are in a car?: Yes    Safety Interventions:  · Home safety tips provided           Objective   Vitals:    03/18/22 1024   BP: 110/64   Site: Right Upper Arm   Position: Sitting   Cuff Size: Large Adult   Pulse: 101   SpO2: 96%   Weight: 173 lb 12.8 oz (78.8 kg)   Height: 5' 7\" (1.702 m)      Body mass index is 27.22 kg/m². Pulmonary/Chest: clear to auscultation bilaterally- no wheezes, rales or rhonchi, normal air movement, no respiratory distress  Cardiovascular: normal rate, normal S1 and S2, no gallops, intact distal pulses and no carotid bruits       Allergies   Allergen Reactions    Sulfa Antibiotics Rash     Prior to Visit Medications    Medication Sig Taking?  Authorizing Provider   lisinopril (PRINIVIL;ZESTRIL) 10 MG tablet Take 1 tablet by mouth daily Yes ED Lama CNP   hydrOXYzine (ATARAX) 50 MG tablet Take 1 tablet by mouth every 8 hours as needed for Anxiety Yes ED Lama CNP   risperiDONE (RISPERDAL) 1 MG tablet Take 1 tablet by mouth 2 times daily Yes ED Topete CNP   pantoprazole (PROTONIX) 40 MG tablet Take 1 tablet by mouth 2 times daily (before meals) Yes ED Rodas CNP   thiamine 100 MG tablet Take 1 tablet by mouth daily Yes ED Rodas CNP   gabapentin (NEURONTIN) 300 MG capsule TAKE 1 CAPSULE BY MOUTH TWICE DAILY Yes ED Rodas CNP   Multiple Vitamins-Minerals (MULTIVITAMIN WITH MINERALS) tablet Take 1 tablet by mouth daily Yes Garrett Calhoun MD       [facility] (Including outside providers/suppliers regularly involved in providing care):   Patient Care Team:  ED Rodas CNP as PCP - General (Family Nurse Practitioner)  ED Rodas CNP as PCP - [facility] Laura Schultz Provider    Reviewed and updated this visit:  Tobacco  Allergies  Meds  Med Hx  Surg Hx  Soc Hx  Fam Hx

## 2022-03-18 NOTE — PATIENT INSTRUCTIONS
Personalized Preventive Plan for Selina Mojica - 3/18/2022  Medicare offers a range of preventive health benefits. Some of the tests and screenings are paid in full while other may be subject to a deductible, co-insurance, and/or copay. Some of these benefits include a comprehensive review of your medical history including lifestyle, illnesses that may run in your family, and various assessments and screenings as appropriate. After reviewing your medical record and screening and assessments performed today your provider may have ordered immunizations, labs, imaging, and/or referrals for you. A list of these orders (if applicable) as well as your Preventive Care list are included within your After Visit Summary for your review. Other Preventive Recommendations:    · A preventive eye exam performed by an eye specialist is recommended every 1-2 years to screen for glaucoma; cataracts, macular degeneration, and other eye disorders. · A preventive dental visit is recommended every 6 months. · Try to get at least 150 minutes of exercise per week or 10,000 steps per day on a pedometer . · Order or download the FREE \"Exercise & Physical Activity: Your Everyday Guide\" from [employer]. Call 7-104.725.1971 or search [employer] online. · You need 7886-4859 mg of calcium and 4527-6146 IU of vitamin D per day. It is possible to meet your calcium requirement with diet alone, but a vitamin D supplement is usually necessary to meet this goal.  · When exposed to the sun, use a sunscreen that protects against both UVA and UVB radiation with an SPF of 30 or greater. Reapply every 2 to 3 hours or after sweating, drying off with a towel, or swimming. · Always wear a seat belt when traveling in a car. Always wear a helmet when riding a bicycle or motorcycle.

## 2022-03-23 ENCOUNTER — HOSPITAL ENCOUNTER (OUTPATIENT)
Dept: CT IMAGING | Age: 62
Discharge: HOME | End: 2022-03-23
Payer: MEDICARE

## 2022-03-23 DIAGNOSIS — Z72.0 TOBACCO ABUSE: ICD-10-CM

## 2022-03-23 PROCEDURE — 71271 CT THORAX LUNG CANCER SCR C-: CPT

## 2022-03-28 ENCOUNTER — TELEPHONE (OUTPATIENT)
Dept: CASE MANAGEMENT | Age: 62
End: 2022-03-28

## 2022-03-28 NOTE — TELEPHONE ENCOUNTER
Annual Lung Cancer Screening CT on 3/23/2022. [id number]. Recommended 3 month F/U LDCT CHEST. Results letter mailed.     Thank you,  Belinda Flynn Lung Navigator  [phone]

## 2022-04-15 DIAGNOSIS — F31.62 BIPOLAR DISORDER, CURRENT EPISODE MIXED, MODERATE (HCC): ICD-10-CM

## 2022-04-16 RX ORDER — RISPERIDONE 1 MG/1
TABLET, FILM COATED ORAL
Qty: 60 TABLET | Refills: 5 | OUTPATIENT
Start: 2022-04-16

## 2022-04-16 NOTE — TELEPHONE ENCOUNTER
Refill Request     Last Seen: Last Seen Department: 3/18/2022  Last Seen by PCP: 3/18/2022    Last Written: 3/18/2022    Next Appointment:   Future Appointments   Date Time Provider Jimy Laird   6/29/2022  9:15 AM Ethelle Gianotti, APRN - CNP [facility]   3/20/2023  8:30 AM ED Macario CNP  [facility] - [facility]             Requested Prescriptions     Pending Prescriptions Disp Refills    risperiDONE (RISPERDAL) 1 MG tablet [Pharmacy Med Name: RISPERIDONE 1MG TABLETS] 60 tablet 5     Sig: TAKE 1 TABLET BY MOUTH TWICE DAILY

## 2022-05-02 ENCOUNTER — TELEPHONE (OUTPATIENT)
Dept: FAMILY MEDICINE CLINIC | Age: 62
End: 2022-05-02

## 2022-05-02 NOTE — TELEPHONE ENCOUNTER
----- Message from Zeke Francisco sent at 5/2/2022  8:16 AM EDT -----  Subject: Appointment Request    Reason for Call: Routine (Patient Request) No Script    QUESTIONS  Type of Appointment? Established Patient  Reason for appointment request? Available appointments did not meet   patient need  Additional Information for Provider? patient is requesting an appt.   ---------------------------------------------------------------------------  --------------  CALL BACK INFO  What is the best way for the office to contact you? OK to leave message on   voicemail  Preferred Call Back Phone Number? [phone]  ---------------------------------------------------------------------------  --------------  SCRIPT ANSWERS  Relationship to Patient? Self  (Is the patient requesting to see the provider for a procedure?)? No  (Is the patient requesting to see the provider urgently  today or   tomorrow. )? No  Have you been diagnosed with, awaiting test results for, or told that you   are suspected of having COVID-19 (Coronavirus)? (If patient has tested   negative or was tested as a requirement for work, school, or travel and   not based on symptoms, answer no)? No  Within the past 10 days have you developed any of the following symptoms   (answer no if symptoms have been present longer than 10 days or began   more than 10 days ago)? Fever or Chills, Cough, Shortness of breath or   difficulty breathing, Loss of taste or smell, Sore throat, Nasal   congestion, Sneezing or runny nose, Fatigue or generalized body aches   (answer no if pain is specific to a body part e.g. back pain), Diarrhea,   Headache? No  Have you had close contact with someone with COVID-19 in the last 7 days? No  (Service Expert  click yes below to proceed with Thoof As Usual   Scheduling)?  Yes
Called patient and I have him scheduled on 5/5/2022 at 8:15AM.
77

## 2022-05-05 ENCOUNTER — OFFICE VISIT (OUTPATIENT)
Dept: FAMILY MEDICINE CLINIC | Age: 62
End: 2022-05-05
Payer: MEDICARE

## 2022-05-05 VITALS
HEART RATE: 88 BPM | WEIGHT: 177.8 LBS | DIASTOLIC BLOOD PRESSURE: 70 MMHG | RESPIRATION RATE: 16 BRPM | OXYGEN SATURATION: 96 % | BODY MASS INDEX: 27.85 KG/M2 | SYSTOLIC BLOOD PRESSURE: 100 MMHG

## 2022-05-05 DIAGNOSIS — J43.2 CENTRILOBULAR EMPHYSEMA (HCC): ICD-10-CM

## 2022-05-05 DIAGNOSIS — M54.10 RADICULOPATHY OF LEG: ICD-10-CM

## 2022-05-05 DIAGNOSIS — A15.9 TUBERCULOSIS: ICD-10-CM

## 2022-05-05 DIAGNOSIS — E78.2 MIXED HYPERLIPIDEMIA: ICD-10-CM

## 2022-05-05 DIAGNOSIS — M48.061 SPINAL STENOSIS OF LUMBAR REGION WITHOUT NEUROGENIC CLAUDICATION: Primary | ICD-10-CM

## 2022-05-05 PROCEDURE — 99214 OFFICE O/P EST MOD 30 MIN: CPT | Performed by: NURSE PRACTITIONER

## 2022-05-05 PROCEDURE — G8419 CALC BMI OUT NRM PARAM NOF/U: HCPCS | Performed by: NURSE PRACTITIONER

## 2022-05-05 PROCEDURE — G8427 DOCREV CUR MEDS BY ELIG CLIN: HCPCS | Performed by: NURSE PRACTITIONER

## 2022-05-05 PROCEDURE — 3023F SPIROM DOC REV: CPT | Performed by: NURSE PRACTITIONER

## 2022-05-05 PROCEDURE — 4004F PT TOBACCO SCREEN RCVD TLK: CPT | Performed by: NURSE PRACTITIONER

## 2022-05-05 PROCEDURE — 3017F COLORECTAL CA SCREEN DOC REV: CPT | Performed by: NURSE PRACTITIONER

## 2022-05-05 RX ORDER — METHOCARBAMOL 750 MG/1
750 TABLET, FILM COATED ORAL 3 TIMES DAILY
Qty: 30 TABLET | Refills: 0 | Status: SHIPPED | OUTPATIENT
Start: 2022-05-05 | End: 2022-05-15

## 2022-05-05 RX ORDER — ATORVASTATIN CALCIUM 40 MG/1
40 TABLET, FILM COATED ORAL DAILY
Qty: 90 TABLET | Refills: 3 | Status: SHIPPED | OUTPATIENT
Start: 2022-05-05

## 2022-05-05 RX ORDER — PREDNISONE 10 MG/1
TABLET ORAL
Qty: 35 TABLET | Refills: 0 | Status: SHIPPED | OUTPATIENT
Start: 2022-05-05 | End: 2022-06-29

## 2022-05-05 RX ORDER — FLUTICASONE PROPIONATE AND SALMETEROL 100; 50 UG/1; UG/1
1 POWDER RESPIRATORY (INHALATION) EVERY 12 HOURS
Qty: 1 EACH | Refills: 5 | Status: SHIPPED | OUTPATIENT
Start: 2022-05-05 | End: 2022-05-05

## 2022-05-05 RX ORDER — FLUTICASONE PROPIONATE AND SALMETEROL 100; 50 UG/1; UG/1
1 POWDER RESPIRATORY (INHALATION) EVERY 12 HOURS
Qty: 3 EACH | Refills: 3 | Status: SHIPPED | OUTPATIENT
Start: 2022-05-05

## 2022-05-05 NOTE — PROGRESS NOTES
Perez Jenkins (:  1960) is a 60 y.o. male,Established patient, here for evaluation of the following chief complaint(s):  Leg Pain (Bilateral, aches, x3 weeks)         ASSESSMENT/PLAN:  1. Spinal stenosis of lumbar region without neurogenic claudication  -     predniSONE (DELTASONE) 10 MG tablet; 2 tablets 2 times daily for 5 days, 1 tablet 2 times daily for 5 days, 1 tablet daily, Disp-35 tablet, R-0Normal  -     methocarbamol (ROBAXIN-750) 750 MG tablet; Take 1 tablet by mouth 3 times daily for 10 days, Disp-30 tablet, R-0Normal  2. Radiculopathy of leg  -     predniSONE (DELTASONE) 10 MG tablet; 2 tablets 2 times daily for 5 days, 1 tablet 2 times daily for 5 days, 1 tablet daily, Disp-35 tablet, R-0Normal  -     methocarbamol (ROBAXIN-750) 750 MG tablet; Take 1 tablet by mouth 3 times daily for 10 days, Disp-30 tablet, R-0Normal  3. Tuberculosis  4. Mixed hyperlipidemia  -     atorvastatin (LIPITOR) 40 MG tablet; Take 1 tablet by mouth daily, Disp-90 tablet, R-3Normal  5. Centrilobular emphysema (HCC)  -     fluticasone-salmeterol (ADVAIR DISKUS) 100-50 MCG/ACT AEPB diskus inhaler; Inhale 1 puff into the lungs every 12 hours, Disp-1 each, R-5Normal      Reviewed CT lung. Nodule noted right lower lobe. Indicated for 3 month follow up. Has appt . Will order at that time. Robert Farmer is aware of the nodule from previous communication    Mod plaque noted on CT lung scan. Triglycerides and LDL elevated on past lab. Will start lipitor 40 mg daily    Persistent wheezing. Mod emphysema. Continues to smoke. Will start advair daily to decrease wheezing. Reviewed CT scan from  CT abdomen and pelvis. Degeneration noted with stenosis L4-L5, probable cause of discomfort. No follow-ups on file. Subjective   SUBJECTIVE/OBJECTIVE:  HPI      For the past 2 weeks with pain back of legs from just above knees up to buttocks. Denies injury. Not able to sleep. Feels like aching, denies sharp pain.  Worse after cutting grass yesterday, self propelled walking mower. Review of Systems       Objective   Physical Exam  Constitutional:       Appearance: Normal appearance. Cardiovascular:      Rate and Rhythm: Normal rate. Pulses: Normal pulses. Pulmonary:      Effort: Pulmonary effort is normal.      Breath sounds: Wheezing present. Comments: Scattered wheezing  Musculoskeletal:         General: No swelling. Normal range of motion. Comments: 2+ PT and pedal pulses bilateral.    Neurological:      Mental Status: He is alert and oriented to person, place, and time.                   An electronic signature was used to authenticate this note.    --JOCELYNE HUGHES, ED - CNP

## 2022-05-05 NOTE — TELEPHONE ENCOUNTER
Duplicate Request    Refill Request     CONFIRM preferrred pharmacy with the patient. If Mail Order Rx - Pend for 90 day refill.       Last Seen: Last Seen Department: 5/5/2022  Last Seen by PCP: 5/5/2022    Last Written: 05/05/2022 1 Each 5 Refills    Next Appointment:   Future Appointments   Date Time Provider Jimy Laird   6/29/2022  9:15 AM ED Burgos CNP   3/20/2023  8:30 AM ED Burgos CNP  [facility]       Future appointment scheduled      Requested Prescriptions     Pending Prescriptions Disp Refills    fluticasone-salmeterol (ADVIAR) 100-50 MCG/ACT AEPB diskus inhaler [Pharmacy Med Name: FLUTICASONE/SALM DISK 100/50MCG 60S] 180 each      Sig: INHALE 1 PUFF INTO THE LUNGS EVERY 12 HOURS

## 2022-05-12 PROBLEM — I25.10 CORONARY ARTERY CALCIFICATION: Status: ACTIVE | Noted: 2022-05-12

## 2022-05-12 PROBLEM — I25.84 CORONARY ARTERY CALCIFICATION: Status: ACTIVE | Noted: 2022-05-12

## 2022-05-12 PROBLEM — T56.891A LITHIUM TOXICITY: Status: RESOLVED | Noted: 2021-11-06 | Resolved: 2022-05-12

## 2022-05-12 PROBLEM — F10.10 NONDEPENDENT ALCOHOL ABUSE: Status: RESOLVED | Noted: 2021-11-04 | Resolved: 2022-05-12

## 2022-05-12 PROBLEM — R91.1 LUNG NODULE: Status: ACTIVE | Noted: 2022-05-12

## 2022-05-12 PROBLEM — I36.1 NONRHEUMATIC TRICUSPID VALVE REGURGITATION: Status: ACTIVE | Noted: 2022-05-12

## 2022-05-20 DIAGNOSIS — G62.9 NEUROPATHY: ICD-10-CM

## 2022-05-23 RX ORDER — GABAPENTIN 300 MG/1
CAPSULE ORAL
Qty: 60 CAPSULE | Refills: 2 | Status: SHIPPED | OUTPATIENT
Start: 2022-05-23 | End: 2022-09-06

## 2022-05-26 ENCOUNTER — TELEPHONE (OUTPATIENT)
Dept: CASE MANAGEMENT | Age: 62
End: 2022-05-26

## 2022-05-26 NOTE — TELEPHONE ENCOUNTER
Pt due for F/U imaging to Annual Lung Screening 3/23/2022, Luna Jones. Reminder letter mailed.     Thank you,  Sai Sequeira RN  [facility] Lung Navigator  [phone]

## 2022-06-09 ENCOUNTER — TELEPHONE (OUTPATIENT)
Dept: CASE MANAGEMENT | Age: 62
End: 2022-06-09

## 2022-06-09 NOTE — TELEPHONE ENCOUNTER
Patient due for F/U imaging to annual CT Lung Screening 3/23/2022, Tk Emanuel. Reminder letter mailed. Please place order.     For an LRAD4A you order a 3 month F/U CT Chest Low Dose ([id number]),  unless there is concern for lymphadenopathy or mediastinum involvement then you it would be a regular Chest CT ([id number])    [facility] [address] Lung Navigator  [phone]

## 2022-06-29 ENCOUNTER — OFFICE VISIT (OUTPATIENT)
Dept: FAMILY MEDICINE CLINIC | Age: 62
End: 2022-06-29
Payer: MEDICARE

## 2022-06-29 VITALS
HEART RATE: 93 BPM | WEIGHT: 189 LBS | HEIGHT: 67 IN | OXYGEN SATURATION: 97 % | TEMPERATURE: 97.7 F | SYSTOLIC BLOOD PRESSURE: 134 MMHG | DIASTOLIC BLOOD PRESSURE: 74 MMHG | BODY MASS INDEX: 29.66 KG/M2

## 2022-06-29 DIAGNOSIS — R79.89 ELEVATED LFTS: ICD-10-CM

## 2022-06-29 DIAGNOSIS — D50.9 IRON DEFICIENCY ANEMIA, UNSPECIFIED IRON DEFICIENCY ANEMIA TYPE: ICD-10-CM

## 2022-06-29 DIAGNOSIS — F41.9 ANXIETY: ICD-10-CM

## 2022-06-29 DIAGNOSIS — I10 PRIMARY HYPERTENSION: ICD-10-CM

## 2022-06-29 DIAGNOSIS — K21.9 GASTROESOPHAGEAL REFLUX DISEASE, UNSPECIFIED WHETHER ESOPHAGITIS PRESENT: ICD-10-CM

## 2022-06-29 DIAGNOSIS — K70.9 ALCOHOLIC LIVER DISEASE (HCC): ICD-10-CM

## 2022-06-29 DIAGNOSIS — F31.62 BIPOLAR DISORDER, CURRENT EPISODE MIXED, MODERATE (HCC): ICD-10-CM

## 2022-06-29 DIAGNOSIS — R10.10 PAIN OF UPPER ABDOMEN: ICD-10-CM

## 2022-06-29 DIAGNOSIS — E78.1 HYPERTRIGLYCERIDEMIA: ICD-10-CM

## 2022-06-29 DIAGNOSIS — R91.1 LUNG NODULE: Primary | ICD-10-CM

## 2022-06-29 LAB
A/G RATIO: 1.8 (ref 1.1–2.2)
ALBUMIN SERPL-MCNC: 4.5 G/DL (ref 3.4–5)
ALP BLD-CCNC: 125 U/L (ref 40–129)
ALT SERPL-CCNC: 34 U/L (ref 10–40)
ANION GAP SERPL CALCULATED.3IONS-SCNC: 14 MMOL/L (ref 3–16)
AST SERPL-CCNC: 28 U/L (ref 15–37)
BANDED NEUTROPHILS RELATIVE PERCENT: 1 % (ref 0–7)
BASOPHILS ABSOLUTE: 0 K/UL (ref 0–0.2)
BASOPHILS RELATIVE PERCENT: 0 %
BILIRUB SERPL-MCNC: <0.2 MG/DL (ref 0–1)
BUN BLDV-MCNC: 7 MG/DL (ref 7–20)
CALCIUM SERPL-MCNC: 9.9 MG/DL (ref 8.3–10.6)
CHLORIDE BLD-SCNC: 102 MMOL/L (ref 99–110)
CHOLESTEROL, TOTAL: 179 MG/DL (ref 0–199)
CO2: 24 MMOL/L (ref 21–32)
CREAT SERPL-MCNC: 0.8 MG/DL (ref 0.8–1.3)
EOSINOPHILS ABSOLUTE: 0.4 K/UL (ref 0–0.6)
EOSINOPHILS RELATIVE PERCENT: 4 %
GFR AFRICAN AMERICAN: >60
GFR NON-AFRICAN AMERICAN: >60
GLUCOSE BLD-MCNC: 98 MG/DL (ref 70–99)
HCT VFR BLD CALC: 39.2 % (ref 40.5–52.5)
HDLC SERPL-MCNC: 79 MG/DL (ref 40–60)
HEMOGLOBIN: 13.3 G/DL (ref 13.5–17.5)
LDL CHOLESTEROL CALCULATED: 62 MG/DL
LIPASE: 25 U/L (ref 13–60)
LYMPHOCYTES ABSOLUTE: 2.4 K/UL (ref 1–5.1)
LYMPHOCYTES RELATIVE PERCENT: 24 %
MCH RBC QN AUTO: 33 PG (ref 26–34)
MCHC RBC AUTO-ENTMCNC: 34 G/DL (ref 31–36)
MCV RBC AUTO: 97.3 FL (ref 80–100)
METAMYELOCYTES RELATIVE PERCENT: 1 %
MONOCYTES ABSOLUTE: 1.3 K/UL (ref 0–1.3)
MONOCYTES RELATIVE PERCENT: 13 %
NEUTROPHILS ABSOLUTE: 5.9 K/UL (ref 1.7–7.7)
NEUTROPHILS RELATIVE PERCENT: 56 %
PDW BLD-RTO: 13 % (ref 12.4–15.4)
PLATELET # BLD: 429 K/UL (ref 135–450)
PLATELET SLIDE REVIEW: ABNORMAL
PMV BLD AUTO: 6.4 FL (ref 5–10.5)
POTASSIUM REFLEX MAGNESIUM: 5.5 MMOL/L (ref 3.5–5.1)
PROMYELOCYTES PERCENT: 1 %
RBC # BLD: 4.03 M/UL (ref 4.2–5.9)
RBC # BLD: NORMAL 10*6/UL
SLIDE REVIEW: ABNORMAL
SODIUM BLD-SCNC: 140 MMOL/L (ref 136–145)
TOTAL PROTEIN: 7 G/DL (ref 6.4–8.2)
TRIGL SERPL-MCNC: 190 MG/DL (ref 0–150)
VLDLC SERPL CALC-MCNC: 38 MG/DL
WBC # BLD: 10 K/UL (ref 4–11)

## 2022-06-29 PROCEDURE — 3017F COLORECTAL CA SCREEN DOC REV: CPT | Performed by: NURSE PRACTITIONER

## 2022-06-29 PROCEDURE — 99214 OFFICE O/P EST MOD 30 MIN: CPT | Performed by: NURSE PRACTITIONER

## 2022-06-29 PROCEDURE — 4004F PT TOBACCO SCREEN RCVD TLK: CPT | Performed by: NURSE PRACTITIONER

## 2022-06-29 PROCEDURE — G8419 CALC BMI OUT NRM PARAM NOF/U: HCPCS | Performed by: NURSE PRACTITIONER

## 2022-06-29 PROCEDURE — G8427 DOCREV CUR MEDS BY ELIG CLIN: HCPCS | Performed by: NURSE PRACTITIONER

## 2022-06-29 RX ORDER — HYDROXYZINE 50 MG/1
50 TABLET, FILM COATED ORAL EVERY 8 HOURS PRN
Qty: 60 TABLET | Refills: 5 | Status: SHIPPED | OUTPATIENT
Start: 2022-06-29

## 2022-06-29 RX ORDER — PANTOPRAZOLE SODIUM 40 MG/1
40 TABLET, DELAYED RELEASE ORAL
Qty: 90 TABLET | Refills: 1 | Status: SHIPPED | OUTPATIENT
Start: 2022-06-29

## 2022-06-29 RX ORDER — RISPERIDONE 1 MG/1
1 TABLET, FILM COATED ORAL 2 TIMES DAILY
Qty: 180 TABLET | Refills: 1 | Status: SHIPPED | OUTPATIENT
Start: 2022-06-29

## 2022-06-29 RX ORDER — LISINOPRIL 10 MG/1
10 TABLET ORAL DAILY
Qty: 90 TABLET | Refills: 1 | Status: SHIPPED | OUTPATIENT
Start: 2022-06-29

## 2022-06-29 RX ORDER — LANOLIN ALCOHOL/MO/W.PET/CERES
100 CREAM (GRAM) TOPICAL DAILY
Qty: 30 TABLET | Refills: 3 | Status: SHIPPED | OUTPATIENT
Start: 2022-06-29

## 2022-06-29 NOTE — PROGRESS NOTES
Homero Ervin (:  1960) is a 60 y.o. male,Established patient, here for evaluation of the following chief complaint(s):  Anemia and Anxiety         ASSESSMENT/PLAN:  1. Lung nodule  -     CT CHEST ABDOMEN PELVIS W CONTRAST; Future  2. Pain of upper abdomen  -     CT CHEST ABDOMEN PELVIS W CONTRAST; Future  -     Comprehensive Metabolic Panel w/ Reflex to MG  -     CBC with Auto Differential  -     Lipase  3. Alcoholic liver disease (HCC)  -     thiamine 100 MG tablet; Take 1 tablet by mouth daily, Disp-30 tablet, R-3Normal  4. Primary hypertension  -     lisinopril (PRINIVIL;ZESTRIL) 10 MG tablet; Take 1 tablet by mouth daily, Disp-90 tablet, R-1**Patient requests 90 days supply**Normal  5. Anxiety  -     hydrOXYzine HCl (ATARAX) 50 MG tablet; Take 1 tablet by mouth every 8 hours as needed for Anxiety, Disp-60 tablet, R-5Normal  6. Bipolar disorder, current episode mixed, moderate (HCC)  -     risperiDONE (RISPERDAL) 1 MG tablet; Take 1 tablet by mouth 2 times daily, Disp-180 tablet, R-1Normal  7. Gastroesophageal reflux disease, unspecified whether esophagitis present  -     pantoprazole (PROTONIX) 40 MG tablet; Take 1 tablet by mouth 2 times daily (before meals), Disp-90 tablet, R-1Normal  8. Iron deficiency anemia, unspecified iron deficiency anemia type  -     CBC with Auto Differential  9. Elevated LFTs  -     Comprehensive Metabolic Panel w/ Reflex to MG  10. Hypertriglyceridemia  -     Lipid Panel        Weight up 21 pounds since December. Return in about 3 months (around 2022) for weight gain, hyperlipidemia. Subjective   SUBJECTIVE/OBJECTIVE:  HPI     For routine follow up anxiety. Continues to take medication as listed. Feels weight is up 50 pounds over past 6 months and stomach is larger. Now with tenderness right upper quad over the past month, feel stomach is stretched. Now due for follow up CT lung scan related to nodule on last scan.  Discussed on previous visit and chose to do follow up scan. Review of Systems   All other systems reviewed and are negative. Objective   Physical Exam  Constitutional:       Appearance: Normal appearance. Cardiovascular:      Rate and Rhythm: Normal rate and regular rhythm. Pulmonary:      Effort: Pulmonary effort is normal.   Abdominal:      General: Bowel sounds are normal. There is distension. Tenderness: There is abdominal tenderness. Comments: Indicates tenderness at pinpoint area left upper abdomen just below mid clavicular lower rib. However to palpation is tender to right upper quad as well. Musculoskeletal:         General: No swelling. Normal range of motion. Neurological:      Mental Status: He is alert and oriented to person, place, and time. Psychiatric:         Behavior: Behavior normal.      Comments: Somewhat quiet today. Clear thought process.                    An electronic signature was used to authenticate this note.    --ED HAWKINS - CNP

## 2022-07-01 ENCOUNTER — HOSPITAL ENCOUNTER (OUTPATIENT)
Dept: CT IMAGING | Age: 62
Discharge: HOME | End: 2022-07-01
Payer: MEDICARE

## 2022-07-01 DIAGNOSIS — R10.10 PAIN OF UPPER ABDOMEN: ICD-10-CM

## 2022-07-01 DIAGNOSIS — R91.1 LUNG NODULE: ICD-10-CM

## 2022-07-01 PROCEDURE — 6360000004 HC RX CONTRAST MEDICATION: Performed by: NURSE PRACTITIONER

## 2022-07-01 PROCEDURE — 74177 CT ABD & PELVIS W/CONTRAST: CPT

## 2022-07-01 RX ADMIN — IOPAMIDOL 75 ML: 755 INJECTION, SOLUTION INTRAVENOUS at 09:53

## 2022-07-05 ENCOUNTER — TELEPHONE (OUTPATIENT)
Dept: FAMILY MEDICINE CLINIC | Age: 62
End: 2022-07-05

## 2022-07-06 DIAGNOSIS — E87.5 HYPERKALEMIA: ICD-10-CM

## 2022-07-06 DIAGNOSIS — R91.1 PULMONARY NODULE: Primary | ICD-10-CM

## 2022-07-06 NOTE — TELEPHONE ENCOUNTER
Patient called office asking about these results again and would like to hear something by the end of the day. Please Advise so this patient can be advised. Patient states he is still having issues this is why he's calling.

## 2022-07-07 ENCOUNTER — TELEPHONE (OUTPATIENT)
Dept: PULMONOLOGY | Age: 62
End: 2022-07-07

## 2022-07-07 NOTE — TELEPHONE ENCOUNTER
npt ref by [facility] pulm nodule. Was consulted ip by Dr. Osmin Adame 2021. Please advise. 06/29/22  Narrative   EXAMINATION:   CT OF THE CHEST, ABDOMEN, AND PELVIS WITH CONTRAST 7/1/2022 9:42 am       TECHNIQUE:   CT of the chest, abdomen and pelvis was performed with the administration of   intravenous contrast. Multiplanar reformatted images are provided for review. Automated exposure control, iterative reconstruction, and/or weight based   adjustment of the mA/kV was utilized to reduce the radiation dose to as low   as reasonably achievable.       COMPARISON:   03/23/2022 and 11/06/2021       HISTORY:   ORDERING SYSTEM PROVIDED HISTORY: Lung nodule   TECHNOLOGIST PROVIDED HISTORY:   Additional Contrast?->None   STAT Creatinine as needed:->No   Reason for exam:->lung nodule noted on CT lung 3/2022, abdominal distention,   hx alcohol abuse. Upper abdominal pain, weight gain 21 pounds over 6 months. Reason for Exam: pain in LUQ, tenderness to the touch, pain after eating,   50lb weight gained since October, cramping in RUQ, current smoker of 50 years       FINDINGS:       Chest:       Mediastinum: The central airways are patent.  There is no hilar or   mediastinal adenopathy.       Lungs/pleura: Moderate emphysematous changes are again demonstrated.  There   has been no significant interval change in size or appearance of the 8 x 7 mm   nodule within the right costophrenic angle.  The lungs are otherwise clear. There is no consolidation, pneumothorax or effusion.       Soft Tissues/Bones: There is no acute fracture or aggressive osseous lesion.           Abdomen/Pelvis:       Organs: The liver, pancreas, spleen, kidneys and adrenals are unremarkable.       GI/Bowel: There is no bowel dilatation, wall thickening or obstruction. Diverticular changes are present throughout the left hemicolon.  The appendix   is normal.       Pelvis: There is no free air, free fluid or intraperitoneal inflammatory   change.  There is no adenopathy.       Peritoneum/Retroperitoneum: There is no free air, free fluid or   intraperitoneal inflammatory change.  There is no adenopathy.       Bones/Soft Tissues: There is no acute fracture or aggressive osseous lesion.           Impression   1. Emphysema with a stable 8 mm right lower lobe indeterminate pulmonary   nodule.  PET scan and or continued CT surveillance recommended.    2. Diverticulosis without evidence of diverticulitis.

## 2022-07-28 ENCOUNTER — TELEPHONE (OUTPATIENT)
Dept: PULMONOLOGY | Age: 62
End: 2022-07-28

## 2022-07-28 NOTE — TELEPHONE ENCOUNTER
Per note from Dr. Kari Fragoso in telephone encounter dated 7/7: Stable PN. Can see me tomorrow or any time in next 4-6 weeks. Will follow up with pt 2nd week of August and offer to reschedule consult with Dr. Kari Fragoso.

## 2022-07-28 NOTE — TELEPHONE ENCOUNTER
Patient cancelled appointment on  with Dr Maria Esther Ayala for . NPT pulmonary nodule referred by Mendel Landsberg, CNP    Reason: Pt has to go out of town for     Patient did not reschedule appointment. - pt stated he will call back next week to reschedule    Appointment rescheduled for n/a.      Last OV n/a

## 2022-07-28 NOTE — LETTER
[facility] Pulmonary, Critical Care, and Sleep  [address] [place] [address]  Phone: [phone]  Fax: [phone]    Paula Esparza MD        August 11, 2022    [facility] [address]      Dear Geeta Macario:    I am writing because my staff has informed me that you've declined to schedule a consult regarding the pulmonary nodule that was seen on your recent chest CT scan. We care about you and the management of your healthcare and want to make sure that you follow up as recommended by your Primary Care Physician,   Dr. Nanette Evans. As as Pulmonologist I must stress to you how important it is for you to have the tests/appointments that are ordered as well to see me in follow up. The tests are necessary so that I can monitor your pulmonary nodule for any changes that could be cancer. Please call the office to let us know your plans for treatment and to reschedule your appointment. I hope you are doing well and urge you to call my office at your earliest convenience so that we can keep you informed on your care.     Sincerely,      Paula Esparza MD

## 2022-08-08 NOTE — TELEPHONE ENCOUNTER
Spoke with pt, declined to schedule with Dr. Chaparro Garvey at this time. Pt will cb when ready to schedule.

## 2022-08-11 NOTE — TELEPHONE ENCOUNTER
Letter drafted, pending Dr. Shahnaz Marie. Routed letter to PCP. [id number] Hiwot miranda MA to mail letter once signed and then close encounter.

## 2022-09-05 DIAGNOSIS — G62.9 NEUROPATHY: ICD-10-CM

## 2022-09-05 NOTE — TELEPHONE ENCOUNTER
.Refill Request     CONFIRM preferrred pharmacy with the patient. If Mail Order Rx - Pend for 90 day refill.       Last Seen: Last Seen Department: 6/29/2022  Last Seen by PCP: 6/29/2022    Last Written: 5/23/22 60 with 2     Next Appointment:   Future Appointments   Date Time Provider Jimy Laird   9/29/2022  9:00 AM Eula Krill, APRN - CNP [facility] [place] [facility]   3/20/2023  8:30 AM ED Connolly CNP [facility]       Future appointment scheduled      Requested Prescriptions     Pending Prescriptions Disp Refills    gabapentin (NEURONTIN) 300 MG capsule [Pharmacy Med Name: GABAPENTIN 300MG CAPSULES] 60 capsule 2     Sig: TAKE 1 CAPSULE BY MOUTH TWICE DAILY

## 2022-09-06 RX ORDER — GABAPENTIN 300 MG/1
CAPSULE ORAL
Qty: 60 CAPSULE | Refills: 0 | Status: SHIPPED | OUTPATIENT
Start: 2022-09-06 | End: 2022-09-29 | Stop reason: SDUPTHER

## 2022-09-09 DIAGNOSIS — G62.9 NEUROPATHY: ICD-10-CM

## 2022-09-09 RX ORDER — GABAPENTIN 300 MG/1
CAPSULE ORAL
Qty: 60 CAPSULE | Refills: 0 | OUTPATIENT
Start: 2022-09-09 | End: 2022-10-09

## 2022-09-09 NOTE — TELEPHONE ENCOUNTER
.Refill Request     CONFIRM preferrred pharmacy with the patient. If Mail Order Rx - Pend for 90 day refill.       Last Seen: Last Seen Department: 6/29/2022  Last Seen by PCP: Visit date not found    Last Written: 9-6-22 60 with 0     Next Appointment:   Future Appointments   Date Time Provider Jimy Laird   9/29/2022  9:00 AM Johanna Marrow, APRN - CNP [facility]  [facility]   3/20/2023  8:30 AM [facility]       Future appointment scheduled      Requested Prescriptions     Pending Prescriptions Disp Refills    gabapentin (NEURONTIN) 300 MG capsule [Pharmacy Med Name: GABAPENTIN 300MG CAPSULES] 60 capsule 0     Sig: TAKE 1 CAPSULE BY MOUTH TWICE DAILY

## 2022-09-29 ENCOUNTER — OFFICE VISIT (OUTPATIENT)
Dept: FAMILY MEDICINE CLINIC | Age: 62
End: 2022-09-29
Payer: MEDICARE

## 2022-09-29 VITALS
OXYGEN SATURATION: 96 % | WEIGHT: 178.2 LBS | SYSTOLIC BLOOD PRESSURE: 122 MMHG | RESPIRATION RATE: 18 BRPM | HEART RATE: 80 BPM | DIASTOLIC BLOOD PRESSURE: 84 MMHG | HEIGHT: 67 IN | BODY MASS INDEX: 27.97 KG/M2

## 2022-09-29 DIAGNOSIS — I10 PRIMARY HYPERTENSION: ICD-10-CM

## 2022-09-29 DIAGNOSIS — D50.8 OTHER IRON DEFICIENCY ANEMIA: ICD-10-CM

## 2022-09-29 DIAGNOSIS — E55.9 VITAMIN D DEFICIENCY: ICD-10-CM

## 2022-09-29 DIAGNOSIS — E53.8 VITAMIN B 12 DEFICIENCY: Primary | ICD-10-CM

## 2022-09-29 DIAGNOSIS — G62.9 NEUROPATHY: ICD-10-CM

## 2022-09-29 PROBLEM — F31.9 BIPOLAR DISORDER (HCC): Status: ACTIVE | Noted: 2022-09-29

## 2022-09-29 PROBLEM — D75.839 THROMBOCYTHEMIA: Status: ACTIVE | Noted: 2022-09-29

## 2022-09-29 LAB
A/G RATIO: 1.4 (ref 1.1–2.2)
ALBUMIN SERPL-MCNC: 4 G/DL (ref 3.4–5)
ALP BLD-CCNC: 153 U/L (ref 40–129)
ALT SERPL-CCNC: 41 U/L (ref 10–40)
ANION GAP SERPL CALCULATED.3IONS-SCNC: 13 MMOL/L (ref 3–16)
AST SERPL-CCNC: 40 U/L (ref 15–37)
BILIRUB SERPL-MCNC: 0.4 MG/DL (ref 0–1)
BUN BLDV-MCNC: 10 MG/DL (ref 7–20)
CALCIUM SERPL-MCNC: 9.4 MG/DL (ref 8.3–10.6)
CHLORIDE BLD-SCNC: 103 MMOL/L (ref 99–110)
CO2: 22 MMOL/L (ref 21–32)
CREAT SERPL-MCNC: 0.8 MG/DL (ref 0.8–1.3)
GFR AFRICAN AMERICAN: >60
GFR NON-AFRICAN AMERICAN: >60
GLUCOSE BLD-MCNC: 100 MG/DL (ref 70–99)
HCT VFR BLD CALC: 40.7 % (ref 40.5–52.5)
HEMOGLOBIN: 14.1 G/DL (ref 13.5–17.5)
MCH RBC QN AUTO: 33.6 PG (ref 26–34)
MCHC RBC AUTO-ENTMCNC: 34.5 G/DL (ref 31–36)
MCV RBC AUTO: 97.3 FL (ref 80–100)
PDW BLD-RTO: 14.3 % (ref 12.4–15.4)
PLATELET # BLD: 351 K/UL (ref 135–450)
PMV BLD AUTO: 6.3 FL (ref 5–10.5)
POTASSIUM REFLEX MAGNESIUM: 4.8 MMOL/L (ref 3.5–5.1)
RBC # BLD: 4.19 M/UL (ref 4.2–5.9)
SODIUM BLD-SCNC: 138 MMOL/L (ref 136–145)
TOTAL PROTEIN: 6.9 G/DL (ref 6.4–8.2)
VITAMIN B-12: 678 PG/ML (ref 211–911)
VITAMIN D 25-HYDROXY: 43.5 NG/ML
WBC # BLD: 5.7 K/UL (ref 4–11)

## 2022-09-29 PROCEDURE — 99214 OFFICE O/P EST MOD 30 MIN: CPT | Performed by: NURSE PRACTITIONER

## 2022-09-29 RX ORDER — FAMOTIDINE 20 MG
2 TABLET ORAL DAILY
COMMUNITY

## 2022-09-29 RX ORDER — FERROUS SULFATE 325(65) MG
325 TABLET ORAL
COMMUNITY

## 2022-09-29 RX ORDER — UBIDECARENONE 75 MG
250 CAPSULE ORAL DAILY
COMMUNITY

## 2022-09-29 RX ORDER — GABAPENTIN 300 MG/1
CAPSULE ORAL
Qty: 90 CAPSULE | Refills: 2
Start: 2022-09-29 | End: 2022-10-12 | Stop reason: SDUPTHER

## 2022-09-29 RX ORDER — MULTIVIT-MIN/IRON/FOLIC ACID/K 18-600-40
1 CAPSULE ORAL DAILY
COMMUNITY

## 2022-09-29 ASSESSMENT — ANXIETY QUESTIONNAIRES
GAD7 TOTAL SCORE: 3
2. NOT BEING ABLE TO STOP OR CONTROL WORRYING: 1
5. BEING SO RESTLESS THAT IT IS HARD TO SIT STILL: 0
3. WORRYING TOO MUCH ABOUT DIFFERENT THINGS: 1
1. FEELING NERVOUS, ANXIOUS, OR ON EDGE: 1
IF YOU CHECKED OFF ANY PROBLEMS ON THIS QUESTIONNAIRE, HOW DIFFICULT HAVE THESE PROBLEMS MADE IT FOR YOU TO DO YOUR WORK, TAKE CARE OF THINGS AT HOME, OR GET ALONG WITH OTHER PEOPLE: NOT DIFFICULT AT ALL
7. FEELING AFRAID AS IF SOMETHING AWFUL MIGHT HAPPEN: 0
6. BECOMING EASILY ANNOYED OR IRRITABLE: 0
4. TROUBLE RELAXING: 0

## 2022-09-29 ASSESSMENT — PATIENT HEALTH QUESTIONNAIRE - PHQ9
1. LITTLE INTEREST OR PLEASURE IN DOING THINGS: 1
3. TROUBLE FALLING OR STAYING ASLEEP: 2
SUM OF ALL RESPONSES TO PHQ QUESTIONS 1-9: 6
SUM OF ALL RESPONSES TO PHQ9 QUESTIONS 1 & 2: 2
4. FEELING TIRED OR HAVING LITTLE ENERGY: 1
5. POOR APPETITE OR OVEREATING: 1
SUM OF ALL RESPONSES TO PHQ QUESTIONS 1-9: 6
SUM OF ALL RESPONSES TO PHQ QUESTIONS 1-9: 6
8. MOVING OR SPEAKING SO SLOWLY THAT OTHER PEOPLE COULD HAVE NOTICED. OR THE OPPOSITE, BEING SO FIGETY OR RESTLESS THAT YOU HAVE BEEN MOVING AROUND A LOT MORE THAN USUAL: 0
2. FEELING DOWN, DEPRESSED OR HOPELESS: 1
SUM OF ALL RESPONSES TO PHQ QUESTIONS 1-9: 6
6. FEELING BAD ABOUT YOURSELF - OR THAT YOU ARE A FAILURE OR HAVE LET YOURSELF OR YOUR FAMILY DOWN: 0
10. IF YOU CHECKED OFF ANY PROBLEMS, HOW DIFFICULT HAVE THESE PROBLEMS MADE IT FOR YOU TO DO YOUR WORK, TAKE CARE OF THINGS AT HOME, OR GET ALONG WITH OTHER PEOPLE: 0
7. TROUBLE CONCENTRATING ON THINGS, SUCH AS READING THE NEWSPAPER OR WATCHING TELEVISION: 0
9. THOUGHTS THAT YOU WOULD BE BETTER OFF DEAD, OR OF HURTING YOURSELF: 0

## 2022-09-29 NOTE — PROGRESS NOTES
Catarina Nielsen (:  1960) is a 60 y.o. male,Established patient, here for evaluation of the following chief complaint(s):  3 Month Follow-Up (Weight gain check up)         ASSESSMENT/PLAN:  1. Vitamin B 12 deficiency  -     Vitamin B12  2. Vitamin D deficiency  -     Vitamin D 25 Hydroxy  3. Primary hypertension  -     Comprehensive Metabolic Panel w/ Reflex to MG  4. Other iron deficiency anemia  -     CBC  5. Neuropathy  -     gabapentin (NEURONTIN) 300 MG capsule; TAKE 1 CAPSULE in the morning and 2 at night., Disp-90 capsule, R-2Adjust Sig      Long discussion around pneumovax vaccine. Declined    Declined flu vaccine. Doesn't like needles    Discussed need to avoid alcohol. States he stopped for a few years but not interested in stopping at this time. Controlled Substance Monitoring:    Acute and Chronic Pain Monitoring:   RX Monitoring 2022   Periodic Controlled Substance Monitoring No signs of potential drug abuse or diversion identified. Declined to follow up with pulmonary regarding lung nodule. Doesn't feel he needs to do this. Return in about 6 months (around 3/29/2023) for has appt in 3/23 for AWV. Subjective   SUBJECTIVE/OBJECTIVE:  HPI    For routine follow up weight loss. Weight down 11 pounds from  but appears stable over the past 10 months. Reports appetite is good. Taking gabapentin for neuropathy. Feels dose needs to be increased. Helps him sleep at night. Continues to drink about 6 beer daily. Review of Systems   All other systems reviewed and are negative. Objective   Physical Exam  Cardiovascular:      Rate and Rhythm: Normal rate. Pulmonary:      Effort: Pulmonary effort is normal.   Abdominal:      General: Bowel sounds are normal. There is no distension. Palpations: Abdomen is soft. Musculoskeletal:         General: Normal range of motion.    Neurological:      Mental Status: He is alert and oriented to person, place, and time.   Psychiatric:         Behavior: Behavior normal.                An electronic signature was used to authenticate this note.    --ED HAWKINS - CNP

## 2022-10-11 DIAGNOSIS — G62.9 NEUROPATHY: ICD-10-CM

## 2022-10-11 NOTE — TELEPHONE ENCOUNTER
Refill Request     CONFIRM preferrred pharmacy with the patient. If Mail Order Rx - Pend for 90 day refill. Last Seen: Last Seen Department: 9/29/2022  Last Seen by PCP: 9/29/2022    Last Written: 9/29/2022- Called pharmacy and they never received this. Patient states that he is completely out of them now. If no future appointment scheduled, route STAFF MESSAGE with patient name to the [facility] for scheduling. Next Appointment:   Future Appointments   Date Time Provider Jimy Laird   3/20/2023  8:30 AM ED Oliveira CNP  [place] [facility]       Message sent to  to schedule appt with patient? NO      Requested Prescriptions     Pending Prescriptions Disp Refills    gabapentin (NEURONTIN) 300 MG capsule 90 capsule 2     Sig: TAKE 1 CAPSULE in the morning and 2 at night.

## 2022-10-12 DIAGNOSIS — K70.9 ALCOHOLIC LIVER DISEASE (HCC): ICD-10-CM

## 2022-10-12 RX ORDER — GABAPENTIN 300 MG/1
CAPSULE ORAL
Qty: 90 CAPSULE | Refills: 2 | Status: SHIPPED | OUTPATIENT
Start: 2022-10-12 | End: 2023-01-10

## 2022-10-12 RX ORDER — CALCIUM CARBONATE/VITAMIN D3 600 MG-10
TABLET ORAL
Qty: 30 TABLET | Refills: 5 | Status: SHIPPED | OUTPATIENT
Start: 2022-10-12

## 2022-10-12 NOTE — TELEPHONE ENCOUNTER
.Refill Request     CONFIRM preferrred pharmacy with the patient. If Mail Order Rx - Pend for 90 day refill. Last Seen: Last Seen Department: 9/29/2022  Last Seen by PCP: 9/29/2022    Last Written: 6-29-22 30 with 3     If no future appointment scheduled, route STAFF MESSAGE with patient name to the [facility] for scheduling. Next Appointment:   Future Appointments   Date Time Provider Jimy Laird   3/20/2023  8:30 AM Cleatus Goodhue, APRN - CNP [facility] [place] [facility]       Message sent to  to schedule appt with patient?   N/A      Requested Prescriptions     Pending Prescriptions Disp Refills    Thiamine Mononitrate (B1) 100 MG TABS [Pharmacy Med Name: VITAMIN B1 (THIAMINE) 100MG TABS] 30 tablet 1     Sig: TAKE 1 TABLET BY MOUTH DAILY

## 2022-11-09 DIAGNOSIS — F31.62 BIPOLAR DISORDER, CURRENT EPISODE MIXED, MODERATE (HCC): ICD-10-CM

## 2022-11-09 RX ORDER — RISPERIDONE 1 MG/1
TABLET ORAL
Qty: 180 TABLET | Refills: 1 | Status: SHIPPED | OUTPATIENT
Start: 2022-11-09

## 2022-11-09 NOTE — TELEPHONE ENCOUNTER
.Refill Request     CONFIRM preferrred pharmacy with the patient. If Mail Order Rx - Pend for 90 day refill. Last Seen: Last Seen Department: 9/29/2022  Last Seen by PCP: 9/29/2022    Last Written: 6-29-22 180 with 1     If no future appointment scheduled, route STAFF MESSAGE with patient name to the [facility] for scheduling. Next Appointment:   Future Appointments   Date Time Provider Jimy Laird   3/20/2023  8:30 AM ED Carrasco CNP  [place] [facility]       Message sent to  to schedule appt with patient?   N/A      Requested Prescriptions     Pending Prescriptions Disp Refills    risperiDONE (RISPERDAL) 1 MG tablet [Pharmacy Med Name: RISPERIDONE 1MG TABLETS] 180 tablet 1     Sig: TAKE 1 TABLET BY MOUTH TWICE DAILY

## 2022-12-12 DIAGNOSIS — K21.9 GASTROESOPHAGEAL REFLUX DISEASE, UNSPECIFIED WHETHER ESOPHAGITIS PRESENT: ICD-10-CM

## 2022-12-12 RX ORDER — PANTOPRAZOLE SODIUM 40 MG/1
TABLET, DELAYED RELEASE ORAL
Qty: 90 TABLET | Refills: 3 | Status: SHIPPED | OUTPATIENT
Start: 2022-12-12

## 2023-01-04 ENCOUNTER — HOSPITAL ENCOUNTER (OUTPATIENT)
Age: 63
Discharge: HOME | End: 2023-01-04
Payer: MEDICARE

## 2023-01-04 ENCOUNTER — HOSPITAL ENCOUNTER (OUTPATIENT)
Dept: GENERAL RADIOLOGY | Age: 63
Discharge: HOME | End: 2023-01-04
Payer: MEDICARE

## 2023-01-04 ENCOUNTER — OFFICE VISIT (OUTPATIENT)
Dept: FAMILY MEDICINE CLINIC | Age: 63
End: 2023-01-04
Payer: MEDICARE

## 2023-01-04 VITALS
BODY MASS INDEX: 26.56 KG/M2 | HEART RATE: 83 BPM | TEMPERATURE: 99 F | RESPIRATION RATE: 19 BRPM | WEIGHT: 169.6 LBS | DIASTOLIC BLOOD PRESSURE: 70 MMHG | OXYGEN SATURATION: 94 % | SYSTOLIC BLOOD PRESSURE: 102 MMHG

## 2023-01-04 DIAGNOSIS — R63.0 ANOREXIA: ICD-10-CM

## 2023-01-04 DIAGNOSIS — R50.9 FEVER, UNSPECIFIED FEVER CAUSE: ICD-10-CM

## 2023-01-04 DIAGNOSIS — R06.2 WHEEZING: ICD-10-CM

## 2023-01-04 DIAGNOSIS — R05.1 ACUTE COUGH: Primary | ICD-10-CM

## 2023-01-04 DIAGNOSIS — R05.1 ACUTE COUGH: ICD-10-CM

## 2023-01-04 PROCEDURE — 71046 X-RAY EXAM CHEST 2 VIEWS: CPT

## 2023-01-04 PROCEDURE — 99214 OFFICE O/P EST MOD 30 MIN: CPT | Performed by: NURSE PRACTITIONER

## 2023-01-04 PROCEDURE — 3074F SYST BP LT 130 MM HG: CPT | Performed by: NURSE PRACTITIONER

## 2023-01-04 PROCEDURE — 3078F DIAST BP <80 MM HG: CPT | Performed by: NURSE PRACTITIONER

## 2023-01-04 RX ORDER — PREDNISONE 10 MG/1
TABLET ORAL
Qty: 35 TABLET | Refills: 0 | Status: SHIPPED | OUTPATIENT
Start: 2023-01-04

## 2023-01-04 RX ORDER — ALBUTEROL SULFATE 90 UG/1
2 AEROSOL, METERED RESPIRATORY (INHALATION) EVERY 6 HOURS PRN
Qty: 18 G | Refills: 3 | Status: SHIPPED | OUTPATIENT
Start: 2023-01-04

## 2023-01-04 RX ORDER — AZITHROMYCIN 250 MG/1
TABLET, FILM COATED ORAL
Qty: 6 TABLET | Refills: 0 | Status: SHIPPED | OUTPATIENT
Start: 2023-01-04 | End: 2023-01-14

## 2023-01-04 SDOH — ECONOMIC STABILITY: HOUSING INSECURITY: IN THE LAST 12 MONTHS, HOW MANY PLACES HAVE YOU LIVED?: 1

## 2023-01-04 SDOH — ECONOMIC STABILITY: FOOD INSECURITY: WITHIN THE PAST 12 MONTHS, YOU WORRIED THAT YOUR FOOD WOULD RUN OUT BEFORE YOU GOT MONEY TO BUY MORE.: NEVER TRUE

## 2023-01-04 SDOH — ECONOMIC STABILITY: FOOD INSECURITY: WITHIN THE PAST 12 MONTHS, THE FOOD YOU BOUGHT JUST DIDN'T LAST AND YOU DIDN'T HAVE MONEY TO GET MORE.: NEVER TRUE

## 2023-01-04 SDOH — ECONOMIC STABILITY: INCOME INSECURITY: IN THE LAST 12 MONTHS, WAS THERE A TIME WHEN YOU WERE NOT ABLE TO PAY THE MORTGAGE OR RENT ON TIME?: NO

## 2023-01-04 ASSESSMENT — PATIENT HEALTH QUESTIONNAIRE - PHQ9
3. TROUBLE FALLING OR STAYING ASLEEP: 2
1. LITTLE INTEREST OR PLEASURE IN DOING THINGS: 2
SUM OF ALL RESPONSES TO PHQ9 QUESTIONS 1 & 2: 3
2. FEELING DOWN, DEPRESSED OR HOPELESS: 1
8. MOVING OR SPEAKING SO SLOWLY THAT OTHER PEOPLE COULD HAVE NOTICED. OR THE OPPOSITE, BEING SO FIGETY OR RESTLESS THAT YOU HAVE BEEN MOVING AROUND A LOT MORE THAN USUAL: 2
SUM OF ALL RESPONSES TO PHQ QUESTIONS 1-9: 14
4. FEELING TIRED OR HAVING LITTLE ENERGY: 3
9. THOUGHTS THAT YOU WOULD BE BETTER OFF DEAD, OR OF HURTING YOURSELF: 0
SUM OF ALL RESPONSES TO PHQ QUESTIONS 1-9: 14
5. POOR APPETITE OR OVEREATING: 3
10. IF YOU CHECKED OFF ANY PROBLEMS, HOW DIFFICULT HAVE THESE PROBLEMS MADE IT FOR YOU TO DO YOUR WORK, TAKE CARE OF THINGS AT HOME, OR GET ALONG WITH OTHER PEOPLE: 1
7. TROUBLE CONCENTRATING ON THINGS, SUCH AS READING THE NEWSPAPER OR WATCHING TELEVISION: 0
6. FEELING BAD ABOUT YOURSELF - OR THAT YOU ARE A FAILURE OR HAVE LET YOURSELF OR YOUR FAMILY DOWN: 1

## 2023-01-04 ASSESSMENT — ANXIETY QUESTIONNAIRES
3. WORRYING TOO MUCH ABOUT DIFFERENT THINGS: 0
5. BEING SO RESTLESS THAT IT IS HARD TO SIT STILL: 1
GAD7 TOTAL SCORE: 5
7. FEELING AFRAID AS IF SOMETHING AWFUL MIGHT HAPPEN: 0
6. BECOMING EASILY ANNOYED OR IRRITABLE: 2
IF YOU CHECKED OFF ANY PROBLEMS ON THIS QUESTIONNAIRE, HOW DIFFICULT HAVE THESE PROBLEMS MADE IT FOR YOU TO DO YOUR WORK, TAKE CARE OF THINGS AT HOME, OR GET ALONG WITH OTHER PEOPLE: SOMEWHAT DIFFICULT
1. FEELING NERVOUS, ANXIOUS, OR ON EDGE: 1
4. TROUBLE RELAXING: 1
2. NOT BEING ABLE TO STOP OR CONTROL WORRYING: 0

## 2023-01-04 ASSESSMENT — SOCIAL DETERMINANTS OF HEALTH (SDOH): HOW HARD IS IT FOR YOU TO PAY FOR THE VERY BASICS LIKE FOOD, HOUSING, MEDICAL CARE, AND HEATING?: NOT HARD AT ALL

## 2023-01-04 NOTE — PROGRESS NOTES
Pao Mora (:  1960) is a 60 y.o. male,Established patient, here for evaluation of the following chief complaint(s):  Congestion, Cough, Sweats, Generalized Body Aches, Fatigue, and Anorexia         ASSESSMENT/PLAN:  1. Acute cough  -     XR CHEST (2 VW); Future  -     azithromycin (ZITHROMAX) 250 MG tablet; 2 tablets today followed by 1 tablet daily for 4 days, Disp-6 tablet, R-0Normal  -     predniSONE (DELTASONE) 10 MG tablet; 2 tablets 2 times daily for 5 days, 1 tablet 2 times daily for 5 days, 1 tablet daily, Disp-35 tablet, R-0Normal  2. Fever, unspecified fever cause  -     XR CHEST (2 VW); Future  -     azithromycin (ZITHROMAX) 250 MG tablet; 2 tablets today followed by 1 tablet daily for 4 days, Disp-6 tablet, R-0Normal  -     predniSONE (DELTASONE) 10 MG tablet; 2 tablets 2 times daily for 5 days, 1 tablet 2 times daily for 5 days, 1 tablet daily, Disp-35 tablet, R-0Normal  3. Anorexia  -     XR CHEST (2 VW); Future  -     azithromycin (ZITHROMAX) 250 MG tablet; 2 tablets today followed by 1 tablet daily for 4 days, Disp-6 tablet, R-0Normal  -     predniSONE (DELTASONE) 10 MG tablet; 2 tablets 2 times daily for 5 days, 1 tablet 2 times daily for 5 days, 1 tablet daily, Disp-35 tablet, R-0Normal  4. Wheezing  -     albuterol sulfate HFA (PROVENTIL;VENTOLIN;PROAIR) 108 (90 Base) MCG/ACT inhaler; Inhale 2 puffs into the lungs every 6 hours as needed for Wheezing, Disp-18 g, R-3Normal      Recommend adding mucinex DM for cough    Discussed lung nodule. Didn't follow up with pulmonary related to the cost to him when has a CT scan. Encouraged to continue with plan with follow up and discuss the cost with them as well. Their opinion is important. Would expect lessening in symptoms over the next 4-5 days. Call if fails to improve. Return if no improvement or worsens      No follow-ups on file.          Subjective   SUBJECTIVE/OBJECTIVE:  HPI      3 weeks ago started with cough, productive gray. Felt tired. Started dayquil. Not able to completely recover. Not able to sleep related to cough, productive clear. No energy, feeling sweaty at times. Limited appetite. Taking tylenol at times. Review of Systems   All other systems reviewed and are negative. Objective   Physical Exam  Constitutional:       Appearance: Normal appearance. He is ill-appearing. Cardiovascular:      Rate and Rhythm: Normal rate. Pulmonary:      Effort: Pulmonary effort is normal.      Breath sounds: Wheezing and rhonchi present. Comments: Scattered wheezing noted more with cough. Resp E&E. Cough harsh bronchial.   Abdominal:      General: Bowel sounds are normal.      Palpations: Abdomen is soft. Neurological:      Mental Status: He is alert and oriented to person, place, and time.    Psychiatric:         Behavior: Behavior normal.                An electronic signature was used to authenticate this note.    --ED HAWKINS - CNP

## 2023-01-30 DIAGNOSIS — G62.9 NEUROPATHY: ICD-10-CM

## 2023-01-30 RX ORDER — GABAPENTIN 300 MG/1
CAPSULE ORAL
Qty: 90 CAPSULE | Refills: 2 | Status: SHIPPED | OUTPATIENT
Start: 2023-01-30 | End: 2023-02-17 | Stop reason: SDUPTHER

## 2023-01-30 NOTE — TELEPHONE ENCOUNTER
.Refill Request     CONFIRM preferrred pharmacy with the patient. If Mail Order Rx - Pend for 90 day refill. Last Seen: Last Seen Department: 1/4/2023  Last Seen by PCP: 1/4/2023    Last Written: 10-12-22 90 with 2     If no future appointment scheduled, route STAFF MESSAGE with patient name to the [facility] for scheduling. Next Appointment:   Future Appointments   Date Time Provider Jimy Laird   3/20/2023  8:30 AM ED Burgos - CNP [facility] [place] [facility]       Message sent to  to schedule appt with patient?   N/A      Requested Prescriptions     Pending Prescriptions Disp Refills    gabapentin (NEURONTIN) 300 MG capsule [Pharmacy Med Name: GABAPENTIN 300MG CAPSULES] 90 capsule 0     Sig: TAKE 1 CAPSULE BY MOUTH IN THE MORNING AND 2 AT NIGHT

## 2023-02-03 ENCOUNTER — APPOINTMENT (OUTPATIENT)
Dept: CT IMAGING | Age: 63
DRG: 391 | End: 2023-02-03
Payer: MEDICARE

## 2023-02-03 ENCOUNTER — HOSPITAL ENCOUNTER (INPATIENT)
Age: 63
LOS: 2 days | Discharge: LEFT BEFORE DISCHARGE | DRG: 391 | End: 2023-02-05
Attending: EMERGENCY MEDICINE | Admitting: INTERNAL MEDICINE
Payer: MEDICARE

## 2023-02-03 DIAGNOSIS — F10.10 ALCOHOL ABUSE: ICD-10-CM

## 2023-02-03 DIAGNOSIS — R11.2 NAUSEA AND VOMITING, UNSPECIFIED VOMITING TYPE: ICD-10-CM

## 2023-02-03 DIAGNOSIS — K85.20 ALCOHOL-INDUCED ACUTE PANCREATITIS, UNSPECIFIED COMPLICATION STATUS: ICD-10-CM

## 2023-02-03 DIAGNOSIS — K57.30 SIGMOID DIVERTICULOSIS: ICD-10-CM

## 2023-02-03 DIAGNOSIS — K29.90 GASTRITIS AND DUODENITIS: ICD-10-CM

## 2023-02-03 DIAGNOSIS — N17.9 AKI (ACUTE KIDNEY INJURY) (HCC): Primary | ICD-10-CM

## 2023-02-03 PROBLEM — K29.80 DUODENITIS: Status: ACTIVE | Noted: 2023-02-03

## 2023-02-03 LAB
A/G RATIO: 1.3 (ref 1.1–2.2)
ALBUMIN SERPL-MCNC: 4.5 G/DL (ref 3.4–5)
ALP BLD-CCNC: 182 U/L (ref 40–129)
ALT SERPL-CCNC: 30 U/L (ref 10–40)
ANION GAP SERPL CALCULATED.3IONS-SCNC: 17 MMOL/L (ref 3–16)
AST SERPL-CCNC: 26 U/L (ref 15–37)
ATYPICAL LYMPHOCYTE RELATIVE PERCENT: 3 % (ref 0–6)
BANDED NEUTROPHILS RELATIVE PERCENT: 12 % (ref 0–7)
BASOPHILS ABSOLUTE: 0 K/UL (ref 0–0.2)
BASOPHILS RELATIVE PERCENT: 0 %
BILIRUB SERPL-MCNC: 0.8 MG/DL (ref 0–1)
BILIRUBIN URINE: ABNORMAL
BLOOD, URINE: ABNORMAL
BUN BLDV-MCNC: 21 MG/DL (ref 7–20)
CALCIUM SERPL-MCNC: 9.9 MG/DL (ref 8.3–10.6)
CHLORIDE BLD-SCNC: 85 MMOL/L (ref 99–110)
CLARITY: CLEAR
CO2: 28 MMOL/L (ref 21–32)
COLOR: YELLOW
CREAT SERPL-MCNC: 2.2 MG/DL (ref 0.8–1.3)
EOSINOPHILS ABSOLUTE: 0 K/UL (ref 0–0.6)
EOSINOPHILS RELATIVE PERCENT: 0 %
EPITHELIAL CELLS, UA: ABNORMAL /HPF (ref 0–5)
ETHANOL: NORMAL MG/DL (ref 0–0.08)
GFR SERPL CREATININE-BSD FRML MDRD: 33 ML/MIN/{1.73_M2}
GLUCOSE BLD-MCNC: 152 MG/DL (ref 70–99)
GLUCOSE URINE: NEGATIVE MG/DL
HCT VFR BLD CALC: 38.9 % (ref 40.5–52.5)
HEMOGLOBIN: 12.6 G/DL (ref 13.5–17.5)
HYALINE CASTS: ABNORMAL /LPF (ref 0–2)
INFLUENZA A: NOT DETECTED
INFLUENZA B: NOT DETECTED
KETONES, URINE: ABNORMAL MG/DL
LACTIC ACID: 1.9 MMOL/L (ref 0.4–2)
LEUKOCYTE ESTERASE, URINE: ABNORMAL
LIPASE: 87 U/L (ref 13–60)
LYMPHOCYTES ABSOLUTE: 2.2 K/UL (ref 1–5.1)
LYMPHOCYTES RELATIVE PERCENT: 11 %
MCH RBC QN AUTO: 31.7 PG (ref 26–34)
MCHC RBC AUTO-ENTMCNC: 32.4 G/DL (ref 31–36)
MCV RBC AUTO: 98 FL (ref 80–100)
MICROSCOPIC EXAMINATION: YES
MONOCYTES ABSOLUTE: 0.9 K/UL (ref 0–1.3)
MONOCYTES RELATIVE PERCENT: 6 %
NEUTROPHILS ABSOLUTE: 12.6 K/UL (ref 1.7–7.7)
NEUTROPHILS RELATIVE PERCENT: 68 %
NITRITE, URINE: POSITIVE
PDW BLD-RTO: 14.8 % (ref 12.4–15.4)
PH UA: 5.5 (ref 5–8)
PLATELET # BLD: 338 K/UL (ref 135–450)
PLATELET SLIDE REVIEW: ADEQUATE
PMV BLD AUTO: 6.2 FL (ref 5–10.5)
POLYCHROMASIA: ABNORMAL
POTASSIUM REFLEX MAGNESIUM: 3.8 MMOL/L (ref 3.5–5.1)
PROTEIN UA: 100 MG/DL
RBC # BLD: 3.97 M/UL (ref 4.2–5.9)
RBC UA: ABNORMAL /HPF (ref 0–4)
SARS-COV-2 RNA, RT PCR: NOT DETECTED
SLIDE REVIEW: ABNORMAL
SODIUM BLD-SCNC: 130 MMOL/L (ref 136–145)
SPECIFIC GRAVITY UA: >=1.03 (ref 1–1.03)
TOTAL PROTEIN: 7.9 G/DL (ref 6.4–8.2)
URINE REFLEX TO CULTURE: ABNORMAL
URINE TYPE: ABNORMAL
UROBILINOGEN, URINE: 1 E.U./DL
WBC # BLD: 15.8 K/UL (ref 4–11)
WBC UA: ABNORMAL /HPF (ref 0–5)

## 2023-02-03 PROCEDURE — 94640 AIRWAY INHALATION TREATMENT: CPT

## 2023-02-03 PROCEDURE — 83605 ASSAY OF LACTIC ACID: CPT

## 2023-02-03 PROCEDURE — 6370000000 HC RX 637 (ALT 250 FOR IP): Performed by: NURSE PRACTITIONER

## 2023-02-03 PROCEDURE — 2500000003 HC RX 250 WO HCPCS: Performed by: EMERGENCY MEDICINE

## 2023-02-03 PROCEDURE — 96375 TX/PRO/DX INJ NEW DRUG ADDON: CPT

## 2023-02-03 PROCEDURE — 80053 COMPREHEN METABOLIC PANEL: CPT

## 2023-02-03 PROCEDURE — 83690 ASSAY OF LIPASE: CPT

## 2023-02-03 PROCEDURE — 99285 EMERGENCY DEPT VISIT HI MDM: CPT

## 2023-02-03 PROCEDURE — 96361 HYDRATE IV INFUSION ADD-ON: CPT

## 2023-02-03 PROCEDURE — 2580000003 HC RX 258: Performed by: INTERNAL MEDICINE

## 2023-02-03 PROCEDURE — C9113 INJ PANTOPRAZOLE SODIUM, VIA: HCPCS | Performed by: INTERNAL MEDICINE

## 2023-02-03 PROCEDURE — 2060000000 HC ICU INTERMEDIATE R&B

## 2023-02-03 PROCEDURE — 82077 ASSAY SPEC XCP UR&BREATH IA: CPT

## 2023-02-03 PROCEDURE — 87493 C DIFF AMPLIFIED PROBE: CPT

## 2023-02-03 PROCEDURE — 85025 COMPLETE CBC W/AUTO DIFF WBC: CPT

## 2023-02-03 PROCEDURE — 1200000000 HC SEMI PRIVATE

## 2023-02-03 PROCEDURE — 6370000000 HC RX 637 (ALT 250 FOR IP): Performed by: INTERNAL MEDICINE

## 2023-02-03 PROCEDURE — 74176 CT ABD & PELVIS W/O CONTRAST: CPT

## 2023-02-03 PROCEDURE — 6360000002 HC RX W HCPCS: Performed by: PHYSICIAN ASSISTANT

## 2023-02-03 PROCEDURE — 96374 THER/PROPH/DIAG INJ IV PUSH: CPT

## 2023-02-03 PROCEDURE — 87636 SARSCOV2 & INF A&B AMP PRB: CPT

## 2023-02-03 PROCEDURE — 2580000003 HC RX 258: Performed by: PHYSICIAN ASSISTANT

## 2023-02-03 PROCEDURE — 81001 URINALYSIS AUTO W/SCOPE: CPT

## 2023-02-03 PROCEDURE — 2580000003 HC RX 258: Performed by: EMERGENCY MEDICINE

## 2023-02-03 PROCEDURE — 6360000002 HC RX W HCPCS: Performed by: INTERNAL MEDICINE

## 2023-02-03 RX ORDER — LORAZEPAM 2 MG/ML
4 INJECTION INTRAMUSCULAR
Status: DISCONTINUED | OUTPATIENT
Start: 2023-02-03 | End: 2023-02-05 | Stop reason: HOSPADM

## 2023-02-03 RX ORDER — FAMOTIDINE 10 MG/ML
20 INJECTION, SOLUTION INTRAVENOUS ONCE
Status: COMPLETED | OUTPATIENT
Start: 2023-02-03 | End: 2023-02-03

## 2023-02-03 RX ORDER — SODIUM CHLORIDE, SODIUM LACTATE, POTASSIUM CHLORIDE, CALCIUM CHLORIDE 600; 310; 30; 20 MG/100ML; MG/100ML; MG/100ML; MG/100ML
2000 INJECTION, SOLUTION INTRAVENOUS ONCE
Status: COMPLETED | OUTPATIENT
Start: 2023-02-03 | End: 2023-02-03

## 2023-02-03 RX ORDER — SODIUM CHLORIDE, SODIUM LACTATE, POTASSIUM CHLORIDE, CALCIUM CHLORIDE 600; 310; 30; 20 MG/100ML; MG/100ML; MG/100ML; MG/100ML
INJECTION, SOLUTION INTRAVENOUS CONTINUOUS
Status: ACTIVE | OUTPATIENT
Start: 2023-02-03 | End: 2023-02-04

## 2023-02-03 RX ORDER — ACETAMINOPHEN 325 MG/1
650 TABLET ORAL EVERY 6 HOURS PRN
Status: DISCONTINUED | OUTPATIENT
Start: 2023-02-03 | End: 2023-02-05 | Stop reason: HOSPADM

## 2023-02-03 RX ORDER — LANOLIN ALCOHOL/MO/W.PET/CERES
3 CREAM (GRAM) TOPICAL NIGHTLY PRN
Status: DISCONTINUED | OUTPATIENT
Start: 2023-02-03 | End: 2023-02-03

## 2023-02-03 RX ORDER — ENOXAPARIN SODIUM 100 MG/ML
40 INJECTION SUBCUTANEOUS DAILY
Status: DISCONTINUED | OUTPATIENT
Start: 2023-02-03 | End: 2023-02-05 | Stop reason: HOSPADM

## 2023-02-03 RX ORDER — LORAZEPAM 2 MG/ML
1 INJECTION INTRAMUSCULAR
Status: DISCONTINUED | OUTPATIENT
Start: 2023-02-03 | End: 2023-02-05 | Stop reason: HOSPADM

## 2023-02-03 RX ORDER — POTASSIUM CHLORIDE 7.45 MG/ML
10 INJECTION INTRAVENOUS PRN
Status: DISCONTINUED | OUTPATIENT
Start: 2023-02-03 | End: 2023-02-04

## 2023-02-03 RX ORDER — PANTOPRAZOLE SODIUM 40 MG/10ML
40 INJECTION, POWDER, LYOPHILIZED, FOR SOLUTION INTRAVENOUS 2 TIMES DAILY
Status: DISCONTINUED | OUTPATIENT
Start: 2023-02-03 | End: 2023-02-05 | Stop reason: HOSPADM

## 2023-02-03 RX ORDER — 0.9 % SODIUM CHLORIDE 0.9 %
1000 INTRAVENOUS SOLUTION INTRAVENOUS ONCE
Status: COMPLETED | OUTPATIENT
Start: 2023-02-03 | End: 2023-02-03

## 2023-02-03 RX ORDER — SODIUM CHLORIDE 9 MG/ML
INJECTION, SOLUTION INTRAVENOUS PRN
Status: DISCONTINUED | OUTPATIENT
Start: 2023-02-03 | End: 2023-02-05 | Stop reason: HOSPADM

## 2023-02-03 RX ORDER — CALCIUM CARBONATE 200(500)MG
1000 TABLET,CHEWABLE ORAL 3 TIMES DAILY PRN
Status: DISCONTINUED | OUTPATIENT
Start: 2023-02-03 | End: 2023-02-05 | Stop reason: HOSPADM

## 2023-02-03 RX ORDER — LORAZEPAM 2 MG/ML
2 INJECTION INTRAMUSCULAR
Status: DISCONTINUED | OUTPATIENT
Start: 2023-02-03 | End: 2023-02-05 | Stop reason: HOSPADM

## 2023-02-03 RX ORDER — SODIUM CHLORIDE 0.9 % (FLUSH) 0.9 %
5-40 SYRINGE (ML) INJECTION EVERY 12 HOURS SCHEDULED
Status: DISCONTINUED | OUTPATIENT
Start: 2023-02-03 | End: 2023-02-05 | Stop reason: HOSPADM

## 2023-02-03 RX ORDER — SODIUM CHLORIDE 0.9 % (FLUSH) 0.9 %
5-40 SYRINGE (ML) INJECTION PRN
Status: DISCONTINUED | OUTPATIENT
Start: 2023-02-03 | End: 2023-02-05 | Stop reason: HOSPADM

## 2023-02-03 RX ORDER — LORAZEPAM 1 MG/1
2 TABLET ORAL
Status: DISCONTINUED | OUTPATIENT
Start: 2023-02-03 | End: 2023-02-05 | Stop reason: HOSPADM

## 2023-02-03 RX ORDER — PROMETHAZINE HYDROCHLORIDE 25 MG/ML
12.5 INJECTION, SOLUTION INTRAMUSCULAR; INTRAVENOUS EVERY 4 HOURS PRN
Status: DISCONTINUED | OUTPATIENT
Start: 2023-02-03 | End: 2023-02-05 | Stop reason: HOSPADM

## 2023-02-03 RX ORDER — ONDANSETRON 4 MG/1
4 TABLET, ORALLY DISINTEGRATING ORAL EVERY 4 HOURS PRN
Status: DISCONTINUED | OUTPATIENT
Start: 2023-02-03 | End: 2023-02-05 | Stop reason: HOSPADM

## 2023-02-03 RX ORDER — NICOTINE 21 MG/24HR
1 PATCH, TRANSDERMAL 24 HOURS TRANSDERMAL DAILY
Status: DISCONTINUED | OUTPATIENT
Start: 2023-02-03 | End: 2023-02-05 | Stop reason: HOSPADM

## 2023-02-03 RX ORDER — ACETAMINOPHEN 650 MG/1
650 SUPPOSITORY RECTAL EVERY 6 HOURS PRN
Status: DISCONTINUED | OUTPATIENT
Start: 2023-02-03 | End: 2023-02-05 | Stop reason: HOSPADM

## 2023-02-03 RX ORDER — MECOBALAMIN 5000 MCG
10 TABLET,DISINTEGRATING ORAL NIGHTLY
Status: DISCONTINUED | OUTPATIENT
Start: 2023-02-03 | End: 2023-02-05 | Stop reason: HOSPADM

## 2023-02-03 RX ORDER — ONDANSETRON 2 MG/ML
4 INJECTION INTRAMUSCULAR; INTRAVENOUS EVERY 4 HOURS PRN
Status: DISCONTINUED | OUTPATIENT
Start: 2023-02-03 | End: 2023-02-05 | Stop reason: HOSPADM

## 2023-02-03 RX ORDER — LORAZEPAM 1 MG/1
1 TABLET ORAL
Status: DISCONTINUED | OUTPATIENT
Start: 2023-02-03 | End: 2023-02-05 | Stop reason: HOSPADM

## 2023-02-03 RX ORDER — LORAZEPAM 1 MG/1
3 TABLET ORAL
Status: DISCONTINUED | OUTPATIENT
Start: 2023-02-03 | End: 2023-02-05 | Stop reason: HOSPADM

## 2023-02-03 RX ORDER — IPRATROPIUM BROMIDE AND ALBUTEROL SULFATE 2.5; .5 MG/3ML; MG/3ML
1 SOLUTION RESPIRATORY (INHALATION) 4 TIMES DAILY PRN
Status: DISCONTINUED | OUTPATIENT
Start: 2023-02-03 | End: 2023-02-05 | Stop reason: HOSPADM

## 2023-02-03 RX ORDER — LANOLIN ALCOHOL/MO/W.PET/CERES
100 CREAM (GRAM) TOPICAL DAILY
Status: DISCONTINUED | OUTPATIENT
Start: 2023-02-03 | End: 2023-02-05 | Stop reason: HOSPADM

## 2023-02-03 RX ORDER — LORAZEPAM 1 MG/1
4 TABLET ORAL
Status: DISCONTINUED | OUTPATIENT
Start: 2023-02-03 | End: 2023-02-05 | Stop reason: HOSPADM

## 2023-02-03 RX ORDER — POTASSIUM CHLORIDE 20 MEQ/1
40 TABLET, EXTENDED RELEASE ORAL PRN
Status: DISCONTINUED | OUTPATIENT
Start: 2023-02-03 | End: 2023-02-04

## 2023-02-03 RX ORDER — ONDANSETRON 2 MG/ML
4 INJECTION INTRAMUSCULAR; INTRAVENOUS ONCE
Status: COMPLETED | OUTPATIENT
Start: 2023-02-03 | End: 2023-02-03

## 2023-02-03 RX ORDER — MAGNESIUM SULFATE 1 G/100ML
1000 INJECTION INTRAVENOUS PRN
Status: DISCONTINUED | OUTPATIENT
Start: 2023-02-03 | End: 2023-02-05 | Stop reason: HOSPADM

## 2023-02-03 RX ORDER — GABAPENTIN 300 MG/1
300 CAPSULE ORAL 2 TIMES DAILY
Status: DISCONTINUED | OUTPATIENT
Start: 2023-02-03 | End: 2023-02-05 | Stop reason: HOSPADM

## 2023-02-03 RX ORDER — LORAZEPAM 2 MG/ML
3 INJECTION INTRAMUSCULAR
Status: DISCONTINUED | OUTPATIENT
Start: 2023-02-03 | End: 2023-02-05 | Stop reason: HOSPADM

## 2023-02-03 RX ORDER — SODIUM CHLORIDE 0.9 % (FLUSH) 0.9 %
10 SYRINGE (ML) INJECTION PRN
Status: DISCONTINUED | OUTPATIENT
Start: 2023-02-03 | End: 2023-02-05 | Stop reason: HOSPADM

## 2023-02-03 RX ORDER — PROMETHAZINE HYDROCHLORIDE 25 MG/1
12.5 TABLET ORAL EVERY 4 HOURS PRN
Status: DISCONTINUED | OUTPATIENT
Start: 2023-02-03 | End: 2023-02-05 | Stop reason: HOSPADM

## 2023-02-03 RX ADMIN — SODIUM CHLORIDE 1000 ML: 9 INJECTION, SOLUTION INTRAVENOUS at 02:54

## 2023-02-03 RX ADMIN — SODIUM CHLORIDE, POTASSIUM CHLORIDE, SODIUM LACTATE AND CALCIUM CHLORIDE: 600; 310; 30; 20 INJECTION, SOLUTION INTRAVENOUS at 08:51

## 2023-02-03 RX ADMIN — GABAPENTIN 300 MG: 300 CAPSULE ORAL at 08:04

## 2023-02-03 RX ADMIN — Medication 10 ML: at 08:05

## 2023-02-03 RX ADMIN — ONDANSETRON 4 MG: 2 INJECTION INTRAMUSCULAR; INTRAVENOUS at 15:35

## 2023-02-03 RX ADMIN — Medication 10 ML: at 21:12

## 2023-02-03 RX ADMIN — Medication 2 PUFF: at 20:25

## 2023-02-03 RX ADMIN — PANTOPRAZOLE SODIUM 40 MG: 40 INJECTION, POWDER, FOR SOLUTION INTRAVENOUS at 21:11

## 2023-02-03 RX ADMIN — PANTOPRAZOLE SODIUM 40 MG: 40 INJECTION, POWDER, FOR SOLUTION INTRAVENOUS at 08:05

## 2023-02-03 RX ADMIN — FAMOTIDINE 20 MG: 10 INJECTION, SOLUTION INTRAVENOUS at 02:52

## 2023-02-03 RX ADMIN — ENOXAPARIN SODIUM 40 MG: 100 INJECTION SUBCUTANEOUS at 08:04

## 2023-02-03 RX ADMIN — ONDANSETRON 4 MG: 2 INJECTION INTRAMUSCULAR; INTRAVENOUS at 01:53

## 2023-02-03 RX ADMIN — Medication 100 MG: at 08:04

## 2023-02-03 RX ADMIN — Medication 10 MG: at 21:19

## 2023-02-03 RX ADMIN — GABAPENTIN 300 MG: 300 CAPSULE ORAL at 21:12

## 2023-02-03 RX ADMIN — SODIUM CHLORIDE 1000 ML: 9 INJECTION, SOLUTION INTRAVENOUS at 01:53

## 2023-02-03 RX ADMIN — SODIUM CHLORIDE, POTASSIUM CHLORIDE, SODIUM LACTATE AND CALCIUM CHLORIDE: 600; 310; 30; 20 INJECTION, SOLUTION INTRAVENOUS at 17:00

## 2023-02-03 RX ADMIN — SODIUM CHLORIDE, POTASSIUM CHLORIDE, SODIUM LACTATE AND CALCIUM CHLORIDE 2000 ML: 600; 310; 30; 20 INJECTION, SOLUTION INTRAVENOUS at 04:17

## 2023-02-03 ASSESSMENT — PAIN DESCRIPTION - ORIENTATION: ORIENTATION: LEFT

## 2023-02-03 ASSESSMENT — PAIN DESCRIPTION - LOCATION: LOCATION: HAND

## 2023-02-03 ASSESSMENT — PAIN SCALES - GENERAL: PAINLEVEL_OUTOF10: 5

## 2023-02-03 ASSESSMENT — PAIN - FUNCTIONAL ASSESSMENT: PAIN_FUNCTIONAL_ASSESSMENT: 0-10

## 2023-02-03 ASSESSMENT — PAIN DESCRIPTION - DESCRIPTORS: DESCRIPTORS: CRAMPING

## 2023-02-03 NOTE — RT PROTOCOL NOTE
RT Inhaler-Nebulizer Bronchodilator Protocol Note    There is a bronchodilator order in the chart from a provider indicating to follow the RT Bronchodilator Protocol and there is an Initiate RT Inhaler-Nebulizer Bronchodilator Protocol order as well (see protocol at bottom of note). CXR Findings:  No results found. The findings from the last RT Protocol Assessment were as follows:   History Pulmonary Disease: Chronic pulmonary disease  Respiratory Pattern: Regular pattern and RR 12-20 bpm  Breath Sounds: Clear breath sounds  Cough: Strong, spontaneous, non-productive  Indication for Bronchodilator Therapy: On home bronchodilators  Bronchodilator Assessment Score: 2    Aerosolized bronchodilator medication orders have been revised according to the RT Inhaler-Nebulizer Bronchodilator Protocol below. Respiratory Therapist to perform RT Therapy Protocol Assessment initially then follow the protocol. Repeat RT Therapy Protocol Assessment PRN for score 0-3 or on second treatment, BID, and PRN for scores above 3. No Indications - adjust the frequency to every 6 hours PRN wheezing or bronchospasm, if no treatments needed after 48 hours then discontinue using Per Protocol order mode. If indication present, adjust the RT bronchodilator orders based on the Bronchodilator Assessment Score as indicated below. Use Inhaler orders unless patient has one or more of the following: on home nebulizer, not able to hold breath for 10 seconds, is not alert and oriented, cannot activate and use MDI correctly, or respiratory rate 25 breaths per minute or more, then use the equivalent nebulizer order(s) with same Frequency and PRN reasons based on the score. If a patient is on this medication at home then do not decrease Frequency below that used at home.     0-3 - enter or revise RT bronchodilator order(s) to equivalent RT Bronchodilator order with Frequency of every 4 hours PRN for wheezing or increased work of breathing using Per Protocol order mode. 4-6 - enter or revise RT Bronchodilator order(s) to two equivalent RT bronchodilator orders with one order with BID Frequency and one order with Frequency of every 4 hours PRN wheezing or increased work of breathing using Per Protocol order mode. 7-10 - enter or revise RT Bronchodilator order(s) to two equivalent RT bronchodilator orders with one order with TID Frequency and one order with Frequency of every 4 hours PRN wheezing or increased work of breathing using Per Protocol order mode. 11-13 - enter or revise RT Bronchodilator order(s) to one equivalent RT bronchodilator order with QID Frequency and an Albuterol order with Frequency of every 4 hours PRN wheezing or increased work of breathing using Per Protocol order mode. Greater than 13 - enter or revise RT Bronchodilator order(s) to one equivalent RT bronchodilator order with every 4 hours Frequency and an Albuterol order with Frequency of every 2 hours PRN wheezing or increased work of breathing using Per Protocol order mode. RT to enter RT Home Evaluation for COPD & MDI Assessment order using Per Protocol order mode.     Electronically signed by Jignesh Poon RCP on 2/3/2023 at 11:03 AM

## 2023-02-03 NOTE — CARE COORDINATION
CASE MANAGEMENT INITIAL ASSESSMENT      Reviewed chart and completed assessment with patient:yes  Family present: no  Explained Case Management role/services. Primary contact information: Annabelle Coley, sister [phone]    Health Care Decision Maker :   Primary Decision Maker: Juan Arellano - Brother/Sister - [phone]    Secondary Decision Maker: Cesar Paredes - Child - [phone]    Supplemental (Other) Decision Maker: Cris Talavera - Brother/Sister - [phone]          Can this person be reached and be able to respond quickly, such as within a few minutes or hours? Yes    Admit date/status:02/03/23  Diagnosis: alcohol abuse   Is this a Readmission?:  No    Readmission Screening completed?: No     Insurance:[facility] medicare   Precert required for SNF: Yes       3 night stay required: No    Living arrangements, Adls, care needs, prior to admission: IPTA. Lives in house with Brother who recently moved in with the Pt. Brother is in [place] at this time. Durable Medical Equipment at home:  none    Services in the home and/or outpatient, prior to admission:none    Current PCP: [address] Prescription coverage? Yes Will pt require financial assistance with medications No     Transportation needs:  per family and friends      PT/OT recs: [facility] [address] Exemption Notification (HEN):n/a    Barriers to discharge:none    Plan/comments: Pt interested in OP resources. Packet given and referral made to [facility] [phone] with Pt's permission. Pt denies additional needs at this time.       ECOC on chart for MD signature

## 2023-02-03 NOTE — H&P
[facility] History & Physical      Patient: Adolph Hoyt  :  1960  MRN:  [id number]    Date of Service: 23    Chief Complaint   Patient presents with    Emesis     N/v/d x 3 days. Pt states he drinks approx 10 beers daily. Hasn't had a drink in 3 days       HISTORY OF PRESENT ILLNESS:    Adolph Hoyt is a 60 y.o. male. He presented to the ER from home for nausea, vmoiting, and abdominal pain. He relates he began to have intermittent upper abdominal discomfort about a week ago. It became more severe and more frequent over time. 3 days ago he became nauseated and began to vomit. Since that time he has not been able to tolerate much oral intake. He has not been able to keep any solids down. He has irregularly been able to keep down sips of liquids. He has continued to try to take his medications. He denies hematemesis and coffee ground emesis. He has continued to have bowel movements and pass flatus. Patient drinks beer daily. He reports about ten beers per day. He has not been able to drink alcohol for the past 3 days. Review of Systems:  All pertinent positives and negatives are as noted in the HPI section. All other systems were reviewed and are negative. Past Medical History:   Diagnosis Date    Anxiety     Arthritis     Bipolar 1 disorder ([facility] [address]     Hypertension        Past Surgical History:   Procedure Laterality Date    COLONOSCOPY N/A 11/10/2021    COLONOSCOPY POLYPECTOMY SNARE/COLD BIOPSY performed by Jason Foster MD at [place] [address]      left 3 times due to injury    UPPER GASTROINTESTINAL ENDOSCOPY N/A 11/10/2021    EGD BIOPSY performed by Jason Foster MD at [address] [place] [address]         Prior to Admission medications    Medication Sig Start Date End Date Taking?  Authorizing Provider   gabapentin (NEURONTIN) 300 MG capsule TAKE 1 CAPSULE BY MOUTH IN THE MORNING AND 2 AT NIGHT 1/30/23 3/1/23  ED Serrano CNP   predniSONE (DELTASONE) 10 MG tablet 2 tablets 2 times daily for 5 days, 1 tablet 2 times daily for 5 days, 1 tablet daily 1/4/23   ED Serrano CNP   albuterol sulfate HFA (PROVENTIL;VENTOLIN;PROAIR) 108 (90 Base) MCG/ACT inhaler Inhale 2 puffs into the lungs every 6 hours as needed for Wheezing 1/4/23   ED Serrano CNP   pantoprazole (PROTONIX) 40 MG tablet TAKE 1 TABLET BY MOUTH TWICE DAILY BEFORE MEALS 12/12/22   ED Serrano CNP   risperiDONE (RISPERDAL) 1 MG tablet TAKE 1 TABLET BY MOUTH TWICE DAILY 11/9/22   ED Serrano CNP   Thiamine Mononitrate (B1) 100 MG TABS TAKE 1 TABLET BY MOUTH DAILY  Patient not taking: Reported on 1/4/2023 10/12/22   ED Serrano CNP   ferrous sulfate (IRON 325) 325 (65 Fe) MG tablet Take 325 mg by mouth daily (with breakfast)    Historical Provider, MD   vitamin B-12 (CYANOCOBALAMIN) 100 MCG tablet Take 250 mcg by mouth daily  Patient not taking: Reported on 1/4/2023    Historical Provider, MD   Potassium 99 MG TABS Take 1 tablet by mouth daily    Historical Provider, MD   Ascorbic Acid (VITAMIN C) 500 MG CAPS Take 1 capsule by mouth daily    Historical Provider, MD   Vitamin D, Cholecalciferol, 25 MCG (1000 UT) CAPS Take 2 capsules by mouth daily    Historical Provider, MD   lisinopril (PRINIVIL;ZESTRIL) 10 MG tablet Take 1 tablet by mouth daily 6/29/22   ED Serrano CNP   hydrOXYzine HCl (ATARAX) 50 MG tablet Take 1 tablet by mouth every 8 hours as needed for Anxiety 6/29/22   ED Serrano CNP   thiamine 100 MG tablet Take 1 tablet by mouth daily 6/29/22   ED Serrano CNP   atorvastatin (LIPITOR) 40 MG tablet Take 1 tablet by mouth daily 5/5/22   ED Serrano CNP   fluticasone-salmeterol (ADVIAR) 100-50 MCG/ACT AEPB diskus inhaler INHALE 1 PUFF INTO THE LUNGS EVERY 12 HOURS 5/5/22   Isrrael Eli, APRN - CNP   Multiple Vitamins-Minerals (MULTIVITAMIN WITH MINERALS) tablet Take 1 tablet by mouth daily  Patient not taking: Reported on 1/4/2023 10/31/21   Oleksandr Maldonado MD       Allergies:   Sulfa antibiotics    Social:   reports that he has been smoking cigarettes. He has a 100.00 pack-year smoking history. He has never used smokeless tobacco.   reports current alcohol use. Social History     Substance and Sexual Activity   Drug Use Yes    Frequency: 2.0 times per week    Types: Marijuana Willma Miguel Angel)       Family History   Problem Relation Age of Onset    Heart Failure Mother         heart disease age 61    Other Father         dementia    Cancer Father         prostate       PHYSICAL EXAM:  I performed this physical examination. Vitals:  Patient Vitals for the past 24 hrs:   BP Temp Pulse Resp SpO2 Height Weight   02/03/23 0304 134/80 -- 100 25 -- -- --   02/03/23 0234 120/73 -- 99 19 -- -- --   02/03/23 0136 111/75 -- (!) 109 -- -- -- --   02/03/23 0125 111/75 98.6 °F (37 °C) (!) 109 19 97 % 5' 7\" (1.702 m) 169 lb (76.7 kg)     Room air    GEN:  Appearance:  WM in NAD. Appers older than stated age . Level of Consciousness:  alert . Orientation:  full    HEENT: Sclera anicteric.  no conjunctival chemosis. tacky mucus membranes. no specific or diagnostic oral lesions. NECK:  no signs of meningismus. Jugular veins not distended. Carotid pulses  2+.  no cervical lymphadenopathy. no thyromegaly. CV:  regular rhythm. normal S1 & S2.    no murmur. no rub.  no gallop. PULM:  Chest excursion is symmetric. Breath sounds are vesicular. Adventitious sounds:  none    AB:  Abdominal shape is normal.  Bowel sounds are absent. Generally soft to palpation. Epigastric tenderness is present. There is involuntary guarding w/ deep epigastric pressure. no rebound guarding. EXTR:  Skin is warm. Capillary refill brisk.     no specific or pathognomic rash.  no clubbing. no pitting edema. Multiple bruises on both forearms. Pulses  2+ x 4    NEURO: Slightly tremulous. No asterixis. LABS:  Lab Results   Component Value Date    WBC 15.8 (H) 02/03/2023    HGB 12.6 (L) 02/03/2023    HCT 38.9 (L) 02/03/2023    MCV 98.0 02/03/2023     02/03/2023     Lab Results   Component Value Date    CREATININE 2.2 (H) 02/03/2023    BUN 21 (H) 02/03/2023     (L) 02/03/2023    K 3.8 02/03/2023    CL 85 (L) 02/03/2023    CO2 28 02/03/2023     Lab Results   Component Value Date    ALT 30 02/03/2023    AST 26 02/03/2023    ALKPHOS 182 (H) 02/03/2023    BILITOT 0.8 02/03/2023     Lab Results   Component Value Date    CKTOTAL 88 11/06/2021    TROPONINI <0.01 11/09/2021     No results for input(s): PHART, WCA4GFA, PO2ART in the last 72 hours. IMAGING:  CT ABDOMEN PELVIS WO CONTRAST Additional Contrast? None    Result Date: 2/3/2023  EXAMINATION: CT OF THE ABDOMEN AND PELVIS WITHOUT CONTRAST 2/3/2023 2:17 am TECHNIQUE: CT of the abdomen and pelvis was performed without the administration of intravenous contrast. Multiplanar reformatted images are provided for review. Automated exposure control, iterative reconstruction, and/or weight based adjustment of the mA/kV was utilized to reduce the radiation dose to as low as reasonably achievable. COMPARISON: 07/01/2022 HISTORY: ORDERING SYSTEM PROVIDED HISTORY: abd pain TECHNOLOGIST PROVIDED HISTORY: Reason for exam:->abd pain Additional Contrast?->None Reason for Exam: Emesis (N/v/d x 3 days. Pt states he drinks approx 10 beers daily. Hasn't had a drink in 3 days) FINDINGS: Lower Chest:  Visualized portion of the lower chest demonstrates no acute abnormality. Organs: The liver, gallbladder, biliary system, spleen, adrenal glands, and kidneys are unremarkable. There is no hydronephrosis or urinary tract calculus.   There are a few punctate calcifications in the head of the pancreas consistent with sequela of prior pancreatitis and there is new mild inflammatory stranding about the pancreatic tail. There is no pancreatic ductal dilatation. GI/Bowel: Circumferential edematous wall thickening of the descending duodenum. No bowel obstruction. Sigmoid diverticulosis. Normal appendix. Pelvis: Urinary bladder and pelvic organs are unremarkable. Peritoneum/Retroperitoneum: No free air or free fluid. No focal fluid collection. Normal abdominal aortic caliber with mild calcifications. No abnormal lymph node. Bones/Soft Tissues: No acute osseous or soft tissue abnormality. 1. Acute uncomplicated duodenitis. 2. Acute uncomplicated pancreatitis involving the pancreatic tail. 3. Sigmoid diverticulosis. XR CHEST (2 VW)    Result Date: 1/4/2023  EXAMINATION: TWO XRAY VIEWS OF THE CHEST 1/4/2023 10:24 am COMPARISON: None. HISTORY: ORDERING SYSTEM PROVIDED HISTORY: Acute cough TECHNOLOGIST PROVIDED HISTORY: Reason for exam:->3 weeks, fever, cough, wheezing, anorexia Reason for Exam: COUGH, CONGESTION, FATIGUE, NO APPETITE X 3 WEEKS, NOW ON ANTIBIOTICS. FINDINGS: Normal cardiomediastinal silhouette. No acute airspace infiltrate. No pneumothorax or pleural effusion     No acute cardiopulmonary findings       I directly reviewed all recent imaging studies as well as pertinent prior studies. Radiology reports may or may not be available at the time of my review. Active Hospital Problems    Diagnosis Date Noted    JCARLOS (acute kidney injury) ([address] [N17.9] 02/03/2023     Priority: Medium    Duodenitis [K29.80] 02/03/2023     Priority: Medium    Centrilobular emphysema ([address] [J43.2] 05/05/2022     Priority: Medium    HTN (hypertension) [I10] 12/16/2021    Tobacco abuse [Z72.0]     Alcohol abuse [F10.10] 11/04/2021       ASSESSMENT & PLAN  Nausea and Vomiting  -  Likely d/t alcoholic gastritis and/or duodenitis.   Minimal evidence to support a diagnosis of pancreatitis at the time of admission, but patient's alcohol intake places him at risk for pancreatitis. Pancreatic findings on CT abdomen are very minimal.  -  Keep NPO on maintenance IV fluids. Antiemetics and analgesics made available as needed. Change BID PPI to IV formulation. JCARLOS  -  Baseline SCr ~ 0.8. Admission SCr = 2.2. Clinically patient appeared hypovolemic. He had also continued to take lisinopril at home.  -  LR 2L over four hours then maintenance at 125 mL/hr until taking PO. Alcohol Abuse  -  Minimal e/o withdrawal currently. CIWA protocol enacted. Tobacco abuse  -  Smoking cessation advised. NRT provided. DVT prophylaxis: SCDs, lovenox  Code Status:  Full  Disposition:  Inpatient w/ anticipated d/c back to home eventually.     Ifeanyi Montiel MD MD

## 2023-02-03 NOTE — ED PROVIDER NOTES
[address] [place] [address]  ED  [address] [place] [id number]  Dept: [phone]  Dept Fax: [phone]  Loc: [phone]    Open Note Time:  2:34 AM EST    I independently performed a history and physical on Gudelia Hernandez. Chief Complaint  Emesis (N/v/d x 3 days. Pt states he drinks approx 10 beers daily. Hasn't had a drink in 3 days)       This is a very pleasant 60 y.o. male  who was evaluated in the emergency department for nausea, vomiting, abdominal pain        Unless otherwise stated in this report or unable to obtain because of the patient's clinical or mental status as evidenced by the medical record, this patient's positive and negative responses for review of systems, constitutional, psych, eyes, ENT, cardiovascular, respiratory, gastrointestinal, neurological, genitourinary, musculoskeletal, integument systems and systems related to the presenting problem are either stated in the preceding paragraph or were not pertinent or were negative for the symptoms and/or complaints related to the medical problem. I wore goggles, surgical mask, N95 mask and gloves when I evaluated the patient. Focused exam:  Body mass index is 26.47 kg/m².   The physical exam reveals an alert and oriented patient who does not appear to be confused, non-ill-appearing, no abnormal heart sounds, no abnormal lung sounds, speaking comfortably in full sentences, abdomen is distended however no tenderness to palpation    Brief ED course/MDM:       I evaluated the patient in room 17/17    Procedures/interventions/images ordered for this visit  Orders Placed This Encounter   Procedures    COVID-19 & Influenza Combo    CT ABDOMEN PELVIS WO CONTRAST Additional Contrast? None    CBC with Diff    CMP w/ Reflex to MG    Lipase    Urinalysis with Reflex to Culture    Ethanol    Lactic Acid    Diet NPO    Notify physician    Inpatient consult to Social Work    Initiate Oxygen Therapy Protocol Saline lock IV    Alcohol and or drug assessment    Seizure precautions    Fall precautions       Medications ordered for this visit  Orders Placed This Encounter   Medications    sodium chloride flush 0.9 % injection 5-40 mL    sodium chloride flush 0.9 % injection 5-40 mL    0.9 % sodium chloride infusion    thiamine tablet 100 mg    OR Linked Order Group     LORazepam (ATIVAN) tablet 1 mg     LORazepam (ATIVAN) injection 1 mg     LORazepam (ATIVAN) tablet 2 mg     LORazepam (ATIVAN) injection 2 mg     LORazepam (ATIVAN) tablet 3 mg     LORazepam (ATIVAN) injection 3 mg     LORazepam (ATIVAN) tablet 4 mg     LORazepam (ATIVAN) injection 4 mg    0.9 % sodium chloride bolus    ondansetron (ZOFRAN) injection 4 mg       ED course notes for this visit       Consults ordered:  IP CONSULT TO SOCIAL WORK  Discussion with Other Professionals : None    Social Determinants : None    Chronic Conditions:  See HPI and PMHx    Records Reviewed : None      Disposition Considerations   (include 1 Tests not done, Shared Decision Making, Pt expectation of Test or Tx.):      Final Impression  1. JCARLOS (acute kidney injury) ([facility] [address]    2. Nausea and vomiting, unspecified vomiting type    3. Alcohol abuse        Blood pressure 111/75, pulse (!) 109, temperature 98.6 °F (37 °C), resp. rate 19, height 5' 7\" (1.702 m), weight 169 lb (76.7 kg), SpO2 97 %. Disposition  Patient understands that they are going to be admitted to the hospital.  There was shared decision making between myself as well as the patient and/or their surrogate and we are in agreement with admission. There is an opportunity for questions and all questions answered to the best of my ability and to the satisfaction of the patient and/or patient's family. Pt is in stable condition upon Admit to med/surg floor. All diagnostic, treatment, and disposition decisions were made by myself in conjunction with the RABIA/resident.   For further details of the patient's emergency department visit, please see RABIA/resident documentation. Initial history and physical exam information was obtained by the RABIA/resident, also dictated a record of this visit. I independently examined and evaluated this patient and participated in the diagnostic, treatment and disposition decisions. I personally saw the patient and performed a substantive portion of the visit including all aspects of the medical decision making. I personally saw the patient and independently provided  non-concurrent critical care out of the total shared critical care time provided. Please note, critical care time was at least 10 minutes, obtaining history, conducting a physical exam, performing and monitoring interventions, ordering, collecting and interpreting tests, and establishing medical decision-making and discussion with the patient and/or family, specifically for management of the presenting complaint and symptoms initially, direct bedside care, reevaluation, review of records, and consultation. There was a high probability of clinically significant life-threatening deterioration in the patient's condition, which required my urgent intervention. This time does not include separately billable procedures. Pt was seen during the [place] 19 pandemic. Appropriate PPE worn by this writer during patient encounters. Pt seen during a time with constrained hospital bed capacity and other potential inpatient and outpatient resources were constrained due to the viral pandemic. The note was completed using Dragon voice recognition transcription. Every effort was made to ensure accuracy; however, inadvertent transcription errors may be present despite my best efforts to edit errors.     Jeffry Eden MD  [address] [place] [address]        Jeffry Eden MD  02/03/23 [id number]

## 2023-02-03 NOTE — ED NOTES
rcvd call from lab that covid swab leaked while in tubes. Pt is refusing any additional swab at this time.       Misha Silveira RN  02/03/23 [id number]

## 2023-02-03 NOTE — ED PROVIDER NOTES
[facility]  ED  EMERGENCY DEPARTMENT ENCOUNTER        Pt Name: Jakub Ji  MRN: [id number]  [place] 1960  Date of evaluation: 2/3/2023  Provider: SHANNON Garcia  PCP: ED Johnson CNP  Note Started: 3:23 AM EST 2/3/23       I have seen and evaluated this patient with my supervising physician Bogdan Philip MD.      [facility]       Chief Complaint   Patient presents with    Emesis     N/v/d x 3 days. Pt states he drinks approx 10 beers daily. Hasn't had a drink in 3 days       HISTORY OF PRESENT ILLNESS: 1 or more Elements     History from : Patient        Jakub Ji is a 60 y.o. male who presents complaining of nausea, vomiting and diarrhea for 3 days. The patient states he typically drinks 8-10 beers per night however has been unable to keep any alcohol down over the last 3 nights. He has gone through withdrawal before but denies any seizures. He denies any fevers or chills. Denies any recent sick contacts. He is complaining of abdominal pain. He describes abdominal pain as diffuse and cramping. No previous abdominal surgeries. No chest pain or shortness of breath. No sore throat or earaches. Unable to tolerate food or liquid. Nursing Notes were all reviewed and agreed with or any disagreements were addressed in the HPI. REVIEW OF SYSTEMS :      Review of Systems    Positives and Pertinent negatives as per HPI.      SURGICAL HISTORY     Past Surgical History:   Procedure Laterality Date    COLONOSCOPY N/A 11/10/2021    COLONOSCOPY POLYPECTOMY SNARE/COLD BIOPSY performed by Brian Hale MD at [place] [id number]      left 3 times due to injury    UPPER GASTROINTESTINAL ENDOSCOPY N/A 11/10/2021    EGD BIOPSY performed by Brian Hale MD at [address] [place] [address]       Previous Medications    ALBUTEROL SULFATE HFA (PROVENTIL;VENTOLIN;PROAIR) 108 (90 BASE) MCG/ACT INHALER    Inhale 2 puffs into the lungs every 6 hours as needed for Wheezing    ASCORBIC ACID (VITAMIN C) 500 MG CAPS    Take 1 capsule by mouth daily    ATORVASTATIN (LIPITOR) 40 MG TABLET    Take 1 tablet by mouth daily    FERROUS SULFATE (IRON 325) 325 (65 FE) MG TABLET    Take 325 mg by mouth daily (with breakfast)    FLUTICASONE-SALMETEROL (ADVIAR) 100-50 MCG/ACT AEPB DISKUS INHALER    INHALE 1 PUFF INTO THE LUNGS EVERY 12 HOURS    GABAPENTIN (NEURONTIN) 300 MG CAPSULE    TAKE 1 CAPSULE BY MOUTH IN THE MORNING AND 2 AT NIGHT    HYDROXYZINE HCL (ATARAX) 50 MG TABLET    Take 1 tablet by mouth every 8 hours as needed for Anxiety    LISINOPRIL (PRINIVIL;ZESTRIL) 10 MG TABLET    Take 1 tablet by mouth daily    MULTIPLE VITAMINS-MINERALS (MULTIVITAMIN WITH MINERALS) TABLET    Take 1 tablet by mouth daily    PANTOPRAZOLE (PROTONIX) 40 MG TABLET    TAKE 1 TABLET BY MOUTH TWICE DAILY BEFORE MEALS    POTASSIUM 99 MG TABS    Take 1 tablet by mouth daily    PREDNISONE (DELTASONE) 10 MG TABLET    2 tablets 2 times daily for 5 days, 1 tablet 2 times daily for 5 days, 1 tablet daily    RISPERIDONE (RISPERDAL) 1 MG TABLET    TAKE 1 TABLET BY MOUTH TWICE DAILY    THIAMINE 100 MG TABLET    Take 1 tablet by mouth daily    THIAMINE MONONITRATE (B1) 100 MG TABS    TAKE 1 TABLET BY MOUTH DAILY    VITAMIN B-12 (CYANOCOBALAMIN) 100 MCG TABLET    Take 250 mcg by mouth daily    VITAMIN D, CHOLECALCIFEROL, 25 MCG (1000 UT) CAPS    Take 2 capsules by mouth daily       ALLERGIES     Sulfa antibiotics    FAMILYHISTORY       Family History   Problem Relation Age of Onset    Heart Failure Mother         heart disease age 61    Other Father         dementia    Cancer Father         prostate        SOCIAL HISTORY       Social History     Tobacco Use    Smoking status: Every Day     Packs/day: 2.00     Years: 50.00     Pack years: 100.00     Types: Cigarettes    Smokeless tobacco: Never   Vaping Use    Vaping Use: Never used   Substance Use Topics    Alcohol use: Yes     Comment: hasn't drank in 3 days    Drug use: Yes     Frequency: 2.0 times per week     Types: Marijuana (Weed)       SCREENINGS                         CIWA Assessment  BP: 134/80  Heart Rate: 100  Nausea and Vomitin  Tactile Disturbances: none  Tremor: not visible, but can be felt fingertip to fingertip  Auditory Disturbances: not present  Paroxysmal Sweats: no sweat visible  Visual Disturbances: not present  Anxiety: no anxiety, at ease  Headache, Fullness in Head: none present  Agitation: normal activity  Orientation and Clouding of Sensorium: oriented and can do serial additions  CIWA-Ar Total: 3           PHYSICAL EXAM  1 or more Elements     ED Triage Vitals [23 0125]   BP Temp Temp src Heart Rate Resp SpO2 Height Weight   111/75 98.6 °F (37 °C) -- (!) 109 19 97 % 5' 7\" (1.702 m) 169 lb (76.7 kg)       Physical Exam  Vitals and nursing note reviewed. Constitutional:       Appearance: Normal appearance. He is not diaphoretic. Comments: Chronically ill-appearing patient   HENT:      Head: Normocephalic and atraumatic. Nose: Nose normal.      Mouth/Throat:      Mouth: Mucous membranes are moist.   Eyes:      General:         Right eye: No discharge. Left eye: No discharge. Extraocular Movements: Extraocular movements intact. Pupils: Pupils are equal, round, and reactive to light. Cardiovascular:      Rate and Rhythm: Regular rhythm. Tachycardia present. Pulses: Normal pulses. Heart sounds: Normal heart sounds. No murmur heard. No friction rub. No gallop. Pulmonary:      Effort: Pulmonary effort is normal. No respiratory distress. Breath sounds: Normal breath sounds. No stridor. No wheezing, rhonchi or rales. Abdominal:      General: Abdomen is flat. Palpations: Abdomen is soft. Tenderness: There is generalized abdominal tenderness. There is no guarding or rebound. Musculoskeletal:         General: Normal range of motion. Cervical back: Normal range of motion and neck supple. Right lower leg: No edema. Left lower leg: No edema. Skin:     General: Skin is warm and dry. Coloration: Skin is not pale. Neurological:      General: No focal deficit present. Mental Status: He is alert and oriented to person, place, and time. Psychiatric:         Mood and Affect: Mood normal.         Behavior: Behavior normal.           DIAGNOSTIC RESULTS   LABS:    Labs Reviewed   CBC WITH AUTO DIFFERENTIAL - Abnormal; Notable for the following components:       Result Value    WBC 15.8 (*)     RBC 3.97 (*)     Hemoglobin 12.6 (*)     Hematocrit 38.9 (*)     Neutrophils Absolute 12.6 (*)     Bands Relative 12 (*)     Polychromasia Occasional (*)     All other components within normal limits   COMPREHENSIVE METABOLIC PANEL W/ REFLEX TO MG FOR LOW K - Abnormal; Notable for the following components:    Sodium 130 (*)     Chloride 85 (*)     Anion Gap 17 (*)     Glucose 152 (*)     BUN 21 (*)     Creatinine 2.2 (*)     Est, Glom Filt Rate 33 (*)     Alkaline Phosphatase 182 (*)     All other components within normal limits   LIPASE - Abnormal; Notable for the following components:    Lipase 87.0 (*)     All other components within normal limits   COVID-19 & INFLUENZA COMBO   ETHANOL   LACTIC ACID   URINALYSIS WITH REFLEX TO CULTURE       When ordered only abnormal lab results are displayed. All other labs were within normal range or not returned as of this dictation. EKG: When ordered, EKG's are interpreted by the Emergency Department Physician in the absence of a cardiologist.  Please see their note for interpretation of EKG.     RADIOLOGY:   Non-plain film images such as CT, Ultrasound and MRI are read by the radiologist. Plain radiographic images are visualized and preliminarily interpreted by the ED Provider with the below findings:        Interpretation per the Radiologist below, if available at the time of this note:    CT ABDOMEN PELVIS WO CONTRAST Additional Contrast? None   Final Result   1. Acute uncomplicated duodenitis. 2. Acute uncomplicated pancreatitis involving the pancreatic tail. 3. Sigmoid diverticulosis. CT ABDOMEN PELVIS WO CONTRAST Additional Contrast? None    Result Date: 2/3/2023  EXAMINATION: CT OF THE ABDOMEN AND PELVIS WITHOUT CONTRAST 2/3/2023 2:17 am TECHNIQUE: CT of the abdomen and pelvis was performed without the administration of intravenous contrast. Multiplanar reformatted images are provided for review. Automated exposure control, iterative reconstruction, and/or weight based adjustment of the mA/kV was utilized to reduce the radiation dose to as low as reasonably achievable. COMPARISON: 07/01/2022 HISTORY: ORDERING SYSTEM PROVIDED HISTORY: abd pain TECHNOLOGIST PROVIDED HISTORY: Reason for exam:->abd pain Additional Contrast?->None Reason for Exam: Emesis (N/v/d x 3 days. Pt states he drinks approx 10 beers daily. Hasn't had a drink in 3 days) FINDINGS: Lower Chest:  Visualized portion of the lower chest demonstrates no acute abnormality. Organs: The liver, gallbladder, biliary system, spleen, adrenal glands, and kidneys are unremarkable. There is no hydronephrosis or urinary tract calculus. There are a few punctate calcifications in the head of the pancreas consistent with sequela of prior pancreatitis and there is new mild inflammatory stranding about the pancreatic tail. There is no pancreatic ductal dilatation. GI/Bowel: Circumferential edematous wall thickening of the descending duodenum. No bowel obstruction. Sigmoid diverticulosis. Normal appendix. Pelvis: Urinary bladder and pelvic organs are unremarkable. Peritoneum/Retroperitoneum: No free air or free fluid. No focal fluid collection. Normal abdominal aortic caliber with mild calcifications. No abnormal lymph node.  Bones/Soft Tissues: No acute osseous or soft tissue abnormality. 1. Acute uncomplicated duodenitis. 2. Acute uncomplicated pancreatitis involving the pancreatic tail. 3. Sigmoid diverticulosis. No results found. PROCEDURES   Unless otherwise noted below, none     Procedures    CRITICAL CARE TIME (.cctime)   None    PAST MEDICAL HISTORY      has a past medical history of Anxiety, Arthritis, Bipolar 1 disorder ([address] and Hypertension.      Chronic Conditions affecting Care: Alcoholism    EMERGENCY DEPARTMENT COURSE and DIFFERENTIAL DIAGNOSIS/MDM:   Vitals:    Vitals:    02/03/23 0125 02/03/23 0136 02/03/23 0234 02/03/23 0304   BP: 111/75 111/75 120/73 134/80   Pulse: (!) 109 (!) 109 99 100   Resp: 19 19 25   Temp: 98.6 °F (37 °C)      SpO2: 97%      Weight: 169 lb (76.7 kg)      Height: 5' 7\" (1.702 m)          Patient was given the following medications:  Medications   sodium chloride flush 0.9 % injection 5-40 mL (has no administration in time range)   sodium chloride flush 0.9 % injection 5-40 mL (has no administration in time range)   0.9 % sodium chloride infusion (has no administration in time range)   thiamine tablet 100 mg (has no administration in time range)   LORazepam (ATIVAN) tablet 1 mg (has no administration in time range)     Or   LORazepam (ATIVAN) injection 1 mg (has no administration in time range)     Or   LORazepam (ATIVAN) tablet 2 mg (has no administration in time range)     Or   LORazepam (ATIVAN) injection 2 mg (has no administration in time range)     Or   LORazepam (ATIVAN) tablet 3 mg (has no administration in time range)     Or   LORazepam (ATIVAN) injection 3 mg (has no administration in time range)     Or   LORazepam (ATIVAN) tablet 4 mg (has no administration in time range)     Or   LORazepam (ATIVAN) injection 4 mg (has no administration in time range)   0.9 % sodium chloride bolus (1,000 mLs IntraVENous New Bag 2/3/23 0254)   0.9 % sodium chloride bolus (0 mLs IntraVENous Stopped 2/3/23 0254)   ondansetron (ZOFRAN) injection 4 mg (4 mg IntraVENous Given 2/3/23 0153)   famotidine (PEPCID) injection 20 mg (20 mg IntraVENous Given 2/3/23 0252)             Is this patient to be included in the SEP-1 Core Measure due to severe sepsis or septic shock? No   Exclusion criteria - the patient is NOT to be included for SEP-1 Core Measure due to: Alternative explanation for abnormal labs/vitals that do not relate to sepsis, see MDM for further explanation    CONSULTS: (Who and What was discussed)  IP CONSULT TO SOCIAL WORK              CC/HPI Summary, DDx, ED Course, and Reassessment: Patient was evaluated in the emergency department today for nausea, vomiting and abdominal pain. The patient does admit to drinking alcohol and has not had any in 3 days due to his symptoms. He is placed on CIWA protocol. Differential diagnosis includes pancreatitis, withdrawal, diverticulitis, gastritis, other. Work-up results include:  CBC with leukocytosis of 15.8 and increased absolute neutrophils of 12.6. He does have 12% bandemia as well. Hemoglobin has dropped since September 2022 from 14.1-12.6 today. CMP within JCARLOS and low sodium  Lipase is 87  Lactic is 1.9  CT abdomen pelvis shows acute uncomplicated duodenitis, acute uncomplicated pancreatitis and sigmoid diverticulosis. A discussion was had with the patient regarding her lab and imaging results. And at this time I am recommending admission for further evaluation and treatment. I suspect his leukocytosis and bandemia are more due to acute phase reaction due to JCARLOS than infectious process given CT findings. Therefore antibiotics will defer to hospitalist team.    Disposition Considerations (include 1 Tests not done, Shared Decision Making, Pt Expectation of Test or Tx.): Patient will be admitted for further evaluation and treatment.   Initially I did order the CT scan with contrast however given the patient's renal function it was obtained without contrast.      Results for orders placed or performed during the hospital encounter of 02/03/23   CBC with Diff   Result Value Ref Range    WBC 15.8 (H) 4.0 - 11.0 K/uL    RBC 3.97 (L) 4.20 - 5.90 M/uL    Hemoglobin 12.6 (L) 13.5 - 17.5 g/dL    Hematocrit 38.9 (L) 40.5 - 52.5 %    MCV 98.0 80.0 - 100.0 fL    MCH 31.7 26.0 - 34.0 pg    MCHC 32.4 31.0 - 36.0 g/dL    RDW 14.8 12.4 - 15.4 %    Platelets 360 942 - 722 K/uL    MPV 6.2 5.0 - 10.5 fL    PLATELET SLIDE REVIEW Adequate     SLIDE REVIEW see below     Neutrophils % 68.0 %    Lymphocytes % 11.0 %    Monocytes % 6.0 %    Eosinophils % 0.0 %    Basophils % 0.0 %    Neutrophils Absolute 12.6 (H) 1.7 - 7.7 K/uL    Lymphocytes Absolute 2.2 1.0 - 5.1 K/uL    Monocytes Absolute 0.9 0.0 - 1.3 K/uL    Eosinophils Absolute 0.0 0.0 - 0.6 K/uL    Basophils Absolute 0.0 0.0 - 0.2 K/uL    Bands Relative 12 (H) 0 - 7 %    Atypical Lymphocytes Relative 3 0 - 6 %    Polychromasia Occasional (A)    CMP w/ Reflex to MG   Result Value Ref Range    Sodium 130 (L) 136 - 145 mmol/L    Potassium reflex Magnesium 3.8 3.5 - 5.1 mmol/L    Chloride 85 (L) 99 - 110 mmol/L    CO2 28 21 - 32 mmol/L    Anion Gap 17 (H) 3 - 16    Glucose 152 (H) 70 - 99 mg/dL    BUN 21 (H) 7 - 20 mg/dL    Creatinine 2.2 (H) 0.8 - 1.3 mg/dL    Est, Glom Filt Rate 33 (A) >60    Calcium 9.9 8.3 - 10.6 mg/dL    Total Protein 7.9 6.4 - 8.2 g/dL    Albumin 4.5 3.4 - 5.0 g/dL    Albumin/Globulin Ratio 1.3 1.1 - 2.2    Total Bilirubin 0.8 0.0 - 1.0 mg/dL    Alkaline Phosphatase 182 (H) 40 - 129 U/L    ALT 30 10 - 40 U/L    AST 26 15 - 37 U/L   Lipase   Result Value Ref Range    Lipase 87.0 (H) 13.0 - 60.0 U/L   Ethanol   Result Value Ref Range    Ethanol Lvl None Detected mg/dL   Lactic Acid   Result Value Ref Range    Lactic Acid 1.9 0.4 - 2.0 mmol/L       I spoke with Dr. Mason Echevarria hospitalist. We discussed the history, physical exam, diagnostics, as well as their emergency department course.  Based upon that discussion, we've decided to admit  Certain Hung for further observation and evaluation of Lyla Persaud's   1. JCARLOS (acute kidney injury) ([facility] [address]    2. Nausea and vomiting, unspecified vomiting type    3. Alcohol abuse    4. Gastritis and duodenitis    5. Alcohol-induced acute pancreatitis, unspecified complication status    6. Sigmoid diverticulosis    . I am the Primary Clinician of Record. FINAL IMPRESSION      1. JCARLOS (acute kidney injury) ([facility] [address]    2. Nausea and vomiting, unspecified vomiting type    3. Alcohol abuse    4. Gastritis and duodenitis    5. Alcohol-induced acute pancreatitis, unspecified complication status    6. Sigmoid diverticulosis          DISPOSITION/PLAN     DISPOSITION Decision To Admit 02/03/2023 02:09:23 AM      PATIENT REFERRED TO:  No follow-up provider specified.     DISCHARGE MEDICATIONS:  New Prescriptions    No medications on file       DISCONTINUED MEDICATIONS:  Discontinued Medications    No medications on file              (Please note that portions of this note were completed with a voice recognition program.  Efforts were made to edit the dictations but occasionally words are mis-transcribed.)    SHANNON Wilks (electronically signed)           SHANNON Wilks  02/03/23 [id number]

## 2023-02-04 LAB
A/G RATIO: 1.3 (ref 1.1–2.2)
ALBUMIN SERPL-MCNC: 3.2 G/DL (ref 3.4–5)
ALP BLD-CCNC: 133 U/L (ref 40–129)
ALT SERPL-CCNC: 21 U/L (ref 10–40)
ANION GAP SERPL CALCULATED.3IONS-SCNC: 8 MMOL/L (ref 3–16)
AST SERPL-CCNC: 21 U/L (ref 15–37)
BASOPHILS ABSOLUTE: 0.1 K/UL (ref 0–0.2)
BASOPHILS RELATIVE PERCENT: 0.9 %
BILIRUB SERPL-MCNC: 0.6 MG/DL (ref 0–1)
BUN BLDV-MCNC: 16 MG/DL (ref 7–20)
CALCIUM SERPL-MCNC: 9.1 MG/DL (ref 8.3–10.6)
CHLORIDE BLD-SCNC: 95 MMOL/L (ref 99–110)
CO2: 28 MMOL/L (ref 21–32)
CREAT SERPL-MCNC: 0.8 MG/DL (ref 0.8–1.3)
EOSINOPHILS ABSOLUTE: 0.2 K/UL (ref 0–0.6)
EOSINOPHILS RELATIVE PERCENT: 2.2 %
GFR SERPL CREATININE-BSD FRML MDRD: >60 ML/MIN/{1.73_M2}
GLUCOSE BLD-MCNC: 115 MG/DL (ref 70–99)
HCT VFR BLD CALC: 31.4 % (ref 40.5–52.5)
HEMOGLOBIN: 10.2 G/DL (ref 13.5–17.5)
INR BLD: 1.1 (ref 0.87–1.14)
LIPASE: 137 U/L (ref 13–60)
LYMPHOCYTES ABSOLUTE: 1.8 K/UL (ref 1–5.1)
LYMPHOCYTES RELATIVE PERCENT: 15.9 %
MAGNESIUM: 2 MG/DL (ref 1.8–2.4)
MCH RBC QN AUTO: 31.9 PG (ref 26–34)
MCHC RBC AUTO-ENTMCNC: 32.4 G/DL (ref 31–36)
MCV RBC AUTO: 98.7 FL (ref 80–100)
MONOCYTES ABSOLUTE: 1.2 K/UL (ref 0–1.3)
MONOCYTES RELATIVE PERCENT: 10.2 %
NEUTROPHILS ABSOLUTE: 8 K/UL (ref 1.7–7.7)
NEUTROPHILS RELATIVE PERCENT: 70.8 %
PDW BLD-RTO: 14.4 % (ref 12.4–15.4)
PLATELET # BLD: 260 K/UL (ref 135–450)
PMV BLD AUTO: 6.2 FL (ref 5–10.5)
POTASSIUM SERPL-SCNC: 3.2 MMOL/L (ref 3.5–5.1)
PROTHROMBIN TIME: 14.2 SEC (ref 11.7–14.5)
RBC # BLD: 3.18 M/UL (ref 4.2–5.9)
SODIUM BLD-SCNC: 131 MMOL/L (ref 136–145)
TOTAL PROTEIN: 5.7 G/DL (ref 6.4–8.2)
WBC # BLD: 11.3 K/UL (ref 4–11)

## 2023-02-04 PROCEDURE — 6370000000 HC RX 637 (ALT 250 FOR IP): Performed by: NURSE PRACTITIONER

## 2023-02-04 PROCEDURE — 2580000003 HC RX 258: Performed by: INTERNAL MEDICINE

## 2023-02-04 PROCEDURE — 87324 CLOSTRIDIUM AG IA: CPT

## 2023-02-04 PROCEDURE — 51798 US URINE CAPACITY MEASURE: CPT

## 2023-02-04 PROCEDURE — C9113 INJ PANTOPRAZOLE SODIUM, VIA: HCPCS | Performed by: INTERNAL MEDICINE

## 2023-02-04 PROCEDURE — 85610 PROTHROMBIN TIME: CPT

## 2023-02-04 PROCEDURE — 83690 ASSAY OF LIPASE: CPT

## 2023-02-04 PROCEDURE — 85025 COMPLETE CBC W/AUTO DIFF WBC: CPT

## 2023-02-04 PROCEDURE — 6370000000 HC RX 637 (ALT 250 FOR IP): Performed by: INTERNAL MEDICINE

## 2023-02-04 PROCEDURE — 80053 COMPREHEN METABOLIC PANEL: CPT

## 2023-02-04 PROCEDURE — 1200000000 HC SEMI PRIVATE

## 2023-02-04 PROCEDURE — 87449 NOS EACH ORGANISM AG IA: CPT

## 2023-02-04 PROCEDURE — 83735 ASSAY OF MAGNESIUM: CPT

## 2023-02-04 PROCEDURE — 36415 COLL VENOUS BLD VENIPUNCTURE: CPT

## 2023-02-04 PROCEDURE — 6360000002 HC RX W HCPCS: Performed by: INTERNAL MEDICINE

## 2023-02-04 PROCEDURE — 94640 AIRWAY INHALATION TREATMENT: CPT

## 2023-02-04 RX ORDER — POTASSIUM CHLORIDE 20 MEQ/1
40 TABLET, EXTENDED RELEASE ORAL ONCE
Status: COMPLETED | OUTPATIENT
Start: 2023-02-04 | End: 2023-02-04

## 2023-02-04 RX ADMIN — PANTOPRAZOLE SODIUM 40 MG: 40 INJECTION, POWDER, FOR SOLUTION INTRAVENOUS at 20:18

## 2023-02-04 RX ADMIN — ONDANSETRON 4 MG: 2 INJECTION INTRAMUSCULAR; INTRAVENOUS at 04:54

## 2023-02-04 RX ADMIN — GABAPENTIN 300 MG: 300 CAPSULE ORAL at 20:18

## 2023-02-04 RX ADMIN — PANTOPRAZOLE SODIUM 40 MG: 40 INJECTION, POWDER, FOR SOLUTION INTRAVENOUS at 08:30

## 2023-02-04 RX ADMIN — SODIUM CHLORIDE, POTASSIUM CHLORIDE, SODIUM LACTATE AND CALCIUM CHLORIDE: 600; 310; 30; 20 INJECTION, SOLUTION INTRAVENOUS at 01:08

## 2023-02-04 RX ADMIN — ONDANSETRON 4 MG: 2 INJECTION INTRAMUSCULAR; INTRAVENOUS at 08:30

## 2023-02-04 RX ADMIN — HYDROMORPHONE HYDROCHLORIDE 1 MG: 1 INJECTION, SOLUTION INTRAMUSCULAR; INTRAVENOUS; SUBCUTANEOUS at 16:49

## 2023-02-04 RX ADMIN — Medication 2 PUFF: at 20:26

## 2023-02-04 RX ADMIN — HYDROMORPHONE HYDROCHLORIDE 1 MG: 1 INJECTION, SOLUTION INTRAMUSCULAR; INTRAVENOUS; SUBCUTANEOUS at 21:19

## 2023-02-04 RX ADMIN — GABAPENTIN 300 MG: 300 CAPSULE ORAL at 08:30

## 2023-02-04 RX ADMIN — POTASSIUM CHLORIDE 40 MEQ: 1500 TABLET, EXTENDED RELEASE ORAL at 10:01

## 2023-02-04 RX ADMIN — Medication 10 ML: at 20:22

## 2023-02-04 RX ADMIN — HYDROMORPHONE HYDROCHLORIDE 1 MG: 1 INJECTION, SOLUTION INTRAMUSCULAR; INTRAVENOUS; SUBCUTANEOUS at 12:13

## 2023-02-04 RX ADMIN — Medication 2 PUFF: at 08:48

## 2023-02-04 RX ADMIN — Medication 10 ML: at 20:19

## 2023-02-04 RX ADMIN — Medication 10 MG: at 20:18

## 2023-02-04 RX ADMIN — Medication 100 MG: at 08:30

## 2023-02-04 RX ADMIN — ENOXAPARIN SODIUM 40 MG: 100 INJECTION SUBCUTANEOUS at 08:30

## 2023-02-04 ASSESSMENT — PAIN DESCRIPTION - PAIN TYPE
TYPE: ACUTE PAIN

## 2023-02-04 ASSESSMENT — PAIN DESCRIPTION - ONSET
ONSET: ON-GOING

## 2023-02-04 ASSESSMENT — PAIN SCALES - GENERAL
PAINLEVEL_OUTOF10: 4
PAINLEVEL_OUTOF10: 7
PAINLEVEL_OUTOF10: 6
PAINLEVEL_OUTOF10: 8
PAINLEVEL_OUTOF10: 7

## 2023-02-04 ASSESSMENT — PAIN DESCRIPTION - LOCATION
LOCATION: ABDOMEN

## 2023-02-04 ASSESSMENT — PAIN DESCRIPTION - ORIENTATION
ORIENTATION: MID
ORIENTATION: MID
ORIENTATION: LEFT;MID;LOWER
ORIENTATION: MID

## 2023-02-04 ASSESSMENT — PAIN - FUNCTIONAL ASSESSMENT
PAIN_FUNCTIONAL_ASSESSMENT: ACTIVITIES ARE NOT PREVENTED
PAIN_FUNCTIONAL_ASSESSMENT: PREVENTS OR INTERFERES SOME ACTIVE ACTIVITIES AND ADLS
PAIN_FUNCTIONAL_ASSESSMENT: ACTIVITIES ARE NOT PREVENTED

## 2023-02-04 ASSESSMENT — PAIN DESCRIPTION - DESCRIPTORS
DESCRIPTORS: THROBBING
DESCRIPTORS: ACHING;DISCOMFORT
DESCRIPTORS: THROBBING
DESCRIPTORS: THROBBING;CRAMPING

## 2023-02-04 ASSESSMENT — PAIN DESCRIPTION - FREQUENCY
FREQUENCY: CONTINUOUS

## 2023-02-04 NOTE — PROGRESS NOTES
Hospitalist Progress Note    Date of Admission: 2/3/2023    Chief Complaint: N/V    Hospital Course:   Hernandez Granados is a 60 y.o. male. He presented to the ER from home for nausea, vmoiting, and abdominal pain. He relates he began to have intermittent upper abdominal discomfort about a week ago. It became more severe and more frequent over time. 3 days ago he became nauseated and began to vomit. Since that time he has not been able to tolerate much oral intake. He has not been able to keep any solids down. He has irregularly been able to keep down sips of liquids. He has continued to try to take his medications. He denies hematemesis and coffee ground emesis. He has continued to have bowel movements and pass flatus. Patient drinks beer daily. He reports about ten beers per day. He has not been able to drink alcohol for the past 3 days. Subjective: Symptoms had improved but some recurrent N/V this morning before really eating anything. Assessment/Plan:  Mild Acute Pancreatitis  - Mild Lipase elevation with minimal inflammation of pancreatic tail, but given alcohol abuse, suspect component of Acute Pancreatitis. - Also suspect could be alcoholic gastritis and/or duodenitis. - Symptoms were improving and diet advanced to Full Liquids; however increased N/V. Back down to clears for now. - GI consulted    JCARLOS  -  Baseline SCr ~ 0.8. Admission SCr = 2.2. Clinically patient appeared hypovolemic. He had also continued to take lisinopril at home.  -  LR 2L over four hours then maintenance at 125 mL/hr until taking PO. Alcohol Abuse  -  Minimal e/o withdrawal currently. CIWA protocol enacted. Tobacco abuse  -  Smoking cessation advised. NRT provided. DVT prophylaxis: SCDs, lovenox  Diet: ADULT DIET;  Full Liquid  Code Status: Full Code  PT/OT Eval Status: NA    Dispo - 2 days    Appropriate for A1 Discharge Unit: No    Physical Exam Performed:    BP (!) 143/82   Pulse 76 Temp 97.6 °F (36.4 °C) (Oral)   Resp 17   Ht 5' 7\" (1.702 m)   Wt 167 lb 1.6 oz (75.8 kg)   SpO2 94%   BMI 26.17 kg/m²   General appearance:  No apparent distress, appears stated age and cooperative. Respiratory:  Normal respiratory effort. Clear to auscultation, bilaterally without Rales/Wheezes/Rhonchi. Cardiovascular:  Regular rate and rhythm with normal S1/S2 without murmurs, rubs or gallops. Abdomen:  Soft, non-tender, non-distended with normal bowel sounds. Musculoskelatal:  No edema  Neurologic:  Non-focal  Psychiatric:  Alert and oriented, normal insight  Skin:  No rashes or lesions. Capillary Refill:  Brisk, < 3 seconds. Peripheral Pulses:  +2 palpable, equal bilaterally.      Labs:   Recent Labs     02/03/23 0137 02/04/23  0508   WBC 15.8* 11.3*   HGB 12.6* 10.2*   HCT 38.9* 31.4*    260     Recent Labs     02/03/23 0137 02/04/23  0508   * 131*   K 3.8 3.2*   CL 85* 95*   CO2 28 28   BUN 21* 16   CREATININE 2.2* 0.8   CALCIUM 9.9 9.1     Recent Labs     02/03/23 0137 02/04/23  0508   AST 26 21   ALT 30 21   BILITOT 0.8 0.6   ALKPHOS 182* 133*     Recent Labs     02/04/23  0508   INR 1.10       Consults:    Jose Hodges MD

## 2023-02-04 NOTE — PROGRESS NOTES
Pt has only had 100 mL of output in the last 5 hours. MD would like bladder scan. Pt bladder scan showed 216mL.

## 2023-02-05 VITALS
DIASTOLIC BLOOD PRESSURE: 69 MMHG | TEMPERATURE: 97.4 F | WEIGHT: 169.4 LBS | RESPIRATION RATE: 18 BRPM | SYSTOLIC BLOOD PRESSURE: 141 MMHG | OXYGEN SATURATION: 94 % | HEIGHT: 67 IN | BODY MASS INDEX: 26.59 KG/M2 | HEART RATE: 80 BPM

## 2023-02-05 LAB
A/G RATIO: 1.2 (ref 1.1–2.2)
ALBUMIN SERPL-MCNC: 3.3 G/DL (ref 3.4–5)
ALP BLD-CCNC: 124 U/L (ref 40–129)
ALT SERPL-CCNC: 19 U/L (ref 10–40)
ANION GAP SERPL CALCULATED.3IONS-SCNC: 9 MMOL/L (ref 3–16)
AST SERPL-CCNC: 19 U/L (ref 15–37)
BASOPHILS ABSOLUTE: 0.1 K/UL (ref 0–0.2)
BASOPHILS RELATIVE PERCENT: 1 %
BILIRUB SERPL-MCNC: 0.4 MG/DL (ref 0–1)
BUN BLDV-MCNC: 9 MG/DL (ref 7–20)
C DIFF TOXIN/ANTIGEN: NORMAL
CALCIUM SERPL-MCNC: 9 MG/DL (ref 8.3–10.6)
CHLORIDE BLD-SCNC: 95 MMOL/L (ref 99–110)
CO2: 26 MMOL/L (ref 21–32)
CREAT SERPL-MCNC: 0.7 MG/DL (ref 0.8–1.3)
EOSINOPHILS ABSOLUTE: 0.2 K/UL (ref 0–0.6)
EOSINOPHILS RELATIVE PERCENT: 3 %
GFR SERPL CREATININE-BSD FRML MDRD: >60 ML/MIN/{1.73_M2}
GLUCOSE BLD-MCNC: 89 MG/DL (ref 70–99)
HCT VFR BLD CALC: 30.3 % (ref 40.5–52.5)
HEMOGLOBIN: 9.9 G/DL (ref 13.5–17.5)
INR BLD: 1.08 (ref 0.87–1.14)
LYMPHOCYTES ABSOLUTE: 2.4 K/UL (ref 1–5.1)
LYMPHOCYTES RELATIVE PERCENT: 30.6 %
MAGNESIUM: 1.8 MG/DL (ref 1.8–2.4)
MCH RBC QN AUTO: 32.5 PG (ref 26–34)
MCHC RBC AUTO-ENTMCNC: 32.7 G/DL (ref 31–36)
MCV RBC AUTO: 99.2 FL (ref 80–100)
MONOCYTES ABSOLUTE: 0.9 K/UL (ref 0–1.3)
MONOCYTES RELATIVE PERCENT: 12.1 %
NEUTROPHILS ABSOLUTE: 4.2 K/UL (ref 1.7–7.7)
NEUTROPHILS RELATIVE PERCENT: 53.3 %
PDW BLD-RTO: 14.3 % (ref 12.4–15.4)
PLATELET # BLD: 254 K/UL (ref 135–450)
PMV BLD AUTO: 6.2 FL (ref 5–10.5)
POTASSIUM SERPL-SCNC: 3.5 MMOL/L (ref 3.5–5.1)
PROTHROMBIN TIME: 14 SEC (ref 11.7–14.5)
RBC # BLD: 3.05 M/UL (ref 4.2–5.9)
SODIUM BLD-SCNC: 130 MMOL/L (ref 136–145)
TOTAL PROTEIN: 6 G/DL (ref 6.4–8.2)
WBC # BLD: 7.8 K/UL (ref 4–11)

## 2023-02-05 PROCEDURE — 6360000002 HC RX W HCPCS: Performed by: INTERNAL MEDICINE

## 2023-02-05 PROCEDURE — C9113 INJ PANTOPRAZOLE SODIUM, VIA: HCPCS | Performed by: INTERNAL MEDICINE

## 2023-02-05 PROCEDURE — 94640 AIRWAY INHALATION TREATMENT: CPT

## 2023-02-05 PROCEDURE — 2580000003 HC RX 258: Performed by: INTERNAL MEDICINE

## 2023-02-05 PROCEDURE — 80053 COMPREHEN METABOLIC PANEL: CPT

## 2023-02-05 PROCEDURE — 6370000000 HC RX 637 (ALT 250 FOR IP): Performed by: INTERNAL MEDICINE

## 2023-02-05 PROCEDURE — 83735 ASSAY OF MAGNESIUM: CPT

## 2023-02-05 PROCEDURE — 36415 COLL VENOUS BLD VENIPUNCTURE: CPT

## 2023-02-05 PROCEDURE — 85025 COMPLETE CBC W/AUTO DIFF WBC: CPT

## 2023-02-05 PROCEDURE — 85610 PROTHROMBIN TIME: CPT

## 2023-02-05 RX ADMIN — GABAPENTIN 300 MG: 300 CAPSULE ORAL at 08:59

## 2023-02-05 RX ADMIN — PANTOPRAZOLE SODIUM 40 MG: 40 INJECTION, POWDER, FOR SOLUTION INTRAVENOUS at 08:59

## 2023-02-05 RX ADMIN — HYDROMORPHONE HYDROCHLORIDE 1 MG: 1 INJECTION, SOLUTION INTRAMUSCULAR; INTRAVENOUS; SUBCUTANEOUS at 01:23

## 2023-02-05 RX ADMIN — Medication 100 MG: at 08:59

## 2023-02-05 RX ADMIN — Medication 2 PUFF: at 08:00

## 2023-02-05 RX ADMIN — ENOXAPARIN SODIUM 40 MG: 100 INJECTION SUBCUTANEOUS at 08:59

## 2023-02-05 RX ADMIN — Medication 10 ML: at 01:24

## 2023-02-05 RX ADMIN — Medication 10 ML: at 09:01

## 2023-02-05 ASSESSMENT — PAIN DESCRIPTION - LOCATION
LOCATION: ABDOMEN

## 2023-02-05 ASSESSMENT — PAIN SCALES - GENERAL
PAINLEVEL_OUTOF10: 2
PAINLEVEL_OUTOF10: 7
PAINLEVEL_OUTOF10: 2

## 2023-02-05 ASSESSMENT — PAIN DESCRIPTION - ORIENTATION
ORIENTATION: LEFT
ORIENTATION: LEFT

## 2023-02-05 ASSESSMENT — PAIN DESCRIPTION - ONSET: ONSET: ON-GOING

## 2023-02-05 ASSESSMENT — PAIN DESCRIPTION - FREQUENCY: FREQUENCY: CONTINUOUS

## 2023-02-05 ASSESSMENT — PAIN DESCRIPTION - PAIN TYPE: TYPE: ACUTE PAIN

## 2023-02-05 ASSESSMENT — PAIN DESCRIPTION - DESCRIPTORS
DESCRIPTORS: ACHING
DESCRIPTORS: ACHING

## 2023-02-05 ASSESSMENT — PAIN - FUNCTIONAL ASSESSMENT: PAIN_FUNCTIONAL_ASSESSMENT: ACTIVITIES ARE NOT PREVENTED

## 2023-02-05 NOTE — FLOWSHEET NOTE
02/04/23 2005   Assessment   Charting Type Shift assessment   Psychosocial   Psychosocial (WDL) WDL   Neurological   Neuro (WDL) WDL   Level of Consciousness 0   Swallow Screening   Is the patient unable to remain alert for testing? No   Is the patient on a modified diet (thickened liquids) due to pre-existing dysphagia? No   Is there presence of existing enteral tube feeding via the stomach or nose? No   Is there presence of head-of-bed restrictions (less than 30 degrees)? No   Is there presence of tracheotomy tube? No   Is the patient ordered nothing-by-mouth status? No   3 oz Water Swallow Screen Pass   Shannan Coma Scale   Eye Opening 4   Best Verbal Response 5   Best Motor Response 6   Shannan Coma Scale Score 15   HEENT (Head, Ears, Eyes, Nose, & Throat)   Right Eye Glasses; Impaired vision   Left Eye Glasses; Impaired vision   Teeth Edentulous; Missing teeth   Respiratory   Respiratory Interventions H.O.B. elevated   Respiratory Pattern Regular   Respiratory Depth Normal   Respiratory Quality/Effort Unlabored   Chest Assessment Chest expansion symmetrical   Breath Sounds   Right Upper Lobe Clear   Right Middle Lobe Diminished   Right Lower Lobe Diminished   Left Upper Lobe Clear   Left Lower Lobe Diminished   Cardiac   Cardiac Regularity Regular   Heart Sounds S1, S2   Cardiac Rhythm Sinus rhythm  (on tele monitor)   Rhythm Interpretation   Heart Rate 73   Cardiac Monitor   Cardiac/Telemetry Monitor On Portable telemetry pack applied   Telemetry Box Number [id number]   Gastrointestinal   GI Symptoms Loss of appetite   Abdomen Inspection Rounded; Soft   Last BM (including prior to admit) 02/04/23   RUQ Bowel Sounds Active   LUQ Bowel Sounds Active   RLQ Bowel Sounds Active   LLQ Bowel Sounds Active   Tenderness LLQ;Tenderness   Genitourinary   Genitourinary (WDL) WDL   Suprapubic Tenderness No   Dysuria (Pain/Burning w/Urination) No   Difficulty Urinating/Starting Stream No   Peripheral Vascular   Peripheral Vascular (WDL) WDL   Edema None   Skin Integumentary    Skin Integrity Abrasion;Ecchymosis   Location scattered   Musculoskeletal   Musculoskeletal (WDL) WDL   Care Plan - Discharge Planning Goals   Discharge to home or other facility with appropriate resources Identify barriers to discharge with patient and caregiver;Arrange for needed discharge resources and transportation as appropriate; Identify discharge learning needs (meds, wound care, etc)

## 2023-02-05 NOTE — PROGRESS NOTES
Hospitalist Progress Note    Date of Admission: 2/3/2023    Chief Complaint: N/V    Hospital Course:   Melissa Anguiano is a 60 y.o. male. He presented to the ER from home for nausea, vmoiting, and abdominal pain. He relates he began to have intermittent upper abdominal discomfort about a week ago. It became more severe and more frequent over time. 3 days ago he became nauseated and began to vomit. Since that time he has not been able to tolerate much oral intake. He has not been able to keep any solids down. He has irregularly been able to keep down sips of liquids. He has continued to try to take his medications. He denies hematemesis and coffee ground emesis. He has continued to have bowel movements and pass flatus. Patient drinks beer daily. He reports about ten beers per day. He has not been able to drink alcohol for the past 3 days. Subjective: Symptoms had improved but some recurrent N/V this morning before really eating anything. Assessment/Plan:  Mild Acute Pancreatitis  - Mild Lipase elevation with minimal inflammation of pancreatic tail, but given alcohol abuse, suspect component of Acute Pancreatitis. - Also suspect could be alcoholic gastritis and/or duodenitis. - Symptoms were improving and diet advanced to Full Liquids; however increased N/V. Back down to clears for now. - GI consulted    JCARLOS  -  Baseline SCr ~ 0.8. Admission SCr = 2.2. Clinically patient appeared hypovolemic. He had also continued to take lisinopril at home.  -  LR 2L over four hours then maintenance at 125 mL/hr until taking PO. Alcohol Abuse  -  Minimal e/o withdrawal currently. CIWA protocol enacted. Tobacco abuse  -  Smoking cessation advised. NRT provided. DVT prophylaxis: SCDs, lovenox  Diet: ADULT DIET;  Clear Liquid  Code Status: Full Code  PT/OT Eval Status: NA    Dispo - 2 days    Appropriate for A1 Discharge Unit: No    Physical Exam Performed:    BP (!) 141/69   Pulse 80 Temp 97.4 °F (36.3 °C) (Oral)   Resp 18   Ht 5' 7\" (1.702 m)   Wt 169 lb 6.4 oz (76.8 kg)   SpO2 94%   BMI 26.53 kg/m²   General appearance:  No apparent distress, appears stated age and cooperative. Respiratory:  Normal respiratory effort. Clear to auscultation, bilaterally without Rales/Wheezes/Rhonchi. Cardiovascular:  Regular rate and rhythm with normal S1/S2 without murmurs, rubs or gallops. Abdomen:  Soft, non-tender, non-distended with normal bowel sounds. Musculoskelatal:  No edema  Neurologic:  Non-focal  Psychiatric:  Alert and oriented, normal insight  Skin:  No rashes or lesions. Capillary Refill:  Brisk, < 3 seconds. Peripheral Pulses:  +2 palpable, equal bilaterally.      Labs:   Recent Labs     02/03/23 0137 02/04/23  0508 02/05/23  0502   WBC 15.8* 11.3* 7.8   HGB 12.6* 10.2* 9.9*   HCT 38.9* 31.4* 30.3*    260 254       Recent Labs     02/03/23 0137 02/04/23  0508 02/05/23  0502   * 131* 130*   K 3.8 3.2* 3.5   CL 85* 95* 95*   CO2 28 28 26   BUN 21* 16 9   CREATININE 2.2* 0.8 0.7*   CALCIUM 9.9 9.1 9.0       Recent Labs     02/03/23  0137 02/04/23  0508 02/05/23  0502   AST 26 21 19   ALT 30 21 19   BILITOT 0.8 0.6 0.4   ALKPHOS 182* 133* 124       Recent Labs     02/04/23  0508 02/05/23  0502   INR 1.10 1.08         Consults:    Candis Mcintyre MD

## 2023-02-05 NOTE — PLAN OF CARE
Problem: Discharge Planning  Goal: Discharge to home or other facility with appropriate resources  Outcome: Progressing  Flowsheets (Taken 2/4/2023 2005)  Discharge to home or other facility with appropriate resources:   Identify barriers to discharge with patient and caregiver   Arrange for needed discharge resources and transportation as appropriate   Identify discharge learning needs (meds, wound care, etc)     Problem: Pain  Goal: Verbalizes/displays adequate comfort level or baseline comfort level  Outcome: Progressing  Flowsheets (Taken 2/5/2023 0123)  Verbalizes/displays adequate comfort level or baseline comfort level:   Assess pain using appropriate pain scale   Administer analgesics based on type and severity of pain and evaluate response   Encourage patient to monitor pain and request assistance   Implement non-pharmacological measures as appropriate and evaluate response     Problem: Safety - Adult  Goal: Free from fall injury  Outcome: Progressing

## 2023-02-05 NOTE — PROGRESS NOTES
Pt upset about only having a liquid diet and wanted to leave AMA. Pt ripped off tele leads, got dressed, and called a cab. MD notified and pt was educated that he could advance his diet and order food. Pt states he still wants to leave and feels completely better. IV was taken out and pt signed the AMA form.

## 2023-02-05 NOTE — PROGRESS NOTES
Pt awake, ambulates well @ room gait steady. Pt stated \" hoping to eat real solid food at breakfast\" rated abdominal pain 2 /10, \"feeling better\". Tolerating Clear liquid, No  c/o nausea, no vomiting. Will continue to monitor.  MKRN

## 2023-02-06 ENCOUNTER — TELEPHONE (OUTPATIENT)
Dept: FAMILY MEDICINE CLINIC | Age: 63
End: 2023-02-06

## 2023-02-06 LAB — C. DIFFICILE TOXIN MOLECULAR: NORMAL

## 2023-02-06 NOTE — TELEPHONE ENCOUNTER
[facility] Initial Follow Up Call    Outreach made within 2 business days of discharge: Yes    Patient: Kim Ahn Patient : 1960   MRN: [id number]  Reason for Admission: There are no discharge diagnoses documented for the most recent discharge. Discharge Date: 23       Spoke with: Attempted to reach PT for post hospital follow up. VM left to return call to schedule TCM with Provider    Discharge department/facility: [facility]    Non-face-to-face services provided:  Obtained and reviewed discharge summary and/or continuity of care documents    TCM Interactive Patient Contact:  Was patient able to fill all prescriptions: Yes  Was patient instructed to bring all medications to the follow-up visit: Yes  Is patient taking all medications as directed in the discharge summary?  Yes  Does patient understand their discharge instructions: Yes  Does patient have questions or concerns that need addressed prior to 7-14 day follow up office visit: no    Scheduled appointment with PCP within 7-14 days    Follow Up  Future Appointments   Date Time Provider Jimy Laird   3/20/2023  8:30 AM ED Fagan - CNP [address]       Vandana Curiel LPN

## 2023-02-07 NOTE — TELEPHONE ENCOUNTER
[facility] Transitions Initial Follow Up Call    Outreach made within 2 business days of discharge: Yes    Patient: Margarita Smith Patient : 1960   MRN: [id number]  Reason for Admission: There are no discharge diagnoses documented for the most recent discharge. Discharge Date: 23       Spoke with: PT scheduled TCM with Provider    Discharge department/facility: [facility]    Non-face-to-face services provided:  Scheduled appointment with PCP-2023  Obtained and reviewed discharge summary and/or continuity of care documents    TCM Interactive Patient Contact:  Was patient able to fill all prescriptions: Yes  Was patient instructed to bring all medications to the follow-up visit: Yes  Is patient taking all medications as directed in the discharge summary?  Yes  Does patient understand their discharge instructions: Yes  Does patient have questions or concerns that need addressed prior to 7-14 day follow up office visit: no    Scheduled appointment with PCP within 7-14 days    Follow Up  Future Appointments   Date Time Provider Jimy Laird   2023  1:30 PM Dorlene Joey, APRN - CNP [facility] [place] [facility]   3/20/2023  8:30 AM ED Faria CNP, LPN

## 2023-02-13 ENCOUNTER — OFFICE VISIT (OUTPATIENT)
Dept: FAMILY MEDICINE CLINIC | Age: 63
End: 2023-02-13

## 2023-02-13 VITALS
HEART RATE: 76 BPM | SYSTOLIC BLOOD PRESSURE: 138 MMHG | BODY MASS INDEX: 26.75 KG/M2 | OXYGEN SATURATION: 96 % | WEIGHT: 170.8 LBS | DIASTOLIC BLOOD PRESSURE: 82 MMHG

## 2023-02-13 DIAGNOSIS — K21.9 GASTROESOPHAGEAL REFLUX DISEASE, UNSPECIFIED WHETHER ESOPHAGITIS PRESENT: ICD-10-CM

## 2023-02-13 DIAGNOSIS — F31.62 BIPOLAR DISORDER, CURRENT EPISODE MIXED, MODERATE (HCC): ICD-10-CM

## 2023-02-13 DIAGNOSIS — I10 PRIMARY HYPERTENSION: ICD-10-CM

## 2023-02-13 DIAGNOSIS — D50.9 IRON DEFICIENCY ANEMIA, UNSPECIFIED IRON DEFICIENCY ANEMIA TYPE: ICD-10-CM

## 2023-02-13 DIAGNOSIS — K29.80 DUODENITIS: ICD-10-CM

## 2023-02-13 DIAGNOSIS — K70.9 ALCOHOLIC LIVER DISEASE (HCC): ICD-10-CM

## 2023-02-13 DIAGNOSIS — R06.2 WHEEZING: ICD-10-CM

## 2023-02-13 DIAGNOSIS — J43.2 CENTRILOBULAR EMPHYSEMA (HCC): ICD-10-CM

## 2023-02-13 DIAGNOSIS — E78.2 MIXED HYPERLIPIDEMIA: ICD-10-CM

## 2023-02-13 DIAGNOSIS — Z09 HOSPITAL DISCHARGE FOLLOW-UP: Primary | ICD-10-CM

## 2023-02-13 DIAGNOSIS — K85.20 ALCOHOL-INDUCED ACUTE PANCREATITIS, UNSPECIFIED COMPLICATION STATUS: ICD-10-CM

## 2023-02-13 DIAGNOSIS — E87.1 HYPONATREMIA: ICD-10-CM

## 2023-02-13 LAB
A/G RATIO: 1.4 (ref 1.1–2.2)
ALBUMIN SERPL-MCNC: 3.9 G/DL (ref 3.4–5)
ALP BLD-CCNC: 144 U/L (ref 40–129)
ALT SERPL-CCNC: 19 U/L (ref 10–40)
ANION GAP SERPL CALCULATED.3IONS-SCNC: 12 MMOL/L (ref 3–16)
AST SERPL-CCNC: 21 U/L (ref 15–37)
BILIRUB SERPL-MCNC: <0.2 MG/DL (ref 0–1)
BUN BLDV-MCNC: 4 MG/DL (ref 7–20)
CALCIUM SERPL-MCNC: 9.3 MG/DL (ref 8.3–10.6)
CHLORIDE BLD-SCNC: 102 MMOL/L (ref 99–110)
CO2: 24 MMOL/L (ref 21–32)
CREAT SERPL-MCNC: 0.7 MG/DL (ref 0.8–1.3)
GFR SERPL CREATININE-BSD FRML MDRD: >60 ML/MIN/{1.73_M2}
GLUCOSE BLD-MCNC: 100 MG/DL (ref 70–99)
POTASSIUM SERPL-SCNC: 4.8 MMOL/L (ref 3.5–5.1)
SODIUM BLD-SCNC: 138 MMOL/L (ref 136–145)
TOTAL PROTEIN: 6.7 G/DL (ref 6.4–8.2)

## 2023-02-13 RX ORDER — ATORVASTATIN CALCIUM 40 MG/1
40 TABLET, FILM COATED ORAL DAILY
Qty: 90 TABLET | Refills: 3 | Status: SHIPPED | OUTPATIENT
Start: 2023-02-13

## 2023-02-13 RX ORDER — RISPERIDONE 1 MG/1
TABLET ORAL
Qty: 180 TABLET | Refills: 1 | Status: SHIPPED | OUTPATIENT
Start: 2023-02-13

## 2023-02-13 RX ORDER — LISINOPRIL 10 MG/1
10 TABLET ORAL DAILY
Qty: 90 TABLET | Refills: 1 | Status: SHIPPED | OUTPATIENT
Start: 2023-02-13

## 2023-02-13 RX ORDER — FLUTICASONE PROPIONATE AND SALMETEROL 100; 50 UG/1; UG/1
1 POWDER RESPIRATORY (INHALATION) EVERY 12 HOURS
Qty: 3 EACH | Refills: 3 | Status: SHIPPED | OUTPATIENT
Start: 2023-02-13

## 2023-02-13 RX ORDER — PANTOPRAZOLE SODIUM 40 MG/1
TABLET, DELAYED RELEASE ORAL
Qty: 90 TABLET | Refills: 3 | Status: SHIPPED | OUTPATIENT
Start: 2023-02-13 | End: 2023-02-17 | Stop reason: SDUPTHER

## 2023-02-13 RX ORDER — ALBUTEROL SULFATE 90 UG/1
2 AEROSOL, METERED RESPIRATORY (INHALATION) EVERY 6 HOURS PRN
Qty: 18 G | Refills: 3 | Status: SHIPPED | OUTPATIENT
Start: 2023-02-13

## 2023-02-13 RX ORDER — PANTOPRAZOLE SODIUM 40 MG/1
TABLET, DELAYED RELEASE ORAL
Qty: 90 TABLET | Refills: 3 | Status: SHIPPED | OUTPATIENT
Start: 2023-02-13 | End: 2023-02-13 | Stop reason: SDUPTHER

## 2023-02-13 NOTE — PROGRESS NOTES
Post-Discharge Transitional Care Follow Up      Lucille Solano   YOB: 1960    Date of Office Visit:  2/13/2023  Date of Hospital Admission: 2/3/23  Date of Hospital Discharge: 2/5/23  Readmission Risk Score (high >=14%. Medium >=10%):Readmission Risk Score: 11.1      Care management risk score Rising risk (score 2-5) and Complex Care (Scores >=6): No Risk Score On File     Non face to face  following discharge, date last encounter closed (first attempt may have been earlier): 02/06/2023     Call initiated 2 business days of discharge: Yes     Alcoholic liver disease ([facility] [address]  -     MT DISCHARGE MEDS RECONCILED W/ CURRENT OUTPATIENT MED LIST  -     Comprehensive Metabolic Panel; Future  Iron deficiency anemia, unspecified iron deficiency anemia type  -     MT DISCHARGE MEDS RECONCILED W/ CURRENT OUTPATIENT MED LIST  -     CBC with Auto Differential; Future  Duodenitis  -     MT DISCHARGE MEDS RECONCILED W/ CURRENT OUTPATIENT MED LIST  Alcohol-induced acute pancreatitis, unspecified complication status  -     MT DISCHARGE MEDS RECONCILED W/ CURRENT OUTPATIENT MED LIST  -     Comprehensive Metabolic Panel; Future  Hospital discharge follow-up  -     MT DISCHARGE MEDS RECONCILED W/ CURRENT OUTPATIENT MED LIST  Primary hypertension  -     lisinopril (PRINIVIL;ZESTRIL) 10 MG tablet; Take 1 tablet by mouth daily, Disp-90 tablet, R-1**Patient requests 90 days supply**Normal  Mixed hyperlipidemia  -     atorvastatin (LIPITOR) 40 MG tablet; Take 1 tablet by mouth daily, Disp-90 tablet, R-3Normal  Centrilobular emphysema (HCC)  -     fluticasone-salmeterol (ADVAIR) 100-50 MCG/ACT AEPB diskus inhaler;  Inhale 1 puff into the lungs in the morning and 1 puff in the evening., Disp-3 each, R-3**Patient requests 90 days supply**Normal  Gastroesophageal reflux disease, unspecified whether esophagitis present  -     pantoprazole (PROTONIX) 40 MG tablet; TAKE 1 TABLET BY MOUTH TWICE DAILY BEFORE MEALS, Disp-90 tablet, R-3Normal  Wheezing  -     albuterol sulfate HFA (PROVENTIL;VENTOLIN;PROAIR) 108 (90 Base) MCG/ACT inhaler; Inhale 2 puffs into the lungs every 6 hours as needed for Wheezing, Disp-18 g, R-3Normal  Bipolar disorder, current episode mixed, moderate (HCC)  -     risperiDONE (RISPERDAL) 1 MG tablet; TAKE 1 TABLET BY MOUTH TWICE DAILY, Disp-180 tablet, R-1Normal  Hyponatremia  -     Comprehensive Metabolic Panel; Future    Medical Decision Making: moderate complexity  No follow-ups on file. Subjective:   HPI    Inpatient course: Discharge summary reviewed- see chart. Interval history/Current status:     Had 48 hours of nausea, emesis and became dehydrated. Called 911. Today hasn't drank alcohol for 2 weeks. Patient Active Problem List   Diagnosis    Alcohol abuse    Symptomatic anemia    Elevated LFTs    Syncope    History of alcohol abuse    Tobacco abuse    Cannabis abuse    Iron deficiency anemia    Arthralgia of both hands    Neuropathy    Anxiety    HTN (hypertension)    Alcoholic liver disease (HCC)    Bipolar disorder, current episode mixed, moderate (HCC)    Tuberculosis    Centrilobular emphysema (HCC)    Lung nodule    Coronary artery calcification    Nonrheumatic tricuspid valve regurgitation    Bipolar disorder ([address]    Thrombocythemia    JCARLOS (acute kidney injury) ([address]    Duodenitis       Medications listed as ordered at the time of discharge from hospital     Medication List            Accurate as of February 13, 2023  2:21 PM. If you have any questions, ask your nurse or doctor.                 CONTINUE taking these medications      albuterol sulfate  (90 Base) MCG/ACT inhaler  Commonly known as: PROVENTIL;VENTOLIN;PROAIR  Inhale 2 puffs into the lungs every 6 hours as needed for Wheezing     atorvastatin 40 MG tablet  Commonly known as: Lipitor  Take 1 tablet by mouth daily     B1 100 MG Tabs  TAKE 1 TABLET BY MOUTH DAILY     ferrous sulfate 325 (65 Fe) MG tablet  Commonly known as: IRON 325     fluticasone-salmeterol 100-50 MCG/ACT Aepb diskus inhaler  Commonly known as: ADVAIR  Inhale 1 puff into the lungs in the morning and 1 puff in the evening.     gabapentin 300 MG capsule  Commonly known as: NEURONTIN  TAKE 1 CAPSULE BY MOUTH IN THE MORNING AND 2 AT NIGHT     hydrOXYzine HCl 50 MG tablet  Commonly known as: ATARAX  Take 1 tablet by mouth every 8 hours as needed for Anxiety     lisinopril 10 MG tablet  Commonly known as: PRINIVIL;ZESTRIL  Take 1 tablet by mouth daily     multivitamin with minerals tablet  Take 1 tablet by mouth daily     pantoprazole 40 MG tablet  Commonly known as: PROTONIX  TAKE 1 TABLET BY MOUTH TWICE DAILY BEFORE MEALS     Potassium 99 MG Tabs     risperiDONE 1 MG tablet  Commonly known as: RISPERDAL  TAKE 1 TABLET BY MOUTH TWICE DAILY     thiamine 100 MG tablet  Take 1 tablet by mouth daily     vitamin B-12 100 MCG tablet  Commonly known as: CYANOCOBALAMIN     Vitamin C 500 MG Caps     Vitamin D (Cholecalciferol) 25 MCG (1000 UT) Caps               Where to Get Your Medications        These medications were sent to [facility], [phone] - [phone]  [address] [facility], [address]      Phone: [phone]   albuterol sulfate  (90 Base) MCG/ACT inhaler  atorvastatin 40 MG tablet  fluticasone-salmeterol 100-50 MCG/ACT Aepb diskus inhaler  lisinopril 10 MG tablet  risperiDONE 1 MG tablet       These medications were sent to [facility] #[id number] [address] [address]      Phone: [phone]   pantoprazole 40 MG tablet          Medications marked \"taking\" at this time  Outpatient Medications Marked as Taking for the 2/13/23 encounter (Office Visit) with ED Chavis CNP   Medication Sig Dispense Refill    lisinopril (PRINIVIL;ZESTRIL) 10 MG tablet Take 1 tablet by mouth daily 90 tablet 1    atorvastatin (LIPITOR) 40 MG tablet Take 1 tablet by mouth daily 90 tablet 3    fluticasone-salmeterol (ADVAIR) 100-50 MCG/ACT AEPB diskus inhaler Inhale 1 puff into the lungs in the morning and 1 puff in the evening. 3 each 3    albuterol sulfate HFA (PROVENTIL;VENTOLIN;PROAIR) 108 (90 Base) MCG/ACT inhaler Inhale 2 puffs into the lungs every 6 hours as needed for Wheezing 18 g 3    risperiDONE (RISPERDAL) 1 MG tablet TAKE 1 TABLET BY MOUTH TWICE DAILY 180 tablet 1    pantoprazole (PROTONIX) 40 MG tablet TAKE 1 TABLET BY MOUTH TWICE DAILY BEFORE MEALS 90 tablet 3    gabapentin (NEURONTIN) 300 MG capsule TAKE 1 CAPSULE BY MOUTH IN THE MORNING AND 2 AT NIGHT 90 capsule 2    ferrous sulfate (IRON 325) 325 (65 Fe) MG tablet Take 325 mg by mouth daily (with breakfast)      vitamin B-12 (CYANOCOBALAMIN) 100 MCG tablet Take 250 mcg by mouth daily      Potassium 99 MG TABS Take 1 tablet by mouth daily      Ascorbic Acid (VITAMIN C) 500 MG CAPS Take 1 capsule by mouth daily      Vitamin D, Cholecalciferol, 25 MCG (1000 UT) CAPS Take 2 capsules by mouth daily      hydrOXYzine HCl (ATARAX) 50 MG tablet Take 1 tablet by mouth every 8 hours as needed for Anxiety 60 tablet 5        Medications patient taking as of now reconciled against medications ordered at time of hospital discharge: Yes    Review of Systems   All other systems reviewed and are negative. Objective:    /82 (Site: Left Upper Arm, Position: Sitting, Cuff Size: Large Adult)   Pulse 76   Wt 170 lb 12.8 oz (77.5 kg)   SpO2 96%   BMI 26.75 kg/m²   Physical Exam  Constitutional:       Appearance: Normal appearance. Cardiovascular:      Rate and Rhythm: Normal rate. Pulmonary:      Effort: Pulmonary effort is normal.      Breath sounds: Normal breath sounds. Musculoskeletal:         General: Normal range of motion. Neurological:      Mental Status: He is alert and oriented to person, place, and time.    Psychiatric: Behavior: Behavior normal.       An electronic signature was used to authenticate this note.  --ED HAWKINS - CNP

## 2023-02-16 DIAGNOSIS — F41.9 ANXIETY: ICD-10-CM

## 2023-02-16 DIAGNOSIS — G62.9 NEUROPATHY: ICD-10-CM

## 2023-02-16 DIAGNOSIS — K21.9 GASTROESOPHAGEAL REFLUX DISEASE, UNSPECIFIED WHETHER ESOPHAGITIS PRESENT: ICD-10-CM

## 2023-02-16 NOTE — TELEPHONE ENCOUNTER
Refill Request     CONFIRM preferrred pharmacy with the patient. If Mail Order Rx - Pend for 90 day refill. Last Seen: Last Seen Department: 2/13/2023  Last Seen by PCP: 2/13/2023    Last Written: 1/30/2023 Gabapentin   2/13/2023 Pantoprazole  6/29/2022 Hydroxyzone     If no future appointment scheduled, route STAFF MESSAGE with patient name to the [facility] for scheduling. Next Appointment:   Future Appointments   Date Time Provider Jimy Laird   3/20/2023  8:30 AM ED Ford CNP  [place] - [place]       Message sent to  to schedule appt with patient?   YES      Requested Prescriptions     Pending Prescriptions Disp Refills    gabapentin (NEURONTIN) 300 MG capsule 90 capsule 2     Sig: TAKE 1 CAPSULE BY MOUTH IN THE MORNING AND 2 AT NIGHT    pantoprazole (PROTONIX) 40 MG tablet 180 tablet 1     Sig: TAKE 1 TABLET BY MOUTH TWICE DAILY BEFORE MEALS    hydrOXYzine HCl (ATARAX) 50 MG tablet 180 tablet 1     Sig: Take 1 tablet by mouth every 8 hours as needed for Anxiety

## 2023-02-17 RX ORDER — GABAPENTIN 300 MG/1
CAPSULE ORAL
Qty: 90 CAPSULE | Refills: 2 | Status: SHIPPED | OUTPATIENT
Start: 2023-02-17 | End: 2023-03-20

## 2023-02-17 RX ORDER — PANTOPRAZOLE SODIUM 40 MG/1
TABLET, DELAYED RELEASE ORAL
Qty: 180 TABLET | Refills: 3 | Status: SHIPPED | OUTPATIENT
Start: 2023-02-17

## 2023-02-17 RX ORDER — HYDROXYZINE 50 MG/1
50 TABLET, FILM COATED ORAL EVERY 8 HOURS PRN
Qty: 180 TABLET | Refills: 1 | Status: SHIPPED | OUTPATIENT
Start: 2023-02-17

## 2023-03-20 ENCOUNTER — OFFICE VISIT (OUTPATIENT)
Dept: FAMILY MEDICINE CLINIC | Age: 63
End: 2023-03-20

## 2023-03-20 VITALS
HEIGHT: 67 IN | OXYGEN SATURATION: 95 % | HEART RATE: 118 BPM | SYSTOLIC BLOOD PRESSURE: 120 MMHG | WEIGHT: 175.6 LBS | DIASTOLIC BLOOD PRESSURE: 88 MMHG | BODY MASS INDEX: 27.56 KG/M2

## 2023-03-20 DIAGNOSIS — Z72.0 TOBACCO ABUSE: ICD-10-CM

## 2023-03-20 DIAGNOSIS — D64.9 ANEMIA, UNSPECIFIED TYPE: ICD-10-CM

## 2023-03-20 DIAGNOSIS — F33.0 MILD EPISODE OF RECURRENT MAJOR DEPRESSIVE DISORDER (HCC): ICD-10-CM

## 2023-03-20 DIAGNOSIS — Z87.891 PERSONAL HISTORY OF TOBACCO USE: ICD-10-CM

## 2023-03-20 DIAGNOSIS — Z00.00 MEDICARE ANNUAL WELLNESS VISIT, SUBSEQUENT: Primary | ICD-10-CM

## 2023-03-20 DIAGNOSIS — R91.1 LUNG NODULE: ICD-10-CM

## 2023-03-20 LAB
BASOPHILS # BLD: 0.1 K/UL (ref 0–0.2)
BASOPHILS NFR BLD: 1.5 %
DEPRECATED RDW RBC AUTO: 14.4 % (ref 12.4–15.4)
EOSINOPHIL # BLD: 0.2 K/UL (ref 0–0.6)
EOSINOPHIL NFR BLD: 3.7 %
HCT VFR BLD AUTO: 44.4 % (ref 40.5–52.5)
HGB BLD-MCNC: 14.5 G/DL (ref 13.5–17.5)
LYMPHOCYTES # BLD: 2.5 K/UL (ref 1–5.1)
LYMPHOCYTES NFR BLD: 39.2 %
MCH RBC QN AUTO: 31.1 PG (ref 26–34)
MCHC RBC AUTO-ENTMCNC: 32.5 G/DL (ref 31–36)
MCV RBC AUTO: 95.7 FL (ref 80–100)
MONOCYTES # BLD: 0.8 K/UL (ref 0–1.3)
MONOCYTES NFR BLD: 12.5 %
NEUTROPHILS # BLD: 2.8 K/UL (ref 1.7–7.7)
NEUTROPHILS NFR BLD: 43.1 %
PLATELET # BLD AUTO: 457 K/UL (ref 135–450)
PMV BLD AUTO: 7.1 FL (ref 5–10.5)
RBC # BLD AUTO: 4.64 M/UL (ref 4.2–5.9)
WBC # BLD AUTO: 6.4 K/UL (ref 4–11)

## 2023-03-20 ASSESSMENT — LIFESTYLE VARIABLES
HOW OFTEN DURING THE LAST YEAR HAVE YOU NEEDED AN ALCOHOLIC DRINK FIRST THING IN THE MORNING TO GET YOURSELF GOING AFTER A NIGHT OF HEAVY DRINKING: 0
HOW OFTEN DURING THE LAST YEAR HAVE YOU BEEN UNABLE TO REMEMBER WHAT HAPPENED THE NIGHT BEFORE BECAUSE YOU HAD BEEN DRINKING: 0
HOW OFTEN DURING THE LAST YEAR HAVE YOU FAILED TO DO WHAT WAS NORMALLY EXPECTED FROM YOU BECAUSE OF DRINKING: 0
HOW OFTEN DURING THE LAST YEAR HAVE YOU FOUND THAT YOU WERE NOT ABLE TO STOP DRINKING ONCE YOU HAD STARTED: 0
HOW OFTEN DO YOU HAVE A DRINK CONTAINING ALCOHOL: 2-3 TIMES A WEEK
HAS A RELATIVE, FRIEND, DOCTOR, OR ANOTHER HEALTH PROFESSIONAL EXPRESSED CONCERN ABOUT YOUR DRINKING OR SUGGESTED YOU CUT DOWN: 0
HOW OFTEN DURING THE LAST YEAR HAVE YOU HAD A FEELING OF GUILT OR REMORSE AFTER DRINKING: 0
HAVE YOU OR SOMEONE ELSE BEEN INJURED AS A RESULT OF YOUR DRINKING: 0
HOW MANY STANDARD DRINKS CONTAINING ALCOHOL DO YOU HAVE ON A TYPICAL DAY: 3 OR 4

## 2023-03-20 ASSESSMENT — ANXIETY QUESTIONNAIRES
1. FEELING NERVOUS, ANXIOUS, OR ON EDGE: 1
5. BEING SO RESTLESS THAT IT IS HARD TO SIT STILL: 1
4. TROUBLE RELAXING: 0
2. NOT BEING ABLE TO STOP OR CONTROL WORRYING: 1
3. WORRYING TOO MUCH ABOUT DIFFERENT THINGS: 0
6. BECOMING EASILY ANNOYED OR IRRITABLE: 0
7. FEELING AFRAID AS IF SOMETHING AWFUL MIGHT HAPPEN: 0
GAD7 TOTAL SCORE: 3
IF YOU CHECKED OFF ANY PROBLEMS ON THIS QUESTIONNAIRE, HOW DIFFICULT HAVE THESE PROBLEMS MADE IT FOR YOU TO DO YOUR WORK, TAKE CARE OF THINGS AT HOME, OR GET ALONG WITH OTHER PEOPLE: SOMEWHAT DIFFICULT

## 2023-03-20 ASSESSMENT — PATIENT HEALTH QUESTIONNAIRE - PHQ9
SUM OF ALL RESPONSES TO PHQ QUESTIONS 1-9: 6
1. LITTLE INTEREST OR PLEASURE IN DOING THINGS: 0
7. TROUBLE CONCENTRATING ON THINGS, SUCH AS READING THE NEWSPAPER OR WATCHING TELEVISION: 0
2. FEELING DOWN, DEPRESSED OR HOPELESS: 1
SUM OF ALL RESPONSES TO PHQ QUESTIONS 1-9: 6
3. TROUBLE FALLING OR STAYING ASLEEP: 1
SUM OF ALL RESPONSES TO PHQ9 QUESTIONS 1 & 2: 1
8. MOVING OR SPEAKING SO SLOWLY THAT OTHER PEOPLE COULD HAVE NOTICED. OR THE OPPOSITE, BEING SO FIGETY OR RESTLESS THAT YOU HAVE BEEN MOVING AROUND A LOT MORE THAN USUAL: 1
6. FEELING BAD ABOUT YOURSELF - OR THAT YOU ARE A FAILURE OR HAVE LET YOURSELF OR YOUR FAMILY DOWN: 1
10. IF YOU CHECKED OFF ANY PROBLEMS, HOW DIFFICULT HAVE THESE PROBLEMS MADE IT FOR YOU TO DO YOUR WORK, TAKE CARE OF THINGS AT HOME, OR GET ALONG WITH OTHER PEOPLE: 0
SUM OF ALL RESPONSES TO PHQ QUESTIONS 1-9: 6
4. FEELING TIRED OR HAVING LITTLE ENERGY: 1
SUM OF ALL RESPONSES TO PHQ QUESTIONS 1-9: 6
9. THOUGHTS THAT YOU WOULD BE BETTER OFF DEAD, OR OF HURTING YOURSELF: 0
5. POOR APPETITE OR OVEREATING: 1

## 2023-03-20 NOTE — PATIENT INSTRUCTIONS
in the mouth. Follow-up care is a key part of your treatment and safety. Be sure to make and go to all appointments, and call your doctor if you are having problems. It's also a good idea to know your test results and keep a list of the medicines you take. Where can you learn more? Go to http://www.tilley.com/ and enter G551 to learn more about \"Learning About Vision Tests. \"  Current as of: October 12, 2022               Content Version: 13.6  © 2006-2023 [employer]. Care instructions adapted under license by [facility] ([facility]). If you have questions about a medical condition or this instruction, always ask your healthcare professional. [place] [address] any warranty or liability for your use of this information. Advance Directives: Care Instructions  Overview  An advance directive is a legal way to state your wishes at the end of your life. It tells your family and your doctor what to do if you can't say what you want. There are two main types of advance directives. You can change them any time your wishes change. Living will. This form tells your family and your doctor your wishes about life support and other treatment. The form is also called a declaration. Medical power of . This form lets you name a person to make treatment decisions for you when you can't speak for yourself. This person is called a health care agent (health care proxy, health care surrogate). The form is also called a durable power of  for health care. If you do not have an advance directive, decisions about your medical care may be made by a family member, or by a doctor or a  who doesn't know you. It may help to think of an advance directive as a gift to the people who care for you. If you have one, they won't have to make tough decisions by themselves.   For more information, including forms for your state, see the [employer] website

## 2023-03-20 NOTE — PROGRESS NOTES
cessation.     Also reviewed the following if the patient has Medicare that as of February 10, 2022, Medicare only covers LDCT screening in patients aged 51-72 with at least a 20 pack-year smoking history who currently smoke or have quit in the last 15 years

## 2023-03-20 NOTE — ACP (ADVANCE CARE PLANNING)
Advance Care Planning     Advance Care Planning (ACP) Physician/NP/PA Conversation    Date of Conversation: 3/20/2023  Conducted with: Patient with Decision Making Capacity    Healthcare Decision Maker:      Primary Decision Maker: Vivien Wilson - Brother/Sister - [phone]    Primary Decision Maker: Alesia Infante - Brother/Sister - [phone]    Supplemental (Other) Decision Maker: Leon Persaud - Brother/Sister - [phone]    Click here to complete Healthcare Decision Makers including selection of the Healthcare Decision Maker Relationship (ie \"Primary\")  Today we documented Decision Maker(s) consistent with Legal Next of Kin hierarchy. Care Preferences:    Hospitalization: \"If your health worsens and it becomes clear that your chance of recovery is unlikely, what would be your preference regarding hospitalization? \"  The patient would prefer hospitalization. Ventilation: \"If you were unable to breath on your own and your chance of recovery was unlikely, what would be your preference about the use of a ventilator (breathing machine) if it was available to you? \"  The patient would desire the use of a ventilator. Resuscitation: \"In the event your heart stopped as a result of an underlying serious health condition, would you want attempts made to restart your heart, or would you prefer a natural death? \"  Yes, attempt to resuscitate.     treatment goals    Conversation Outcomes / Follow-Up Plan:  ACP in process - information provided, considering goals and options  Reviewed DNR/DNI and patient elects Full Code (Attempt Resuscitation)    Length of Voluntary ACP Conversation in minutes:  <16 minutes (Non-Billable)    JOCELYNE HUGHES, APRN - CNP

## 2023-04-13 PROBLEM — N17.9 AKI (ACUTE KIDNEY INJURY) (HCC): Status: RESOLVED | Noted: 2023-02-03 | Resolved: 2023-04-13

## 2023-04-28 DIAGNOSIS — E78.2 MIXED HYPERLIPIDEMIA: ICD-10-CM

## 2023-04-28 RX ORDER — ATORVASTATIN CALCIUM 40 MG/1
40 TABLET, FILM COATED ORAL DAILY
Qty: 90 TABLET | Refills: 1 | Status: SHIPPED | OUTPATIENT
Start: 2023-04-28

## 2023-04-28 NOTE — TELEPHONE ENCOUNTER
Refill Request     CONFIRM preferred pharmacy with the patient. If Mail Order Rx - Pend for 90 day refill. Last Seen: Last Seen Department: 4/13/2023  Last Seen by PCP: 4/13/2023    Last Written: 02/13/2023 90 tablet 3 refills     If no future appointment scheduled:  Review the last OV with PCP and review information for follow-up visit,  Route STAFF MESSAGE with patient name to the [facility] for scheduling with the following information:            -  Timing of next visit           -  Visit type ie Physical, OV, etc           -  Diagnoses/Reason ie. COPD, HTN - Do not use MEDICATION, Follow-up or CHECK UP - Give reason for visit      Next Appointment:   Future Appointments   Date Time Provider Jimy Laird   10/16/2023  9:00 AM Maye Hernandez APRN - CNP [facility] [place] - [place]       Message sent to [address] [place] [address] to schedule appt with patient?   NO      Requested Prescriptions     Pending Prescriptions Disp Refills    atorvastatin (LIPITOR) 40 MG tablet [Pharmacy Med Name: ATORVASTATIN 40MG TABLETS] 90 tablet 3     Sig: TAKE 1 TABLET BY MOUTH DAILY

## 2023-08-24 DIAGNOSIS — F33.0 MILD EPISODE OF RECURRENT MAJOR DEPRESSIVE DISORDER (HCC): ICD-10-CM

## 2023-08-24 DIAGNOSIS — F41.9 ANXIETY: ICD-10-CM

## 2023-08-24 DIAGNOSIS — I10 PRIMARY HYPERTENSION: ICD-10-CM

## 2023-08-24 DIAGNOSIS — G62.9 NEUROPATHY: ICD-10-CM

## 2023-08-24 RX ORDER — LISINOPRIL 10 MG/1
10 TABLET ORAL DAILY
Qty: 90 TABLET | Refills: 1 | Status: SHIPPED | OUTPATIENT
Start: 2023-08-24

## 2023-08-24 RX ORDER — HYDROXYZINE 50 MG/1
50 TABLET, FILM COATED ORAL EVERY 8 HOURS PRN
Qty: 180 TABLET | Refills: 1 | Status: SHIPPED | OUTPATIENT
Start: 2023-08-24

## 2023-08-24 RX ORDER — GABAPENTIN 300 MG/1
CAPSULE ORAL
Qty: 90 CAPSULE | Refills: 2 | Status: SHIPPED | OUTPATIENT
Start: 2023-08-24 | End: 2023-10-18

## 2023-08-24 NOTE — TELEPHONE ENCOUNTER
Refill Request     CONFIRM preferred pharmacy with the patient. If Mail Order Rx - Pend for 90 day refill. Last Seen: Last Seen Department: 4/13/2023  Last Seen by PCP: 4/13/2023    Last Written:   Gabapentin- 02/17/2023 90 capsules 2 refills     Hydroxyzine- 02/17/2023 180 tablets 1 refills    Lisinopril- 02/13/2023 90 tablets 1 refill     Sertraline- 03/20/2023 90 tablets 1 refill     If no future appointment scheduled:  Review the last OV with PCP and review information for follow-up visit,  Route STAFF MESSAGE with patient name to the [facility] for scheduling with the following information:            -  Timing of next visit           -  Visit type ie Physical, OV, etc           -  Diagnoses/Reason ie. COPD, HTN - Do not use MEDICATION, Follow-up or CHECK UP - Give reason for visit      Next Appointment:   Future Appointments   Date Time Provider [address] [place] [address]   10/16/2023  9:00 AM [facility]  [place] [facility]       Message sent to [address] [place] [address] to schedule appt with patient?   NO      Requested Prescriptions     Pending Prescriptions Disp Refills    gabapentin (NEURONTIN) 300 MG capsule 90 capsule 2     Sig: TAKE 1 CAPSULE BY MOUTH IN THE MORNING AND 2 AT NIGHT    hydrOXYzine HCl (ATARAX) 50 MG tablet 180 tablet 1     Sig: Take 1 tablet by mouth every 8 hours as needed for Anxiety    lisinopril (PRINIVIL;ZESTRIL) 10 MG tablet 90 tablet 1     Sig: Take 1 tablet by mouth daily    sertraline (ZOLOFT) 50 MG tablet 90 tablet 1     Sig: Take 1 tablet by mouth daily

## 2023-12-29 ENCOUNTER — APPOINTMENT (OUTPATIENT)
Dept: CT IMAGING | Age: 63
DRG: 641 | End: 2023-12-29
Payer: MEDICARE

## 2023-12-29 ENCOUNTER — HOSPITAL ENCOUNTER (INPATIENT)
Age: 63
LOS: 3 days | Discharge: HOME HEALTH | DRG: 641 | End: 2024-01-01
Attending: STUDENT IN AN ORGANIZED HEALTH CARE EDUCATION/TRAINING PROGRAM | Admitting: INTERNAL MEDICINE
Payer: MEDICARE

## 2023-12-29 DIAGNOSIS — R29.6 FREQUENT FALLS: ICD-10-CM

## 2023-12-29 DIAGNOSIS — F10.11 HISTORY OF ALCOHOL ABUSE: ICD-10-CM

## 2023-12-29 DIAGNOSIS — E87.1 HYPONATREMIA: Primary | ICD-10-CM

## 2023-12-29 DIAGNOSIS — R79.89 ELEVATED BRAIN NATRIURETIC PEPTIDE (BNP) LEVEL: ICD-10-CM

## 2023-12-29 DIAGNOSIS — R53.1 GENERALIZED WEAKNESS: ICD-10-CM

## 2023-12-29 LAB
ALBUMIN SERPL-MCNC: 2.9 G/DL (ref 3.4–5)
ALBUMIN SERPL-MCNC: 3.3 G/DL (ref 3.4–5)
ALBUMIN/GLOB SERPL: 1.2 {RATIO} (ref 1.1–2.2)
ALP SERPL-CCNC: 208 U/L (ref 40–129)
ALT SERPL-CCNC: 19 U/L (ref 10–40)
ANION GAP SERPL CALCULATED.3IONS-SCNC: 10 MMOL/L (ref 3–16)
ANION GAP SERPL CALCULATED.3IONS-SCNC: 5 MMOL/L (ref 3–16)
ANION GAP SERPL CALCULATED.3IONS-SCNC: 8 MMOL/L (ref 3–16)
AST SERPL-CCNC: 23 U/L (ref 15–37)
BASOPHILS # BLD: 0.1 K/UL (ref 0–0.2)
BASOPHILS NFR BLD: 1.1 %
BILIRUB SERPL-MCNC: <0.2 MG/DL (ref 0–1)
BUN SERPL-MCNC: 4 MG/DL (ref 7–20)
BUN SERPL-MCNC: 6 MG/DL (ref 7–20)
BUN SERPL-MCNC: 6 MG/DL (ref 7–20)
CALCIUM SERPL-MCNC: 7.8 MG/DL (ref 8.3–10.6)
CALCIUM SERPL-MCNC: 8.1 MG/DL (ref 8.3–10.6)
CALCIUM SERPL-MCNC: 8.2 MG/DL (ref 8.3–10.6)
CHLORIDE SERPL-SCNC: 100 MMOL/L (ref 99–110)
CHLORIDE SERPL-SCNC: 92 MMOL/L (ref 99–110)
CHLORIDE SERPL-SCNC: 97 MMOL/L (ref 99–110)
CO2 SERPL-SCNC: 23 MMOL/L (ref 21–32)
CO2 SERPL-SCNC: 25 MMOL/L (ref 21–32)
CO2 SERPL-SCNC: 25 MMOL/L (ref 21–32)
CREAT SERPL-MCNC: 0.6 MG/DL (ref 0.8–1.3)
CREAT SERPL-MCNC: 0.6 MG/DL (ref 0.8–1.3)
CREAT SERPL-MCNC: 0.7 MG/DL (ref 0.8–1.3)
DEPRECATED RDW RBC AUTO: 16.6 % (ref 12.4–15.4)
EKG ATRIAL RATE: 91 BPM
EKG DIAGNOSIS: NORMAL
EKG P AXIS: 75 DEGREES
EKG P-R INTERVAL: 168 MS
EKG Q-T INTERVAL: 396 MS
EKG QRS DURATION: 92 MS
EKG QTC CALCULATION (BAZETT): 487 MS
EKG R AXIS: 55 DEGREES
EKG T AXIS: 66 DEGREES
EKG VENTRICULAR RATE: 91 BPM
EOSINOPHIL # BLD: 0.1 K/UL (ref 0–0.6)
EOSINOPHIL NFR BLD: 1 %
ETHANOLAMINE SERPL-MCNC: NORMAL MG/DL (ref 0–0.08)
FLUAV RNA RESP QL NAA+PROBE: NOT DETECTED
FLUBV RNA RESP QL NAA+PROBE: NOT DETECTED
FOLATE SERPL-MCNC: 8.22 NG/ML (ref 4.78–24.2)
GFR SERPLBLD CREATININE-BSD FMLA CKD-EPI: >60 ML/MIN/{1.73_M2}
GLUCOSE SERPL-MCNC: 110 MG/DL (ref 70–99)
GLUCOSE SERPL-MCNC: 145 MG/DL (ref 70–99)
GLUCOSE SERPL-MCNC: 92 MG/DL (ref 70–99)
HCT VFR BLD AUTO: 30.6 % (ref 40.5–52.5)
HGB BLD-MCNC: 10.1 G/DL (ref 13.5–17.5)
INR PPP: 0.97 (ref 0.84–1.16)
LIPASE SERPL-CCNC: 48 U/L (ref 13–60)
LYMPHOCYTES # BLD: 1.7 K/UL (ref 1–5.1)
LYMPHOCYTES NFR BLD: 21.4 %
MAGNESIUM SERPL-MCNC: 1.9 MG/DL (ref 1.8–2.4)
MCH RBC QN AUTO: 32.4 PG (ref 26–34)
MCHC RBC AUTO-ENTMCNC: 33.1 G/DL (ref 31–36)
MCV RBC AUTO: 97.7 FL (ref 80–100)
MONOCYTES # BLD: 0.4 K/UL (ref 0–1.3)
MONOCYTES NFR BLD: 5.4 %
NEUTROPHILS # BLD: 5.6 K/UL (ref 1.7–7.7)
NEUTROPHILS NFR BLD: 71.1 %
NT-PROBNP SERPL-MCNC: 1680 PG/ML (ref 0–124)
OSMOLALITY SERPL: 263 MOSM/KG (ref 280–301)
OSMOLALITY UR: 94 MOSM/KG (ref 390–1070)
PHOSPHATE SERPL-MCNC: 2.8 MG/DL (ref 2.5–4.9)
PHOSPHATE SERPL-MCNC: 3.5 MG/DL (ref 2.5–4.9)
PLATELET # BLD AUTO: 364 K/UL (ref 135–450)
PMV BLD AUTO: 6.1 FL (ref 5–10.5)
POTASSIUM SERPL-SCNC: 3.1 MMOL/L (ref 3.5–5.1)
POTASSIUM SERPL-SCNC: 3.4 MMOL/L (ref 3.5–5.1)
POTASSIUM SERPL-SCNC: 3.4 MMOL/L (ref 3.5–5.1)
PROT SERPL-MCNC: 6.1 G/DL (ref 6.4–8.2)
PROTHROMBIN TIME: 12.9 SEC (ref 11.5–14.8)
RBC # BLD AUTO: 3.13 M/UL (ref 4.2–5.9)
SARS-COV-2 RNA RESP QL NAA+PROBE: NOT DETECTED
SODIUM SERPL-SCNC: 125 MMOL/L (ref 136–145)
SODIUM SERPL-SCNC: 130 MMOL/L (ref 136–145)
SODIUM SERPL-SCNC: 130 MMOL/L (ref 136–145)
SODIUM UR-SCNC: <20 MMOL/L
TROPONIN, HIGH SENSITIVITY: 16 NG/L (ref 0–22)
TROPONIN, HIGH SENSITIVITY: 16 NG/L (ref 0–22)
TSH SERPL DL<=0.005 MIU/L-ACNC: 1.53 UIU/ML (ref 0.27–4.2)
URATE SERPL-MCNC: 2.1 MG/DL (ref 3.5–7.2)
VIT B12 SERPL-MCNC: 703 PG/ML (ref 211–911)
WBC # BLD AUTO: 7.9 K/UL (ref 4–11)

## 2023-12-29 PROCEDURE — 83880 ASSAY OF NATRIURETIC PEPTIDE: CPT

## 2023-12-29 PROCEDURE — 2580000003 HC RX 258

## 2023-12-29 PROCEDURE — 6370000000 HC RX 637 (ALT 250 FOR IP)

## 2023-12-29 PROCEDURE — 85610 PROTHROMBIN TIME: CPT

## 2023-12-29 PROCEDURE — 94761 N-INVAS EAR/PLS OXIMETRY MLT: CPT

## 2023-12-29 PROCEDURE — 99285 EMERGENCY DEPT VISIT HI MDM: CPT

## 2023-12-29 PROCEDURE — 70450 CT HEAD/BRAIN W/O DYE: CPT

## 2023-12-29 PROCEDURE — 84484 ASSAY OF TROPONIN QUANT: CPT

## 2023-12-29 PROCEDURE — 93010 ELECTROCARDIOGRAM REPORT: CPT | Performed by: INTERNAL MEDICINE

## 2023-12-29 PROCEDURE — 94640 AIRWAY INHALATION TREATMENT: CPT

## 2023-12-29 PROCEDURE — 6370000000 HC RX 637 (ALT 250 FOR IP): Performed by: STUDENT IN AN ORGANIZED HEALTH CARE EDUCATION/TRAINING PROGRAM

## 2023-12-29 PROCEDURE — 83935 ASSAY OF URINE OSMOLALITY: CPT

## 2023-12-29 PROCEDURE — 82077 ASSAY SPEC XCP UR&BREATH IA: CPT

## 2023-12-29 PROCEDURE — 80053 COMPREHEN METABOLIC PANEL: CPT

## 2023-12-29 PROCEDURE — 84550 ASSAY OF BLOOD/URIC ACID: CPT

## 2023-12-29 PROCEDURE — 36415 COLL VENOUS BLD VENIPUNCTURE: CPT

## 2023-12-29 PROCEDURE — 82746 ASSAY OF FOLIC ACID SERUM: CPT

## 2023-12-29 PROCEDURE — 83735 ASSAY OF MAGNESIUM: CPT

## 2023-12-29 PROCEDURE — 82607 VITAMIN B-12: CPT

## 2023-12-29 PROCEDURE — 84100 ASSAY OF PHOSPHORUS: CPT

## 2023-12-29 PROCEDURE — 84300 ASSAY OF URINE SODIUM: CPT

## 2023-12-29 PROCEDURE — 84443 ASSAY THYROID STIM HORMONE: CPT

## 2023-12-29 PROCEDURE — 83690 ASSAY OF LIPASE: CPT

## 2023-12-29 PROCEDURE — 93005 ELECTROCARDIOGRAM TRACING: CPT | Performed by: STUDENT IN AN ORGANIZED HEALTH CARE EDUCATION/TRAINING PROGRAM

## 2023-12-29 PROCEDURE — 1200000000 HC SEMI PRIVATE

## 2023-12-29 PROCEDURE — 2580000003 HC RX 258: Performed by: STUDENT IN AN ORGANIZED HEALTH CARE EDUCATION/TRAINING PROGRAM

## 2023-12-29 PROCEDURE — 83930 ASSAY OF BLOOD OSMOLALITY: CPT

## 2023-12-29 PROCEDURE — 87636 SARSCOV2 & INF A&B AMP PRB: CPT

## 2023-12-29 PROCEDURE — 6360000002 HC RX W HCPCS

## 2023-12-29 PROCEDURE — 6370000000 HC RX 637 (ALT 250 FOR IP): Performed by: INTERNAL MEDICINE

## 2023-12-29 PROCEDURE — 2580000003 HC RX 258: Performed by: INTERNAL MEDICINE

## 2023-12-29 PROCEDURE — 85025 COMPLETE CBC W/AUTO DIFF WBC: CPT

## 2023-12-29 PROCEDURE — 99223 1ST HOSP IP/OBS HIGH 75: CPT

## 2023-12-29 RX ORDER — SODIUM CHLORIDE 9 MG/ML
INJECTION, SOLUTION INTRAVENOUS CONTINUOUS
Status: DISCONTINUED | OUTPATIENT
Start: 2023-12-29 | End: 2023-12-29

## 2023-12-29 RX ORDER — SODIUM CHLORIDE 9 MG/ML
1000 INJECTION, SOLUTION INTRAVENOUS CONTINUOUS
Status: DISCONTINUED | OUTPATIENT
Start: 2023-12-29 | End: 2023-12-29 | Stop reason: SDUPTHER

## 2023-12-29 RX ORDER — PANTOPRAZOLE SODIUM 40 MG/1
40 TABLET, DELAYED RELEASE ORAL
Status: DISCONTINUED | OUTPATIENT
Start: 2023-12-29 | End: 2024-01-01 | Stop reason: HOSPADM

## 2023-12-29 RX ORDER — LANOLIN ALCOHOL/MO/W.PET/CERES
100 CREAM (GRAM) TOPICAL DAILY
Status: DISCONTINUED | OUTPATIENT
Start: 2023-12-29 | End: 2024-01-01 | Stop reason: HOSPADM

## 2023-12-29 RX ORDER — MECOBALAMIN 5000 MCG
5 TABLET,DISINTEGRATING ORAL NIGHTLY
Status: DISCONTINUED | OUTPATIENT
Start: 2023-12-29 | End: 2024-01-01 | Stop reason: HOSPADM

## 2023-12-29 RX ORDER — LORAZEPAM 2 MG/ML
3 INJECTION INTRAMUSCULAR
Status: DISCONTINUED | OUTPATIENT
Start: 2023-12-29 | End: 2024-01-01 | Stop reason: HOSPADM

## 2023-12-29 RX ORDER — SODIUM CHLORIDE 9 MG/ML
INJECTION, SOLUTION INTRAVENOUS PRN
Status: DISCONTINUED | OUTPATIENT
Start: 2023-12-29 | End: 2024-01-01 | Stop reason: HOSPADM

## 2023-12-29 RX ORDER — POTASSIUM CHLORIDE 7.45 MG/ML
10 INJECTION INTRAVENOUS PRN
Status: DISCONTINUED | OUTPATIENT
Start: 2023-12-29 | End: 2024-01-01 | Stop reason: HOSPADM

## 2023-12-29 RX ORDER — SODIUM CHLORIDE 0.9 % (FLUSH) 0.9 %
5-40 SYRINGE (ML) INJECTION PRN
Status: DISCONTINUED | OUTPATIENT
Start: 2023-12-29 | End: 2024-01-01 | Stop reason: HOSPADM

## 2023-12-29 RX ORDER — LORAZEPAM 1 MG/1
1 TABLET ORAL
Status: DISCONTINUED | OUTPATIENT
Start: 2023-12-29 | End: 2024-01-01 | Stop reason: HOSPADM

## 2023-12-29 RX ORDER — ACETAMINOPHEN 325 MG/1
650 TABLET ORAL EVERY 6 HOURS PRN
Status: DISCONTINUED | OUTPATIENT
Start: 2023-12-29 | End: 2024-01-01 | Stop reason: HOSPADM

## 2023-12-29 RX ORDER — LORAZEPAM 2 MG/1
4 TABLET ORAL
Status: DISCONTINUED | OUTPATIENT
Start: 2023-12-29 | End: 2024-01-01 | Stop reason: HOSPADM

## 2023-12-29 RX ORDER — POTASSIUM CHLORIDE 20 MEQ/1
40 TABLET, EXTENDED RELEASE ORAL PRN
Status: DISCONTINUED | OUTPATIENT
Start: 2023-12-29 | End: 2024-01-01 | Stop reason: HOSPADM

## 2023-12-29 RX ORDER — RISPERIDONE 1 MG/1
1 TABLET ORAL DAILY
Status: DISCONTINUED | OUTPATIENT
Start: 2023-12-30 | End: 2024-01-01 | Stop reason: HOSPADM

## 2023-12-29 RX ORDER — MAGNESIUM SULFATE IN WATER 40 MG/ML
2000 INJECTION, SOLUTION INTRAVENOUS PRN
Status: DISCONTINUED | OUTPATIENT
Start: 2023-12-29 | End: 2024-01-01 | Stop reason: HOSPADM

## 2023-12-29 RX ORDER — ENOXAPARIN SODIUM 100 MG/ML
40 INJECTION SUBCUTANEOUS DAILY
Status: DISCONTINUED | OUTPATIENT
Start: 2023-12-29 | End: 2024-01-01 | Stop reason: HOSPADM

## 2023-12-29 RX ORDER — BUDESONIDE AND FORMOTEROL FUMARATE DIHYDRATE 80; 4.5 UG/1; UG/1
2 AEROSOL RESPIRATORY (INHALATION)
Status: DISCONTINUED | OUTPATIENT
Start: 2023-12-29 | End: 2024-01-01 | Stop reason: HOSPADM

## 2023-12-29 RX ORDER — DEXTROSE MONOHYDRATE 50 MG/ML
INJECTION, SOLUTION INTRAVENOUS CONTINUOUS
Status: ACTIVE | OUTPATIENT
Start: 2023-12-29 | End: 2023-12-29

## 2023-12-29 RX ORDER — POLYETHYLENE GLYCOL 3350 17 G/17G
17 POWDER, FOR SOLUTION ORAL DAILY PRN
Status: DISCONTINUED | OUTPATIENT
Start: 2023-12-29 | End: 2024-01-01 | Stop reason: HOSPADM

## 2023-12-29 RX ORDER — NICOTINE 21 MG/24HR
1 PATCH, TRANSDERMAL 24 HOURS TRANSDERMAL DAILY
Status: DISCONTINUED | OUTPATIENT
Start: 2023-12-29 | End: 2024-01-01 | Stop reason: HOSPADM

## 2023-12-29 RX ORDER — LORAZEPAM 2 MG/ML
1 INJECTION INTRAMUSCULAR
Status: DISCONTINUED | OUTPATIENT
Start: 2023-12-29 | End: 2024-01-01 | Stop reason: HOSPADM

## 2023-12-29 RX ORDER — LORAZEPAM 2 MG/1
2 TABLET ORAL
Status: DISCONTINUED | OUTPATIENT
Start: 2023-12-29 | End: 2024-01-01 | Stop reason: HOSPADM

## 2023-12-29 RX ORDER — ATORVASTATIN CALCIUM 40 MG/1
40 TABLET, FILM COATED ORAL DAILY
Status: DISCONTINUED | OUTPATIENT
Start: 2023-12-29 | End: 2024-01-01 | Stop reason: HOSPADM

## 2023-12-29 RX ORDER — LORAZEPAM 2 MG/ML
4 INJECTION INTRAMUSCULAR
Status: DISCONTINUED | OUTPATIENT
Start: 2023-12-29 | End: 2024-01-01 | Stop reason: HOSPADM

## 2023-12-29 RX ORDER — ONDANSETRON 2 MG/ML
4 INJECTION INTRAMUSCULAR; INTRAVENOUS EVERY 6 HOURS PRN
Status: DISCONTINUED | OUTPATIENT
Start: 2023-12-29 | End: 2024-01-01 | Stop reason: HOSPADM

## 2023-12-29 RX ORDER — LORAZEPAM 2 MG/ML
2 INJECTION INTRAMUSCULAR
Status: DISCONTINUED | OUTPATIENT
Start: 2023-12-29 | End: 2024-01-01 | Stop reason: HOSPADM

## 2023-12-29 RX ORDER — POTASSIUM CHLORIDE 20 MEQ/1
40 TABLET, EXTENDED RELEASE ORAL ONCE
Status: COMPLETED | OUTPATIENT
Start: 2023-12-29 | End: 2023-12-29

## 2023-12-29 RX ORDER — ALBUTEROL SULFATE 90 UG/1
2 AEROSOL, METERED RESPIRATORY (INHALATION) EVERY 6 HOURS PRN
Status: DISCONTINUED | OUTPATIENT
Start: 2023-12-29 | End: 2024-01-01 | Stop reason: HOSPADM

## 2023-12-29 RX ORDER — M-VIT,TX,IRON,MINS/CALC/FOLIC 27MG-0.4MG
1 TABLET ORAL DAILY
Status: DISCONTINUED | OUTPATIENT
Start: 2023-12-29 | End: 2024-01-01 | Stop reason: HOSPADM

## 2023-12-29 RX ORDER — ACETAMINOPHEN 650 MG/1
650 SUPPOSITORY RECTAL EVERY 6 HOURS PRN
Status: DISCONTINUED | OUTPATIENT
Start: 2023-12-29 | End: 2024-01-01 | Stop reason: HOSPADM

## 2023-12-29 RX ORDER — ONDANSETRON 4 MG/1
4 TABLET, ORALLY DISINTEGRATING ORAL EVERY 8 HOURS PRN
Status: DISCONTINUED | OUTPATIENT
Start: 2023-12-29 | End: 2024-01-01 | Stop reason: HOSPADM

## 2023-12-29 RX ORDER — SODIUM CHLORIDE 0.9 % (FLUSH) 0.9 %
5-40 SYRINGE (ML) INJECTION EVERY 12 HOURS SCHEDULED
Status: DISCONTINUED | OUTPATIENT
Start: 2023-12-29 | End: 2024-01-01 | Stop reason: HOSPADM

## 2023-12-29 RX ORDER — HYDROXYZINE 50 MG/1
50 TABLET, FILM COATED ORAL EVERY 8 HOURS PRN
Status: DISCONTINUED | OUTPATIENT
Start: 2023-12-29 | End: 2024-01-01 | Stop reason: HOSPADM

## 2023-12-29 RX ORDER — LISINOPRIL 10 MG/1
10 TABLET ORAL DAILY
Status: DISCONTINUED | OUTPATIENT
Start: 2023-12-30 | End: 2024-01-01 | Stop reason: HOSPADM

## 2023-12-29 RX ADMIN — SODIUM CHLORIDE: 9 INJECTION, SOLUTION INTRAVENOUS at 11:36

## 2023-12-29 RX ADMIN — SODIUM CHLORIDE 1000 ML: 9 INJECTION, SOLUTION INTRAVENOUS at 09:52

## 2023-12-29 RX ADMIN — DEXTROSE MONOHYDRATE: 50 INJECTION, SOLUTION INTRAVENOUS at 17:39

## 2023-12-29 RX ADMIN — ACETAMINOPHEN 650 MG: 325 TABLET ORAL at 18:44

## 2023-12-29 RX ADMIN — POTASSIUM CHLORIDE 40 MEQ: 1500 TABLET, EXTENDED RELEASE ORAL at 17:31

## 2023-12-29 RX ADMIN — Medication 10 ML: at 22:09

## 2023-12-29 RX ADMIN — ENOXAPARIN SODIUM 40 MG: 100 INJECTION SUBCUTANEOUS at 11:36

## 2023-12-29 RX ADMIN — Medication 10 ML: at 11:41

## 2023-12-29 RX ADMIN — Medication 1 TABLET: at 11:40

## 2023-12-29 RX ADMIN — PANTOPRAZOLE SODIUM 40 MG: 40 TABLET, DELAYED RELEASE ORAL at 17:31

## 2023-12-29 RX ADMIN — Medication 2 PUFF: at 20:11

## 2023-12-29 RX ADMIN — ATORVASTATIN CALCIUM 40 MG: 40 TABLET, FILM COATED ORAL at 17:31

## 2023-12-29 RX ADMIN — Medication 5 MG: at 22:08

## 2023-12-29 RX ADMIN — LORAZEPAM 1 MG: 1 TABLET ORAL at 18:44

## 2023-12-29 RX ADMIN — Medication 100 MG: at 09:53

## 2023-12-29 ASSESSMENT — LIFESTYLE VARIABLES
HOW MANY STANDARD DRINKS CONTAINING ALCOHOL DO YOU HAVE ON A TYPICAL DAY: 7 TO 9
HOW MANY STANDARD DRINKS CONTAINING ALCOHOL DO YOU HAVE ON A TYPICAL DAY: 10 OR MORE
HOW OFTEN DO YOU HAVE A DRINK CONTAINING ALCOHOL: 4 OR MORE TIMES A WEEK
HOW OFTEN DO YOU HAVE A DRINK CONTAINING ALCOHOL: 4 OR MORE TIMES A WEEK

## 2023-12-29 NOTE — CONSULTS
understands and agrees with the plan    Thank you for the consultation.  Please do not hesitate to call with questions.    Krzysztof Mckeon MD  Office: [phone]  Fax:    [phone]  [facility]

## 2023-12-29 NOTE — ED NOTES
Repeat BPM @1400   PRN K replacement   Stop IV fluids  1 L PO fluid restriction   Verbal orders from nephrologist Dr. Mckeon

## 2023-12-29 NOTE — H&P
daily 10/31/21   Marek Blackwell MD       Allergies:  Sulfa antibiotics    Social History:      TOBACCO:   reports that he has been smoking cigarettes. He started smoking about 52 years ago. He has a 105.4 pack-year smoking history. He has never used smokeless tobacco.  ETOH:   reports current alcohol use of about 70.0 standard drinks of alcohol per week.      Family History:   Positive as follows:        Problem Relation Age of Onset    Heart Failure Mother         heart disease age 63    Other Father         dementia    Cancer Father         prostate    Diabetes Brother     No Known Problems Brother     Heart Disease Brother         heart valve defective at birth, replaced age 25    No Known Problems Brother        REVIEW OF SYSTEMS:     Constitutional: Negative for fever, +generalized weakness   HENT: Negative for sore throat   Eyes: Negative for redness   Respiratory: Negative  for dyspnea, cough   Cardiovascular: Negative for chest pain   Gastrointestinal: Negative for vomiting, diarrhea   Genitourinary: Negative for hematuria   Musculoskeletal: Negative for arthralgias   Skin: Negative for rash   Neurological: Negative for syncope, +falls   Hematological: Negative for adenopathy   Psychiatric/Behavorial: Negative for anxiety    PHYSICAL EXAM:  /81   Pulse 88   Temp 98.4 °F (36.9 °C) (Oral)   Resp 23   Ht 1.702 m (5' 7\")   Wt 65.7 kg (144 lb 12.8 oz)   SpO2 100%   BMI 22.68 kg/m²   Gen: No distress. Alert.   Eyes: PERRL. No sclera icterus. No conjunctival injection.   ENT: No discharge. Pharynx clear.   Neck: No JVD. Trachea midline.  Resp: No accessory muscle use. No crackles. No wheezes. No rhonchi.   CV: Regular rate. Regular rhythm. No murmur.  No rub. No edema.   GI: Non-tender. Non-distended. Normal bowel sounds.  Skin: Warm and dry. No nodule on exposed extremities. No rash on exposed extremities.   M/S: No cyanosis. No joint deformity. No clubbing.   Neuro: Awake. Grossly nonfocal

## 2023-12-29 NOTE — PLAN OF CARE
Hyponatremia  Generalized weakness, falls  Alcohol abuse    Nephrology consult, PT/OT, monitor CIWA    2W w/ tele    Stacie Simmons PA-C  12/29/2023   11:06 AM

## 2023-12-29 NOTE — ED PROVIDER NOTES
additions  CIWA-Ar Total: 3           Is this patient to be included in the SEP-1 Core Measure due to severe sepsis or septic shock?   No   Exclusion criteria - the patient is NOT to be included for SEP-1 Core Measure due to:  2+ SIRS criteria are not met      TREATMENT:  During the patient's ED course, the patient was given:  Medications   thiamine tablet 100 mg (100 mg Oral Given 12/29/23 0936)   0.9 % sodium chloride infusion (1,000 mLs IntraVENous New Bag 12/29/23 0993)          REASSESSMENT:  On reassessment, patient remained stable in the emergency department.  Workup remarkable for hyponatremia.  Patient also has elevated BNP at 1680 without a history of heart failure.  Patient requires admission for further management and workup of hyponatremia, though it is likely in the setting of chronic alcohol abuse.  Patient also requires PT/OT evaluation given frequent falls. At this time, do feel the patient requires admission for further work-up and management.  Patient agrees to admission. Discussed the patient with hospital team, Dr Martinez, patient to be admitted to [facility].    Vitals:    Vitals:    12/29/23 1009 12/29/23 1011 12/29/23 1013 12/29/23 1014   BP: (!) 131/90 (!) 136/90 118/89 118/89   Pulse: 88  95 (!) 104   Resp: 14  19 19   Temp:       TempSrc:       SpO2: 100%  99% 95%   Weight:       Height:           CRITICAL CARE TIME     I personally spent a total of 0 minutes of critical care time in obtaining history, performing a physical exam, bedside monitoring of interventions, collecting and interpreting tests and discussion with consultants but excluding time spent performing procedures, treating other patients and teaching time.                   FINAL IMPRESSION      1. Hyponatremia    2. History of alcohol abuse    3. Frequent falls    4. Generalized weakness    5. Elevated brain natriuretic peptide (BNP) level          DISPOSITION/PLAN   Disposition: DISPOSITION      Condition: stable

## 2023-12-30 LAB
ANION GAP SERPL CALCULATED.3IONS-SCNC: 7 MMOL/L (ref 3–16)
ANION GAP SERPL CALCULATED.3IONS-SCNC: 8 MMOL/L (ref 3–16)
BASOPHILS # BLD: 0 K/UL (ref 0–0.2)
BASOPHILS NFR BLD: 0.8 %
BUN SERPL-MCNC: 5 MG/DL (ref 7–20)
BUN SERPL-MCNC: 6 MG/DL (ref 7–20)
CALCIUM SERPL-MCNC: 8 MG/DL (ref 8.3–10.6)
CALCIUM SERPL-MCNC: 8.1 MG/DL (ref 8.3–10.6)
CHLORIDE SERPL-SCNC: 100 MMOL/L (ref 99–110)
CHLORIDE SERPL-SCNC: 101 MMOL/L (ref 99–110)
CO2 SERPL-SCNC: 22 MMOL/L (ref 21–32)
CO2 SERPL-SCNC: 24 MMOL/L (ref 21–32)
CORTIS AM PEAK SERPL-MCNC: 9.6 UG/DL (ref 4.3–22.4)
CREAT SERPL-MCNC: 0.6 MG/DL (ref 0.8–1.3)
CREAT SERPL-MCNC: 0.7 MG/DL (ref 0.8–1.3)
DEPRECATED RDW RBC AUTO: 16.8 % (ref 12.4–15.4)
EOSINOPHIL # BLD: 0.1 K/UL (ref 0–0.6)
EOSINOPHIL NFR BLD: 1.6 %
GFR SERPLBLD CREATININE-BSD FMLA CKD-EPI: >60 ML/MIN/{1.73_M2}
GFR SERPLBLD CREATININE-BSD FMLA CKD-EPI: >60 ML/MIN/{1.73_M2}
GLUCOSE SERPL-MCNC: 111 MG/DL (ref 70–99)
GLUCOSE SERPL-MCNC: 94 MG/DL (ref 70–99)
HCT VFR BLD AUTO: 29 % (ref 40.5–52.5)
HGB BLD-MCNC: 9.7 G/DL (ref 13.5–17.5)
LYMPHOCYTES # BLD: 1.5 K/UL (ref 1–5.1)
LYMPHOCYTES NFR BLD: 29.3 %
MCH RBC QN AUTO: 33.1 PG (ref 26–34)
MCHC RBC AUTO-ENTMCNC: 33.6 G/DL (ref 31–36)
MCV RBC AUTO: 98.8 FL (ref 80–100)
MONOCYTES # BLD: 0.4 K/UL (ref 0–1.3)
MONOCYTES NFR BLD: 7.5 %
NEUTROPHILS # BLD: 3 K/UL (ref 1.7–7.7)
NEUTROPHILS NFR BLD: 60.8 %
OSMOLALITY SERPL: 275 MOSM/KG (ref 280–301)
OSMOLALITY UR: 322 MOSM/KG (ref 390–1070)
PLATELET # BLD AUTO: 322 K/UL (ref 135–450)
PMV BLD AUTO: 6 FL (ref 5–10.5)
POTASSIUM SERPL-SCNC: 3.6 MMOL/L (ref 3.5–5.1)
POTASSIUM SERPL-SCNC: 4 MMOL/L (ref 3.5–5.1)
RBC # BLD AUTO: 2.94 M/UL (ref 4.2–5.9)
SODIUM SERPL-SCNC: 123 MMOL/L (ref 136–145)
SODIUM SERPL-SCNC: 130 MMOL/L (ref 136–145)
SODIUM SERPL-SCNC: 132 MMOL/L (ref 136–145)
WBC # BLD AUTO: 5 K/UL (ref 4–11)

## 2023-12-30 PROCEDURE — 84295 ASSAY OF SERUM SODIUM: CPT

## 2023-12-30 PROCEDURE — 2580000003 HC RX 258: Performed by: INTERNAL MEDICINE

## 2023-12-30 PROCEDURE — 97530 THERAPEUTIC ACTIVITIES: CPT

## 2023-12-30 PROCEDURE — 1200000000 HC SEMI PRIVATE

## 2023-12-30 PROCEDURE — 83930 ASSAY OF BLOOD OSMOLALITY: CPT

## 2023-12-30 PROCEDURE — 94640 AIRWAY INHALATION TREATMENT: CPT

## 2023-12-30 PROCEDURE — 99232 SBSQ HOSP IP/OBS MODERATE 35: CPT | Performed by: INTERNAL MEDICINE

## 2023-12-30 PROCEDURE — 82533 TOTAL CORTISOL: CPT

## 2023-12-30 PROCEDURE — 80048 BASIC METABOLIC PNL TOTAL CA: CPT

## 2023-12-30 PROCEDURE — 94761 N-INVAS EAR/PLS OXIMETRY MLT: CPT

## 2023-12-30 PROCEDURE — 6360000002 HC RX W HCPCS

## 2023-12-30 PROCEDURE — 97166 OT EVAL MOD COMPLEX 45 MIN: CPT

## 2023-12-30 PROCEDURE — 83935 ASSAY OF URINE OSMOLALITY: CPT

## 2023-12-30 PROCEDURE — 85025 COMPLETE CBC W/AUTO DIFF WBC: CPT

## 2023-12-30 PROCEDURE — 6370000000 HC RX 637 (ALT 250 FOR IP)

## 2023-12-30 PROCEDURE — 36415 COLL VENOUS BLD VENIPUNCTURE: CPT

## 2023-12-30 PROCEDURE — 2580000003 HC RX 258

## 2023-12-30 PROCEDURE — 97162 PT EVAL MOD COMPLEX 30 MIN: CPT

## 2023-12-30 RX ORDER — SODIUM CHLORIDE 450 MG/100ML
INJECTION, SOLUTION INTRAVENOUS CONTINUOUS
Status: DISCONTINUED | OUTPATIENT
Start: 2023-12-30 | End: 2023-12-31

## 2023-12-30 RX ORDER — SODIUM CHLORIDE 9 MG/ML
INJECTION, SOLUTION INTRAVENOUS CONTINUOUS
Status: DISCONTINUED | OUTPATIENT
Start: 2023-12-30 | End: 2023-12-30

## 2023-12-30 RX ADMIN — ATORVASTATIN CALCIUM 40 MG: 40 TABLET, FILM COATED ORAL at 08:59

## 2023-12-30 RX ADMIN — ACETAMINOPHEN 650 MG: 325 TABLET ORAL at 04:40

## 2023-12-30 RX ADMIN — PANTOPRAZOLE SODIUM 40 MG: 40 TABLET, DELAYED RELEASE ORAL at 16:45

## 2023-12-30 RX ADMIN — Medication 10 ML: at 08:59

## 2023-12-30 RX ADMIN — ACETAMINOPHEN 650 MG: 325 TABLET ORAL at 21:12

## 2023-12-30 RX ADMIN — SODIUM CHLORIDE: 9 INJECTION, SOLUTION INTRAVENOUS at 16:45

## 2023-12-30 RX ADMIN — LISINOPRIL 10 MG: 10 TABLET ORAL at 08:59

## 2023-12-30 RX ADMIN — SODIUM CHLORIDE: 4.5 INJECTION, SOLUTION INTRAVENOUS at 21:09

## 2023-12-30 RX ADMIN — Medication 5 MG: at 21:08

## 2023-12-30 RX ADMIN — Medication 100 MG: at 08:59

## 2023-12-30 RX ADMIN — PANTOPRAZOLE SODIUM 40 MG: 40 TABLET, DELAYED RELEASE ORAL at 05:04

## 2023-12-30 RX ADMIN — ENOXAPARIN SODIUM 40 MG: 100 INJECTION SUBCUTANEOUS at 08:59

## 2023-12-30 RX ADMIN — RISPERIDONE 1 MG: 1 TABLET ORAL at 08:59

## 2023-12-30 RX ADMIN — Medication 2 PUFF: at 18:23

## 2023-12-30 RX ADMIN — LORAZEPAM 1 MG: 1 TABLET ORAL at 04:51

## 2023-12-30 RX ADMIN — Medication 1 TABLET: at 08:59

## 2023-12-30 RX ADMIN — Medication 2 PUFF: at 08:03

## 2023-12-30 ASSESSMENT — PAIN - FUNCTIONAL ASSESSMENT: PAIN_FUNCTIONAL_ASSESSMENT: PREVENTS OR INTERFERES SOME ACTIVE ACTIVITIES AND ADLS

## 2023-12-30 ASSESSMENT — PAIN SCALES - GENERAL: PAINLEVEL_OUTOF10: 3

## 2023-12-30 ASSESSMENT — PAIN DESCRIPTION - DESCRIPTORS: DESCRIPTORS: POUNDING

## 2023-12-30 ASSESSMENT — PAIN DESCRIPTION - LOCATION: LOCATION: HEAD

## 2023-12-30 ASSESSMENT — PAIN DESCRIPTION - ORIENTATION: ORIENTATION: ANTERIOR

## 2023-12-30 NOTE — PLAN OF CARE
Problem: Discharge Planning  Goal: Discharge to home or other facility with appropriate resources  12/30/2023 0957 by Nahomi Gallego, RN  Outcome: Progressing  12/29/2023 2324 by Mauro Ceja RN  Outcome: Progressing  Flowsheets (Taken 12/29/2023 2206)  Discharge to home or other facility with appropriate resources: Identify barriers to discharge with patient and caregiver  12/29/2023 2018 by Ivelisse Weston, RN  Outcome: Progressing     Problem: Safety - Adult  Goal: Free from fall injury  12/30/2023 0957 by Nahomi Gallego, RN  Outcome: Progressing  12/29/2023 2324 by Mauro Ceja RN  Outcome: Progressing  12/29/2023 2018 by Ivelisse Weston, RN  Outcome: Progressing

## 2023-12-30 NOTE — PLAN OF CARE
Problem: Discharge Planning  Goal: Discharge to home or other facility with appropriate resources  12/29/2023 2324 by Mauro Ceja, RN  Outcome: Progressing  Flowsheets (Taken 12/29/2023 2206)  Discharge to home or other facility with appropriate resources: Identify barriers to discharge with patient and caregiver  Problem: Safety - Adult  Goal: Free from fall injury  12/29/2023 2324 by Mauro Ceja, RN  Outcome: Progressing

## 2023-12-31 LAB
ANION GAP SERPL CALCULATED.3IONS-SCNC: 6 MMOL/L (ref 3–16)
ANION GAP SERPL CALCULATED.3IONS-SCNC: 7 MMOL/L (ref 3–16)
BASOPHILS # BLD: 0 K/UL (ref 0–0.2)
BASOPHILS NFR BLD: 0.9 %
BUN SERPL-MCNC: 6 MG/DL (ref 7–20)
BUN SERPL-MCNC: 8 MG/DL (ref 7–20)
CALCIUM SERPL-MCNC: 7.7 MG/DL (ref 8.3–10.6)
CALCIUM SERPL-MCNC: 7.9 MG/DL (ref 8.3–10.6)
CHLORIDE SERPL-SCNC: 101 MMOL/L (ref 99–110)
CHLORIDE SERPL-SCNC: 95 MMOL/L (ref 99–110)
CO2 SERPL-SCNC: 22 MMOL/L (ref 21–32)
CO2 SERPL-SCNC: 22 MMOL/L (ref 21–32)
CREAT SERPL-MCNC: 0.6 MG/DL (ref 0.8–1.3)
CREAT SERPL-MCNC: 0.7 MG/DL (ref 0.8–1.3)
DEPRECATED RDW RBC AUTO: 17.2 % (ref 12.4–15.4)
EOSINOPHIL # BLD: 0.1 K/UL (ref 0–0.6)
EOSINOPHIL NFR BLD: 1.3 %
GFR SERPLBLD CREATININE-BSD FMLA CKD-EPI: >60 ML/MIN/{1.73_M2}
GFR SERPLBLD CREATININE-BSD FMLA CKD-EPI: >60 ML/MIN/{1.73_M2}
GLUCOSE SERPL-MCNC: 115 MG/DL (ref 70–99)
GLUCOSE SERPL-MCNC: 156 MG/DL (ref 70–99)
HCT VFR BLD AUTO: 29.3 % (ref 40.5–52.5)
HGB BLD-MCNC: 9.8 G/DL (ref 13.5–17.5)
LYMPHOCYTES # BLD: 1.2 K/UL (ref 1–5.1)
LYMPHOCYTES NFR BLD: 26.5 %
MCH RBC QN AUTO: 33.6 PG (ref 26–34)
MCHC RBC AUTO-ENTMCNC: 33.6 G/DL (ref 31–36)
MCV RBC AUTO: 100 FL (ref 80–100)
MONOCYTES # BLD: 0.4 K/UL (ref 0–1.3)
MONOCYTES NFR BLD: 7.5 %
NEUTROPHILS # BLD: 3 K/UL (ref 1.7–7.7)
NEUTROPHILS NFR BLD: 63.8 %
OSMOLALITY UR: 532 MOSM/KG (ref 390–1070)
PLATELET # BLD AUTO: 316 K/UL (ref 135–450)
PMV BLD AUTO: 6 FL (ref 5–10.5)
POTASSIUM SERPL-SCNC: 3.4 MMOL/L (ref 3.5–5.1)
POTASSIUM SERPL-SCNC: 3.5 MMOL/L (ref 3.5–5.1)
RBC # BLD AUTO: 2.93 M/UL (ref 4.2–5.9)
SODIUM SERPL-SCNC: 123 MMOL/L (ref 136–145)
SODIUM SERPL-SCNC: 130 MMOL/L (ref 136–145)
SODIUM UR-SCNC: 94 MMOL/L
WBC # BLD AUTO: 4.7 K/UL (ref 4–11)

## 2023-12-31 PROCEDURE — 36415 COLL VENOUS BLD VENIPUNCTURE: CPT

## 2023-12-31 PROCEDURE — 85025 COMPLETE CBC W/AUTO DIFF WBC: CPT

## 2023-12-31 PROCEDURE — 83935 ASSAY OF URINE OSMOLALITY: CPT

## 2023-12-31 PROCEDURE — 1200000000 HC SEMI PRIVATE

## 2023-12-31 PROCEDURE — 6360000002 HC RX W HCPCS

## 2023-12-31 PROCEDURE — 97116 GAIT TRAINING THERAPY: CPT

## 2023-12-31 PROCEDURE — 84300 ASSAY OF URINE SODIUM: CPT

## 2023-12-31 PROCEDURE — 6370000000 HC RX 637 (ALT 250 FOR IP)

## 2023-12-31 PROCEDURE — 2580000003 HC RX 258

## 2023-12-31 PROCEDURE — 94640 AIRWAY INHALATION TREATMENT: CPT

## 2023-12-31 PROCEDURE — 94761 N-INVAS EAR/PLS OXIMETRY MLT: CPT

## 2023-12-31 PROCEDURE — 80048 BASIC METABOLIC PNL TOTAL CA: CPT

## 2023-12-31 PROCEDURE — 99232 SBSQ HOSP IP/OBS MODERATE 35: CPT | Performed by: INTERNAL MEDICINE

## 2023-12-31 RX ADMIN — PANTOPRAZOLE SODIUM 40 MG: 40 TABLET, DELAYED RELEASE ORAL at 15:54

## 2023-12-31 RX ADMIN — ACETAMINOPHEN 650 MG: 325 TABLET ORAL at 22:56

## 2023-12-31 RX ADMIN — Medication 10 ML: at 22:56

## 2023-12-31 RX ADMIN — ACETAMINOPHEN 650 MG: 325 TABLET ORAL at 08:31

## 2023-12-31 RX ADMIN — Medication 2 PUFF: at 08:01

## 2023-12-31 RX ADMIN — RISPERIDONE 1 MG: 1 TABLET ORAL at 08:31

## 2023-12-31 RX ADMIN — Medication 1 TABLET: at 08:31

## 2023-12-31 RX ADMIN — ATORVASTATIN CALCIUM 40 MG: 40 TABLET, FILM COATED ORAL at 08:31

## 2023-12-31 RX ADMIN — PANTOPRAZOLE SODIUM 40 MG: 40 TABLET, DELAYED RELEASE ORAL at 05:10

## 2023-12-31 RX ADMIN — ENOXAPARIN SODIUM 40 MG: 100 INJECTION SUBCUTANEOUS at 08:31

## 2023-12-31 RX ADMIN — LISINOPRIL 10 MG: 10 TABLET ORAL at 08:31

## 2023-12-31 RX ADMIN — Medication 5 MG: at 22:56

## 2023-12-31 RX ADMIN — Medication 2 PUFF: at 20:57

## 2023-12-31 RX ADMIN — Medication 100 MG: at 08:31

## 2023-12-31 ASSESSMENT — PAIN DESCRIPTION - DESCRIPTORS
DESCRIPTORS: ACHING
DESCRIPTORS: ACHING

## 2023-12-31 ASSESSMENT — PAIN - FUNCTIONAL ASSESSMENT
PAIN_FUNCTIONAL_ASSESSMENT: ACTIVITIES ARE NOT PREVENTED
PAIN_FUNCTIONAL_ASSESSMENT: ACTIVITIES ARE NOT PREVENTED

## 2023-12-31 ASSESSMENT — PAIN DESCRIPTION - LOCATION
LOCATION: OTHER (COMMENT)
LOCATION: HEAD

## 2023-12-31 ASSESSMENT — PAIN DESCRIPTION - PAIN TYPE: TYPE: ACUTE PAIN

## 2023-12-31 ASSESSMENT — PAIN DESCRIPTION - ORIENTATION: ORIENTATION: MID

## 2023-12-31 ASSESSMENT — PAIN DESCRIPTION - ONSET: ONSET: GRADUAL

## 2023-12-31 ASSESSMENT — PAIN SCALES - GENERAL
PAINLEVEL_OUTOF10: 5
PAINLEVEL_OUTOF10: 6

## 2023-12-31 ASSESSMENT — PAIN DESCRIPTION - FREQUENCY: FREQUENCY: INTERMITTENT

## 2023-12-31 NOTE — PLAN OF CARE
Problem: Discharge Planning  Goal: Discharge to home or other facility with appropriate resources  12/30/2023 2352 by Alvarez Jaramillo, RN  Outcome: Progressing  12/30/2023 0957 by Nahomi Gallego RN  Outcome: Progressing     Problem: Safety - Adult  Goal: Free from fall injury  12/30/2023 2352 by Alvarez Jaramillo, RN  Outcome: Progressing  12/30/2023 0957 by Nahomi Gallego, RN  Outcome: Progressing

## 2023-12-31 NOTE — PLAN OF CARE
Problem: Discharge Planning  Goal: Discharge to home or other facility with appropriate resources  12/30/2023 2352 by Alvarez Jaramillo, RN  Outcome: Progressing     Problem: Safety - Adult  Goal: Free from fall injury  12/31/2023 1106 by Nahomi Gallego, RN  Outcome: Progressing  12/30/2023 2352 by Alvarez Jaramillo, RN  Outcome: Progressing     Problem: Pain  Goal: Verbalizes/displays adequate comfort level or baseline comfort level  Outcome: Progressing

## 2023-12-31 NOTE — FLOWSHEET NOTE
12/30/23 2045   Vital Signs   Temp 97.7 °F (36.5 °C)   Temp Source Axillary   Pulse 84   Heart Rate Source Monitor   Respirations 18   BP (!) 153/90   MAP (Calculated) 111   BP Location Left upper arm   BP Method Automatic   Patient Position Semi fowlers   Pain Assessment   Pain Assessment None - Denies Pain   Opioid-Induced Sedation   POSS Score 1   RASS   Doyle Agitation Sedation Scale (RASS) 0   Oxygen Therapy   SpO2 100 %   O2 Device None (Room air)     Shift assessment completed. Pt is alert and oriented. Vital signs are WNL. Respirations are even & easy. No complaints voiced. Pt denies needs at this time. SR up x 2 and bed in low position. Call light is within reach. Will monitor.

## 2023-12-31 NOTE — CARE COORDINATION
Case Management Assessment  Initial Evaluation    Date/Time of Evaluation: 12/31/2023 12:09 PM  Assessment Completed by: Cherelle Turner RN    If patient is discharged prior to next notation, then this note serves as note for discharge by case management.    Patient Name: Kaleb Persaud                   YOB: 1960  Diagnosis: Hyponatremia [E87.1]  Generalized weakness [R53.1]  History of alcohol abuse [F10.11]  Elevated brain natriuretic peptide (BNP) level [R79.89]  Frequent falls [R29.6]                   Date / Time: 12/29/2023  9:03 AM    Patient Admission Status: Inpatient   Readmission Risk (Low < 19, Mod (19-27), High > 27): Readmission Risk Score: 14.6    Current PCP: Patricia Vieira APRN - CNP  PCP verified by CM? Yes (Patricia WARD)    Chart Reviewed: Yes      History Provided by: Patient  Patient Orientation: Alert and Oriented    Patient Cognition: Alert    Hospitalization in the last 30 days (Readmission):  No    If yes, Readmission Assessment in CM Navigator will be completed.    Advance Directives:      Code Status: Full Code   Patient's Primary Decision Maker is: Legal Next of Kin    Primary Decision Maker: Mona Robison - Brother/Sister - [phone]    Primary Decision Maker: HungLeon - Brother/Sister - [phone]    Primary Decision Maker: HungPatrice - Brother/Sister - [phone]    Discharge Planning:    Patient lives with: Alone Type of Home: House  Primary Care Giver: Self  Patient Support Systems include: Family Members   Current Financial resources: Medicare  Current community resources: None  Current services prior to admission: None            Current DME:              Type of Home Care services:  None    ADLS  Prior functional level: Independent in ADLs/IADLs  Current functional level: Independent in ADLs/IADLs    PT AM-PAC: 18 /24  OT AM-PAC: 18 /24    Family can provide assistance at DC: Yes (Siblings)  Would you like Case Management to discuss

## 2024-01-01 ENCOUNTER — APPOINTMENT (OUTPATIENT)
Dept: CT IMAGING | Age: 64
End: 2024-01-01
Payer: COMMERCIAL

## 2024-01-01 ENCOUNTER — APPOINTMENT (OUTPATIENT)
Dept: MRI IMAGING | Age: 64
End: 2024-01-01
Payer: COMMERCIAL

## 2024-01-01 ENCOUNTER — APPOINTMENT (OUTPATIENT)
Age: 64
End: 2024-01-01
Attending: INTERNAL MEDICINE
Payer: COMMERCIAL

## 2024-01-01 ENCOUNTER — APPOINTMENT (OUTPATIENT)
Dept: ULTRASOUND IMAGING | Age: 64
End: 2024-01-01
Payer: COMMERCIAL

## 2024-01-01 ENCOUNTER — APPOINTMENT (OUTPATIENT)
Dept: INTERVENTIONAL RADIOLOGY/VASCULAR | Age: 64
End: 2024-01-01
Attending: STUDENT IN AN ORGANIZED HEALTH CARE EDUCATION/TRAINING PROGRAM
Payer: COMMERCIAL

## 2024-01-01 ENCOUNTER — TELEPHONE (OUTPATIENT)
Dept: FAMILY MEDICINE CLINIC | Age: 64
End: 2024-01-01

## 2024-01-01 ENCOUNTER — APPOINTMENT (OUTPATIENT)
Dept: GENERAL RADIOLOGY | Age: 64
End: 2024-01-01
Payer: COMMERCIAL

## 2024-01-01 ENCOUNTER — HOSPITAL ENCOUNTER (INPATIENT)
Age: 64
LOS: 7 days | Discharge: HOSPICE | End: 2024-11-01
Attending: EMERGENCY MEDICINE | Admitting: INTERNAL MEDICINE
Payer: COMMERCIAL

## 2024-01-01 VITALS
DIASTOLIC BLOOD PRESSURE: 69 MMHG | HEART RATE: 63 BPM | WEIGHT: 143.3 LBS | OXYGEN SATURATION: 99 % | TEMPERATURE: 97.8 F | RESPIRATION RATE: 18 BRPM | HEIGHT: 67 IN | BODY MASS INDEX: 22.49 KG/M2 | SYSTOLIC BLOOD PRESSURE: 117 MMHG

## 2024-01-01 VITALS
RESPIRATION RATE: 16 BRPM | SYSTOLIC BLOOD PRESSURE: 103 MMHG | DIASTOLIC BLOOD PRESSURE: 79 MMHG | HEIGHT: 66 IN | TEMPERATURE: 97.2 F | OXYGEN SATURATION: 94 % | HEART RATE: 74 BPM | BODY MASS INDEX: 23.7 KG/M2 | WEIGHT: 147.49 LBS

## 2024-01-01 DIAGNOSIS — E87.1 HYPONATREMIA: ICD-10-CM

## 2024-01-01 DIAGNOSIS — N17.9 AKI (ACUTE KIDNEY INJURY) (HCC): ICD-10-CM

## 2024-01-01 DIAGNOSIS — B95.61 MSSA BACTEREMIA: ICD-10-CM

## 2024-01-01 DIAGNOSIS — R78.81 MSSA BACTEREMIA: ICD-10-CM

## 2024-01-01 DIAGNOSIS — R41.82 ALTERED MENTAL STATUS, UNSPECIFIED ALTERED MENTAL STATUS TYPE: Primary | ICD-10-CM

## 2024-01-01 LAB
25(OH)D3 SERPL-MCNC: 8.1 NG/ML
AFP-TM SERPL-MCNC: 3.9 UG/L
ALBUMIN FLD-MCNC: 0.3 G/DL
ALBUMIN SERPL-MCNC: 2.2 G/DL (ref 3.4–5)
ALBUMIN SERPL-MCNC: 2.3 G/DL (ref 3.4–5)
ALBUMIN SERPL-MCNC: 2.4 G/DL (ref 3.4–5)
ALBUMIN SERPL-MCNC: 2.6 G/DL (ref 3.4–5)
ALBUMIN SERPL-MCNC: 2.7 G/DL (ref 3.4–5)
ALBUMIN/GLOB SERPL: 1 {RATIO} (ref 1.1–2.2)
ALBUMIN/GLOB SERPL: 1 {RATIO} (ref 1.1–2.2)
ALBUMIN/GLOB SERPL: 1.2 {RATIO} (ref 1.1–2.2)
ALBUMIN/GLOB SERPL: 1.2 {RATIO} (ref 1.1–2.2)
ALBUMIN/GLOB SERPL: 1.3 {RATIO} (ref 1.1–2.2)
ALBUMIN/GLOB SERPL: 1.4 {RATIO} (ref 1.1–2.2)
ALP SERPL-CCNC: 207 U/L (ref 40–129)
ALP SERPL-CCNC: 215 U/L (ref 40–129)
ALP SERPL-CCNC: 229 U/L (ref 40–129)
ALP SERPL-CCNC: 234 U/L (ref 40–129)
ALP SERPL-CCNC: 241 U/L (ref 40–129)
ALP SERPL-CCNC: 249 U/L (ref 40–129)
ALP SERPL-CCNC: 284 U/L (ref 40–129)
ALP SERPL-CCNC: 337 U/L (ref 40–129)
ALT SERPL-CCNC: 38 U/L (ref 10–40)
ALT SERPL-CCNC: 49 U/L (ref 10–40)
ALT SERPL-CCNC: 57 U/L (ref 10–40)
ALT SERPL-CCNC: 60 U/L (ref 10–40)
ALT SERPL-CCNC: 65 U/L (ref 10–40)
ALT SERPL-CCNC: 71 U/L (ref 10–40)
ALT SERPL-CCNC: 79 U/L (ref 10–40)
ALT SERPL-CCNC: 80 U/L (ref 10–40)
AMMONIA PLAS-SCNC: 34 UMOL/L (ref 16–60)
AMMONIA PLAS-SCNC: 46 UMOL/L (ref 16–60)
AMPHETAMINES UR QL SCN>1000 NG/ML: NORMAL
ANA SER QL IA: NEGATIVE
ANION GAP SERPL CALCULATED.3IONS-SCNC: 10 MMOL/L (ref 3–16)
ANION GAP SERPL CALCULATED.3IONS-SCNC: 11 MMOL/L (ref 3–16)
ANION GAP SERPL CALCULATED.3IONS-SCNC: 12 MMOL/L (ref 3–16)
ANION GAP SERPL CALCULATED.3IONS-SCNC: 13 MMOL/L (ref 3–16)
ANION GAP SERPL CALCULATED.3IONS-SCNC: 14 MMOL/L (ref 3–16)
ANION GAP SERPL CALCULATED.3IONS-SCNC: 7 MMOL/L (ref 3–16)
ANION GAP SERPL CALCULATED.3IONS-SCNC: 7 MMOL/L (ref 3–16)
ANION GAP SERPL CALCULATED.3IONS-SCNC: 8 MMOL/L (ref 3–16)
ANION GAP SERPL CALCULATED.3IONS-SCNC: 9 MMOL/L (ref 3–16)
ANISOCYTOSIS BLD QL SMEAR: ABNORMAL
APAP SERPL-MCNC: <5 UG/ML (ref 10–30)
APPEARANCE CSF: CLEAR
APPEARANCE CSF: CLEAR
APPEARANCE FLUID: NORMAL
APPEARANCE FLUID: NORMAL
AST SERPL-CCNC: 128 U/L (ref 15–37)
AST SERPL-CCNC: 168 U/L (ref 15–37)
AST SERPL-CCNC: 171 U/L (ref 15–37)
AST SERPL-CCNC: 45 U/L (ref 15–37)
AST SERPL-CCNC: 53 U/L (ref 15–37)
AST SERPL-CCNC: 67 U/L (ref 15–37)
AST SERPL-CCNC: 68 U/L (ref 15–37)
AST SERPL-CCNC: 89 U/L (ref 15–37)
BACTERIA BLD CULT ORG #2: ABNORMAL
BACTERIA BLD CULT: ABNORMAL
BACTERIA BLD CULT: ABNORMAL
BACTERIA CSF CULT: NORMAL
BACTERIA CSF CULT: NORMAL
BACTERIA FLD AEROBE CULT: NORMAL
BACTERIA FLD AEROBE CULT: NORMAL
BACTERIA URNS QL MICRO: ABNORMAL /HPF
BACTERIA URNS QL MICRO: ABNORMAL /HPF
BARBITURATES UR QL SCN>200 NG/ML: NORMAL
BASE EXCESS BLDA CALC-SCNC: -2 MMOL/L (ref -3–3)
BASE EXCESS BLDV CALC-SCNC: -4.9 MMOL/L (ref -3–3)
BASOPHILS # BLD: 0 K/UL (ref 0–0.2)
BASOPHILS # BLD: 0.1 K/UL (ref 0–0.2)
BASOPHILS # BLD: 0.1 K/UL (ref 0–0.2)
BASOPHILS NFR BLD: 0 %
BASOPHILS NFR BLD: 0.3 %
BASOPHILS NFR BLD: 0.7 %
BASOPHILS NFR BLD: 1.1 %
BDY FLUID QUALITY: NORMAL
BDY FLUID QUALITY: NORMAL
BENZODIAZ UR QL SCN>200 NG/ML: NORMAL
BILIRUB DIRECT SERPL-MCNC: 1.2 MG/DL (ref 0–0.3)
BILIRUB DIRECT SERPL-MCNC: 1.7 MG/DL (ref 0–0.3)
BILIRUB DIRECT SERPL-MCNC: 2.5 MG/DL (ref 0–0.3)
BILIRUB INDIRECT SERPL-MCNC: 0.5 MG/DL (ref 0–1)
BILIRUB INDIRECT SERPL-MCNC: 0.7 MG/DL (ref 0–1)
BILIRUB INDIRECT SERPL-MCNC: 0.7 MG/DL (ref 0–1)
BILIRUB SERPL-MCNC: 1.2 MG/DL (ref 0–1)
BILIRUB SERPL-MCNC: 1.3 MG/DL (ref 0–1)
BILIRUB SERPL-MCNC: 1.7 MG/DL (ref 0–1)
BILIRUB SERPL-MCNC: 2.1 MG/DL (ref 0–1)
BILIRUB SERPL-MCNC: 2.4 MG/DL (ref 0–1)
BILIRUB SERPL-MCNC: 3 MG/DL (ref 0–1)
BILIRUB SERPL-MCNC: 3.2 MG/DL (ref 0–1)
BILIRUB SERPL-MCNC: 4 MG/DL (ref 0–1)
BILIRUB UR QL STRIP.AUTO: ABNORMAL
BILIRUB UR QL STRIP.AUTO: ABNORMAL
BILIRUB UR QL STRIP.AUTO: NEGATIVE
BUN SERPL-MCNC: 13 MG/DL (ref 7–20)
BUN SERPL-MCNC: 16 MG/DL (ref 7–20)
BUN SERPL-MCNC: 18 MG/DL (ref 7–20)
BUN SERPL-MCNC: 22 MG/DL (ref 7–20)
BUN SERPL-MCNC: 23 MG/DL (ref 7–20)
BUN SERPL-MCNC: 24 MG/DL (ref 7–20)
BUN SERPL-MCNC: 28 MG/DL (ref 7–20)
BUN SERPL-MCNC: 37 MG/DL (ref 7–20)
BUN SERPL-MCNC: 5 MG/DL (ref 7–20)
BUN SERPL-MCNC: 6 MG/DL (ref 7–20)
BUN SERPL-MCNC: 6 MG/DL (ref 7–20)
BUN SERPL-MCNC: 7 MG/DL (ref 7–20)
BUN SERPL-MCNC: 8 MG/DL (ref 7–20)
BUN SERPL-MCNC: 9 MG/DL (ref 7–20)
C3 SERPL-MCNC: 96.3 MG/DL (ref 90–180)
C4 SERPL-MCNC: 21.4 MG/DL (ref 10–40)
C4 SERPL-MCNC: 23.3 MG/DL (ref 10–40)
CA-I BLD-SCNC: 1.23 MMOL/L (ref 1.12–1.32)
CALCIUM SERPL-MCNC: 7.2 MG/DL (ref 8.3–10.6)
CALCIUM SERPL-MCNC: 7.3 MG/DL (ref 8.3–10.6)
CALCIUM SERPL-MCNC: 7.3 MG/DL (ref 8.3–10.6)
CALCIUM SERPL-MCNC: 7.4 MG/DL (ref 8.3–10.6)
CALCIUM SERPL-MCNC: 7.5 MG/DL (ref 8.3–10.6)
CALCIUM SERPL-MCNC: 7.6 MG/DL (ref 8.3–10.6)
CALCIUM SERPL-MCNC: 7.7 MG/DL (ref 8.3–10.6)
CALCIUM SERPL-MCNC: 7.8 MG/DL (ref 8.3–10.6)
CALCIUM SERPL-MCNC: 7.9 MG/DL (ref 8.3–10.6)
CALCIUM SERPL-MCNC: 7.9 MG/DL (ref 8.3–10.6)
CALCIUM SERPL-MCNC: 8 MG/DL (ref 8.3–10.6)
CALCIUM SERPL-MCNC: 8.1 MG/DL (ref 8.3–10.6)
CALCIUM SERPL-MCNC: 8.1 MG/DL (ref 8.3–10.6)
CANNABINOIDS UR QL SCN>50 NG/ML: NORMAL
CELL COUNT FLUID TYPE: NORMAL
CELL COUNT FLUID TYPE: NORMAL
CERULOPLASMIN SERPL-MCNC: 23 MG/DL (ref 15–30)
CHLORIDE SERPL-SCNC: 101 MMOL/L (ref 99–110)
CHLORIDE SERPL-SCNC: 101 MMOL/L (ref 99–110)
CHLORIDE SERPL-SCNC: 102 MMOL/L (ref 99–110)
CHLORIDE SERPL-SCNC: 103 MMOL/L (ref 99–110)
CHLORIDE SERPL-SCNC: 104 MMOL/L (ref 99–110)
CHLORIDE SERPL-SCNC: 105 MMOL/L (ref 99–110)
CHLORIDE SERPL-SCNC: 109 MMOL/L (ref 99–110)
CHLORIDE SERPL-SCNC: 111 MMOL/L (ref 99–110)
CHLORIDE SERPL-SCNC: 78 MMOL/L (ref 99–110)
CHLORIDE SERPL-SCNC: 79 MMOL/L (ref 99–110)
CHLORIDE SERPL-SCNC: 80 MMOL/L (ref 99–110)
CHLORIDE SERPL-SCNC: 81 MMOL/L (ref 99–110)
CHLORIDE SERPL-SCNC: 85 MMOL/L (ref 99–110)
CHLORIDE SERPL-SCNC: 86 MMOL/L (ref 99–110)
CHLORIDE SERPL-SCNC: 87 MMOL/L (ref 99–110)
CHLORIDE SERPL-SCNC: 87 MMOL/L (ref 99–110)
CHLORIDE SERPL-SCNC: 88 MMOL/L (ref 99–110)
CHLORIDE SERPL-SCNC: 89 MMOL/L (ref 99–110)
CHLORIDE SERPL-SCNC: 89 MMOL/L (ref 99–110)
CHLORIDE SERPL-SCNC: 91 MMOL/L (ref 99–110)
CHLORIDE SERPL-SCNC: 92 MMOL/L (ref 99–110)
CHLORIDE SERPL-SCNC: 93 MMOL/L (ref 99–110)
CHLORIDE SERPL-SCNC: 97 MMOL/L (ref 99–110)
CHLORIDE SERPL-SCNC: 99 MMOL/L (ref 99–110)
CHLORIDE SERPL-SCNC: 99 MMOL/L (ref 99–110)
CHLORIDE UR-SCNC: 50 MMOL/L
CK SERPL-CCNC: 1065 U/L (ref 39–308)
CK SERPL-CCNC: 109 U/L (ref 39–308)
CK SERPL-CCNC: 1090 U/L (ref 39–308)
CK SERPL-CCNC: 306 U/L (ref 39–308)
CK SERPL-CCNC: 59 U/L (ref 39–308)
CLARITY UR: ABNORMAL
CLARITY UR: ABNORMAL
CLARITY UR: CLEAR
CLOT EVALUATION CSF: ABNORMAL
CLOT EVALUATION CSF: ABNORMAL
CO2 BLDA-SCNC: 22 MMOL/L
CO2 BLDV-SCNC: 19 MMOL/L
CO2 SERPL-SCNC: 16 MMOL/L (ref 21–32)
CO2 SERPL-SCNC: 17 MMOL/L (ref 21–32)
CO2 SERPL-SCNC: 18 MMOL/L (ref 21–32)
CO2 SERPL-SCNC: 19 MMOL/L (ref 21–32)
CO2 SERPL-SCNC: 20 MMOL/L (ref 21–32)
CO2 SERPL-SCNC: 21 MMOL/L (ref 21–32)
CO2 SERPL-SCNC: 22 MMOL/L (ref 21–32)
CO2 SERPL-SCNC: 23 MMOL/L (ref 21–32)
COCAINE UR QL SCN: NORMAL
COHGB MFR BLDV: 4 % (ref 0–1.5)
COLOR CSF: COLORLESS
COLOR CSF: COLORLESS
COLOR FLUID: NORMAL
COLOR FLUID: NORMAL
COLOR UR: YELLOW
CORTIS AM PEAK SERPL-MCNC: 38.4 UG/DL (ref 4.3–22.4)
CORTIS F SERPL-MCNC: 13.11 UG/DL
CREAT SERPL-MCNC: 0.6 MG/DL (ref 0.8–1.3)
CREAT SERPL-MCNC: 0.7 MG/DL (ref 0.8–1.3)
CREAT SERPL-MCNC: 0.8 MG/DL (ref 0.8–1.3)
CREAT SERPL-MCNC: 0.9 MG/DL (ref 0.8–1.3)
CREAT SERPL-MCNC: 0.9 MG/DL (ref 0.8–1.3)
CREAT SERPL-MCNC: 1 MG/DL (ref 0.8–1.3)
CREAT SERPL-MCNC: 1 MG/DL (ref 0.8–1.3)
CREAT SERPL-MCNC: 1.1 MG/DL (ref 0.8–1.3)
CREAT SERPL-MCNC: 1.1 MG/DL (ref 0.8–1.3)
CREAT SERPL-MCNC: 1.2 MG/DL (ref 0.8–1.3)
CREAT SERPL-MCNC: 1.2 MG/DL (ref 0.8–1.3)
CREAT SERPL-MCNC: 1.3 MG/DL (ref 0.8–1.3)
CREAT SERPL-MCNC: 1.3 MG/DL (ref 0.8–1.3)
CREAT SERPL-MCNC: 1.6 MG/DL (ref 0.8–1.3)
CREAT SERPL-MCNC: 1.9 MG/DL (ref 0.8–1.3)
CREAT SERPL-MCNC: 2.7 MG/DL (ref 0.8–1.3)
CREAT SERPL-MCNC: 2.8 MG/DL (ref 0.8–1.3)
CREAT SERPL-MCNC: 3.2 MG/DL (ref 0.8–1.3)
CREAT SERPL-MCNC: 3.7 MG/DL (ref 0.8–1.3)
CREAT SERPL-MCNC: 4.6 MG/DL (ref 0.8–1.3)
CREAT UR-MCNC: 143 MG/DL (ref 39–259)
CREAT UR-MCNC: 80.2 MG/DL (ref 39–259)
CRYPTOC AG CSF QL: NEGATIVE
DEPRECATED RDW RBC AUTO: 14.7 % (ref 12.4–15.4)
DEPRECATED RDW RBC AUTO: 15.1 % (ref 12.4–15.4)
DEPRECATED RDW RBC AUTO: 15.5 % (ref 12.4–15.4)
DEPRECATED RDW RBC AUTO: 16 % (ref 12.4–15.4)
DEPRECATED RDW RBC AUTO: 16.2 % (ref 12.4–15.4)
DEPRECATED RDW RBC AUTO: 16.3 % (ref 12.4–15.4)
DEPRECATED RDW RBC AUTO: 17.3 % (ref 12.4–15.4)
DOHLE BOD BLD QL SMEAR: PRESENT
DRUG SCREEN COMMENT UR-IMP: NORMAL
ECHO AV MEAN GRADIENT: 3 MMHG
ECHO AV MEAN VELOCITY: 0.9 M/S
ECHO AV PEAK GRADIENT: 6 MMHG
ECHO AV PEAK VELOCITY: 1.3 M/S
ECHO AV VELOCITY RATIO: 0.54
ECHO AV VTI: 22.1 CM
ECHO BSA: 1.77 M2
ECHO LVOT AV VTI INDEX: 0.63
ECHO LVOT MEAN GRADIENT: 1 MMHG
ECHO LVOT PEAK GRADIENT: 2 MMHG
ECHO LVOT PEAK VELOCITY: 0.7 M/S
ECHO LVOT VTI: 13.9 CM
ECHO RV TAPSE: 1.3 CM (ref 1.7–?)
ECHO TV REGURGITANT MAX VELOCITY: 2.56 M/S
ECHO TV REGURGITANT PEAK GRADIENT: 26 MMHG
EKG ATRIAL RATE: 108 BPM
EKG ATRIAL RATE: 113 BPM
EKG ATRIAL RATE: 114 BPM
EKG DIAGNOSIS: NORMAL
EKG P AXIS: 79 DEGREES
EKG P AXIS: 82 DEGREES
EKG P AXIS: 92 DEGREES
EKG P-R INTERVAL: 166 MS
EKG P-R INTERVAL: 174 MS
EKG P-R INTERVAL: 176 MS
EKG Q-T INTERVAL: 336 MS
EKG Q-T INTERVAL: 348 MS
EKG Q-T INTERVAL: 352 MS
EKG QRS DURATION: 78 MS
EKG QRS DURATION: 84 MS
EKG QRS DURATION: 84 MS
EKG QTC CALCULATION (BAZETT): 463 MS
EKG QTC CALCULATION (BAZETT): 471 MS
EKG QTC CALCULATION (BAZETT): 477 MS
EKG R AXIS: 30 DEGREES
EKG R AXIS: 40 DEGREES
EKG R AXIS: 54 DEGREES
EKG T AXIS: 57 DEGREES
EKG T AXIS: 68 DEGREES
EKG T AXIS: 79 DEGREES
EKG VENTRICULAR RATE: 108 BPM
EKG VENTRICULAR RATE: 113 BPM
EKG VENTRICULAR RATE: 114 BPM
EOSINOPHIL # BLD: 0 K/UL (ref 0–0.6)
EOSINOPHIL # BLD: 0.1 K/UL (ref 0–0.6)
EOSINOPHIL NFR BLD: 0 %
EOSINOPHIL NFR BLD: 0.1 %
EOSINOPHIL NFR BLD: 1.1 %
EOSINOPHIL NFR FLD MANUAL: 1 %
EPI CELLS #/AREA URNS HPF: ABNORMAL /HPF (ref 0–5)
ETHANOLAMINE SERPL-MCNC: NORMAL MG/DL (ref 0–0.08)
FENTANYL SCREEN, URINE: NORMAL
FERRITIN SERPL IA-MCNC: 5260 NG/ML (ref 30–400)
FOLATE SERPL-MCNC: 8.32 NG/ML (ref 4.78–24.2)
GFR SERPLBLD CREATININE-BSD FMLA CKD-EPI: 13 ML/MIN/{1.73_M2}
GFR SERPLBLD CREATININE-BSD FMLA CKD-EPI: 17 ML/MIN/{1.73_M2}
GFR SERPLBLD CREATININE-BSD FMLA CKD-EPI: 21 ML/MIN/{1.73_M2}
GFR SERPLBLD CREATININE-BSD FMLA CKD-EPI: 24 ML/MIN/{1.73_M2}
GFR SERPLBLD CREATININE-BSD FMLA CKD-EPI: 25 ML/MIN/{1.73_M2}
GFR SERPLBLD CREATININE-BSD FMLA CKD-EPI: 39 ML/MIN/{1.73_M2}
GFR SERPLBLD CREATININE-BSD FMLA CKD-EPI: 47 ML/MIN/{1.73_M2}
GFR SERPLBLD CREATININE-BSD FMLA CKD-EPI: 61 ML/MIN/{1.73_M2}
GFR SERPLBLD CREATININE-BSD FMLA CKD-EPI: 61 ML/MIN/{1.73_M2}
GFR SERPLBLD CREATININE-BSD FMLA CKD-EPI: 67 ML/MIN/{1.73_M2}
GFR SERPLBLD CREATININE-BSD FMLA CKD-EPI: 67 ML/MIN/{1.73_M2}
GFR SERPLBLD CREATININE-BSD FMLA CKD-EPI: 74 ML/MIN/{1.73_M2}
GFR SERPLBLD CREATININE-BSD FMLA CKD-EPI: 74 ML/MIN/{1.73_M2}
GFR SERPLBLD CREATININE-BSD FMLA CKD-EPI: 83 ML/MIN/{1.73_M2}
GFR SERPLBLD CREATININE-BSD FMLA CKD-EPI: 83 ML/MIN/{1.73_M2}
GFR SERPLBLD CREATININE-BSD FMLA CKD-EPI: >60 ML/MIN/{1.73_M2}
GFR SERPLBLD CREATININE-BSD FMLA CKD-EPI: >90 ML/MIN/{1.73_M2}
GLUCOSE BLD-MCNC: 159 MG/DL (ref 70–99)
GLUCOSE CSF-MCNC: 86 MG/DL (ref 40–80)
GLUCOSE SERPL-MCNC: 101 MG/DL (ref 70–99)
GLUCOSE SERPL-MCNC: 106 MG/DL (ref 70–99)
GLUCOSE SERPL-MCNC: 112 MG/DL (ref 70–99)
GLUCOSE SERPL-MCNC: 114 MG/DL (ref 70–99)
GLUCOSE SERPL-MCNC: 116 MG/DL (ref 70–99)
GLUCOSE SERPL-MCNC: 119 MG/DL (ref 70–99)
GLUCOSE SERPL-MCNC: 119 MG/DL (ref 70–99)
GLUCOSE SERPL-MCNC: 127 MG/DL (ref 70–99)
GLUCOSE SERPL-MCNC: 128 MG/DL (ref 70–99)
GLUCOSE SERPL-MCNC: 131 MG/DL (ref 70–99)
GLUCOSE SERPL-MCNC: 132 MG/DL (ref 70–99)
GLUCOSE SERPL-MCNC: 132 MG/DL (ref 70–99)
GLUCOSE SERPL-MCNC: 133 MG/DL (ref 70–99)
GLUCOSE SERPL-MCNC: 140 MG/DL (ref 70–99)
GLUCOSE SERPL-MCNC: 145 MG/DL (ref 70–99)
GLUCOSE SERPL-MCNC: 148 MG/DL (ref 70–99)
GLUCOSE SERPL-MCNC: 149 MG/DL (ref 70–99)
GLUCOSE SERPL-MCNC: 150 MG/DL (ref 70–99)
GLUCOSE SERPL-MCNC: 153 MG/DL (ref 70–99)
GLUCOSE SERPL-MCNC: 155 MG/DL (ref 70–99)
GLUCOSE SERPL-MCNC: 164 MG/DL (ref 70–99)
GLUCOSE SERPL-MCNC: 171 MG/DL (ref 70–99)
GLUCOSE SERPL-MCNC: 173 MG/DL (ref 70–99)
GLUCOSE SERPL-MCNC: 190 MG/DL (ref 70–99)
GLUCOSE SERPL-MCNC: 190 MG/DL (ref 70–99)
GLUCOSE SERPL-MCNC: 197 MG/DL (ref 70–99)
GLUCOSE SERPL-MCNC: 198 MG/DL (ref 70–99)
GLUCOSE SERPL-MCNC: 217 MG/DL (ref 70–99)
GLUCOSE SERPL-MCNC: 243 MG/DL (ref 70–99)
GLUCOSE UR STRIP.AUTO-MCNC: 100 MG/DL
GLUCOSE UR STRIP.AUTO-MCNC: 250 MG/DL
GLUCOSE UR STRIP.AUTO-MCNC: NEGATIVE MG/DL
GRAM STN SPEC: NORMAL
HCO3 BLDA-SCNC: 21.5 MMOL/L (ref 21–29)
HCO3 BLDV-SCNC: 18.3 MMOL/L (ref 23–29)
HCT VFR BLD AUTO: 20.4 % (ref 40.5–52.5)
HCT VFR BLD AUTO: 21.2 % (ref 40.5–52.5)
HCT VFR BLD AUTO: 21.3 % (ref 40.5–52.5)
HCT VFR BLD AUTO: 21.7 % (ref 40.5–52.5)
HCT VFR BLD AUTO: 22.3 % (ref 40.5–52.5)
HCT VFR BLD AUTO: 23.2 % (ref 40.5–52.5)
HCT VFR BLD AUTO: 23.5 % (ref 40.5–52.5)
HCT VFR BLD AUTO: 26 % (ref 40.5–52.5)
HCT VFR BLD AUTO: 26 % (ref 40.5–52.5)
HCT VFR BLD AUTO: 29.5 % (ref 40.5–52.5)
HGB BLD CALC-MCNC: 8.9 GM/DL (ref 13.5–17.5)
HGB BLD-MCNC: 10 G/DL (ref 13.5–17.5)
HGB BLD-MCNC: 7 G/DL (ref 13.5–17.5)
HGB BLD-MCNC: 7 G/DL (ref 13.5–17.5)
HGB BLD-MCNC: 7.1 G/DL (ref 13.5–17.5)
HGB BLD-MCNC: 7.2 G/DL (ref 13.5–17.5)
HGB BLD-MCNC: 7.3 G/DL (ref 13.5–17.5)
HGB BLD-MCNC: 7.7 G/DL (ref 13.5–17.5)
HGB BLD-MCNC: 7.7 G/DL (ref 13.5–17.5)
HGB BLD-MCNC: 8.8 G/DL (ref 13.5–17.5)
HGB UR QL STRIP.AUTO: ABNORMAL
HGB UR QL STRIP.AUTO: ABNORMAL
HGB UR QL STRIP.AUTO: NEGATIVE
HISTONE IGG SER IA-ACNC: 0.2 UNITS (ref 0–0.9)
HIV 1+2 AB+HIV1 P24 AG SERPL QL IA: NORMAL
HIV 2 AB SERPL QL IA: NORMAL
HIV1 AB SERPL QL IA: NORMAL
HIV1 P24 AG SERPL QL IA: NORMAL
HSV1 DNA CSF QL NAA+PROBE: NOT DETECTED
HSV2 DNA CSF QL NAA+PROBE: NOT DETECTED
HYALINE CASTS #/AREA URNS LPF: ABNORMAL /LPF (ref 0–2)
INR PPP: 1.4 (ref 0.85–1.15)
INR PPP: 1.94 (ref 0.85–1.15)
IRON SATN MFR SERPL: 42 % (ref 20–50)
IRON SERPL-MCNC: 49 UG/DL (ref 59–158)
KETONES UR STRIP.AUTO-MCNC: 15 MG/DL
KETONES UR STRIP.AUTO-MCNC: 15 MG/DL
KETONES UR STRIP.AUTO-MCNC: NEGATIVE MG/DL
LACTATE BLD-SCNC: 0.77 MMOL/L (ref 0.4–2)
LEUKOCYTE ESTERASE UR QL STRIP.AUTO: ABNORMAL
LEUKOCYTE ESTERASE UR QL STRIP.AUTO: NEGATIVE
LEUKOCYTE ESTERASE UR QL STRIP.AUTO: NEGATIVE
LYMPHOCYTES # BLD: 0.5 K/UL (ref 1–5.1)
LYMPHOCYTES # BLD: 0.6 K/UL (ref 1–5.1)
LYMPHOCYTES # BLD: 0.8 K/UL (ref 1–5.1)
LYMPHOCYTES # BLD: 1 K/UL (ref 1–5.1)
LYMPHOCYTES # BLD: 1.1 K/UL (ref 1–5.1)
LYMPHOCYTES # BLD: 1.4 K/UL (ref 1–5.1)
LYMPHOCYTES # BLD: 1.6 K/UL (ref 1–5.1)
LYMPHOCYTES NFR BLD: 29.3 %
LYMPHOCYTES NFR BLD: 4 %
LYMPHOCYTES NFR BLD: 5 %
LYMPHOCYTES NFR BLD: 5.4 %
LYMPHOCYTES NFR BLD: 6 %
LYMPHOCYTES NFR BLD: 6.5 %
LYMPHOCYTES NFR BLD: 7 %
LYMPHOCYTES NFR FLD: 12 %
LYMPHOCYTES NFR FLD: 3 %
MACROCYTES BLD QL SMEAR: ABNORMAL
MACROPHAGES # FLD: 30 %
MACROPHAGES # FLD: 65 %
MAGNESIUM SERPL-MCNC: 1.8 MG/DL (ref 1.8–2.4)
MAGNESIUM SERPL-MCNC: 1.86 MG/DL (ref 1.8–2.4)
MAGNESIUM SERPL-MCNC: 1.87 MG/DL (ref 1.8–2.4)
MAGNESIUM SERPL-MCNC: 1.89 MG/DL (ref 1.8–2.4)
MAGNESIUM SERPL-MCNC: 1.9 MG/DL (ref 1.8–2.4)
MAGNESIUM SERPL-MCNC: 2.28 MG/DL (ref 1.8–2.4)
MANUAL DIF COMMENT CSF-IMP: ABNORMAL
MANUAL DIF COMMENT CSF-IMP: ABNORMAL
MCH RBC QN AUTO: 32.9 PG (ref 26–34)
MCH RBC QN AUTO: 34.9 PG (ref 26–34)
MCH RBC QN AUTO: 35 PG (ref 26–34)
MCH RBC QN AUTO: 35.2 PG (ref 26–34)
MCH RBC QN AUTO: 35.4 PG (ref 26–34)
MCH RBC QN AUTO: 35.5 PG (ref 26–34)
MCH RBC QN AUTO: 36.5 PG (ref 26–34)
MCHC RBC AUTO-ENTMCNC: 32.7 G/DL (ref 31–36)
MCHC RBC AUTO-ENTMCNC: 32.8 G/DL (ref 31–36)
MCHC RBC AUTO-ENTMCNC: 33.2 G/DL (ref 31–36)
MCHC RBC AUTO-ENTMCNC: 33.7 G/DL (ref 31–36)
MCHC RBC AUTO-ENTMCNC: 33.8 G/DL (ref 31–36)
MCHC RBC AUTO-ENTMCNC: 34 G/DL (ref 31–36)
MCHC RBC AUTO-ENTMCNC: 34.1 G/DL (ref 31–36)
MCV RBC AUTO: 105 FL (ref 80–100)
MCV RBC AUTO: 105.1 FL (ref 80–100)
MCV RBC AUTO: 106 FL (ref 80–100)
MCV RBC AUTO: 106.7 FL (ref 80–100)
MCV RBC AUTO: 106.9 FL (ref 80–100)
MCV RBC AUTO: 106.9 FL (ref 80–100)
MCV RBC AUTO: 96.8 FL (ref 80–100)
MENING+ENC PNL CSF NAA+NON-PROBE: NORMAL
MESOTHL CELL NFR FLD: 23 %
MESOTHL CELL NFR FLD: 7 %
METAMYELOCYTES NFR BLD MANUAL: 1 %
METHADONE UR QL SCN>300 NG/ML: NORMAL
METHGB MFR BLDV: 0.1 %
MICROCYTES BLD QL SMEAR: ABNORMAL
MICROCYTES BLD QL SMEAR: ABNORMAL
MONOCYTES # BLD: 0 K/UL (ref 0–1.3)
MONOCYTES # BLD: 0.4 K/UL (ref 0–1.3)
MONOCYTES # BLD: 0.5 K/UL (ref 0–1.3)
MONOCYTES # BLD: 0.9 K/UL (ref 0–1.3)
MONOCYTES # BLD: 1 K/UL (ref 0–1.3)
MONOCYTES # BLD: 1.1 K/UL (ref 0–1.3)
MONOCYTES # BLD: 1.4 K/UL (ref 0–1.3)
MONOCYTES NFR BLD: 0 %
MONOCYTES NFR BLD: 3 %
MONOCYTES NFR BLD: 6 %
MONOCYTES NFR BLD: 7 %
MONOCYTES NFR BLD: 7 %
MONOCYTES NFR BLD: 7.2 %
MONOCYTES NFR BLD: 8.4 %
MONONUCLEAR UNIDENTIFIED CELLS FLUID: 22 %
MYELOCYTES NFR BLD MANUAL: 1 %
MYELOCYTES NFR BLD MANUAL: 3 %
MYELOPEROXIDASE AB SER-ACNC: 0 AU/ML (ref 0–19)
NEUTROPHIL, FLUID: 12 %
NEUTROPHIL, FLUID: 25 %
NEUTROPHILS # BLD: 10.3 K/UL (ref 1.7–7.7)
NEUTROPHILS # BLD: 12.5 K/UL (ref 1.7–7.7)
NEUTROPHILS # BLD: 13.1 K/UL (ref 1.7–7.7)
NEUTROPHILS # BLD: 13.1 K/UL (ref 1.7–7.7)
NEUTROPHILS # BLD: 14.4 K/UL (ref 1.7–7.7)
NEUTROPHILS # BLD: 14.5 K/UL (ref 1.7–7.7)
NEUTROPHILS # BLD: 3 K/UL (ref 1.7–7.7)
NEUTROPHILS NFR BLD: 61 %
NEUTROPHILS NFR BLD: 61.3 %
NEUTROPHILS NFR BLD: 84.4 %
NEUTROPHILS NFR BLD: 86 %
NEUTROPHILS NFR BLD: 86 %
NEUTROPHILS NFR BLD: 87.2 %
NEUTROPHILS NFR BLD: 91 %
NEUTS BAND NFR BLD MANUAL: 1 % (ref 0–7)
NEUTS BAND NFR BLD MANUAL: 20 % (ref 0–7)
NEUTS BAND NFR BLD MANUAL: 4 % (ref 0–7)
NEUTS BAND NFR BLD MANUAL: 5 % (ref 0–7)
NITRITE UR QL STRIP.AUTO: NEGATIVE
NRBC BLD-RTO: 1 /100 WBC
NUC CELL # FLD MANUAL: 0 /CUMM (ref 0–5)
NUC CELL # FLD MANUAL: 1 /CUMM (ref 0–5)
NUC CELL # FLD: 113 /CUMM
NUC CELL # FLD: 84 /CUMM
O2 THERAPY: ABNORMAL
OPIATES UR QL SCN>300 NG/ML: NORMAL
ORGANISM: ABNORMAL
OSMOLALITY SERPL: 245 MOSM/KG (ref 280–301)
OSMOLALITY UR: 323 MOSM/KG (ref 390–1070)
OSMOLALITY UR: 372 MOSM/KG (ref 390–1070)
OXYCODONE UR QL SCN: NORMAL
PATH CONSULT FLUID: NORMAL
PATH REV: YES
PCO2 BLDA: 27.5 MM HG (ref 35–45)
PCO2 BLDV: 27.8 MMHG (ref 40–50)
PCP UR QL SCN>25 NG/ML: NORMAL
PERFORMED ON: ABNORMAL
PH BLDA: 7.5 [PH] (ref 7.35–7.45)
PH BLDV: 7.44 [PH] (ref 7.35–7.45)
PH UR STRIP.AUTO: 5.5 [PH] (ref 5–8)
PH UR STRIP.AUTO: 6 [PH] (ref 5–8)
PH UR STRIP.AUTO: 6 [PH] (ref 5–8)
PH UR STRIP: 5.5 [PH]
PHOSPHATE SERPL-MCNC: 1.7 MG/DL (ref 2.5–4.9)
PHOSPHATE SERPL-MCNC: 1.9 MG/DL (ref 2.5–4.9)
PHOSPHATE SERPL-MCNC: 2.2 MG/DL (ref 2.5–4.9)
PHOSPHATE SERPL-MCNC: 2.3 MG/DL (ref 2.5–4.9)
PHOSPHATE SERPL-MCNC: 2.3 MG/DL (ref 2.5–4.9)
PHOSPHATE SERPL-MCNC: 2.5 MG/DL (ref 2.5–4.9)
PHOSPHATE SERPL-MCNC: 3.2 MG/DL (ref 2.5–4.9)
PLATELET # BLD AUTO: 131 K/UL (ref 135–450)
PLATELET # BLD AUTO: 146 K/UL (ref 135–450)
PLATELET # BLD AUTO: 151 K/UL (ref 135–450)
PLATELET # BLD AUTO: 153 K/UL (ref 135–450)
PLATELET # BLD AUTO: 159 K/UL (ref 135–450)
PLATELET # BLD AUTO: 246 K/UL (ref 135–450)
PLATELET # BLD AUTO: 313 K/UL (ref 135–450)
PLATELET BLD QL SMEAR: ADEQUATE
PMV BLD AUTO: 6.2 FL (ref 5–10.5)
PMV BLD AUTO: 6.7 FL (ref 5–10.5)
PMV BLD AUTO: 6.8 FL (ref 5–10.5)
PMV BLD AUTO: 6.8 FL (ref 5–10.5)
PMV BLD AUTO: 6.9 FL (ref 5–10.5)
PMV BLD AUTO: 6.9 FL (ref 5–10.5)
PMV BLD AUTO: 7.4 FL (ref 5–10.5)
PO2 BLDA: 77.2 MM HG (ref 75–108)
PO2 BLDV: 51.2 MMHG (ref 25–40)
POC SAMPLE TYPE: ABNORMAL
POIKILOCYTOSIS BLD QL SMEAR: ABNORMAL
POLYCHROMASIA BLD QL SMEAR: ABNORMAL
POTASSIUM BLD-SCNC: 4.1 MMOL/L (ref 3.5–5.1)
POTASSIUM SERPL-SCNC: 3.2 MMOL/L (ref 3.5–5.1)
POTASSIUM SERPL-SCNC: 3.2 MMOL/L (ref 3.5–5.1)
POTASSIUM SERPL-SCNC: 3.3 MMOL/L (ref 3.5–5.1)
POTASSIUM SERPL-SCNC: 3.4 MMOL/L (ref 3.5–5.1)
POTASSIUM SERPL-SCNC: 3.5 MMOL/L (ref 3.5–5.1)
POTASSIUM SERPL-SCNC: 3.6 MMOL/L (ref 3.5–5.1)
POTASSIUM SERPL-SCNC: 3.6 MMOL/L (ref 3.5–5.1)
POTASSIUM SERPL-SCNC: 3.7 MMOL/L (ref 3.5–5.1)
POTASSIUM SERPL-SCNC: 3.8 MMOL/L (ref 3.5–5.1)
POTASSIUM SERPL-SCNC: 3.8 MMOL/L (ref 3.5–5.1)
POTASSIUM SERPL-SCNC: 4 MMOL/L (ref 3.5–5.1)
POTASSIUM SERPL-SCNC: 4.1 MMOL/L (ref 3.5–5.1)
POTASSIUM SERPL-SCNC: 4.1 MMOL/L (ref 3.5–5.1)
POTASSIUM SERPL-SCNC: 4.3 MMOL/L (ref 3.5–5.1)
POTASSIUM SERPL-SCNC: 4.4 MMOL/L (ref 3.5–5.1)
POTASSIUM SERPL-SCNC: 4.6 MMOL/L (ref 3.5–5.1)
POTASSIUM SERPL-SCNC: 5.5 MMOL/L (ref 3.5–5.1)
POTASSIUM SERPL-SCNC: 5.6 MMOL/L (ref 3.5–5.1)
POTASSIUM UR-SCNC: 71.6 MMOL/L
PROT CSF-MCNC: 29 MG/DL (ref 15–45)
PROT FLD-MCNC: 0.4 G/DL
PROT SERPL-MCNC: 4.3 G/DL (ref 6.4–8.2)
PROT SERPL-MCNC: 4.4 G/DL (ref 6.4–8.2)
PROT SERPL-MCNC: 4.5 G/DL (ref 6.4–8.2)
PROT SERPL-MCNC: 4.6 G/DL (ref 6.4–8.2)
PROT SERPL-MCNC: 4.7 G/DL (ref 6.4–8.2)
PROT SERPL-MCNC: 5 G/DL (ref 6.4–8.2)
PROT UR STRIP.AUTO-MCNC: 100 MG/DL
PROT UR STRIP.AUTO-MCNC: NEGATIVE MG/DL
PROT UR STRIP.AUTO-MCNC: NEGATIVE MG/DL
PROT UR-MCNC: 324 MG/DL
PROT/CREAT UR-RTO: 4 MG/DL
PROTEINASE3 AB SER-ACNC: 0 AU/ML (ref 0–19)
PROTHROMBIN TIME: 17.3 SEC (ref 11.9–14.9)
PROTHROMBIN TIME: 22.2 SEC (ref 11.9–14.9)
RBC # BLD AUTO: 1.91 M/UL (ref 4.2–5.9)
RBC # BLD AUTO: 1.99 M/UL (ref 4.2–5.9)
RBC # BLD AUTO: 2.03 M/UL (ref 4.2–5.9)
RBC # BLD AUTO: 2.04 M/UL (ref 4.2–5.9)
RBC # BLD AUTO: 2.19 M/UL (ref 4.2–5.9)
RBC # BLD AUTO: 2.47 M/UL (ref 4.2–5.9)
RBC # BLD AUTO: 3.04 M/UL (ref 4.2–5.9)
RBC # FLD MANUAL: 13 /CUMM
RBC # FLD MANUAL: 34 /CUMM
RBC #/AREA URNS HPF: ABNORMAL /HPF (ref 0–4)
RBC #/AREA URNS HPF: ABNORMAL /HPF (ref 0–4)
RBC FLUID: 900 /CUMM
RBC FLUID: <1000 /CUMM
REAGIN+T PALLIDUM IGG+IGM SERPL-IMP: NORMAL
REASON FOR REJECTION: NORMAL
REASON FOR REJECTION: NORMAL
REJECTED TEST: NORMAL
REJECTED TEST: NORMAL
REPORT: NORMAL
REPORT: NORMAL
SALICYLATES SERPL-MCNC: 0.5 MG/DL (ref 15–30)
SAO2 % BLDA: 97 % (ref 93–100)
SAO2 % BLDV: 86 %
SCHISTOCYTES BLD QL SMEAR: ABNORMAL
SCHISTOCYTES BLD QL SMEAR: ABNORMAL
SLIDE REVIEW: ABNORMAL
SODIUM BLD-SCNC: 119 MMOL/L (ref 136–145)
SODIUM SERPL-SCNC: 108 MMOL/L (ref 136–145)
SODIUM SERPL-SCNC: 109 MMOL/L (ref 136–145)
SODIUM SERPL-SCNC: 110 MMOL/L (ref 136–145)
SODIUM SERPL-SCNC: 111 MMOL/L (ref 136–145)
SODIUM SERPL-SCNC: 111 MMOL/L (ref 136–145)
SODIUM SERPL-SCNC: 112 MMOL/L (ref 136–145)
SODIUM SERPL-SCNC: 115 MMOL/L (ref 136–145)
SODIUM SERPL-SCNC: 116 MMOL/L (ref 136–145)
SODIUM SERPL-SCNC: 117 MMOL/L (ref 136–145)
SODIUM SERPL-SCNC: 118 MMOL/L (ref 136–145)
SODIUM SERPL-SCNC: 118 MMOL/L (ref 136–145)
SODIUM SERPL-SCNC: 119 MMOL/L (ref 136–145)
SODIUM SERPL-SCNC: 119 MMOL/L (ref 136–145)
SODIUM SERPL-SCNC: 120 MMOL/L (ref 136–145)
SODIUM SERPL-SCNC: 121 MMOL/L (ref 136–145)
SODIUM SERPL-SCNC: 122 MMOL/L (ref 136–145)
SODIUM SERPL-SCNC: 124 MMOL/L (ref 136–145)
SODIUM SERPL-SCNC: 126 MMOL/L (ref 136–145)
SODIUM SERPL-SCNC: 127 MMOL/L (ref 136–145)
SODIUM SERPL-SCNC: 128 MMOL/L (ref 136–145)
SODIUM SERPL-SCNC: 129 MMOL/L (ref 136–145)
SODIUM SERPL-SCNC: 131 MMOL/L (ref 136–145)
SODIUM SERPL-SCNC: 133 MMOL/L (ref 136–145)
SODIUM SERPL-SCNC: 134 MMOL/L (ref 136–145)
SODIUM SERPL-SCNC: 135 MMOL/L (ref 136–145)
SODIUM SERPL-SCNC: 136 MMOL/L (ref 136–145)
SODIUM SERPL-SCNC: 137 MMOL/L (ref 136–145)
SODIUM SERPL-SCNC: 138 MMOL/L (ref 136–145)
SODIUM SERPL-SCNC: 140 MMOL/L (ref 136–145)
SODIUM SERPL-SCNC: 142 MMOL/L (ref 136–145)
SODIUM UR-SCNC: 48 MMOL/L
SODIUM UR-SCNC: <20 MMOL/L
SP GR UR STRIP.AUTO: 1.02 (ref 1–1.03)
SP GR UR STRIP.AUTO: 1.02 (ref 1–1.03)
SP GR UR STRIP.AUTO: >=1.03 (ref 1–1.03)
SPECIMEN SOURCE FLD: NORMAL
SPECIMEN SOURCE: NORMAL
SPECIMEN VOL CSF: 3 ML
STOMATOCYTES BLD QL SMEAR: ABNORMAL
TARGETS BLD QL SMEAR: ABNORMAL
TIBC SERPL-MCNC: 118 UG/DL (ref 260–445)
TOTAL CELLS COUNTED FLD: 100
TOTAL CELLS COUNTED FLD: 100
TSH SERPL DL<=0.005 MIU/L-ACNC: 1.31 UIU/ML (ref 0.27–4.2)
TUBE # CSF: ABNORMAL
UA COMPLETE W REFLEX CULTURE PNL UR: ABNORMAL
UA DIPSTICK W REFLEX MICRO PNL UR: ABNORMAL
UA DIPSTICK W REFLEX MICRO PNL UR: YES
UA DIPSTICK W REFLEX MICRO PNL UR: YES
URATE SERPL-MCNC: 5 MG/DL (ref 3.5–7.2)
URN SPEC COLLECT METH UR: ABNORMAL
UROBILINOGEN UR STRIP-ACNC: 1 E.U./DL
UUN UR-MCNC: 420 MG/DL (ref 800–1666)
VANCOMYCIN SERPL-MCNC: 15.7 UG/ML
VARIANT LYMPHS NFR BLD MANUAL: 3 % (ref 0–6)
VDRL CSF QL: NON REACTIVE
VIT B1 BLD-MCNC: 240 NMOL/L (ref 70–180)
VIT B12 SERPL-MCNC: 2246 PG/ML (ref 211–911)
WBC # BLD AUTO: 10.8 K/UL (ref 4–11)
WBC # BLD AUTO: 14 K/UL (ref 4–11)
WBC # BLD AUTO: 15 K/UL (ref 4–11)
WBC # BLD AUTO: 15.6 K/UL (ref 4–11)
WBC # BLD AUTO: 15.9 K/UL (ref 4–11)
WBC # BLD AUTO: 17 K/UL (ref 4–11)
WBC # BLD AUTO: 4.9 K/UL (ref 4–11)
WBC #/AREA URNS HPF: >100 /HPF (ref 0–5)
WBC #/AREA URNS HPF: ABNORMAL /HPF (ref 0–5)

## 2024-01-01 PROCEDURE — 49083 ABD PARACENTESIS W/IMAGING: CPT

## 2024-01-01 PROCEDURE — 2500000003 HC RX 250 WO HCPCS: Performed by: INTERNAL MEDICINE

## 2024-01-01 PROCEDURE — 6360000002 HC RX W HCPCS: Performed by: FAMILY MEDICINE

## 2024-01-01 PROCEDURE — 87205 SMEAR GRAM STAIN: CPT

## 2024-01-01 PROCEDURE — 82550 ASSAY OF CK (CPK): CPT

## 2024-01-01 PROCEDURE — 99232 SBSQ HOSP IP/OBS MODERATE 35: CPT | Performed by: INTERNAL MEDICINE

## 2024-01-01 PROCEDURE — P9047 ALBUMIN (HUMAN), 25%, 50ML: HCPCS | Performed by: INTERNAL MEDICINE

## 2024-01-01 PROCEDURE — 80076 HEPATIC FUNCTION PANEL: CPT

## 2024-01-01 PROCEDURE — 87390 HIV-1 AG IA: CPT

## 2024-01-01 PROCEDURE — 2580000003 HC RX 258: Performed by: INTERNAL MEDICINE

## 2024-01-01 PROCEDURE — 94640 AIRWAY INHALATION TREATMENT: CPT

## 2024-01-01 PROCEDURE — 83935 ASSAY OF URINE OSMOLALITY: CPT

## 2024-01-01 PROCEDURE — 99233 SBSQ HOSP IP/OBS HIGH 50: CPT | Performed by: PSYCHIATRY & NEUROLOGY

## 2024-01-01 PROCEDURE — 51798 US URINE CAPACITY MEASURE: CPT

## 2024-01-01 PROCEDURE — 6370000000 HC RX 637 (ALT 250 FOR IP): Performed by: INTERNAL MEDICINE

## 2024-01-01 PROCEDURE — 84295 ASSAY OF SERUM SODIUM: CPT

## 2024-01-01 PROCEDURE — 82947 ASSAY GLUCOSE BLOOD QUANT: CPT

## 2024-01-01 PROCEDURE — 86160 COMPLEMENT ANTIGEN: CPT

## 2024-01-01 PROCEDURE — 70551 MRI BRAIN STEM W/O DYE: CPT

## 2024-01-01 PROCEDURE — 82077 ASSAY SPEC XCP UR&BREATH IA: CPT

## 2024-01-01 PROCEDURE — 2700000000 HC OXYGEN THERAPY PER DAY

## 2024-01-01 PROCEDURE — 82728 ASSAY OF FERRITIN: CPT

## 2024-01-01 PROCEDURE — 2580000003 HC RX 258: Performed by: STUDENT IN AN ORGANIZED HEALTH CARE EDUCATION/TRAINING PROGRAM

## 2024-01-01 PROCEDURE — 2000000000 HC ICU R&B

## 2024-01-01 PROCEDURE — 85610 PROTHROMBIN TIME: CPT

## 2024-01-01 PROCEDURE — 36415 COLL VENOUS BLD VENIPUNCTURE: CPT

## 2024-01-01 PROCEDURE — 6360000002 HC RX W HCPCS: Performed by: INTERNAL MEDICINE

## 2024-01-01 PROCEDURE — 009U3ZX DRAINAGE OF SPINAL CANAL, PERCUTANEOUS APPROACH, DIAGNOSTIC: ICD-10-PCS | Performed by: INTERNAL MEDICINE

## 2024-01-01 PROCEDURE — 83735 ASSAY OF MAGNESIUM: CPT

## 2024-01-01 PROCEDURE — 83550 IRON BINDING TEST: CPT

## 2024-01-01 PROCEDURE — 80069 RENAL FUNCTION PANEL: CPT

## 2024-01-01 PROCEDURE — 6360000002 HC RX W HCPCS: Performed by: STUDENT IN AN ORGANIZED HEALTH CARE EDUCATION/TRAINING PROGRAM

## 2024-01-01 PROCEDURE — 6370000000 HC RX 637 (ALT 250 FOR IP)

## 2024-01-01 PROCEDURE — 84300 ASSAY OF URINE SODIUM: CPT

## 2024-01-01 PROCEDURE — 99291 CRITICAL CARE FIRST HOUR: CPT | Performed by: INTERNAL MEDICINE

## 2024-01-01 PROCEDURE — 82330 ASSAY OF CALCIUM: CPT

## 2024-01-01 PROCEDURE — 80053 COMPREHEN METABOLIC PANEL: CPT

## 2024-01-01 PROCEDURE — 02PYX3Z REMOVAL OF INFUSION DEVICE FROM GREAT VESSEL, EXTERNAL APPROACH: ICD-10-PCS | Performed by: STUDENT IN AN ORGANIZED HEALTH CARE EDUCATION/TRAINING PROGRAM

## 2024-01-01 PROCEDURE — 84156 ASSAY OF PROTEIN URINE: CPT

## 2024-01-01 PROCEDURE — 76937 US GUIDE VASCULAR ACCESS: CPT

## 2024-01-01 PROCEDURE — 86592 SYPHILIS TEST NON-TREP QUAL: CPT

## 2024-01-01 PROCEDURE — 85014 HEMATOCRIT: CPT

## 2024-01-01 PROCEDURE — 6360000002 HC RX W HCPCS

## 2024-01-01 PROCEDURE — 85025 COMPLETE CBC W/AUTO DIFF WBC: CPT

## 2024-01-01 PROCEDURE — 83516 IMMUNOASSAY NONANTIBODY: CPT

## 2024-01-01 PROCEDURE — 88305 TISSUE EXAM BY PATHOLOGIST: CPT

## 2024-01-01 PROCEDURE — 89051 BODY FLUID CELL COUNT: CPT

## 2024-01-01 PROCEDURE — 93005 ELECTROCARDIOGRAM TRACING: CPT

## 2024-01-01 PROCEDURE — 36569 INSJ PICC 5 YR+ W/O IMAGING: CPT

## 2024-01-01 PROCEDURE — 80048 BASIC METABOLIC PNL TOTAL CA: CPT

## 2024-01-01 PROCEDURE — 36592 COLLECT BLOOD FROM PICC: CPT

## 2024-01-01 PROCEDURE — 6370000000 HC RX 637 (ALT 250 FOR IP): Performed by: NURSE PRACTITIONER

## 2024-01-01 PROCEDURE — 6360000002 HC RX W HCPCS: Performed by: NURSE PRACTITIONER

## 2024-01-01 PROCEDURE — 97530 THERAPEUTIC ACTIVITIES: CPT

## 2024-01-01 PROCEDURE — 86701 HIV-1ANTIBODY: CPT

## 2024-01-01 PROCEDURE — 87150 DNA/RNA AMPLIFIED PROBE: CPT

## 2024-01-01 PROCEDURE — 3E0G76Z INTRODUCTION OF NUTRITIONAL SUBSTANCE INTO UPPER GI, VIA NATURAL OR ARTIFICIAL OPENING: ICD-10-PCS | Performed by: INTERNAL MEDICINE

## 2024-01-01 PROCEDURE — 84550 ASSAY OF BLOOD/URIC ACID: CPT

## 2024-01-01 PROCEDURE — 2580000003 HC RX 258

## 2024-01-01 PROCEDURE — 84100 ASSAY OF PHOSPHORUS: CPT

## 2024-01-01 PROCEDURE — 87899 AGENT NOS ASSAY W/OPTIC: CPT

## 2024-01-01 PROCEDURE — 90471 IMMUNIZATION ADMIN: CPT | Performed by: EMERGENCY MEDICINE

## 2024-01-01 PROCEDURE — 74018 RADEX ABDOMEN 1 VIEW: CPT

## 2024-01-01 PROCEDURE — 2500000003 HC RX 250 WO HCPCS

## 2024-01-01 PROCEDURE — 82436 ASSAY OF URINE CHLORIDE: CPT

## 2024-01-01 PROCEDURE — 88112 CYTOPATH CELL ENHANCE TECH: CPT

## 2024-01-01 PROCEDURE — 95816 EEG AWAKE AND DROWSY: CPT | Performed by: PSYCHIATRY & NEUROLOGY

## 2024-01-01 PROCEDURE — 93306 TTE W/DOPPLER COMPLETE: CPT

## 2024-01-01 PROCEDURE — 84133 ASSAY OF URINE POTASSIUM: CPT

## 2024-01-01 PROCEDURE — C1751 CATH, INF, PER/CENT/MIDLINE: HCPCS

## 2024-01-01 PROCEDURE — 51702 INSERT TEMP BLADDER CATH: CPT

## 2024-01-01 PROCEDURE — 83605 ASSAY OF LACTIC ACID: CPT

## 2024-01-01 PROCEDURE — 82306 VITAMIN D 25 HYDROXY: CPT

## 2024-01-01 PROCEDURE — 0DH67UZ INSERTION OF FEEDING DEVICE INTO STOMACH, VIA NATURAL OR ARTIFICIAL OPENING: ICD-10-PCS | Performed by: INTERNAL MEDICINE

## 2024-01-01 PROCEDURE — 02HV33Z INSERTION OF INFUSION DEVICE INTO SUPERIOR VENA CAVA, PERCUTANEOUS APPROACH: ICD-10-PCS | Performed by: STUDENT IN AN ORGANIZED HEALTH CARE EDUCATION/TRAINING PROGRAM

## 2024-01-01 PROCEDURE — 87186 SC STD MICRODIL/AGAR DIL: CPT

## 2024-01-01 PROCEDURE — 82570 ASSAY OF URINE CREATININE: CPT

## 2024-01-01 PROCEDURE — A9579 GAD-BASE MR CONTRAST NOS,1ML: HCPCS | Performed by: STUDENT IN AN ORGANIZED HEALTH CARE EDUCATION/TRAINING PROGRAM

## 2024-01-01 PROCEDURE — 72125 CT NECK SPINE W/O DYE: CPT

## 2024-01-01 PROCEDURE — 80143 DRUG ASSAY ACETAMINOPHEN: CPT

## 2024-01-01 PROCEDURE — 99223 1ST HOSP IP/OBS HIGH 75: CPT | Performed by: STUDENT IN AN ORGANIZED HEALTH CARE EDUCATION/TRAINING PROGRAM

## 2024-01-01 PROCEDURE — 80307 DRUG TEST PRSMV CHEM ANLYZR: CPT

## 2024-01-01 PROCEDURE — 89050 BODY FLUID CELL COUNT: CPT

## 2024-01-01 PROCEDURE — 87070 CULTURE OTHR SPECIMN AEROBIC: CPT

## 2024-01-01 PROCEDURE — 86038 ANTINUCLEAR ANTIBODIES: CPT

## 2024-01-01 PROCEDURE — 93306 TTE W/DOPPLER COMPLETE: CPT | Performed by: INTERNAL MEDICINE

## 2024-01-01 PROCEDURE — 6370000000 HC RX 637 (ALT 250 FOR IP): Performed by: REGISTERED NURSE

## 2024-01-01 PROCEDURE — 84443 ASSAY THYROID STIM HORMONE: CPT

## 2024-01-01 PROCEDURE — 83540 ASSAY OF IRON: CPT

## 2024-01-01 PROCEDURE — 99233 SBSQ HOSP IP/OBS HIGH 50: CPT | Performed by: INTERNAL MEDICINE

## 2024-01-01 PROCEDURE — 82105 ALPHA-FETOPROTEIN SERUM: CPT

## 2024-01-01 PROCEDURE — 6360000002 HC RX W HCPCS: Performed by: EMERGENCY MEDICINE

## 2024-01-01 PROCEDURE — 6360000002 HC RX W HCPCS: Performed by: REGISTERED NURSE

## 2024-01-01 PROCEDURE — 85018 HEMOGLOBIN: CPT

## 2024-01-01 PROCEDURE — 84157 ASSAY OF PROTEIN OTHER: CPT

## 2024-01-01 PROCEDURE — 82533 TOTAL CORTISOL: CPT

## 2024-01-01 PROCEDURE — 99223 1ST HOSP IP/OBS HIGH 75: CPT | Performed by: INTERNAL MEDICINE

## 2024-01-01 PROCEDURE — 97162 PT EVAL MOD COMPLEX 30 MIN: CPT

## 2024-01-01 PROCEDURE — 82746 ASSAY OF FOLIC ACID SERUM: CPT

## 2024-01-01 PROCEDURE — 97166 OT EVAL MOD COMPLEX 45 MIN: CPT

## 2024-01-01 PROCEDURE — 62328 DX LMBR SPI PNXR W/FLUOR/CT: CPT

## 2024-01-01 PROCEDURE — 82140 ASSAY OF AMMONIA: CPT

## 2024-01-01 PROCEDURE — 95822 EEG COMA OR SLEEP ONLY: CPT

## 2024-01-01 PROCEDURE — 93010 ELECTROCARDIOGRAM REPORT: CPT | Performed by: INTERNAL MEDICINE

## 2024-01-01 PROCEDURE — 84425 ASSAY OF VITAMIN B-1: CPT

## 2024-01-01 PROCEDURE — 82803 BLOOD GASES ANY COMBINATION: CPT

## 2024-01-01 PROCEDURE — 94644 CONT INHLJ TX 1ST HOUR: CPT

## 2024-01-01 PROCEDURE — 84540 ASSAY OF URINE/UREA-N: CPT

## 2024-01-01 PROCEDURE — 87529 HSV DNA AMP PROBE: CPT

## 2024-01-01 PROCEDURE — 82390 ASSAY OF CERULOPLASMIN: CPT

## 2024-01-01 PROCEDURE — 0W9G3ZZ DRAINAGE OF PERITONEAL CAVITY, PERCUTANEOUS APPROACH: ICD-10-PCS | Performed by: INTERNAL MEDICINE

## 2024-01-01 PROCEDURE — 99285 EMERGENCY DEPT VISIT HI MDM: CPT

## 2024-01-01 PROCEDURE — 70450 CT HEAD/BRAIN W/O DYE: CPT

## 2024-01-01 PROCEDURE — 84132 ASSAY OF SERUM POTASSIUM: CPT

## 2024-01-01 PROCEDURE — 82945 GLUCOSE OTHER FLUID: CPT

## 2024-01-01 PROCEDURE — 05HB33Z INSERTION OF INFUSION DEVICE INTO RIGHT BASILIC VEIN, PERCUTANEOUS APPROACH: ICD-10-PCS | Performed by: STUDENT IN AN ORGANIZED HEALTH CARE EDUCATION/TRAINING PROGRAM

## 2024-01-01 PROCEDURE — APPNB60 APP NON BILLABLE TIME 46-60 MINS: Performed by: NURSE PRACTITIONER

## 2024-01-01 PROCEDURE — 94761 N-INVAS EAR/PLS OXIMETRY MLT: CPT

## 2024-01-01 PROCEDURE — 81001 URINALYSIS AUTO W/SCOPE: CPT

## 2024-01-01 PROCEDURE — 6360000004 HC RX CONTRAST MEDICATION: Performed by: STUDENT IN AN ORGANIZED HEALTH CARE EDUCATION/TRAINING PROGRAM

## 2024-01-01 PROCEDURE — 82042 OTHER SOURCE ALBUMIN QUAN EA: CPT

## 2024-01-01 PROCEDURE — 81003 URINALYSIS AUTO W/O SCOPE: CPT

## 2024-01-01 PROCEDURE — 80179 DRUG ASSAY SALICYLATE: CPT

## 2024-01-01 PROCEDURE — 83930 ASSAY OF BLOOD OSMOLALITY: CPT

## 2024-01-01 PROCEDURE — 87483 CNS DNA AMP PROBE TYPE 12-25: CPT

## 2024-01-01 PROCEDURE — 6370000000 HC RX 637 (ALT 250 FOR IP): Performed by: EMERGENCY MEDICINE

## 2024-01-01 PROCEDURE — 90715 TDAP VACCINE 7 YRS/> IM: CPT | Performed by: EMERGENCY MEDICINE

## 2024-01-01 PROCEDURE — 2580000003 HC RX 258: Performed by: EMERGENCY MEDICINE

## 2024-01-01 PROCEDURE — 97535 SELF CARE MNGMENT TRAINING: CPT

## 2024-01-01 PROCEDURE — 87040 BLOOD CULTURE FOR BACTERIA: CPT

## 2024-01-01 PROCEDURE — 86702 HIV-2 ANTIBODY: CPT

## 2024-01-01 PROCEDURE — 70498 CT ANGIOGRAPHY NECK: CPT

## 2024-01-01 PROCEDURE — 82607 VITAMIN B-12: CPT

## 2024-01-01 PROCEDURE — 80202 ASSAY OF VANCOMYCIN: CPT

## 2024-01-01 PROCEDURE — 76705 ECHO EXAM OF ABDOMEN: CPT

## 2024-01-01 PROCEDURE — 82530 CORTISOL FREE: CPT

## 2024-01-01 PROCEDURE — 86780 TREPONEMA PALLIDUM: CPT

## 2024-01-01 PROCEDURE — 74176 CT ABD & PELVIS W/O CONTRAST: CPT

## 2024-01-01 PROCEDURE — 70553 MRI BRAIN STEM W/O & W/DYE: CPT

## 2024-01-01 PROCEDURE — 0W9G3ZZ DRAINAGE OF PERITONEAL CAVITY, PERCUTANEOUS APPROACH: ICD-10-PCS | Performed by: RADIOLOGY

## 2024-01-01 RX ORDER — BUDESONIDE AND FORMOTEROL FUMARATE DIHYDRATE 160; 4.5 UG/1; UG/1
2 AEROSOL RESPIRATORY (INHALATION)
Status: DISCONTINUED | OUTPATIENT
Start: 2024-01-01 | End: 2024-01-01

## 2024-01-01 RX ORDER — MAGNESIUM SULFATE IN WATER 40 MG/ML
2000 INJECTION, SOLUTION INTRAVENOUS PRN
Status: DISCONTINUED | OUTPATIENT
Start: 2024-01-01 | End: 2024-01-01

## 2024-01-01 RX ORDER — MORPHINE SULFATE 2 MG/ML
2 INJECTION, SOLUTION INTRAMUSCULAR; INTRAVENOUS EVERY 30 MIN PRN
Status: DISCONTINUED | OUTPATIENT
Start: 2024-01-01 | End: 2024-01-01 | Stop reason: HOSPADM

## 2024-01-01 RX ORDER — MUPIROCIN 20 MG/G
OINTMENT TOPICAL 2 TIMES DAILY
Status: DISPENSED | OUTPATIENT
Start: 2024-01-01 | End: 2024-01-01

## 2024-01-01 RX ORDER — LORAZEPAM 1 MG/1
2 TABLET ORAL
Status: DISCONTINUED | OUTPATIENT
Start: 2024-01-01 | End: 2024-01-01

## 2024-01-01 RX ORDER — 3% SODIUM CHLORIDE 3 G/100ML
50 INJECTION, SOLUTION INTRAVENOUS CONTINUOUS
Status: ACTIVE | OUTPATIENT
Start: 2024-01-01 | End: 2024-01-01

## 2024-01-01 RX ORDER — LANOLIN ALCOHOL/MO/W.PET/CERES
100 CREAM (GRAM) TOPICAL DAILY
Qty: 30 TABLET | Refills: 0 | Status: SHIPPED | OUTPATIENT
Start: 2024-01-02 | End: 2024-02-01

## 2024-01-01 RX ORDER — LORAZEPAM 2 MG/ML
4 INJECTION INTRAMUSCULAR
Status: DISCONTINUED | OUTPATIENT
Start: 2024-01-01 | End: 2024-01-01

## 2024-01-01 RX ORDER — LORAZEPAM 2 MG/ML
1 INJECTION INTRAMUSCULAR
Status: DISCONTINUED | OUTPATIENT
Start: 2024-01-01 | End: 2024-01-01 | Stop reason: HOSPADM

## 2024-01-01 RX ORDER — POTASSIUM CHLORIDE 20 MEQ/1
20 TABLET, EXTENDED RELEASE ORAL DAILY
Qty: 5 TABLET | Refills: 0 | Status: SHIPPED | OUTPATIENT
Start: 2024-01-01 | End: 2024-01-06

## 2024-01-01 RX ORDER — LANOLIN ALCOHOL/MO/W.PET/CERES
100 CREAM (GRAM) TOPICAL DAILY
Status: DISCONTINUED | OUTPATIENT
Start: 2024-01-01 | End: 2024-01-01

## 2024-01-01 RX ORDER — THIAMINE HYDROCHLORIDE 100 MG/ML
500 INJECTION, SOLUTION INTRAMUSCULAR; INTRAVENOUS 2 TIMES DAILY
Status: DISCONTINUED | OUTPATIENT
Start: 2024-01-01 | End: 2024-01-01

## 2024-01-01 RX ORDER — SODIUM CHLORIDE 9 MG/ML
INJECTION, SOLUTION INTRAVENOUS
Status: COMPLETED
Start: 2024-01-01 | End: 2024-01-01

## 2024-01-01 RX ORDER — POLYETHYLENE GLYCOL 3350 17 G/17G
17 POWDER, FOR SOLUTION ORAL DAILY PRN
Status: DISCONTINUED | OUTPATIENT
Start: 2024-01-01 | End: 2024-01-01 | Stop reason: HOSPADM

## 2024-01-01 RX ORDER — POTASSIUM CHLORIDE 7.45 MG/ML
10 INJECTION INTRAVENOUS
Status: COMPLETED | OUTPATIENT
Start: 2024-01-01 | End: 2024-01-01

## 2024-01-01 RX ORDER — LORAZEPAM 2 MG/ML
1 INJECTION INTRAMUSCULAR
Status: DISCONTINUED | OUTPATIENT
Start: 2024-01-01 | End: 2024-01-01

## 2024-01-01 RX ORDER — SODIUM CHLORIDE 0.9 % (FLUSH) 0.9 %
5-40 SYRINGE (ML) INJECTION PRN
Status: DISCONTINUED | OUTPATIENT
Start: 2024-01-01 | End: 2024-01-01

## 2024-01-01 RX ORDER — 3% SODIUM CHLORIDE 3 G/100ML
25 INJECTION, SOLUTION INTRAVENOUS CONTINUOUS
Status: DISCONTINUED | OUTPATIENT
Start: 2024-01-01 | End: 2024-01-01

## 2024-01-01 RX ORDER — HYDROXYZINE HYDROCHLORIDE 25 MG/1
50 TABLET, FILM COATED ORAL EVERY 8 HOURS PRN
Status: DISCONTINUED | OUTPATIENT
Start: 2024-01-01 | End: 2024-01-01 | Stop reason: HOSPADM

## 2024-01-01 RX ORDER — NICOTINE 21 MG/24HR
1 PATCH, TRANSDERMAL 24 HOURS TRANSDERMAL DAILY
Status: DISCONTINUED | OUTPATIENT
Start: 2024-01-01 | End: 2024-01-01

## 2024-01-01 RX ORDER — SODIUM CHLORIDE 9 MG/ML
INJECTION, SOLUTION INTRAVENOUS CONTINUOUS
Status: DISCONTINUED | OUTPATIENT
Start: 2024-01-01 | End: 2024-01-01

## 2024-01-01 RX ORDER — BACITRACIN ZINC AND POLYMYXIN B SULFATE 500; 1000 [USP'U]/G; [USP'U]/G
OINTMENT TOPICAL ONCE
Status: COMPLETED | OUTPATIENT
Start: 2024-01-01 | End: 2024-01-01

## 2024-01-01 RX ORDER — VANCOMYCIN 1 G/200ML
1000 INJECTION, SOLUTION INTRAVENOUS EVERY 12 HOURS
Status: DISCONTINUED | OUTPATIENT
Start: 2024-01-01 | End: 2024-01-01

## 2024-01-01 RX ORDER — MECOBALAMIN 5000 MCG
5 TABLET,DISINTEGRATING ORAL NIGHTLY
COMMUNITY
Start: 2024-01-01

## 2024-01-01 RX ORDER — FERROUS SULFATE 325(65) MG
325 TABLET ORAL
Status: DISCONTINUED | OUTPATIENT
Start: 2024-01-01 | End: 2024-01-01

## 2024-01-01 RX ORDER — POTASSIUM CHLORIDE 1500 MG/1
20 TABLET, EXTENDED RELEASE ORAL ONCE
Status: DISCONTINUED | OUTPATIENT
Start: 2024-01-01 | End: 2024-01-01

## 2024-01-01 RX ORDER — LORAZEPAM 1 MG/1
1 TABLET ORAL
Status: DISCONTINUED | OUTPATIENT
Start: 2024-01-01 | End: 2024-01-01

## 2024-01-01 RX ORDER — ENOXAPARIN SODIUM 100 MG/ML
40 INJECTION SUBCUTANEOUS DAILY
Status: DISCONTINUED | OUTPATIENT
Start: 2024-01-01 | End: 2024-01-01

## 2024-01-01 RX ORDER — ASCORBIC ACID 500 MG
500 TABLET ORAL DAILY
Status: DISCONTINUED | OUTPATIENT
Start: 2024-01-01 | End: 2024-01-01

## 2024-01-01 RX ORDER — ALBUTEROL SULFATE 5 MG/ML
10 SOLUTION RESPIRATORY (INHALATION) ONCE
Status: COMPLETED | OUTPATIENT
Start: 2024-01-01 | End: 2024-01-01

## 2024-01-01 RX ORDER — POTASSIUM CHLORIDE 29.8 MG/ML
20 INJECTION INTRAVENOUS ONCE
Status: COMPLETED | OUTPATIENT
Start: 2024-01-01 | End: 2024-01-01

## 2024-01-01 RX ORDER — M-VIT,TX,IRON,MINS/CALC/FOLIC 27MG-0.4MG
1 TABLET ORAL DAILY
Status: DISCONTINUED | OUTPATIENT
Start: 2024-01-01 | End: 2024-01-01

## 2024-01-01 RX ORDER — ONDANSETRON 4 MG/1
4 TABLET, ORALLY DISINTEGRATING ORAL EVERY 8 HOURS PRN
Status: DISCONTINUED | OUTPATIENT
Start: 2024-01-01 | End: 2024-01-01 | Stop reason: HOSPADM

## 2024-01-01 RX ORDER — PANTOPRAZOLE SODIUM 40 MG/1
40 TABLET, DELAYED RELEASE ORAL
Status: DISCONTINUED | OUTPATIENT
Start: 2024-01-01 | End: 2024-01-01

## 2024-01-01 RX ORDER — SODIUM CHLORIDE 0.9 % (FLUSH) 0.9 %
5-40 SYRINGE (ML) INJECTION PRN
Status: DISCONTINUED | OUTPATIENT
Start: 2024-01-01 | End: 2024-01-01 | Stop reason: HOSPADM

## 2024-01-01 RX ORDER — LACTULOSE 10 G/15ML
20 SOLUTION ORAL 2 TIMES DAILY
Status: DISCONTINUED | OUTPATIENT
Start: 2024-01-01 | End: 2024-01-01

## 2024-01-01 RX ORDER — 3% SODIUM CHLORIDE 3 G/100ML
50 INJECTION, SOLUTION INTRAVENOUS CONTINUOUS
Status: DISPENSED | OUTPATIENT
Start: 2024-01-01 | End: 2024-01-01

## 2024-01-01 RX ORDER — LORAZEPAM 2 MG/ML
3 INJECTION INTRAMUSCULAR
Status: DISCONTINUED | OUTPATIENT
Start: 2024-01-01 | End: 2024-01-01

## 2024-01-01 RX ORDER — ERGOCALCIFEROL 1.25 MG/1
50000 CAPSULE, LIQUID FILLED ORAL WEEKLY
Status: DISCONTINUED | OUTPATIENT
Start: 2024-01-01 | End: 2024-01-01

## 2024-01-01 RX ORDER — ONDANSETRON 2 MG/ML
4 INJECTION INTRAMUSCULAR; INTRAVENOUS EVERY 6 HOURS PRN
Status: DISCONTINUED | OUTPATIENT
Start: 2024-01-01 | End: 2024-01-01 | Stop reason: HOSPADM

## 2024-01-01 RX ORDER — LORAZEPAM 2 MG/ML
2 INJECTION INTRAMUSCULAR
Status: DISCONTINUED | OUTPATIENT
Start: 2024-01-01 | End: 2024-01-01

## 2024-01-01 RX ORDER — CALCIUM GLUCONATE 20 MG/ML
1000 INJECTION, SOLUTION INTRAVENOUS ONCE
Status: COMPLETED | OUTPATIENT
Start: 2024-01-01 | End: 2024-01-01

## 2024-01-01 RX ORDER — BACITRACIN ZINC AND POLYMYXIN B SULFATE 500; 1000 [USP'U]/G; [USP'U]/G
OINTMENT TOPICAL 2 TIMES DAILY
Status: DISPENSED | OUTPATIENT
Start: 2024-01-01 | End: 2024-01-01

## 2024-01-01 RX ORDER — LORAZEPAM 1 MG/1
3 TABLET ORAL
Status: DISCONTINUED | OUTPATIENT
Start: 2024-01-01 | End: 2024-01-01

## 2024-01-01 RX ORDER — SODIUM CHLORIDE 9 MG/ML
INJECTION, SOLUTION INTRAVENOUS PRN
Status: DISCONTINUED | OUTPATIENT
Start: 2024-01-01 | End: 2024-01-01 | Stop reason: HOSPADM

## 2024-01-01 RX ORDER — SCOLOPAMINE TRANSDERMAL SYSTEM 1 MG/1
1 PATCH, EXTENDED RELEASE TRANSDERMAL
Status: DISCONTINUED | OUTPATIENT
Start: 2024-01-01 | End: 2024-01-01 | Stop reason: HOSPADM

## 2024-01-01 RX ORDER — IOPAMIDOL 755 MG/ML
75 INJECTION, SOLUTION INTRAVASCULAR
Status: COMPLETED | OUTPATIENT
Start: 2024-01-01 | End: 2024-01-01

## 2024-01-01 RX ORDER — FOLIC ACID 1 MG/1
1 TABLET ORAL DAILY
Status: DISCONTINUED | OUTPATIENT
Start: 2024-01-01 | End: 2024-01-01

## 2024-01-01 RX ORDER — ALBUTEROL SULFATE 90 UG/1
2 INHALANT RESPIRATORY (INHALATION) EVERY 6 HOURS PRN
Status: DISCONTINUED | OUTPATIENT
Start: 2024-01-01 | End: 2024-01-01 | Stop reason: HOSPADM

## 2024-01-01 RX ORDER — SODIUM CHLORIDE 9 MG/ML
INJECTION, SOLUTION INTRAVENOUS PRN
Status: DISCONTINUED | OUTPATIENT
Start: 2024-01-01 | End: 2024-01-01

## 2024-01-01 RX ORDER — 3% SODIUM CHLORIDE 3 G/100ML
30 INJECTION, SOLUTION INTRAVENOUS CONTINUOUS
Status: DISCONTINUED | OUTPATIENT
Start: 2024-01-01 | End: 2024-01-01

## 2024-01-01 RX ORDER — SODIUM CHLORIDE 0.9 % (FLUSH) 0.9 %
5-40 SYRINGE (ML) INJECTION EVERY 12 HOURS SCHEDULED
Status: DISCONTINUED | OUTPATIENT
Start: 2024-01-01 | End: 2024-01-01

## 2024-01-01 RX ORDER — POTASSIUM CHLORIDE 29.8 MG/ML
20 INJECTION INTRAVENOUS ONCE
Status: DISCONTINUED | OUTPATIENT
Start: 2024-01-01 | End: 2024-01-01

## 2024-01-01 RX ORDER — ENOXAPARIN SODIUM 100 MG/ML
30 INJECTION SUBCUTANEOUS DAILY
Status: DISCONTINUED | OUTPATIENT
Start: 2024-01-01 | End: 2024-01-01

## 2024-01-01 RX ORDER — DEXTROSE MONOHYDRATE 50 MG/ML
INJECTION, SOLUTION INTRAVENOUS CONTINUOUS
Status: ACTIVE | OUTPATIENT
Start: 2024-01-01 | End: 2024-01-01

## 2024-01-01 RX ORDER — LORAZEPAM 1 MG/1
4 TABLET ORAL
Status: DISCONTINUED | OUTPATIENT
Start: 2024-01-01 | End: 2024-01-01

## 2024-01-01 RX ORDER — ALBUMIN (HUMAN) 12.5 G/50ML
25 SOLUTION INTRAVENOUS ONCE
Status: COMPLETED | OUTPATIENT
Start: 2024-01-01 | End: 2024-01-01

## 2024-01-01 RX ORDER — ALBUMIN (HUMAN) 12.5 G/50ML
25 SOLUTION INTRAVENOUS 2 TIMES DAILY
Status: DISCONTINUED | OUTPATIENT
Start: 2024-01-01 | End: 2024-01-01

## 2024-01-01 RX ORDER — POTASSIUM CHLORIDE 1500 MG/1
40 TABLET, EXTENDED RELEASE ORAL PRN
Status: ACTIVE | OUTPATIENT
Start: 2024-01-01 | End: 2024-01-01

## 2024-01-01 RX ORDER — VANCOMYCIN HYDROCHLORIDE 1.5 G/300ML
1500 INJECTION, SOLUTION INTRAVITREAL ONCE
Status: COMPLETED | OUTPATIENT
Start: 2024-01-01 | End: 2024-01-01

## 2024-01-01 RX ORDER — BUDESONIDE AND FORMOTEROL FUMARATE DIHYDRATE 160; 4.5 UG/1; UG/1
2 AEROSOL RESPIRATORY (INHALATION) 2 TIMES DAILY PRN
Status: DISCONTINUED | OUTPATIENT
Start: 2024-01-01 | End: 2024-01-01 | Stop reason: HOSPADM

## 2024-01-01 RX ORDER — SODIUM CHLORIDE 0.9 % (FLUSH) 0.9 %
5-40 SYRINGE (ML) INJECTION EVERY 12 HOURS SCHEDULED
Status: DISCONTINUED | OUTPATIENT
Start: 2024-01-01 | End: 2024-01-01 | Stop reason: HOSPADM

## 2024-01-01 RX ORDER — BUPROPION HYDROCHLORIDE 150 MG/1
150 TABLET ORAL EVERY MORNING
Status: DISCONTINUED | OUTPATIENT
Start: 2024-01-01 | End: 2024-01-01

## 2024-01-01 RX ORDER — RISPERIDONE 0.25 MG/1
0.5 TABLET ORAL NIGHTLY
Status: DISCONTINUED | OUTPATIENT
Start: 2024-01-01 | End: 2024-01-01

## 2024-01-01 RX ORDER — TRAMADOL HYDROCHLORIDE 50 MG/1
50 TABLET ORAL EVERY 6 HOURS PRN
Status: DISCONTINUED | OUTPATIENT
Start: 2024-01-01 | End: 2024-01-01 | Stop reason: HOSPADM

## 2024-01-01 RX ORDER — HEPARIN SODIUM 5000 [USP'U]/ML
5000 INJECTION, SOLUTION INTRAVENOUS; SUBCUTANEOUS EVERY 8 HOURS SCHEDULED
Status: DISCONTINUED | OUTPATIENT
Start: 2024-01-01 | End: 2024-01-01

## 2024-01-01 RX ORDER — POTASSIUM CHLORIDE 7.45 MG/ML
10 INJECTION INTRAVENOUS PRN
Status: ACTIVE | OUTPATIENT
Start: 2024-01-01 | End: 2024-01-01

## 2024-01-01 RX ORDER — 3% SODIUM CHLORIDE 3 G/100ML
25 INJECTION, SOLUTION INTRAVENOUS CONTINUOUS
Status: DISPENSED | OUTPATIENT
Start: 2024-01-01 | End: 2024-01-01

## 2024-01-01 RX ORDER — FUROSEMIDE 10 MG/ML
60 INJECTION INTRAMUSCULAR; INTRAVENOUS ONCE
Status: COMPLETED | OUTPATIENT
Start: 2024-01-01 | End: 2024-01-01

## 2024-01-01 RX ORDER — LISINOPRIL 10 MG/1
10 TABLET ORAL DAILY
Status: DISCONTINUED | OUTPATIENT
Start: 2024-01-01 | End: 2024-01-01

## 2024-01-01 RX ORDER — HYDRALAZINE HYDROCHLORIDE 20 MG/ML
5 INJECTION INTRAMUSCULAR; INTRAVENOUS EVERY 8 HOURS PRN
Status: DISCONTINUED | OUTPATIENT
Start: 2024-01-01 | End: 2024-01-01 | Stop reason: HOSPADM

## 2024-01-01 RX ORDER — LACTULOSE 10 G/15ML
20 SOLUTION ORAL 3 TIMES DAILY
Status: DISCONTINUED | OUTPATIENT
Start: 2024-01-01 | End: 2024-01-01

## 2024-01-01 RX ORDER — PANTOPRAZOLE SODIUM 40 MG/10ML
40 INJECTION, POWDER, LYOPHILIZED, FOR SOLUTION INTRAVENOUS DAILY
Status: DISCONTINUED | OUTPATIENT
Start: 2024-01-01 | End: 2024-01-01

## 2024-01-01 RX ADMIN — LACTULOSE 20 G: 20 SOLUTION ORAL at 10:29

## 2024-01-01 RX ADMIN — ACYCLOVIR SODIUM 650 MG: 50 INJECTION, SOLUTION INTRAVENOUS at 20:39

## 2024-01-01 RX ADMIN — RIFAXIMIN 400 MG: 200 TABLET ORAL at 11:46

## 2024-01-01 RX ADMIN — POTASSIUM CHLORIDE 20 MEQ: 29.8 INJECTION, SOLUTION INTRAVENOUS at 04:44

## 2024-01-01 RX ADMIN — SODIUM CHLORIDE 25 ML/HR: 3 INJECTION, SOLUTION INTRAVENOUS at 20:39

## 2024-01-01 RX ADMIN — FERROUS SULFATE TAB 325 MG (65 MG ELEMENTAL FE) 325 MG: 325 (65 FE) TAB at 08:22

## 2024-01-01 RX ADMIN — NAFCILLIN SODIUM 2000 MG: 2 INJECTION, POWDER, LYOPHILIZED, FOR SOLUTION INTRAMUSCULAR; INTRAVENOUS at 14:22

## 2024-01-01 RX ADMIN — IOPAMIDOL 75 ML: 755 INJECTION, SOLUTION INTRAVENOUS at 10:09

## 2024-01-01 RX ADMIN — PANTOPRAZOLE SODIUM 40 MG: 40 INJECTION, POWDER, FOR SOLUTION INTRAVENOUS at 08:36

## 2024-01-01 RX ADMIN — PANTOPRAZOLE SODIUM 40 MG: 40 INJECTION, POWDER, FOR SOLUTION INTRAVENOUS at 09:49

## 2024-01-01 RX ADMIN — LACTULOSE 20 G: 20 SOLUTION ORAL at 20:23

## 2024-01-01 RX ADMIN — MUPIROCIN: 20 OINTMENT TOPICAL at 20:43

## 2024-01-01 RX ADMIN — AMPICILLIN SODIUM 2000 MG: 2 INJECTION, POWDER, FOR SOLUTION INTRAMUSCULAR; INTRAVENOUS at 08:36

## 2024-01-01 RX ADMIN — CALCIUM GLUCONATE 1000 MG: 20 INJECTION, SOLUTION INTRAVENOUS at 09:21

## 2024-01-01 RX ADMIN — NAFCILLIN SODIUM 2000 MG: 2 INJECTION, POWDER, LYOPHILIZED, FOR SOLUTION INTRAMUSCULAR; INTRAVENOUS at 01:57

## 2024-01-01 RX ADMIN — ONDANSETRON 4 MG: 2 INJECTION INTRAMUSCULAR; INTRAVENOUS at 12:09

## 2024-01-01 RX ADMIN — THIAMINE HYDROCHLORIDE 500 MG: 100 INJECTION, SOLUTION INTRAMUSCULAR; INTRAVENOUS at 13:49

## 2024-01-01 RX ADMIN — THIAMINE HYDROCHLORIDE 500 MG: 100 INJECTION, SOLUTION INTRAMUSCULAR; INTRAVENOUS at 01:57

## 2024-01-01 RX ADMIN — AMPICILLIN SODIUM 2000 MG: 2 INJECTION, POWDER, FOR SOLUTION INTRAMUSCULAR; INTRAVENOUS at 03:32

## 2024-01-01 RX ADMIN — NAFCILLIN SODIUM 2000 MG: 2 INJECTION, POWDER, LYOPHILIZED, FOR SOLUTION INTRAMUSCULAR; INTRAVENOUS at 17:52

## 2024-01-01 RX ADMIN — NAFCILLIN SODIUM 2000 MG: 2 INJECTION, POWDER, LYOPHILIZED, FOR SOLUTION INTRAMUSCULAR; INTRAVENOUS at 20:09

## 2024-01-01 RX ADMIN — HYDRALAZINE HYDROCHLORIDE 5 MG: 20 INJECTION, SOLUTION INTRAMUSCULAR; INTRAVENOUS at 03:40

## 2024-01-01 RX ADMIN — ENOXAPARIN SODIUM 30 MG: 100 INJECTION SUBCUTANEOUS at 08:22

## 2024-01-01 RX ADMIN — ENOXAPARIN SODIUM 40 MG: 100 INJECTION SUBCUTANEOUS at 08:26

## 2024-01-01 RX ADMIN — TETANUS TOXOID, REDUCED DIPHTHERIA TOXOID AND ACELLULAR PERTUSSIS VACCINE, ADSORBED 0.5 ML: 5; 2.5; 8; 8; 2.5 SUSPENSION INTRAMUSCULAR at 19:46

## 2024-01-01 RX ADMIN — THIAMINE HYDROCHLORIDE 500 MG: 100 INJECTION, SOLUTION INTRAMUSCULAR; INTRAVENOUS at 09:02

## 2024-01-01 RX ADMIN — AMPICILLIN SODIUM 2000 MG: 2 INJECTION, POWDER, FOR SOLUTION INTRAMUSCULAR; INTRAVENOUS at 17:55

## 2024-01-01 RX ADMIN — MUPIROCIN: 20 OINTMENT TOPICAL at 09:50

## 2024-01-01 RX ADMIN — THIAMINE HYDROCHLORIDE 500 MG: 100 INJECTION, SOLUTION INTRAMUSCULAR; INTRAVENOUS at 16:06

## 2024-01-01 RX ADMIN — SODIUM CHLORIDE, PRESERVATIVE FREE 10 ML: 5 INJECTION INTRAVENOUS at 20:47

## 2024-01-01 RX ADMIN — SODIUM CHLORIDE, PRESERVATIVE FREE 10 ML: 5 INJECTION INTRAVENOUS at 20:01

## 2024-01-01 RX ADMIN — NAFCILLIN SODIUM 2000 MG: 2 INJECTION, POWDER, LYOPHILIZED, FOR SOLUTION INTRAMUSCULAR; INTRAVENOUS at 15:06

## 2024-01-01 RX ADMIN — NAFCILLIN SODIUM 2000 MG: 2 INJECTION, POWDER, LYOPHILIZED, FOR SOLUTION INTRAMUSCULAR; INTRAVENOUS at 08:54

## 2024-01-01 RX ADMIN — THIAMINE HYDROCHLORIDE 500 MG: 100 INJECTION, SOLUTION INTRAMUSCULAR; INTRAVENOUS at 17:10

## 2024-01-01 RX ADMIN — SODIUM CHLORIDE 30 ML/HR: 3 INJECTION, SOLUTION INTRAVENOUS at 14:26

## 2024-01-01 RX ADMIN — ACYCLOVIR SODIUM 650 MG: 50 INJECTION, SOLUTION INTRAVENOUS at 12:40

## 2024-01-01 RX ADMIN — POTASSIUM CHLORIDE 10 MEQ: 7.45 INJECTION INTRAVENOUS at 03:15

## 2024-01-01 RX ADMIN — Medication 1 TABLET: at 10:30

## 2024-01-01 RX ADMIN — Medication 1 TABLET: at 08:12

## 2024-01-01 RX ADMIN — BACITRACIN ZINC AND POLYMYXIN B SULFATE: 500; 10000 OINTMENT TOPICAL at 11:16

## 2024-01-01 RX ADMIN — POTASSIUM BICARBONATE 40 MEQ: 782 TABLET, EFFERVESCENT ORAL at 17:05

## 2024-01-01 RX ADMIN — THIAMINE HYDROCHLORIDE 500 MG: 100 INJECTION, SOLUTION INTRAMUSCULAR; INTRAVENOUS at 12:32

## 2024-01-01 RX ADMIN — AMPICILLIN SODIUM 2000 MG: 2 INJECTION, POWDER, FOR SOLUTION INTRAMUSCULAR; INTRAVENOUS at 15:58

## 2024-01-01 RX ADMIN — NAFCILLIN SODIUM 2000 MG: 2 INJECTION, POWDER, LYOPHILIZED, FOR SOLUTION INTRAMUSCULAR; INTRAVENOUS at 05:32

## 2024-01-01 RX ADMIN — TRAMADOL HYDROCHLORIDE 50 MG: 50 TABLET ORAL at 03:32

## 2024-01-01 RX ADMIN — POTASSIUM PHOSPHATE, MONOBASIC POTASSIUM PHOSPHATE, DIBASIC 15 MMOL: 224; 236 INJECTION, SOLUTION, CONCENTRATE INTRAVENOUS at 09:12

## 2024-01-01 RX ADMIN — MUPIROCIN: 20 OINTMENT TOPICAL at 20:33

## 2024-01-01 RX ADMIN — OXYCODONE HYDROCHLORIDE AND ACETAMINOPHEN 500 MG: 500 TABLET ORAL at 10:30

## 2024-01-01 RX ADMIN — SODIUM CHLORIDE, PRESERVATIVE FREE 10 ML: 5 INJECTION INTRAVENOUS at 10:17

## 2024-01-01 RX ADMIN — LACTULOSE 20 G: 20 SOLUTION ORAL at 16:41

## 2024-01-01 RX ADMIN — SODIUM CHLORIDE, PRESERVATIVE FREE 10 ML: 5 INJECTION INTRAVENOUS at 22:44

## 2024-01-01 RX ADMIN — LORAZEPAM 2 MG: 2 INJECTION INTRAMUSCULAR; INTRAVENOUS at 00:13

## 2024-01-01 RX ADMIN — ALBUTEROL SULFATE 10 MG: 2.5 SOLUTION RESPIRATORY (INHALATION) at 12:09

## 2024-01-01 RX ADMIN — THIAMINE HYDROCHLORIDE 500 MG: 100 INJECTION, SOLUTION INTRAMUSCULAR; INTRAVENOUS at 21:22

## 2024-01-01 RX ADMIN — ERGOCALCIFEROL 50000 UNITS: 1.25 CAPSULE ORAL at 11:36

## 2024-01-01 RX ADMIN — Medication 10 ML: at 08:29

## 2024-01-01 RX ADMIN — NAFCILLIN SODIUM 2000 MG: 2 INJECTION, POWDER, LYOPHILIZED, FOR SOLUTION INTRAMUSCULAR; INTRAVENOUS at 02:02

## 2024-01-01 RX ADMIN — ENOXAPARIN SODIUM 40 MG: 100 INJECTION SUBCUTANEOUS at 09:43

## 2024-01-01 RX ADMIN — SODIUM CHLORIDE: 9 INJECTION, SOLUTION INTRAVENOUS at 17:45

## 2024-01-01 RX ADMIN — SODIUM CHLORIDE, PRESERVATIVE FREE 10 ML: 5 INJECTION INTRAVENOUS at 10:33

## 2024-01-01 RX ADMIN — FOLIC ACID 1 MG: 1 TABLET ORAL at 08:13

## 2024-01-01 RX ADMIN — ONDANSETRON 4 MG: 2 INJECTION INTRAMUSCULAR; INTRAVENOUS at 21:16

## 2024-01-01 RX ADMIN — FOLIC ACID 1 MG: 1 TABLET ORAL at 08:26

## 2024-01-01 RX ADMIN — POLYETHYLENE GLYCOL 3350 17 G: 17 POWDER, FOR SOLUTION ORAL at 17:05

## 2024-01-01 RX ADMIN — ALBUMIN (HUMAN) 25 G: 0.25 INJECTION, SOLUTION INTRAVENOUS at 10:47

## 2024-01-01 RX ADMIN — SODIUM CHLORIDE, PRESERVATIVE FREE 10 ML: 5 INJECTION INTRAVENOUS at 08:37

## 2024-01-01 RX ADMIN — SODIUM CHLORIDE, PRESERVATIVE FREE 10 ML: 5 INJECTION INTRAVENOUS at 20:20

## 2024-01-01 RX ADMIN — NAFCILLIN SODIUM 2000 MG: 2 INJECTION, POWDER, LYOPHILIZED, FOR SOLUTION INTRAMUSCULAR; INTRAVENOUS at 17:43

## 2024-01-01 RX ADMIN — SODIUM CHLORIDE: 9 INJECTION, SOLUTION INTRAVENOUS at 09:00

## 2024-01-01 RX ADMIN — SODIUM CHLORIDE, PRESERVATIVE FREE 10 ML: 5 INJECTION INTRAVENOUS at 21:23

## 2024-01-01 RX ADMIN — THIAMINE HYDROCHLORIDE 500 MG: 100 INJECTION, SOLUTION INTRAMUSCULAR; INTRAVENOUS at 21:28

## 2024-01-01 RX ADMIN — SODIUM ZIRCONIUM CYCLOSILICATE 10 G: 10 POWDER, FOR SUSPENSION ORAL at 09:21

## 2024-01-01 RX ADMIN — BACITRACIN ZINC AND POLYMYXIN B SULFATE: 500; 10000 OINTMENT TOPICAL at 23:39

## 2024-01-01 RX ADMIN — LACTULOSE 20 G: 20 SOLUTION ORAL at 14:32

## 2024-01-01 RX ADMIN — PANTOPRAZOLE SODIUM 40 MG: 40 TABLET, DELAYED RELEASE ORAL at 06:15

## 2024-01-01 RX ADMIN — Medication 100 MG: at 09:43

## 2024-01-01 RX ADMIN — VANCOMYCIN HYDROCHLORIDE 1500 MG: 1.5 INJECTION, SOLUTION INTRAVITREAL at 11:16

## 2024-01-01 RX ADMIN — PANTOPRAZOLE SODIUM 40 MG: 40 INJECTION, POWDER, FOR SOLUTION INTRAVENOUS at 13:46

## 2024-01-01 RX ADMIN — Medication 10 ML: at 21:19

## 2024-01-01 RX ADMIN — POTASSIUM CHLORIDE 10 MEQ: 7.45 INJECTION INTRAVENOUS at 12:07

## 2024-01-01 RX ADMIN — RISPERIDONE 0.5 MG: 0.25 TABLET, FILM COATED ORAL at 20:24

## 2024-01-01 RX ADMIN — Medication 1 TABLET: at 17:07

## 2024-01-01 RX ADMIN — NAFCILLIN SODIUM 2000 MG: 2 INJECTION, POWDER, LYOPHILIZED, FOR SOLUTION INTRAMUSCULAR; INTRAVENOUS at 01:40

## 2024-01-01 RX ADMIN — Medication 2 PUFF: at 11:44

## 2024-01-01 RX ADMIN — LACTULOSE 20 G: 20 SOLUTION ORAL at 14:27

## 2024-01-01 RX ADMIN — BACITRACIN ZINC AND POLYMYXIN B SULFATE: 500; 10000 OINTMENT TOPICAL at 21:24

## 2024-01-01 RX ADMIN — LACTULOSE 20 G: 20 SOLUTION ORAL at 20:00

## 2024-01-01 RX ADMIN — MUPIROCIN: 20 OINTMENT TOPICAL at 21:23

## 2024-01-01 RX ADMIN — POTASSIUM CHLORIDE 10 MEQ: 7.45 INJECTION INTRAVENOUS at 13:10

## 2024-01-01 RX ADMIN — ENOXAPARIN SODIUM 40 MG: 100 INJECTION SUBCUTANEOUS at 09:13

## 2024-01-01 RX ADMIN — SODIUM CHLORIDE 25 ML/HR: 3 INJECTION, SOLUTION INTRAVENOUS at 10:44

## 2024-01-01 RX ADMIN — NAFCILLIN SODIUM 2000 MG: 2 INJECTION, POWDER, LYOPHILIZED, FOR SOLUTION INTRAMUSCULAR; INTRAVENOUS at 21:36

## 2024-01-01 RX ADMIN — THIAMINE HYDROCHLORIDE 500 MG: 100 INJECTION, SOLUTION INTRAMUSCULAR; INTRAVENOUS at 21:42

## 2024-01-01 RX ADMIN — RIFAXIMIN 400 MG: 200 TABLET ORAL at 17:14

## 2024-01-01 RX ADMIN — BACITRACIN ZINC AND POLYMYXIN B SULFATE: 500; 10000 OINTMENT TOPICAL at 10:40

## 2024-01-01 RX ADMIN — LACTULOSE 20 G: 20 SOLUTION ORAL at 08:26

## 2024-01-01 RX ADMIN — MUPIROCIN: 20 OINTMENT TOPICAL at 10:40

## 2024-01-01 RX ADMIN — ACYCLOVIR SODIUM 650 MG: 50 INJECTION, SOLUTION INTRAVENOUS at 12:31

## 2024-01-01 RX ADMIN — FERROUS SULFATE TAB 325 MG (65 MG ELEMENTAL FE) 325 MG: 325 (65 FE) TAB at 08:26

## 2024-01-01 RX ADMIN — LORAZEPAM 2 MG: 2 INJECTION INTRAMUSCULAR; INTRAVENOUS at 21:56

## 2024-01-01 RX ADMIN — Medication 2 PUFF: at 12:09

## 2024-01-01 RX ADMIN — SODIUM CHLORIDE, PRESERVATIVE FREE 40 ML: 5 INJECTION INTRAVENOUS at 20:27

## 2024-01-01 RX ADMIN — ATORVASTATIN CALCIUM 40 MG: 40 TABLET, FILM COATED ORAL at 09:43

## 2024-01-01 RX ADMIN — NAFCILLIN SODIUM 2000 MG: 2 INJECTION, POWDER, LYOPHILIZED, FOR SOLUTION INTRAMUSCULAR; INTRAVENOUS at 21:20

## 2024-01-01 RX ADMIN — RISPERIDONE 0.5 MG: 0.25 TABLET, FILM COATED ORAL at 02:23

## 2024-01-01 RX ADMIN — THIAMINE HYDROCHLORIDE 500 MG: 100 INJECTION, SOLUTION INTRAMUSCULAR; INTRAVENOUS at 03:07

## 2024-01-01 RX ADMIN — LORAZEPAM 3 MG: 1 TABLET ORAL at 20:24

## 2024-01-01 RX ADMIN — SODIUM CHLORIDE, PRESERVATIVE FREE 10 ML: 5 INJECTION INTRAVENOUS at 09:52

## 2024-01-01 RX ADMIN — NAFCILLIN SODIUM 2000 MG: 2 INJECTION, POWDER, LYOPHILIZED, FOR SOLUTION INTRAMUSCULAR; INTRAVENOUS at 04:31

## 2024-01-01 RX ADMIN — BACITRACIN ZINC AND POLYMYXIN B SULFATE: 500; 10000 OINTMENT TOPICAL at 20:42

## 2024-01-01 RX ADMIN — SODIUM CHLORIDE, PRESERVATIVE FREE 10 ML: 5 INJECTION INTRAVENOUS at 20:17

## 2024-01-01 RX ADMIN — AMPICILLIN SODIUM 2000 MG: 2 INJECTION, POWDER, FOR SOLUTION INTRAMUSCULAR; INTRAVENOUS at 23:35

## 2024-01-01 RX ADMIN — MORPHINE SULFATE 2 MG: 2 INJECTION, SOLUTION INTRAMUSCULAR; INTRAVENOUS at 15:04

## 2024-01-01 RX ADMIN — FERROUS SULFATE TAB 325 MG (65 MG ELEMENTAL FE) 325 MG: 325 (65 FE) TAB at 10:33

## 2024-01-01 RX ADMIN — MUPIROCIN: 20 OINTMENT TOPICAL at 08:13

## 2024-01-01 RX ADMIN — RISPERIDONE 0.5 MG: 0.25 TABLET, FILM COATED ORAL at 20:00

## 2024-01-01 RX ADMIN — NAFCILLIN SODIUM 2000 MG: 2 INJECTION, POWDER, LYOPHILIZED, FOR SOLUTION INTRAMUSCULAR; INTRAVENOUS at 04:18

## 2024-01-01 RX ADMIN — MUPIROCIN: 20 OINTMENT TOPICAL at 20:46

## 2024-01-01 RX ADMIN — BUPROPION HYDROCHLORIDE 150 MG: 150 TABLET, EXTENDED RELEASE ORAL at 08:12

## 2024-01-01 RX ADMIN — THIAMINE HYDROCHLORIDE 500 MG: 100 INJECTION, SOLUTION INTRAMUSCULAR; INTRAVENOUS at 17:15

## 2024-01-01 RX ADMIN — LISINOPRIL 10 MG: 10 TABLET ORAL at 09:43

## 2024-01-01 RX ADMIN — THIAMINE HYDROCHLORIDE 500 MG: 100 INJECTION, SOLUTION INTRAMUSCULAR; INTRAVENOUS at 16:19

## 2024-01-01 RX ADMIN — NAFCILLIN SODIUM 2000 MG: 2 INJECTION, POWDER, LYOPHILIZED, FOR SOLUTION INTRAMUSCULAR; INTRAVENOUS at 17:48

## 2024-01-01 RX ADMIN — NAFCILLIN SODIUM 2000 MG: 2 INJECTION, POWDER, LYOPHILIZED, FOR SOLUTION INTRAMUSCULAR; INTRAVENOUS at 01:23

## 2024-01-01 RX ADMIN — LACTULOSE 20 G: 20 SOLUTION ORAL at 20:17

## 2024-01-01 RX ADMIN — NAFCILLIN SODIUM 2000 MG: 2 INJECTION, POWDER, LYOPHILIZED, FOR SOLUTION INTRAMUSCULAR; INTRAVENOUS at 05:48

## 2024-01-01 RX ADMIN — AMPICILLIN SODIUM 2000 MG: 2 INJECTION, POWDER, FOR SOLUTION INTRAMUSCULAR; INTRAVENOUS at 11:41

## 2024-01-01 RX ADMIN — THIAMINE HYDROCHLORIDE 500 MG: 100 INJECTION, SOLUTION INTRAMUSCULAR; INTRAVENOUS at 20:55

## 2024-01-01 RX ADMIN — VANCOMYCIN 1000 MG: 1 INJECTION, SOLUTION INTRAVENOUS at 08:40

## 2024-01-01 RX ADMIN — PANTOPRAZOLE SODIUM 40 MG: 40 INJECTION, POWDER, FOR SOLUTION INTRAVENOUS at 08:27

## 2024-01-01 RX ADMIN — LACTULOSE 20 G: 20 SOLUTION ORAL at 20:46

## 2024-01-01 RX ADMIN — SODIUM CHLORIDE: 9 INJECTION, SOLUTION INTRAVENOUS at 09:36

## 2024-01-01 RX ADMIN — SODIUM CHLORIDE: 9 INJECTION, SOLUTION INTRAVENOUS at 03:00

## 2024-01-01 RX ADMIN — ALBUMIN (HUMAN) 25 G: 0.25 INJECTION, SOLUTION INTRAVENOUS at 18:55

## 2024-01-01 RX ADMIN — MUPIROCIN: 20 OINTMENT TOPICAL at 09:13

## 2024-01-01 RX ADMIN — BACITRACIN ZINC AND POLYMYXIN B SULFATE: 500; 10000 OINTMENT TOPICAL at 21:38

## 2024-01-01 RX ADMIN — DEXTROSE MONOHYDRATE: 50 INJECTION, SOLUTION INTRAVENOUS at 17:10

## 2024-01-01 RX ADMIN — NAFCILLIN SODIUM 2000 MG: 2 INJECTION, POWDER, LYOPHILIZED, FOR SOLUTION INTRAMUSCULAR; INTRAVENOUS at 22:34

## 2024-01-01 RX ADMIN — Medication 2 PUFF: at 07:24

## 2024-01-01 RX ADMIN — NAFCILLIN SODIUM 2000 MG: 2 INJECTION, POWDER, LYOPHILIZED, FOR SOLUTION INTRAMUSCULAR; INTRAVENOUS at 14:32

## 2024-01-01 RX ADMIN — NAFCILLIN SODIUM 2000 MG: 2 INJECTION, POWDER, LYOPHILIZED, FOR SOLUTION INTRAMUSCULAR; INTRAVENOUS at 10:00

## 2024-01-01 RX ADMIN — FERROUS SULFATE TAB 325 MG (65 MG ELEMENTAL FE) 325 MG: 325 (65 FE) TAB at 08:12

## 2024-01-01 RX ADMIN — THIAMINE HYDROCHLORIDE 500 MG: 100 INJECTION, SOLUTION INTRAMUSCULAR; INTRAVENOUS at 11:31

## 2024-01-01 RX ADMIN — LORAZEPAM 2 MG: 2 INJECTION INTRAMUSCULAR; INTRAVENOUS at 23:45

## 2024-01-01 RX ADMIN — VANCOMYCIN 1000 MG: 1 INJECTION, SOLUTION INTRAVENOUS at 21:12

## 2024-01-01 RX ADMIN — BACITRACIN ZINC AND POLYMYXIN B SULFATE: 500; 10000 OINTMENT TOPICAL at 09:50

## 2024-01-01 RX ADMIN — MORPHINE SULFATE 2 MG: 2 INJECTION, SOLUTION INTRAMUSCULAR; INTRAVENOUS at 03:27

## 2024-01-01 RX ADMIN — RIFAXIMIN 400 MG: 200 TABLET ORAL at 20:23

## 2024-01-01 RX ADMIN — THIAMINE HYDROCHLORIDE 500 MG: 100 INJECTION, SOLUTION INTRAMUSCULAR; INTRAVENOUS at 15:48

## 2024-01-01 RX ADMIN — OXYCODONE HYDROCHLORIDE AND ACETAMINOPHEN 500 MG: 500 TABLET ORAL at 17:06

## 2024-01-01 RX ADMIN — Medication 10 ML: at 09:43

## 2024-01-01 RX ADMIN — NAFCILLIN SODIUM 2000 MG: 2 INJECTION, POWDER, LYOPHILIZED, FOR SOLUTION INTRAMUSCULAR; INTRAVENOUS at 10:33

## 2024-01-01 RX ADMIN — RISPERIDONE 1 MG: 1 TABLET ORAL at 09:43

## 2024-01-01 RX ADMIN — NAFCILLIN SODIUM 2000 MG: 2 INJECTION, POWDER, LYOPHILIZED, FOR SOLUTION INTRAMUSCULAR; INTRAVENOUS at 02:10

## 2024-01-01 RX ADMIN — SODIUM CHLORIDE, PRESERVATIVE FREE 10 ML: 5 INJECTION INTRAVENOUS at 08:29

## 2024-01-01 RX ADMIN — NAFCILLIN SODIUM 2000 MG: 2 INJECTION, POWDER, LYOPHILIZED, FOR SOLUTION INTRAMUSCULAR; INTRAVENOUS at 17:22

## 2024-01-01 RX ADMIN — FOLIC ACID 1 MG: 1 TABLET ORAL at 08:22

## 2024-01-01 RX ADMIN — GADOTERIDOL 13 ML: 279.3 INJECTION, SOLUTION INTRAVENOUS at 15:27

## 2024-01-01 RX ADMIN — BACITRACIN ZINC AND POLYMYXIN B SULFATE: 500; 10000 OINTMENT TOPICAL at 20:46

## 2024-01-01 RX ADMIN — SODIUM BICARBONATE: 84 INJECTION, SOLUTION INTRAVENOUS at 11:42

## 2024-01-01 RX ADMIN — POTASSIUM CHLORIDE 10 MEQ: 7.45 INJECTION INTRAVENOUS at 04:34

## 2024-01-01 RX ADMIN — AMPICILLIN SODIUM 2000 MG: 2 INJECTION, POWDER, FOR SOLUTION INTRAMUSCULAR; INTRAVENOUS at 12:08

## 2024-01-01 RX ADMIN — POTASSIUM PHOSPHATES 20 MMOL: 236; 224 INJECTION, SOLUTION INTRAVENOUS at 13:18

## 2024-01-01 RX ADMIN — POTASSIUM BICARBONATE 40 MEQ: 782 TABLET, EFFERVESCENT ORAL at 09:51

## 2024-01-01 RX ADMIN — SODIUM CHLORIDE, PRESERVATIVE FREE 10 ML: 5 INJECTION INTRAVENOUS at 08:15

## 2024-01-01 RX ADMIN — MORPHINE SULFATE 2 MG: 2 INJECTION, SOLUTION INTRAMUSCULAR; INTRAVENOUS at 06:20

## 2024-01-01 RX ADMIN — MORPHINE SULFATE 2 MG: 2 INJECTION, SOLUTION INTRAMUSCULAR; INTRAVENOUS at 20:16

## 2024-01-01 RX ADMIN — PANTOPRAZOLE SODIUM 40 MG: 40 INJECTION, POWDER, FOR SOLUTION INTRAVENOUS at 08:23

## 2024-01-01 RX ADMIN — FOLIC ACID 1 MG: 1 TABLET ORAL at 17:06

## 2024-01-01 RX ADMIN — ACYCLOVIR SODIUM 650 MG: 50 INJECTION, SOLUTION INTRAVENOUS at 04:21

## 2024-01-01 RX ADMIN — PANTOPRAZOLE SODIUM 40 MG: 40 TABLET, DELAYED RELEASE ORAL at 06:12

## 2024-01-01 RX ADMIN — ENOXAPARIN SODIUM 40 MG: 100 INJECTION SUBCUTANEOUS at 10:30

## 2024-01-01 RX ADMIN — BACITRACIN ZINC AND POLYMYXIN B SULFATE: 500; 10000 OINTMENT TOPICAL at 08:28

## 2024-01-01 RX ADMIN — NAFCILLIN SODIUM 2000 MG: 2 INJECTION, POWDER, LYOPHILIZED, FOR SOLUTION INTRAMUSCULAR; INTRAVENOUS at 09:26

## 2024-01-01 RX ADMIN — ALBUMIN (HUMAN) 25 G: 0.25 INJECTION, SOLUTION INTRAVENOUS at 19:48

## 2024-01-01 RX ADMIN — Medication 10 ML: at 19:50

## 2024-01-01 RX ADMIN — Medication 1 TABLET: at 09:43

## 2024-01-01 RX ADMIN — SODIUM CHLORIDE: 9 INJECTION, SOLUTION INTRAVENOUS at 21:35

## 2024-01-01 RX ADMIN — LACTULOSE 20 G: 20 SOLUTION ORAL at 21:16

## 2024-01-01 RX ADMIN — THIAMINE HYDROCHLORIDE 500 MG: 100 INJECTION, SOLUTION INTRAMUSCULAR; INTRAVENOUS at 10:03

## 2024-01-01 RX ADMIN — FUROSEMIDE 60 MG: 10 INJECTION, SOLUTION INTRAMUSCULAR; INTRAVENOUS at 09:37

## 2024-01-01 RX ADMIN — WATER: 100 IRRIGANT IRRIGATION at 12:09

## 2024-01-01 RX ADMIN — SODIUM CHLORIDE, PRESERVATIVE FREE 10 ML: 5 INJECTION INTRAVENOUS at 09:14

## 2024-01-01 RX ADMIN — MUPIROCIN: 20 OINTMENT TOPICAL at 08:28

## 2024-01-01 RX ADMIN — Medication 1 TABLET: at 08:22

## 2024-01-01 RX ADMIN — FOLIC ACID 1 MG: 1 TABLET ORAL at 10:30

## 2024-01-01 RX ADMIN — NAFCILLIN SODIUM 2000 MG: 2 INJECTION, POWDER, LYOPHILIZED, FOR SOLUTION INTRAMUSCULAR; INTRAVENOUS at 21:19

## 2024-01-01 RX ADMIN — Medication 1 TABLET: at 08:26

## 2024-01-01 RX ADMIN — AMPICILLIN SODIUM 2000 MG: 2 INJECTION, POWDER, FOR SOLUTION INTRAMUSCULAR; INTRAVENOUS at 19:56

## 2024-01-01 RX ADMIN — ENOXAPARIN SODIUM 40 MG: 100 INJECTION SUBCUTANEOUS at 08:13

## 2024-01-01 RX ADMIN — THIAMINE HYDROCHLORIDE 500 MG: 100 INJECTION, SOLUTION INTRAMUSCULAR; INTRAVENOUS at 20:32

## 2024-01-01 RX ADMIN — SODIUM ZIRCONIUM CYCLOSILICATE 10 G: 10 POWDER, FOR SUSPENSION ORAL at 02:23

## 2024-01-01 RX ADMIN — THIAMINE HYDROCHLORIDE 500 MG: 100 INJECTION, SOLUTION INTRAMUSCULAR; INTRAVENOUS at 11:47

## 2024-01-01 RX ADMIN — SODIUM CHLORIDE: 9 INJECTION, SOLUTION INTRAVENOUS at 14:52

## 2024-01-01 RX ADMIN — PANTOPRAZOLE SODIUM 40 MG: 40 INJECTION, POWDER, FOR SOLUTION INTRAVENOUS at 10:31

## 2024-01-01 RX ADMIN — OXYCODONE HYDROCHLORIDE AND ACETAMINOPHEN 500 MG: 500 TABLET ORAL at 08:13

## 2024-01-01 RX ADMIN — SODIUM CHLORIDE 20 ML/HR: 3 INJECTION, SOLUTION INTRAVENOUS at 09:51

## 2024-01-01 RX ADMIN — Medication 2 PUFF: at 20:10

## 2024-01-01 RX ADMIN — CEFTRIAXONE SODIUM 2000 MG: 2 INJECTION, POWDER, FOR SOLUTION INTRAMUSCULAR; INTRAVENOUS at 09:06

## 2024-01-01 RX ADMIN — ALBUMIN (HUMAN) 12.5 G: 0.25 INJECTION, SOLUTION INTRAVENOUS at 10:24

## 2024-01-01 RX ADMIN — WATER: 100 IRRIGANT IRRIGATION at 14:56

## 2024-01-01 RX ADMIN — NAFCILLIN SODIUM 2000 MG: 2 INJECTION, POWDER, LYOPHILIZED, FOR SOLUTION INTRAMUSCULAR; INTRAVENOUS at 05:46

## 2024-01-01 RX ADMIN — CEFTRIAXONE SODIUM 1000 MG: 1 INJECTION, POWDER, FOR SOLUTION INTRAMUSCULAR; INTRAVENOUS at 09:16

## 2024-01-01 ASSESSMENT — PAIN SCALES - WONG BAKER
WONGBAKER_NUMERICALRESPONSE: NO HURT

## 2024-01-01 ASSESSMENT — PAIN SCALES - GENERAL
PAINLEVEL_OUTOF10: 0
PAINLEVEL_OUTOF10: 0
PAINLEVEL_OUTOF10: 9
PAINLEVEL_OUTOF10: 0
PAINLEVEL_OUTOF10: 6
PAINLEVEL_OUTOF10: 10
PAINLEVEL_OUTOF10: 0
PAINLEVEL_OUTOF10: 10
PAINLEVEL_OUTOF10: 0

## 2024-01-01 ASSESSMENT — PAIN - FUNCTIONAL ASSESSMENT
PAIN_FUNCTIONAL_ASSESSMENT: ACTIVITIES ARE NOT PREVENTED
PAIN_FUNCTIONAL_ASSESSMENT: 0-10

## 2024-01-01 ASSESSMENT — PAIN DESCRIPTION - ORIENTATION
ORIENTATION: RIGHT
ORIENTATION: RIGHT;LEFT
ORIENTATION: MID

## 2024-01-01 ASSESSMENT — PAIN DESCRIPTION - PAIN TYPE: TYPE: ACUTE PAIN

## 2024-01-01 ASSESSMENT — LIFESTYLE VARIABLES
HOW MANY STANDARD DRINKS CONTAINING ALCOHOL DO YOU HAVE ON A TYPICAL DAY: 5 OR 6
HOW OFTEN DO YOU HAVE A DRINK CONTAINING ALCOHOL: 4 OR MORE TIMES A WEEK

## 2024-01-01 ASSESSMENT — PAIN DESCRIPTION - LOCATION
LOCATION: LEG
LOCATION: HEAD

## 2024-01-01 ASSESSMENT — PAIN DESCRIPTION - FREQUENCY: FREQUENCY: CONTINUOUS

## 2024-01-01 ASSESSMENT — PAIN DESCRIPTION - ONSET: ONSET: ON-GOING

## 2024-01-01 ASSESSMENT — PAIN DESCRIPTION - DESCRIPTORS
DESCRIPTORS: ACHING
DESCRIPTORS: ACHING
DESCRIPTORS: THROBBING

## 2024-01-01 NOTE — CARE COORDINATION
DISCHARGE ORDER  Date/Time 2024 1:44 PM  Completed by: Anu Blake RN, Case Management    Patient Name: Kaleb Persaud      : 1960  Admitting Diagnosis: Hyponatremia [E87.1]  Generalized weakness [R53.1]  History of alcohol abuse [F10.11]  Elevated brain natriuretic peptide (BNP) level [R79.89]  Frequent falls [R29.6]      Admit order Date and Status:23 inpt  (verify MD's last order for status of admission)      Noted discharge order.   If applicable PT/OT recommendation at Discharge: SNF  DME recommendation by PT/OT:N/A  Confirmed discharge plan  : Yes  with whom   If pt confirmed DC plan does family need to be contacted by CM Yes   Discharge Plan: Order for dc noted. Spoke with pt who cont plan to return home. States his brother will be staying with him and can assist if needed. Discussed therapy rec's for rehab but pt declines. Discussed HHC and pt is agreeable and chooses [facility]. Referral called to Maggy at [facility] who states can accept for SOC on Thursday. Noted pt states his insurance has changed and provided member ID #. Maggy aware. Chart reviewed and no other dc needs identified.    Date of Last IMM Given: 23    Reviewed chart.  Role of discharge planner explained and patient verbalized understanding. Discharge order is noted.    Has Home O2 in place on admit:  No  Informed of need to bring portable home O2 tank on day of discharge for nursing to connect prior to leaving:   Not Indicated  Verbalized agreement/Understanding:   Not Indicated  Pt is being d/c'd to home today. Pt's O2 sats are 95% on RA.    Discharge timeout done with nsg, CM and pt. All discharge needs and concerns addressed.

## 2024-01-01 NOTE — DISCHARGE SUMMARY
Hospital Medicine Discharge Summary    Patient: Kaleb Persaud     Gender: male  : 1960   Age: 63 y.o.  MRN: [id number]    Admitting Physician: Melissa Martinez DO  Discharge Physician: Melissa Martinez DO    Code Status: Full Code     Admit Date: 2023   Discharge Date: 2023      Discharge Diagnoses:    Active Hospital Problems    Diagnosis Date Noted    Bipolar disorder (HCC) [F31.9] 2022     Priority: Medium    Centrilobular emphysema (HCC) [J43.2] 2022     Priority: Medium    Hyponatremia [E87.1] 2023    Generalized weakness [R53.1] 2023    Frequent falls [R29.6] 2023    Neuropathy [G62.9] 2021    Anxiety [F41.9] 2021    HTN (hypertension) [I10] 2021    Iron deficiency anemia [D50.9] 2021    Alcohol abuse [F10.10] 2021       Condition at Discharge: Stable    Hospital Course:     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  - fall precautions       Alcohol abuse  - pt typically drinks 6-10 beers daily, last drink was Wednesday  - etoh negative on admission  - CIWA  - thiamine and multivitamin  - fall and seizure precautions  - social work consult     Hypokalemia  - replacement protocol  - monitor      HTN  - on lisinopril  - monitor BP     HLD  - on statin     Anxiety  Bipolar 1 disorder  - on Risperdal  - holding Zoloft 2/2 hyponatremia      Anemia   - no longer on iron supplements  - monitor h/h, transfuse if hgb <7 or symptomatic       Additional findings or notes to primary provider:  None at this time    Discharge Medications:   Current

## 2024-01-01 NOTE — DISCHARGE INSTR - COC
Continuity of Care Form    Patient Name: Kaleb Persaud   :  1960  MRN:  [id number]    Admit date:  2023  Discharge date:  2024    Code Status Order: Full Code   Advance Directives:     Admitting Physician:  Melissa Martinez DO  PCP: Patricia Vieira APRN - CNP    Discharging Nurse: Omar  Discharging Hospital Unit/Room#: 0206/0206-02  Discharging Unit Phone Number: [phone]    Emergency Contact:   Extended Emergency Contact Information  Primary Emergency Contact: Mona Robison  Home Phone: [phone]  Mobile Phone: [phone]  Relation: Brother/Sister  Preferred language: English  Secondary Emergency Contact: Leon Persaud  Home Phone: [phone]  Relation: Brother/Sister   needed? No    Past Surgical History:  Past Surgical History:   Procedure Laterality Date    COLONOSCOPY N/A 11/10/2021    COLONOSCOPY POLYPECTOMY SNARE/COLD BIOPSY performed by Henry Orta MD at [facility] ENDOSCOPY    ELBOW SURGERY Right     scraped the bone    ENDOSCOPY, COLON, DIAGNOSTIC      HERNIA REPAIR Right     inguinal hernia repair    NECK SURGERY N/A     Fusion [id number] by a safe falling on him    NECK SURGERY N/A     C4-6 revision of fusion    SHOULDER SURGERY Left 2017    reconstruction    SHOULDER SURGERY Right     rotator cuff    TONSILLECTOMY N/A 1974    UPPER GASTROINTESTINAL ENDOSCOPY N/A 11/10/2021    EGD BIOPSY performed by Henry Orta MD at [facility] ENDOSCOPY       Immunization History:   Immunization History   Administered Date(s) Administered    COVID-19, PFIZER PURPLE top, DILUTE for use, (age 12 y+), 30mcg/0.3mL 04/15/2021, 2021    Influenza, FLUARIX, FLULAVAL, FLUZONE (age 6 mo+) AND AFLURIA, (age 3 y+), PF, 0.5mL 2021    TDaP, ADACEL (age 10y-64y), BOOSTRIX (age 10y+), IM, 0.5mL 2016       Active Problems:  Patient Active Problem List   Diagnosis Code    Alcohol abuse F10.10    Symptomatic anemia D64.9    Elevated

## 2024-01-01 NOTE — PLAN OF CARE
Problem: Discharge Planning  Goal: Discharge to home or other facility with appropriate resources  Outcome: Completed  Flowsheets (Taken 1/1/2024 0057 by Sully Ge RN)  Discharge to home or other facility with appropriate resources: Identify barriers to discharge with patient and caregiver     Problem: Safety - Adult  Goal: Free from fall injury  1/1/2024 1104 by Omar Dow RN  Outcome: Completed  Flowsheets (Taken 1/1/2024 0103 by Sully Ge RN)  Free From Fall Injury: Instruct family/caregiver on patient safety  1/1/2024 0100 by Sully Ge RN  Outcome: Progressing     Problem: Pain  Goal: Verbalizes/displays adequate comfort level or baseline comfort level  1/1/2024 1104 by Omar Dow RN  Outcome: Completed  1/1/2024 0100 by Sully Ge RN  Outcome: Progressing     Problem: Nutrition Deficit:  Goal: Optimize nutritional status  1/1/2024 1104 by Omar Dow RN  Outcome: Completed  1/1/2024 0100 by Sully Ge RN  Outcome: Progressing

## 2024-01-01 NOTE — DISCHARGE INSTRUCTIONS
-Fluid restriction  1.5 L/day  -continue nutrition supplements with ensure  -make an appointment to follow-up with your primary care provider

## 2024-01-01 NOTE — PLAN OF CARE
Problem: Safety - Adult  Goal: Free from fall injury  1/1/2024 0100 by Sully Ge, RN  Outcome: Progressing  12/31/2023 1106 by Nahomi Gallego RN  Outcome: Progressing     Problem: Pain  Goal: Verbalizes/displays adequate comfort level or baseline comfort level  1/1/2024 0100 by Sully Ge RN  Outcome: Progressing  12/31/2023 1106 by Nahomi Gallego, RN  Outcome: Progressing     Problem: Nutrition Deficit:  Goal: Optimize nutritional status  Outcome: Progressing

## 2024-01-02 ENCOUNTER — TELEPHONE (OUTPATIENT)
Dept: FAMILY MEDICINE CLINIC | Age: 64
End: 2024-01-02

## 2024-01-02 ENCOUNTER — CARE COORDINATION (OUTPATIENT)
Dept: CASE MANAGEMENT | Age: 64
End: 2024-01-02

## 2024-01-02 NOTE — CARE COORDINATION
Care Transitions Initial Follow Up Call    Call within 2 business days of discharge: Yes      Patient: Kaleb Persaud Patient : 1960   MRN: [id number]  Reason for Admission: 3 days -> hyponatremia-most likely primary polydipsia and alcohol abuse, generalized weakness, falls, alcohol abuse, hypokalemia, HTN, HLD, anxiety, bipolar 1 disorder, anemia -> home with [facility], AM-PAC 18, fluid restriction 1.5L/day, eat more protein in diet, Na 133 at DC   Discharge Date: 24 RARS: Readmission Risk Score: 14.1      Last Discharge Facility       Date Complaint Diagnosis Description Type Department Provider    23 Loss of Consciousness Hyponatremia ... ED to Hosp-Admission (Discharged) (ADMITTED) [facility] [place] Melissa Martinez DO; Meg Larson MD     CTN attempted follow-up outreach to patient. Message left including CTN contact information. Per Maggy at [facility], patient is scheduled for SOC on Thursday, 2024.     Is follow up appointment scheduled within 7 days of discharge? Yes.    Follow Up  Future Appointments   Date Time Provider Department Center   2024  2:30 PM Patricia Vieira, APRN - CNP [facility]  Don Rodriguez, RN  Care Transition Nurse  [phone] mobile

## 2024-01-02 NOTE — TELEPHONE ENCOUNTER
Care Transitions Initial Follow Up Call    Outreach made within 2 business days of discharge: Yes    Patient: Kaleb Persaud Patient : 1960   MRN: [id number]  Reason for Admission: There are no discharge diagnoses documented for the most recent discharge.  Discharge Date: 24       Spoke with: Kaleb    Discharge department/facility: [facility]    TCM Interactive Patient Contact:  Was patient able to fill all prescriptions: Yes  Was patient instructed to bring all medications to the follow-up visit: Yes  Is patient taking all medications as directed in the discharge summary? Yes  Does patient understand their discharge instructions: Yes  Does patient have questions or concerns that need addressed prior to 7-14 day follow up office visit: no    Scheduled appointment with PCP within 7-14 days    Follow Up  Future Appointments   Date Time Provider Department Center   2024  2:30 PM Patricia Vieira, ED - JENNIFER Henley LPN

## 2024-01-03 ENCOUNTER — TELEPHONE (OUTPATIENT)
Dept: FAMILY MEDICINE CLINIC | Age: 64
End: 2024-01-03

## 2024-01-03 ENCOUNTER — CARE COORDINATION (OUTPATIENT)
Dept: CASE MANAGEMENT | Age: 64
End: 2024-01-03

## 2024-01-03 DIAGNOSIS — D75.839 THROMBOCYTHEMIA: Primary | ICD-10-CM

## 2024-01-03 PROCEDURE — 1111F DSCHRG MED/CURRENT MED MERGE: CPT | Performed by: NURSE PRACTITIONER

## 2024-01-03 NOTE — CARE COORDINATION
Care Transitions Initial Follow Up Call    Call within 2 business days of discharge: Yes    Patient Current Location: Home: [address] [place] [id number]    Care Transition Nurse contacted the patient by telephone to perform post hospital discharge assessment. Provided introduction to self, and explanation of the Care Transition Nurse role.    Patient: Kaleb Persaud Patient : 1960   MRN: [id number]  Reason for Admission: 3 days -> hyponatremia-most likely primary polydipsia and alcohol abuse, generalized weakness, falls, alcohol abuse, hypokalemia, HTN, HLD, anxiety, bipolar 1 disorder, anemia -> home with [facility], AM-PAC 18, fluid restriction 1.5L/day, eat more protein in diet, Na 133 at DC   Discharge Date: 24 RARS: Readmission Risk Score: 14.1      Last Discharge Facility       Date Complaint Diagnosis Description Type Department Provider    23 Loss of Consciousness Hyponatremia ... ED to [facility] (Discharged[facility]ITT[facility] [place] Melissa Martinez DO; Meg Larson MD     Challenges to be reviewed by the provider   Additional needs identified to be addressed with provider: No         Method of communication with provider: none.    Patient reports he feels well today. He is taking medications as noted in Medication Review. He denies nausea, vomiting, headache, weakness. States he is tolerating PO intake. CTN reviewed and educated on fluid restriction of 1.5L/day as well as s/s hyponatremia and importance of eating more protein in his diet. CTN reviewed sources of protein. He voices understanding. Confirmed with him that HC will start tomorrow and he will watch for call from HC nurse to schedule time. Reviewed hospital f/up visit date/time and he expects labs to be drawn. Denies needs at this time.    Care Transition Nurse reviewed discharge instructions, medical action plan, and red flags with patient who verbalized understanding. The patient was given an opportunity to ask  No

## 2024-01-10 ENCOUNTER — CARE COORDINATION (OUTPATIENT)
Dept: CASE MANAGEMENT | Age: 64
End: 2024-01-10

## 2024-01-11 ENCOUNTER — HOSPITAL ENCOUNTER (OUTPATIENT)
Age: 64
Setting detail: SPECIMEN
Discharge: HOME | End: 2024-01-11
Payer: COMMERCIAL

## 2024-01-11 ENCOUNTER — CARE COORDINATION (OUTPATIENT)
Dept: CASE MANAGEMENT | Age: 64
End: 2024-01-11

## 2024-01-11 DIAGNOSIS — E87.1 HYPONATREMIA: Primary | ICD-10-CM

## 2024-01-11 DIAGNOSIS — R79.89 ELEVATED LFTS: ICD-10-CM

## 2024-01-11 DIAGNOSIS — E87.1 HYPONATREMIA: ICD-10-CM

## 2024-01-11 PROCEDURE — 80053 COMPREHEN METABOLIC PANEL: CPT

## 2024-01-11 PROCEDURE — 36415 COLL VENOUS BLD VENIPUNCTURE: CPT

## 2024-01-11 NOTE — CARE COORDINATION
Care Transitions Follow Up Call    Patient Current Location: Home: [address] [place] [id number]    Care Transition Nurse contacted the patient by telephone to follow up after recent hospital admission.     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    CTN contacted [facility] to ask for notes from yesterday's resolved visit as CTN could not find in Care Everywhere. Spoke with [employer]. States the patient canceled the visit and they noted he \"didn't feel like dealing with it.\"    Outreach to patient at this time. States he canceled the [facility] visit and has nurse coming today at 2pm. States he feels shaky and will likely come back to the hospital. CTN offered to schedule him with PCP today and he wants to wait for SN visit. CTN routing to PCP as FYI.     Addressed changes since last contact:  none  Discussed follow-up appointments. If no appointment was previously scheduled, appointment scheduling offered: Yes.   Is follow up appointment scheduled within 7 days of discharge? No.    Follow Up  No future appointments.  External follow up appointment(s): [facility]    Care Transition Nurse reviewed medical action plan and red flags with patient and discussed any barriers to care and/or understanding of plan of care after discharge. Discussed appropriate site of care based on symptoms and resources available to patient including: PCP  Urgent care

## 2024-01-11 NOTE — CARE COORDINATION
Patient's HHN called the office asking what labs are needed and to let us know that the patient's BP was 170/108 . Spoke with Christiane NOLAN, schedule patient for 1/12/24. CMP is the lab requested by Sandra LONG.   Patient and HHN nurse notified of appt and requested lab.   
that are emergency to call 911 or report to ED. He voices understanding. CTN will try to contact HC and PCP for plan-perhaps HC nurse could draw labs? He is in agreement with plan and understands if symptoms worsen to seek emergency evaluation. CTN reviewed again the importance of limiting fluid intake. He voices understanding by saying, \"I know this.\"     CTN contacted [facility] (Yasmin). States patient declined SOC on 1/4/2024 because he \"was sick\". He is scheduled for Thursday 1/11/2024 for SOC. The nurse can draw labs with order faxed to them at ([phone]). If the SOC date is different, then Yasmin will have Maggy call CTN back with update.     CTN routing high priority to PCP requesting lab orders to be completed at SOC.     CTN contacted patient again. Patient updated. He voices understanding to allow the HC visit, limit fluid intake and eat more protein as well as s/s of hyponatremia requiring ED evaluation. He is agreeable to a [facility] visit and the request was entered into [facility] at this time. Patient accepted the visit with ETA Jan 10th 1130am-5pm.     Routing note to PCP as FYI and to see if labwork wanted at SOC for HC tomorrow.     Addressed changes since last contact:   canceled hosp f/up visit  Discussed follow-up appointments. If no appointment was previously scheduled, appointment scheduling offered: Yes.   Is follow up appointment scheduled within 7 days of discharge? No.    Follow Up  No future appointments.    Care Transition Nurse reviewed medical action plan and red flags with patient and discussed any barriers to care and/or understanding of plan of care after discharge. Discussed appropriate site of care based on symptoms and resources available to patient including: PCP  Home health. The patient agrees to contact the PCP office for questions related to their healthcare.     Patients top risk factors for readmission: medical condition-  and utilization of

## 2024-01-11 NOTE — TELEPHONE ENCOUNTER
I do not see a note from them. So I called [facility] and I was told he was scheduled to be seen, but then the patient called them back and cancelled that appointment. I called patient to get him scheduled with PCP. Patient started to refuse, he states that the nurse from [facility] is coming out today 1/11/2024 and would be checking his labs when they are out there. I did though get patient scheduled for 1/15/2024 at 4:30 to see PCP.

## 2024-01-11 NOTE — TELEPHONE ENCOUNTER
Do we have something from [facility], did they see him yesterday. We need to get him rescheduled for an in office visit as well.

## 2024-01-12 ENCOUNTER — OFFICE VISIT (OUTPATIENT)
Dept: FAMILY MEDICINE CLINIC | Age: 64
End: 2024-01-12

## 2024-01-12 ENCOUNTER — TELEPHONE (OUTPATIENT)
Dept: FAMILY MEDICINE CLINIC | Age: 64
End: 2024-01-12

## 2024-01-12 VITALS
RESPIRATION RATE: 18 BRPM | SYSTOLIC BLOOD PRESSURE: 110 MMHG | HEART RATE: 70 BPM | OXYGEN SATURATION: 97 % | DIASTOLIC BLOOD PRESSURE: 80 MMHG | WEIGHT: 144.2 LBS | BODY MASS INDEX: 22.58 KG/M2

## 2024-01-12 DIAGNOSIS — F31.62 BIPOLAR DISORDER, CURRENT EPISODE MIXED, MODERATE (HCC): ICD-10-CM

## 2024-01-12 DIAGNOSIS — F10.10 ALCOHOL ABUSE: ICD-10-CM

## 2024-01-12 DIAGNOSIS — K21.9 GASTROESOPHAGEAL REFLUX DISEASE, UNSPECIFIED WHETHER ESOPHAGITIS PRESENT: ICD-10-CM

## 2024-01-12 DIAGNOSIS — F33.0 MILD EPISODE OF RECURRENT MAJOR DEPRESSIVE DISORDER (HCC): ICD-10-CM

## 2024-01-12 DIAGNOSIS — E78.2 MIXED HYPERLIPIDEMIA: ICD-10-CM

## 2024-01-12 DIAGNOSIS — K70.9 ALCOHOLIC LIVER DISEASE (HCC): ICD-10-CM

## 2024-01-12 DIAGNOSIS — Z09 HOSPITAL DISCHARGE FOLLOW-UP: Primary | ICD-10-CM

## 2024-01-12 DIAGNOSIS — I10 PRIMARY HYPERTENSION: ICD-10-CM

## 2024-01-12 DIAGNOSIS — F31.32 BIPOLAR AFFECTIVE DISORDER, CURRENTLY DEPRESSED, MODERATE (HCC): ICD-10-CM

## 2024-01-12 DIAGNOSIS — Z23 FLU VACCINE NEED: ICD-10-CM

## 2024-01-12 DIAGNOSIS — J43.2 CENTRILOBULAR EMPHYSEMA (HCC): ICD-10-CM

## 2024-01-12 DIAGNOSIS — E87.1 HYPONATREMIA: ICD-10-CM

## 2024-01-12 LAB
ALBUMIN SERPL-MCNC: 3.8 G/DL (ref 3.4–5)
ALBUMIN/GLOB SERPL: 1.5 {RATIO} (ref 1.1–2.2)
ALP SERPL-CCNC: 193 U/L (ref 40–129)
ALT SERPL-CCNC: 38 U/L (ref 10–40)
ANION GAP SERPL CALCULATED.3IONS-SCNC: 13 MMOL/L (ref 3–16)
AST SERPL-CCNC: 68 U/L (ref 15–37)
BILIRUB SERPL-MCNC: 0.3 MG/DL (ref 0–1)
BUN SERPL-MCNC: 11 MG/DL (ref 7–20)
CALCIUM SERPL-MCNC: 8.6 MG/DL (ref 8.3–10.6)
CHLORIDE SERPL-SCNC: 96 MMOL/L (ref 99–110)
CO2 SERPL-SCNC: 20 MMOL/L (ref 21–32)
CREAT SERPL-MCNC: 0.9 MG/DL (ref 0.8–1.3)
GFR SERPLBLD CREATININE-BSD FMLA CKD-EPI: >60 ML/MIN/{1.73_M2}
GLUCOSE SERPL-MCNC: 100 MG/DL (ref 70–99)
POTASSIUM SERPL-SCNC: 3.8 MMOL/L (ref 3.5–5.1)
PROT SERPL-MCNC: 6.4 G/DL (ref 6.4–8.2)
SODIUM SERPL-SCNC: 129 MMOL/L (ref 136–145)

## 2024-01-12 RX ORDER — FLUTICASONE PROPIONATE AND SALMETEROL 100; 50 UG/1; UG/1
1 POWDER RESPIRATORY (INHALATION) EVERY 12 HOURS
Qty: 3 EACH | Refills: 3 | Status: SHIPPED | OUTPATIENT
Start: 2024-01-12

## 2024-01-12 RX ORDER — ATORVASTATIN CALCIUM 40 MG/1
40 TABLET, FILM COATED ORAL DAILY
Qty: 90 TABLET | Refills: 1 | Status: SHIPPED | OUTPATIENT
Start: 2024-01-12

## 2024-01-12 RX ORDER — RISPERIDONE 1 MG/1
TABLET ORAL
Qty: 180 TABLET | Refills: 1 | Status: SHIPPED | OUTPATIENT
Start: 2024-01-12

## 2024-01-12 RX ORDER — LANOLIN ALCOHOL/MO/W.PET/CERES
100 CREAM (GRAM) TOPICAL DAILY
Qty: 30 TABLET | Refills: 3 | Status: SHIPPED | OUTPATIENT
Start: 2024-01-12

## 2024-01-12 RX ORDER — PANTOPRAZOLE SODIUM 40 MG/1
TABLET, DELAYED RELEASE ORAL
Qty: 180 TABLET | Refills: 3 | Status: SHIPPED | OUTPATIENT
Start: 2024-01-12

## 2024-01-12 RX ORDER — LISINOPRIL 10 MG/1
10 TABLET ORAL DAILY
Qty: 90 TABLET | Refills: 1 | Status: SHIPPED | OUTPATIENT
Start: 2024-01-12

## 2024-01-12 SDOH — ECONOMIC STABILITY: INCOME INSECURITY: HOW HARD IS IT FOR YOU TO PAY FOR THE VERY BASICS LIKE FOOD, HOUSING, MEDICAL CARE, AND HEATING?: NOT HARD AT ALL

## 2024-01-12 SDOH — ECONOMIC STABILITY: FOOD INSECURITY: WITHIN THE PAST 12 MONTHS, YOU WORRIED THAT YOUR FOOD WOULD RUN OUT BEFORE YOU GOT MONEY TO BUY MORE.: NEVER TRUE

## 2024-01-12 SDOH — ECONOMIC STABILITY: FOOD INSECURITY: WITHIN THE PAST 12 MONTHS, THE FOOD YOU BOUGHT JUST DIDN'T LAST AND YOU DIDN'T HAVE MONEY TO GET MORE.: NEVER TRUE

## 2024-01-12 ASSESSMENT — PATIENT HEALTH QUESTIONNAIRE - PHQ9
1. LITTLE INTEREST OR PLEASURE IN DOING THINGS: 1
10. IF YOU CHECKED OFF ANY PROBLEMS, HOW DIFFICULT HAVE THESE PROBLEMS MADE IT FOR YOU TO DO YOUR WORK, TAKE CARE OF THINGS AT HOME, OR GET ALONG WITH OTHER PEOPLE: 1
3. TROUBLE FALLING OR STAYING ASLEEP: 2
SUM OF ALL RESPONSES TO PHQ9 QUESTIONS 1 & 2: 2
SUM OF ALL RESPONSES TO PHQ QUESTIONS 1-9: 16
6. FEELING BAD ABOUT YOURSELF - OR THAT YOU ARE A FAILURE OR HAVE LET YOURSELF OR YOUR FAMILY DOWN: 1
5. POOR APPETITE OR OVEREATING: 3
SUM OF ALL RESPONSES TO PHQ QUESTIONS 1-9: 16
2. FEELING DOWN, DEPRESSED OR HOPELESS: 1
4. FEELING TIRED OR HAVING LITTLE ENERGY: 3
SUM OF ALL RESPONSES TO PHQ QUESTIONS 1-9: 16
SUM OF ALL RESPONSES TO PHQ QUESTIONS 1-9: 16
7. TROUBLE CONCENTRATING ON THINGS, SUCH AS READING THE NEWSPAPER OR WATCHING TELEVISION: 2
8. MOVING OR SPEAKING SO SLOWLY THAT OTHER PEOPLE COULD HAVE NOTICED. OR THE OPPOSITE, BEING SO FIGETY OR RESTLESS THAT YOU HAVE BEEN MOVING AROUND A LOT MORE THAN USUAL: 3
9. THOUGHTS THAT YOU WOULD BE BETTER OFF DEAD, OR OF HURTING YOURSELF: 0

## 2024-01-12 NOTE — PROGRESS NOTES
Post-Discharge Transitional Care Follow Up      Kaleb Persaud   YOB: 1960    Date of Office Visit:  1/12/2024  Date of Hospital Admission: 12/29/23  Date of Hospital Discharge: 1/1/24  Readmission Risk Score (high >=14%. Medium >=10%):Readmission Risk Score: 14.1      Care management risk score Rising risk (score 2-5) and Complex Care (Scores >=6): No Risk Score On File     Non face to face  following discharge, date last encounter closed (first attempt may have been earlier): 01/03/2024     Call initiated 2 business days of discharge: Yes     Hospital discharge follow-up  -     ND DISCHARGE MEDS RECONCILED W/ CURRENT OUTPATIENT MED LIST  Alcoholic liver disease (HCC)  -     Comprehensive Metabolic Panel; Future  Hyponatremia  -     Comprehensive Metabolic Panel; Future  Centrilobular emphysema (HCC)  -     fluticasone-salmeterol (ADVAIR) 100-50 MCG/ACT AEPB diskus inhaler; Inhale 1 puff into the lungs in the morning and 1 puff in the evening., Disp-3 each, R-3**Patient requests 90 days supply**Normal  Bipolar affective disorder, currently depressed, moderate (HCC)  Mixed hyperlipidemia  -     atorvastatin (LIPITOR) 40 MG tablet; Take 1 tablet by mouth daily, Disp-90 tablet, R-1Normal  Primary hypertension  -     lisinopril (PRINIVIL;ZESTRIL) 10 MG tablet; Take 1 tablet by mouth daily, Disp-90 tablet, R-1**Patient requests 90 days supply**Normal  Gastroesophageal reflux disease, unspecified whether esophagitis present  -     pantoprazole (PROTONIX) 40 MG tablet; TAKE 1 TABLET BY MOUTH TWICE DAILY BEFORE MEALS, Disp-180 tablet, R-3Normal  Bipolar disorder, current episode mixed, moderate (HCC)  -     risperiDONE (RISPERDAL) 1 MG tablet; TAKE 1 TABLET BY MOUTH TWICE DAILY, Disp-180 tablet, R-1Normal  Mild episode of recurrent major depressive disorder (HCC)  -     sertraline (ZOLOFT) 50 MG tablet; Take 1 tablet by mouth daily, Disp-90 tablet, R-1Normal  Alcohol abuse  -     thiamine 100 MG tablet;

## 2024-01-12 NOTE — TELEPHONE ENCOUNTER
CARRIE Sim, called the office stating that Kaleb was drinking 12 beers per day. He has decided to quick drinking beer cold turkey. Patient has started to develop diarrhea, shakes, and HTN. His memory his poor and not eating. Chiquis educated the patient about how to stop drinking, he did have a few beers yesterday, which made him feel a little better but still not feeling himself. Chiquis has noticed that he is more depressed as well. Patient has an appt today at 2pm.     Chiquis [phone]

## 2024-01-12 NOTE — TELEPHONE ENCOUNTER
Patent needs to schedule- 3 weeks (around 2/2/2024) for hyponatremia. PCP is scheduling into March. Please advise if March is OK or if there is an opening we can get him in before then.

## 2024-01-17 ENCOUNTER — CARE COORDINATION (OUTPATIENT)
Dept: CASE MANAGEMENT | Age: 64
End: 2024-01-17

## 2024-01-17 NOTE — CARE COORDINATION
Care Transitions Follow Up Call    Patient Current Location: Home: [address] [place] [id number]    Care Transition Nurse contacted the patient by telephone to follow up after recent hospital admission.     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    Completed hosp f/up visit on -note reviewed. Today patient reports he is having less shaking and eating better. Still drinking \"quite a bit\" of fluids amounting to about two 2-liters/day. States he is drinking also a 12 pack of beer daily. Reviewed that his fluid restriction of 1.5L/day is to help his hypernatremia. He is in no distress. Has no complaints at this time. States HC nurse to visit today but hasn't shown up yet. He has agency number to follow up. He states he is looking into getting a new home care agency closer to home. CTN advised him to stay with current agency and discussed why. CTN asked if he had interest in stopping alcohol consumption and if he would be agreeable to resources to help him do so. He declined stating he had no interest in stopping drinking beer. CTN noted if he changes his mind resources can be provided. CTN reviewed again the purpose and importance of the fluid restriction and encouraged slowly cutting back his fluid intake. He voices understanding. Denies needs at this time.     Addressed changes since last contact:  chart reviewed  Discussed follow-up appointments. If no appointment

## 2024-01-18 ENCOUNTER — TELEPHONE (OUTPATIENT)
Dept: FAMILY MEDICINE CLINIC | Age: 64
End: 2024-01-18

## 2024-01-18 NOTE — TELEPHONE ENCOUNTER
CARRIE Sim called the office asking when would you like to labs done again. HH is out 2 days per week.  Patient is c/o pain under left rib that is constant, nothing makes it better or worse.     Please call her back at [phone]

## 2024-01-25 ENCOUNTER — HOSPITAL ENCOUNTER (OUTPATIENT)
Age: 64
Setting detail: SPECIMEN
Discharge: HOME | End: 2024-01-25
Payer: MEDICARE

## 2024-01-25 ENCOUNTER — CARE COORDINATION (OUTPATIENT)
Dept: CASE MANAGEMENT | Age: 64
End: 2024-01-25

## 2024-01-25 LAB
ALBUMIN SERPL-MCNC: 3.7 G/DL (ref 3.4–5)
ALBUMIN/GLOB SERPL: 1.5 {RATIO} (ref 1.1–2.2)
ALP SERPL-CCNC: 294 U/L (ref 40–129)
ALT SERPL-CCNC: 357 U/L (ref 10–40)
ANION GAP SERPL CALCULATED.3IONS-SCNC: 18 MMOL/L (ref 3–16)
AST SERPL-CCNC: 781 U/L (ref 15–37)
BILIRUB SERPL-MCNC: 1.1 MG/DL (ref 0–1)
BUN SERPL-MCNC: 16 MG/DL (ref 7–20)
CALCIUM SERPL-MCNC: 8.9 MG/DL (ref 8.3–10.6)
CHLORIDE SERPL-SCNC: 93 MMOL/L (ref 99–110)
CO2 SERPL-SCNC: 16 MMOL/L (ref 21–32)
CREAT SERPL-MCNC: 0.8 MG/DL (ref 0.8–1.3)
GFR SERPLBLD CREATININE-BSD FMLA CKD-EPI: >60 ML/MIN/{1.73_M2}
GLUCOSE SERPL-MCNC: 131 MG/DL (ref 70–99)
POTASSIUM SERPL-SCNC: 3.1 MMOL/L (ref 3.5–5.1)
PROT SERPL-MCNC: 6.2 G/DL (ref 6.4–8.2)
SODIUM SERPL-SCNC: 127 MMOL/L (ref 136–145)

## 2024-01-25 PROCEDURE — 80053 COMPREHEN METABOLIC PANEL: CPT

## 2024-01-25 NOTE — CARE COORDINATION
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 Message left including CTN contact information.     Follow Up  Future Appointments   Date Time Provider Department Center   2024 11:30 AM Patricia Vieira, APRN - CNP SAHIL Rodriguez, RN  Care Transition Nurse  [phone] mobile

## 2024-01-26 ENCOUNTER — TELEPHONE (OUTPATIENT)
Dept: FAMILY MEDICINE CLINIC | Age: 64
End: 2024-01-26

## 2024-01-26 DIAGNOSIS — E87.6 HYPOKALEMIA: Primary | ICD-10-CM

## 2024-01-26 DIAGNOSIS — F10.10 ALCOHOL ABUSE: Primary | ICD-10-CM

## 2024-01-26 DIAGNOSIS — E87.6 HYPOKALEMIA: ICD-10-CM

## 2024-01-26 RX ORDER — POTASSIUM CHLORIDE 20 MEQ/1
20 TABLET, EXTENDED RELEASE ORAL DAILY
Qty: 90 TABLET | OUTPATIENT
Start: 2024-01-26

## 2024-01-26 RX ORDER — POTASSIUM CHLORIDE 20 MEQ/1
20 TABLET, EXTENDED RELEASE ORAL DAILY
Qty: 30 TABLET | Refills: 0 | Status: SHIPPED | OUTPATIENT
Start: 2024-01-26

## 2024-01-26 NOTE — TELEPHONE ENCOUNTER
Refill Request     CONFIRM preferred pharmacy with the patient.    If Mail Order Rx - Pend for 90 day refill.      Last Seen: Last Seen Department: 1/12/2024  Last Seen by PCP: 1/12/2024    Last Written: 1/26/2024, #30, 0 refills    If no future appointment scheduled:  Review the last OV with PCP and review information for follow-up visit,  Route STAFF MESSAGE with patient name to the  Pool for scheduling with the following information:            -  Timing of next visit           -  Visit type ie Physical, OV, etc           -  Diagnoses/Reason ie. COPD, HTN - Do not use MEDICATION, Follow-up or CHECK UP - Give reason for visit      Next Appointment:   Future Appointments   Date Time Provider Department Center   2/2/2024 11:30 AM Patricia Vieira APRN - CNP [facility] - [facility]       Message sent to  to schedule appt with patient?  N/A      Requested Prescriptions     Pending Prescriptions Disp Refills    potassium chloride (KLOR-CON M) 20 MEQ extended release tablet [Pharmacy Med Name: POTASSIUM CL 20MEQ ER TABLETS] 90 tablet      Sig: TAKE 1 TABLET BY MOUTH DAILY

## 2024-01-26 NOTE — TELEPHONE ENCOUNTER
While on the phone with patients sister giving her lab results. She stated that when HH nurse was out with the patient a few days ago that the patient told the HHN that he was ready to get sober, but did not know where he could go for the help. Patients sister stated that they the HHN was supposed to call us with this info and to see if we knew where he could get the help, but I am not seeing anything.

## 2024-01-26 NOTE — TELEPHONE ENCOUNTER
I placed a referral to a couple of suggestion for Ezio. Please give his sister the numbers that she can contact them. I also placed a referral to our  for any additional suggestions.

## 2024-01-26 NOTE — TELEPHONE ENCOUNTER
Patients sister has been informed and numbers were given.     Minh, can you call this number [phone], this is the sisters number Mona, on HIPAA and patient may not answer his phone.

## 2024-01-29 ENCOUNTER — TELEPHONE (OUTPATIENT)
Dept: FAMILY MEDICINE CLINIC | Age: 64
End: 2024-01-29

## 2024-01-29 NOTE — TELEPHONE ENCOUNTER
[facility] Social Work Note    Reason for Referral   Other:  Inpatient and Outpatient Alcohol Treatment Center     Plan:    Pt's sister will contact agencies SW provided to explore alcohol treatment options for brother to assist him.    Pt's sister will have pt contact SW to discuss eligibility for Medicaid and if could be referred to [facility] for assistance.    Pt's sister will contact SW if she needs additional resources or information.        Assessment/Summary:    SW spoke pt's sister to assess pt's needs.  Pt's sister related pt may want inpatient and outpatient alcohol treatment services.  SW informed pt's sister how some facilities may offer one or both but is best to contact agency to discuss what services they do offer.  Pt's sister also related pt has Medicare at this time.  Pt's sister was provided with information about various inpatient/outpatient alcohol treatment facilities/agencies such as: [facility] at [phone]; [facility] at [phone]; [facility] at [phone]; [facility] in [place] at [phone]; and [facility]  in [place] at [phone] to discuss treatment options for patient. Pt's sister also related pt has Medicare at this time but pt is also wanting to look to see if he is eligible for Medicaid. SW related how would assess pt's income and resources to see if pt may be eligible for Medicaid or for referral to [facility].  Pt's sister related she would prefer pt, her brother, contact SW to discuss this further though.  SW informed pt's sister to contact if needs additional resources or information.      Interventions:    SW assessed pt's needs and barriers.    SW identified how pt needs resources for inpatient and outpatient treatment facilities and may need assistance to see if eligible for Medicaid.     SW provided pt's sister with information about inpatient/outpatient alcohol

## 2024-01-29 NOTE — TELEPHONE ENCOUNTER
SW made 1st call attempt to patient's sister per PCP request to follow-up on [facility] referral.  SW left message for pt's sister, Mona, explaining reason for call and contact number asking for a return call.

## 2024-01-29 NOTE — TELEPHONE ENCOUNTER
Here are the two locations Sandra gave the patient. It is also under the referral tab and is the one just right above your referral.     [address] [address]  Phone: [phone]    [facility] in [place]  [phone]

## 2024-01-31 ENCOUNTER — CARE COORDINATION (OUTPATIENT)
Dept: CASE MANAGEMENT | Age: 64
End: 2024-01-31

## 2024-01-31 ENCOUNTER — TELEPHONE (OUTPATIENT)
Dept: FAMILY MEDICINE CLINIC | Age: 64
End: 2024-01-31

## 2024-01-31 NOTE — TELEPHONE ENCOUNTER
He has an appointment schedule with me 2/2/24 at 11:30. Please make sure the home health provider knows of this. We can draw labs at that time.

## 2024-01-31 NOTE — TELEPHONE ENCOUNTER
Received phone call from Celena with [facility] in regards to pts sodium was low while in the hospital and she does not have an order to do a redraw to check this. She stated that she is going back out to see the pt on Friday 2/2/24 please advise she stated that she is ok to have a verbal order for the potassium lab draw. Celena

## 2024-01-31 NOTE — CARE COORDINATION
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 Message left including CTN contact information. Care transition program closed at this time.     Follow Up  Future Appointments   Date Time Provider Department Center   2024 11:30 AM Patricia Vieira, ED - JENNIFER Rodriguez, RN  Care Transition Nurse  [phone] mobile

## 2024-02-02 ENCOUNTER — OFFICE VISIT (OUTPATIENT)
Dept: FAMILY MEDICINE CLINIC | Age: 64
End: 2024-02-02
Payer: COMMERCIAL

## 2024-02-02 VITALS
BODY MASS INDEX: 20.73 KG/M2 | OXYGEN SATURATION: 100 % | HEART RATE: 111 BPM | SYSTOLIC BLOOD PRESSURE: 110 MMHG | DIASTOLIC BLOOD PRESSURE: 80 MMHG | WEIGHT: 144.8 LBS | HEIGHT: 70 IN

## 2024-02-02 DIAGNOSIS — Z00.00 MEDICARE ANNUAL WELLNESS VISIT, SUBSEQUENT: Primary | ICD-10-CM

## 2024-02-02 DIAGNOSIS — E87.1 HYPONATREMIA: ICD-10-CM

## 2024-02-02 DIAGNOSIS — D50.9 IRON DEFICIENCY ANEMIA, UNSPECIFIED IRON DEFICIENCY ANEMIA TYPE: ICD-10-CM

## 2024-02-02 DIAGNOSIS — D69.6 THROMBOCYTOPENIA, UNSPECIFIED (HCC): ICD-10-CM

## 2024-02-02 DIAGNOSIS — K70.9 ALCOHOLIC LIVER DISEASE (HCC): ICD-10-CM

## 2024-02-02 DIAGNOSIS — F10.19 ALCOHOL ABUSE WITH ALCOHOL-INDUCED DISORDER (HCC): ICD-10-CM

## 2024-02-02 LAB
BASOPHILS # BLD: 0.1 K/UL (ref 0–0.2)
BASOPHILS NFR BLD: 1.3 %
DEPRECATED RDW RBC AUTO: 17 % (ref 12.4–15.4)
EOSINOPHIL # BLD: 0 K/UL (ref 0–0.6)
EOSINOPHIL NFR BLD: 0.6 %
HCT VFR BLD AUTO: 38.9 % (ref 40.5–52.5)
HGB BLD-MCNC: 13.5 G/DL (ref 13.5–17.5)
LYMPHOCYTES # BLD: 1.7 K/UL (ref 1–5.1)
LYMPHOCYTES NFR BLD: 29.1 %
MCH RBC QN AUTO: 35.1 PG (ref 26–34)
MCHC RBC AUTO-ENTMCNC: 34.7 G/DL (ref 31–36)
MCV RBC AUTO: 101 FL (ref 80–100)
MONOCYTES # BLD: 0.8 K/UL (ref 0–1.3)
MONOCYTES NFR BLD: 13.7 %
NEUTROPHILS # BLD: 3.2 K/UL (ref 1.7–7.7)
NEUTROPHILS NFR BLD: 55.3 %
PLATELET # BLD AUTO: 319 K/UL (ref 135–450)
PMV BLD AUTO: 7.3 FL (ref 5–10.5)
RBC # BLD AUTO: 3.85 M/UL (ref 4.2–5.9)
WBC # BLD AUTO: 5.8 K/UL (ref 4–11)

## 2024-02-02 PROCEDURE — 3079F DIAST BP 80-89 MM HG: CPT | Performed by: NURSE PRACTITIONER

## 2024-02-02 PROCEDURE — 3074F SYST BP LT 130 MM HG: CPT | Performed by: NURSE PRACTITIONER

## 2024-02-02 PROCEDURE — G0439 PPPS, SUBSEQ VISIT: HCPCS | Performed by: NURSE PRACTITIONER

## 2024-02-02 ASSESSMENT — PATIENT HEALTH QUESTIONNAIRE - PHQ9
4. FEELING TIRED OR HAVING LITTLE ENERGY: 2
10. IF YOU CHECKED OFF ANY PROBLEMS, HOW DIFFICULT HAVE THESE PROBLEMS MADE IT FOR YOU TO DO YOUR WORK, TAKE CARE OF THINGS AT HOME, OR GET ALONG WITH OTHER PEOPLE: 0
8. MOVING OR SPEAKING SO SLOWLY THAT OTHER PEOPLE COULD HAVE NOTICED. OR THE OPPOSITE, BEING SO FIGETY OR RESTLESS THAT YOU HAVE BEEN MOVING AROUND A LOT MORE THAN USUAL: 1
5. POOR APPETITE OR OVEREATING: 2
2. FEELING DOWN, DEPRESSED OR HOPELESS: 1
SUM OF ALL RESPONSES TO PHQ QUESTIONS 1-9: 9
SUM OF ALL RESPONSES TO PHQ QUESTIONS 1-9: 9
6. FEELING BAD ABOUT YOURSELF - OR THAT YOU ARE A FAILURE OR HAVE LET YOURSELF OR YOUR FAMILY DOWN: 1
7. TROUBLE CONCENTRATING ON THINGS, SUCH AS READING THE NEWSPAPER OR WATCHING TELEVISION: 0
SUM OF ALL RESPONSES TO PHQ9 QUESTIONS 1 & 2: 2
SUM OF ALL RESPONSES TO PHQ QUESTIONS 1-9: 9
SUM OF ALL RESPONSES TO PHQ QUESTIONS 1-9: 9
3. TROUBLE FALLING OR STAYING ASLEEP: 1
9. THOUGHTS THAT YOU WOULD BE BETTER OFF DEAD, OR OF HURTING YOURSELF: 0
1. LITTLE INTEREST OR PLEASURE IN DOING THINGS: 1

## 2024-02-02 ASSESSMENT — LIFESTYLE VARIABLES
HOW MANY STANDARD DRINKS CONTAINING ALCOHOL DO YOU HAVE ON A TYPICAL DAY: 7 TO 9
HOW OFTEN DURING THE LAST YEAR HAVE YOU NEEDED AN ALCOHOLIC DRINK FIRST THING IN THE MORNING TO GET YOURSELF GOING AFTER A NIGHT OF HEAVY DRINKING: 0
HAVE YOU OR SOMEONE ELSE BEEN INJURED AS A RESULT OF YOUR DRINKING: 0
HOW OFTEN DURING THE LAST YEAR HAVE YOU BEEN UNABLE TO REMEMBER WHAT HAPPENED THE NIGHT BEFORE BECAUSE YOU HAD BEEN DRINKING: 0
HOW OFTEN DO YOU HAVE A DRINK CONTAINING ALCOHOL: 4 OR MORE TIMES A WEEK
HOW OFTEN DURING THE LAST YEAR HAVE YOU FOUND THAT YOU WERE NOT ABLE TO STOP DRINKING ONCE YOU HAD STARTED: 4
HAS A RELATIVE, FRIEND, DOCTOR, OR ANOTHER HEALTH PROFESSIONAL EXPRESSED CONCERN ABOUT YOUR DRINKING OR SUGGESTED YOU CUT DOWN: 4
HOW OFTEN DURING THE LAST YEAR HAVE YOU FAILED TO DO WHAT WAS NORMALLY EXPECTED FROM YOU BECAUSE OF DRINKING: 0
HOW OFTEN DURING THE LAST YEAR HAVE YOU HAD A FEELING OF GUILT OR REMORSE AFTER DRINKING: 0

## 2024-02-02 NOTE — PROGRESS NOTES
Patricia Vieira APRN - CNP   risperiDONE (RISPERDAL) 1 MG tablet TAKE 1 TABLET BY MOUTH TWICE DAILY Yes Patricia Vieira APRN - CNP   thiamine 100 MG tablet Take 1 tablet by mouth daily Yes Patricia Vieira APRN - CNP   hydrOXYzine HCl (ATARAX) 50 MG tablet Take 1 tablet by mouth every 8 hours as needed for Anxiety Yes Patricia Vieira APRN - CNP   ferrous sulfate (IRON 325) 325 (65 Fe) MG tablet Take 1 tablet by mouth daily (with breakfast) Yes Yudith Keenan MD   Ascorbic Acid (VITAMIN C) 500 MG CAPS Take 1 capsule by mouth daily Yes Yudith Keenan MD   melatonin 5 MG TBDP disintegrating tablet Take 1 tablet by mouth nightly  Patient not taking: Reported on 1/3/2024  Melissa Martinez,    albuterol sulfate HFA (PROVENTIL;VENTOLIN;PROAIR) 108 (90 Base) MCG/ACT inhaler Inhale 2 puffs into the lungs every 6 hours as needed for Wheezing  Patricia Vieira APRN - CNP   vitamin B-12 (CYANOCOBALAMIN) 100 MCG tablet Take 2.5 tablets by mouth daily  Patient not taking: Reported on 12/29/2023  Yudith Keenan MD   Vitamin D, Cholecalciferol, 25 MCG (1000 UT) CAPS Take 2,000 Units by mouth daily  Patient not taking: Reported on 12/29/2023  Yudith Keenan MD   Multiple Vitamins-Minerals (MULTIVITAMIN WITH MINERALS) tablet Take 1 tablet by mouth daily  Patient not taking: Reported on 1/3/2024  Marek Blackwell MD       [facility] (Including outside providers/suppliers regularly involved in providing care):   Patient Care Team:  Patricia Vieira APRN - CNP as PCP - General (Family Nurse Practitioner)  Patricia Vieira APRN - CNP as PCP - Empaneled Provider  Minh Ibarra LSW as  (Licensed Clinical )     Reviewed and updated this visit:  Tobacco  Allergies  Meds  Problems  Med Hx  Surg Hx  Soc Hx  Fam Hx

## 2024-02-03 LAB
ALBUMIN SERPL-MCNC: 3.7 G/DL (ref 3.4–5)
ALBUMIN/GLOB SERPL: 1.4 {RATIO} (ref 1.1–2.2)
ALP SERPL-CCNC: 336 U/L (ref 40–129)
ALT SERPL-CCNC: 232 U/L (ref 10–40)
ANION GAP SERPL CALCULATED.3IONS-SCNC: 15 MMOL/L (ref 3–16)
AST SERPL-CCNC: 306 U/L (ref 15–37)
BILIRUB SERPL-MCNC: 0.4 MG/DL (ref 0–1)
BUN SERPL-MCNC: 9 MG/DL (ref 7–20)
CALCIUM SERPL-MCNC: 8.6 MG/DL (ref 8.3–10.6)
CHLORIDE SERPL-SCNC: 93 MMOL/L (ref 99–110)
CO2 SERPL-SCNC: 21 MMOL/L (ref 21–32)
CREAT SERPL-MCNC: 0.8 MG/DL (ref 0.8–1.3)
GFR SERPLBLD CREATININE-BSD FMLA CKD-EPI: >60 ML/MIN/{1.73_M2}
GLUCOSE SERPL-MCNC: 106 MG/DL (ref 70–99)
POTASSIUM SERPL-SCNC: 4.4 MMOL/L (ref 3.5–5.1)
PROT SERPL-MCNC: 6.4 G/DL (ref 6.4–8.2)
SODIUM SERPL-SCNC: 129 MMOL/L (ref 136–145)

## 2024-02-07 ENCOUNTER — TELEPHONE (OUTPATIENT)
Dept: FAMILY MEDICINE CLINIC | Age: 64
End: 2024-02-07

## 2024-02-07 DIAGNOSIS — F10.19 ALCOHOL ABUSE WITH ALCOHOL-INDUCED DISORDER (HCC): ICD-10-CM

## 2024-02-07 DIAGNOSIS — E87.1 HYPONATREMIA: Primary | ICD-10-CM

## 2024-02-07 DIAGNOSIS — R79.89 ELEVATED LFTS: ICD-10-CM

## 2024-02-08 ENCOUNTER — TELEPHONE (OUTPATIENT)
Dept: FAMILY MEDICINE CLINIC | Age: 64
End: 2024-02-08

## 2024-02-08 NOTE — TELEPHONE ENCOUNTER
FD, can you please change patient insurance in system. New as below    [employer].   ID # [id number]    Thanks.

## 2024-02-08 NOTE — TELEPHONE ENCOUNTER
He did make us aware of the change he made in insurance but didn't have any information at the time of his appointment. We discussed his need to let the office know of the information.  His sister is willing to provide transportation and she could bring him to the office for the lab draw in 2 weeks.

## 2024-02-08 NOTE — TELEPHONE ENCOUNTER
Patient called the office to discuss lab work, informed him of results and to have labs done in 2 weeks. Patient states that home health has not been out to him since the last week of 2024. I informed him that I will call [facility].       Spoke with [facility], they informed me that the patient's insurance  24.       Spoke to the patient, he has new insurance that he didn't mention to our office or to [facility]. Patient has switched to [employer].   ID # [id number]      Called [facility], gave them the new insurance information. I was told that a new referral will need to be placed with the updated insurance information.       Notified front office of insurance change, will update the system.         [facility]  F [phone]

## 2024-02-16 ENCOUNTER — TELEPHONE (OUTPATIENT)
Dept: FAMILY MEDICINE CLINIC | Age: 64
End: 2024-02-16

## 2024-02-16 ENCOUNTER — APPOINTMENT (OUTPATIENT)
Dept: CT IMAGING | Age: 64
DRG: 897 | End: 2024-02-16
Payer: COMMERCIAL

## 2024-02-16 ENCOUNTER — HOSPITAL ENCOUNTER (INPATIENT)
Age: 64
LOS: 3 days | Discharge: HOME | DRG: 897 | End: 2024-02-19
Attending: STUDENT IN AN ORGANIZED HEALTH CARE EDUCATION/TRAINING PROGRAM | Admitting: INTERNAL MEDICINE
Payer: COMMERCIAL

## 2024-02-16 ENCOUNTER — HOSPITAL ENCOUNTER (OUTPATIENT)
Age: 64
Discharge: HOME | End: 2024-02-16
Payer: COMMERCIAL

## 2024-02-16 ENCOUNTER — APPOINTMENT (OUTPATIENT)
Dept: GENERAL RADIOLOGY | Age: 64
DRG: 897 | End: 2024-02-16
Payer: COMMERCIAL

## 2024-02-16 DIAGNOSIS — F10.932 ALCOHOL WITHDRAWAL SYNDROME WITH PERCEPTUAL DISTURBANCE (HCC): Primary | ICD-10-CM

## 2024-02-16 DIAGNOSIS — E87.1 HYPONATREMIA: ICD-10-CM

## 2024-02-16 PROBLEM — F10.239 ALCOHOL DEPENDENCE WITH WITHDRAWAL (HCC): Status: ACTIVE | Noted: 2024-02-16

## 2024-02-16 LAB
ALBUMIN SERPL-MCNC: 3.1 G/DL (ref 3.4–5)
ALBUMIN SERPL-MCNC: 3.2 G/DL (ref 3.4–5)
ALBUMIN/GLOB SERPL: 1.1 {RATIO} (ref 1.1–2.2)
ALBUMIN/GLOB SERPL: 1.5 {RATIO} (ref 1.1–2.2)
ALP SERPL-CCNC: 509 U/L (ref 40–129)
ALP SERPL-CCNC: 596 U/L (ref 40–129)
ALT SERPL-CCNC: 318 U/L (ref 10–40)
ALT SERPL-CCNC: 350 U/L (ref 10–40)
ANION GAP SERPL CALCULATED.3IONS-SCNC: 10 MMOL/L (ref 3–16)
ANION GAP SERPL CALCULATED.3IONS-SCNC: 13 MMOL/L (ref 3–16)
ANION GAP SERPL CALCULATED.3IONS-SCNC: 8 MMOL/L (ref 3–16)
APAP SERPL-MCNC: <5 UG/ML (ref 10–30)
AST SERPL-CCNC: 638 U/L (ref 15–37)
AST SERPL-CCNC: 735 U/L (ref 15–37)
BASOPHILS # BLD: 0.2 K/UL (ref 0–0.2)
BASOPHILS NFR BLD: 2.3 %
BILIRUB SERPL-MCNC: 1.1 MG/DL (ref 0–1)
BILIRUB SERPL-MCNC: 1.2 MG/DL (ref 0–1)
BILIRUB UR QL STRIP.AUTO: NEGATIVE
BUN SERPL-MCNC: 5 MG/DL (ref 7–20)
BUN SERPL-MCNC: 5 MG/DL (ref 7–20)
BUN SERPL-MCNC: 6 MG/DL (ref 7–20)
CALCIUM SERPL-MCNC: 7.6 MG/DL (ref 8.3–10.6)
CALCIUM SERPL-MCNC: 7.7 MG/DL (ref 8.3–10.6)
CALCIUM SERPL-MCNC: 8.3 MG/DL (ref 8.3–10.6)
CHLORIDE SERPL-SCNC: 87 MMOL/L (ref 99–110)
CHLORIDE SERPL-SCNC: 90 MMOL/L (ref 99–110)
CHLORIDE SERPL-SCNC: 90 MMOL/L (ref 99–110)
CLARITY UR: CLEAR
CO2 SERPL-SCNC: 20 MMOL/L (ref 21–32)
CO2 SERPL-SCNC: 23 MMOL/L (ref 21–32)
CO2 SERPL-SCNC: 24 MMOL/L (ref 21–32)
COLOR UR: YELLOW
CREAT SERPL-MCNC: <0.5 MG/DL (ref 0.8–1.3)
DEPRECATED RDW RBC AUTO: 16 % (ref 12.4–15.4)
EOSINOPHIL # BLD: 0.1 K/UL (ref 0–0.6)
EOSINOPHIL NFR BLD: 0.8 %
ETHANOLAMINE SERPL-MCNC: 48 MG/DL (ref 0–0.08)
FLUAV RNA RESP QL NAA+PROBE: NOT DETECTED
FLUBV RNA RESP QL NAA+PROBE: NOT DETECTED
GFR SERPLBLD CREATININE-BSD FMLA CKD-EPI: >60 ML/MIN/{1.73_M2}
GLUCOSE BLD-MCNC: 152 MG/DL (ref 70–99)
GLUCOSE SERPL-MCNC: 110 MG/DL (ref 70–99)
GLUCOSE SERPL-MCNC: 133 MG/DL (ref 70–99)
GLUCOSE SERPL-MCNC: 148 MG/DL (ref 70–99)
GLUCOSE UR STRIP.AUTO-MCNC: NEGATIVE MG/DL
HCT VFR BLD AUTO: 37.4 % (ref 40.5–52.5)
HGB BLD-MCNC: 13.2 G/DL (ref 13.5–17.5)
HGB UR QL STRIP.AUTO: NEGATIVE
INR PPP: 0.94 (ref 0.84–1.16)
KETONES UR STRIP.AUTO-MCNC: NEGATIVE MG/DL
LEUKOCYTE ESTERASE UR QL STRIP.AUTO: NEGATIVE
LYMPHOCYTES # BLD: 2.8 K/UL (ref 1–5.1)
LYMPHOCYTES NFR BLD: 42 %
MAGNESIUM SERPL-MCNC: 1.9 MG/DL (ref 1.8–2.4)
MCH RBC QN AUTO: 34.9 PG (ref 26–34)
MCHC RBC AUTO-ENTMCNC: 35.3 G/DL (ref 31–36)
MCV RBC AUTO: 99 FL (ref 80–100)
MONOCYTES # BLD: 0.5 K/UL (ref 0–1.3)
MONOCYTES NFR BLD: 7.6 %
NEUTROPHILS # BLD: 3.1 K/UL (ref 1.7–7.7)
NEUTROPHILS NFR BLD: 47.3 %
NITRITE UR QL STRIP.AUTO: NEGATIVE
NT-PROBNP SERPL-MCNC: 205 PG/ML (ref 0–124)
PERFORMED ON: ABNORMAL
PH UR STRIP.AUTO: 6 [PH] (ref 5–8)
PLATELET # BLD AUTO: 159 K/UL (ref 135–450)
PMV BLD AUTO: 7.4 FL (ref 5–10.5)
POTASSIUM SERPL-SCNC: 3.7 MMOL/L (ref 3.5–5.1)
POTASSIUM SERPL-SCNC: 3.9 MMOL/L (ref 3.5–5.1)
POTASSIUM SERPL-SCNC: 4 MMOL/L (ref 3.5–5.1)
PROT SERPL-MCNC: 5.4 G/DL (ref 6.4–8.2)
PROT SERPL-MCNC: 5.8 G/DL (ref 6.4–8.2)
PROT UR STRIP.AUTO-MCNC: NEGATIVE MG/DL
PROTHROMBIN TIME: 12.6 SEC (ref 11.5–14.8)
RBC # BLD AUTO: 3.78 M/UL (ref 4.2–5.9)
SARS-COV-2 RNA RESP QL NAA+PROBE: NOT DETECTED
SODIUM SERPL-SCNC: 118 MMOL/L (ref 136–145)
SODIUM SERPL-SCNC: 123 MMOL/L (ref 136–145)
SODIUM SERPL-SCNC: 124 MMOL/L (ref 136–145)
SP GR UR STRIP.AUTO: 1.01 (ref 1–1.03)
TROPONIN, HIGH SENSITIVITY: 14 NG/L (ref 0–22)
TROPONIN, HIGH SENSITIVITY: 16 NG/L (ref 0–22)
UA COMPLETE W REFLEX CULTURE PNL UR: NORMAL
UA DIPSTICK W REFLEX MICRO PNL UR: NORMAL
URN SPEC COLLECT METH UR: NORMAL
UROBILINOGEN UR STRIP-ACNC: 0.2 E.U./DL
WBC # BLD AUTO: 6.6 K/UL (ref 4–11)

## 2024-02-16 PROCEDURE — 2580000003 HC RX 258: Performed by: INTERNAL MEDICINE

## 2024-02-16 PROCEDURE — 85610 PROTHROMBIN TIME: CPT

## 2024-02-16 PROCEDURE — 72125 CT NECK SPINE W/O DYE: CPT

## 2024-02-16 PROCEDURE — 6370000000 HC RX 637 (ALT 250 FOR IP): Performed by: INTERNAL MEDICINE

## 2024-02-16 PROCEDURE — 83935 ASSAY OF URINE OSMOLALITY: CPT

## 2024-02-16 PROCEDURE — 94640 AIRWAY INHALATION TREATMENT: CPT

## 2024-02-16 PROCEDURE — 83735 ASSAY OF MAGNESIUM: CPT

## 2024-02-16 PROCEDURE — 70450 CT HEAD/BRAIN W/O DYE: CPT

## 2024-02-16 PROCEDURE — 2580000003 HC RX 258: Performed by: REGISTERED NURSE

## 2024-02-16 PROCEDURE — 6370000000 HC RX 637 (ALT 250 FOR IP): Performed by: REGISTERED NURSE

## 2024-02-16 PROCEDURE — 36415 COLL VENOUS BLD VENIPUNCTURE: CPT

## 2024-02-16 PROCEDURE — 6360000002 HC RX W HCPCS: Performed by: INTERNAL MEDICINE

## 2024-02-16 PROCEDURE — 85025 COMPLETE CBC W/AUTO DIFF WBC: CPT

## 2024-02-16 PROCEDURE — 81003 URINALYSIS AUTO W/O SCOPE: CPT

## 2024-02-16 PROCEDURE — 2060000000 HC ICU INTERMEDIATE R&B

## 2024-02-16 PROCEDURE — 70486 CT MAXILLOFACIAL W/O DYE: CPT

## 2024-02-16 PROCEDURE — 71046 X-RAY EXAM CHEST 2 VIEWS: CPT

## 2024-02-16 PROCEDURE — 93005 ELECTROCARDIOGRAM TRACING: CPT | Performed by: REGISTERED NURSE

## 2024-02-16 PROCEDURE — 87636 SARSCOV2 & INF A&B AMP PRB: CPT

## 2024-02-16 PROCEDURE — 80053 COMPREHEN METABOLIC PANEL: CPT

## 2024-02-16 PROCEDURE — 83880 ASSAY OF NATRIURETIC PEPTIDE: CPT

## 2024-02-16 PROCEDURE — 94761 N-INVAS EAR/PLS OXIMETRY MLT: CPT

## 2024-02-16 PROCEDURE — 84300 ASSAY OF URINE SODIUM: CPT

## 2024-02-16 PROCEDURE — 80143 DRUG ASSAY ACETAMINOPHEN: CPT

## 2024-02-16 PROCEDURE — 84484 ASSAY OF TROPONIN QUANT: CPT

## 2024-02-16 PROCEDURE — 82077 ASSAY SPEC XCP UR&BREATH IA: CPT

## 2024-02-16 PROCEDURE — 99285 EMERGENCY DEPT VISIT HI MDM: CPT

## 2024-02-16 RX ORDER — RISPERIDONE 1 MG/1
1 TABLET ORAL 2 TIMES DAILY
Status: DISCONTINUED | OUTPATIENT
Start: 2024-02-16 | End: 2024-02-19 | Stop reason: HOSPADM

## 2024-02-16 RX ORDER — NICOTINE 21 MG/24HR
1 PATCH, TRANSDERMAL 24 HOURS TRANSDERMAL DAILY
Status: DISCONTINUED | OUTPATIENT
Start: 2024-02-17 | End: 2024-02-17

## 2024-02-16 RX ORDER — ONDANSETRON 2 MG/ML
4 INJECTION INTRAMUSCULAR; INTRAVENOUS EVERY 6 HOURS PRN
Status: DISCONTINUED | OUTPATIENT
Start: 2024-02-16 | End: 2024-02-19 | Stop reason: HOSPADM

## 2024-02-16 RX ORDER — POTASSIUM CHLORIDE 7.45 MG/ML
10 INJECTION INTRAVENOUS PRN
Status: DISCONTINUED | OUTPATIENT
Start: 2024-02-16 | End: 2024-02-19 | Stop reason: HOSPADM

## 2024-02-16 RX ORDER — LORAZEPAM 2 MG/1
2 TABLET ORAL
Status: DISCONTINUED | OUTPATIENT
Start: 2024-02-16 | End: 2024-02-16 | Stop reason: SDUPTHER

## 2024-02-16 RX ORDER — LORAZEPAM 2 MG/ML
4 INJECTION INTRAMUSCULAR
Status: DISCONTINUED | OUTPATIENT
Start: 2024-02-16 | End: 2024-02-16 | Stop reason: SDUPTHER

## 2024-02-16 RX ORDER — LORAZEPAM 2 MG/ML
3 INJECTION INTRAMUSCULAR
Status: DISCONTINUED | OUTPATIENT
Start: 2024-02-16 | End: 2024-02-16 | Stop reason: SDUPTHER

## 2024-02-16 RX ORDER — SODIUM CHLORIDE 0.9 % (FLUSH) 0.9 %
5-40 SYRINGE (ML) INJECTION EVERY 12 HOURS SCHEDULED
Status: DISCONTINUED | OUTPATIENT
Start: 2024-02-16 | End: 2024-02-16 | Stop reason: SDUPTHER

## 2024-02-16 RX ORDER — LORAZEPAM 2 MG/1
4 TABLET ORAL
Status: DISCONTINUED | OUTPATIENT
Start: 2024-02-16 | End: 2024-02-17

## 2024-02-16 RX ORDER — LORAZEPAM 2 MG/ML
2 INJECTION INTRAMUSCULAR
Status: DISCONTINUED | OUTPATIENT
Start: 2024-02-16 | End: 2024-02-17

## 2024-02-16 RX ORDER — LANOLIN ALCOHOL/MO/W.PET/CERES
100 CREAM (GRAM) TOPICAL DAILY
Status: DISCONTINUED | OUTPATIENT
Start: 2024-02-16 | End: 2024-02-19 | Stop reason: HOSPADM

## 2024-02-16 RX ORDER — SODIUM CHLORIDE 0.9 % (FLUSH) 0.9 %
10 SYRINGE (ML) INJECTION PRN
Status: DISCONTINUED | OUTPATIENT
Start: 2024-02-16 | End: 2024-02-19 | Stop reason: HOSPADM

## 2024-02-16 RX ORDER — LANOLIN ALCOHOL/MO/W.PET/CERES
3 CREAM (GRAM) TOPICAL NIGHTLY
Status: DISCONTINUED | OUTPATIENT
Start: 2024-02-16 | End: 2024-02-19 | Stop reason: HOSPADM

## 2024-02-16 RX ORDER — SODIUM CHLORIDE 0.9 % (FLUSH) 0.9 %
10 SYRINGE (ML) INJECTION EVERY 12 HOURS SCHEDULED
Status: DISCONTINUED | OUTPATIENT
Start: 2024-02-16 | End: 2024-02-19 | Stop reason: HOSPADM

## 2024-02-16 RX ORDER — ACETAMINOPHEN 650 MG/1
650 SUPPOSITORY RECTAL EVERY 6 HOURS PRN
Status: DISCONTINUED | OUTPATIENT
Start: 2024-02-16 | End: 2024-02-19 | Stop reason: HOSPADM

## 2024-02-16 RX ORDER — LORAZEPAM 2 MG/1
2 TABLET ORAL
Status: DISCONTINUED | OUTPATIENT
Start: 2024-02-16 | End: 2024-02-17

## 2024-02-16 RX ORDER — SODIUM CHLORIDE 0.9 % (FLUSH) 0.9 %
5-40 SYRINGE (ML) INJECTION PRN
Status: DISCONTINUED | OUTPATIENT
Start: 2024-02-16 | End: 2024-02-16 | Stop reason: SDUPTHER

## 2024-02-16 RX ORDER — PROMETHAZINE HYDROCHLORIDE 25 MG/1
12.5 TABLET ORAL EVERY 6 HOURS PRN
Status: DISCONTINUED | OUTPATIENT
Start: 2024-02-16 | End: 2024-02-19 | Stop reason: HOSPADM

## 2024-02-16 RX ORDER — LANOLIN ALCOHOL/MO/W.PET/CERES
100 CREAM (GRAM) TOPICAL DAILY
Status: DISCONTINUED | OUTPATIENT
Start: 2024-02-17 | End: 2024-02-16 | Stop reason: SDUPTHER

## 2024-02-16 RX ORDER — LORAZEPAM 2 MG/ML
1 INJECTION INTRAMUSCULAR
Status: DISCONTINUED | OUTPATIENT
Start: 2024-02-16 | End: 2024-02-16 | Stop reason: SDUPTHER

## 2024-02-16 RX ORDER — LORAZEPAM 2 MG/ML
3 INJECTION INTRAMUSCULAR
Status: DISCONTINUED | OUTPATIENT
Start: 2024-02-16 | End: 2024-02-17

## 2024-02-16 RX ORDER — LORAZEPAM 1 MG/1
1 TABLET ORAL
Status: DISCONTINUED | OUTPATIENT
Start: 2024-02-16 | End: 2024-02-17

## 2024-02-16 RX ORDER — BUDESONIDE AND FORMOTEROL FUMARATE DIHYDRATE 160; 4.5 UG/1; UG/1
2 AEROSOL RESPIRATORY (INHALATION)
Status: DISCONTINUED | OUTPATIENT
Start: 2024-02-16 | End: 2024-02-19 | Stop reason: HOSPADM

## 2024-02-16 RX ORDER — SODIUM CHLORIDE 9 MG/ML
INJECTION, SOLUTION INTRAVENOUS PRN
Status: DISCONTINUED | OUTPATIENT
Start: 2024-02-16 | End: 2024-02-19 | Stop reason: HOSPADM

## 2024-02-16 RX ORDER — LORAZEPAM 2 MG/ML
2 INJECTION INTRAMUSCULAR
Status: DISCONTINUED | OUTPATIENT
Start: 2024-02-16 | End: 2024-02-16 | Stop reason: SDUPTHER

## 2024-02-16 RX ORDER — LISINOPRIL 10 MG/1
10 TABLET ORAL DAILY
Status: DISCONTINUED | OUTPATIENT
Start: 2024-02-17 | End: 2024-02-19 | Stop reason: HOSPADM

## 2024-02-16 RX ORDER — CHLORDIAZEPOXIDE HYDROCHLORIDE 25 MG/1
25 CAPSULE, GELATIN COATED ORAL 3 TIMES DAILY
Status: DISCONTINUED | OUTPATIENT
Start: 2024-02-16 | End: 2024-02-17

## 2024-02-16 RX ORDER — POTASSIUM CHLORIDE 20 MEQ/1
40 TABLET, EXTENDED RELEASE ORAL PRN
Status: DISCONTINUED | OUTPATIENT
Start: 2024-02-16 | End: 2024-02-19 | Stop reason: HOSPADM

## 2024-02-16 RX ORDER — PANTOPRAZOLE SODIUM 40 MG/1
40 TABLET, DELAYED RELEASE ORAL
Status: DISCONTINUED | OUTPATIENT
Start: 2024-02-17 | End: 2024-02-19 | Stop reason: HOSPADM

## 2024-02-16 RX ORDER — LORAZEPAM 2 MG/ML
1 INJECTION INTRAMUSCULAR
Status: DISCONTINUED | OUTPATIENT
Start: 2024-02-16 | End: 2024-02-17

## 2024-02-16 RX ORDER — ACETAMINOPHEN 325 MG/1
650 TABLET ORAL EVERY 6 HOURS PRN
Status: DISCONTINUED | OUTPATIENT
Start: 2024-02-16 | End: 2024-02-19 | Stop reason: HOSPADM

## 2024-02-16 RX ORDER — LORAZEPAM 1 MG/1
1 TABLET ORAL
Status: DISCONTINUED | OUTPATIENT
Start: 2024-02-16 | End: 2024-02-16 | Stop reason: SDUPTHER

## 2024-02-16 RX ORDER — ENOXAPARIN SODIUM 100 MG/ML
40 INJECTION SUBCUTANEOUS DAILY
Status: DISCONTINUED | OUTPATIENT
Start: 2024-02-17 | End: 2024-02-19 | Stop reason: HOSPADM

## 2024-02-16 RX ORDER — ALBUTEROL SULFATE 90 UG/1
2 AEROSOL, METERED RESPIRATORY (INHALATION) EVERY 6 HOURS PRN
Status: DISCONTINUED | OUTPATIENT
Start: 2024-02-16 | End: 2024-02-19 | Stop reason: HOSPADM

## 2024-02-16 RX ORDER — M-VIT,TX,IRON,MINS/CALC/FOLIC 27MG-0.4MG
1 TABLET ORAL DAILY
Status: DISCONTINUED | OUTPATIENT
Start: 2024-02-17 | End: 2024-02-19 | Stop reason: HOSPADM

## 2024-02-16 RX ORDER — SODIUM CHLORIDE 9 MG/ML
1000 INJECTION, SOLUTION INTRAVENOUS CONTINUOUS
Status: DISCONTINUED | OUTPATIENT
Start: 2024-02-16 | End: 2024-02-16

## 2024-02-16 RX ORDER — LORAZEPAM 2 MG/1
4 TABLET ORAL
Status: DISCONTINUED | OUTPATIENT
Start: 2024-02-16 | End: 2024-02-16 | Stop reason: SDUPTHER

## 2024-02-16 RX ORDER — ATORVASTATIN CALCIUM 40 MG/1
40 TABLET, FILM COATED ORAL DAILY
Status: DISCONTINUED | OUTPATIENT
Start: 2024-02-17 | End: 2024-02-19 | Stop reason: HOSPADM

## 2024-02-16 RX ORDER — MAGNESIUM SULFATE IN WATER 40 MG/ML
2000 INJECTION, SOLUTION INTRAVENOUS PRN
Status: DISCONTINUED | OUTPATIENT
Start: 2024-02-16 | End: 2024-02-19 | Stop reason: HOSPADM

## 2024-02-16 RX ORDER — LORAZEPAM 2 MG/ML
4 INJECTION INTRAMUSCULAR
Status: DISCONTINUED | OUTPATIENT
Start: 2024-02-16 | End: 2024-02-17

## 2024-02-16 RX ADMIN — CHLORDIAZEPOXIDE HYDROCHLORIDE 25 MG: 25 CAPSULE ORAL at 20:45

## 2024-02-16 RX ADMIN — RISPERIDONE 1 MG: 1 TABLET ORAL at 22:56

## 2024-02-16 RX ADMIN — SODIUM CHLORIDE, PRESERVATIVE FREE 5 ML: 5 INJECTION INTRAVENOUS at 20:42

## 2024-02-16 RX ADMIN — Medication 3 MG: at 22:56

## 2024-02-16 RX ADMIN — Medication 2 PUFF: at 23:20

## 2024-02-16 RX ADMIN — Medication 100 MG: at 20:45

## 2024-02-16 RX ADMIN — CALCIUM GLUCONATE 3000 MG: 98 INJECTION, SOLUTION INTRAVENOUS at 23:36

## 2024-02-16 RX ADMIN — SODIUM CHLORIDE, PRESERVATIVE FREE 10 ML: 5 INJECTION INTRAVENOUS at 22:59

## 2024-02-16 ASSESSMENT — LIFESTYLE VARIABLES
HOW OFTEN DO YOU HAVE A DRINK CONTAINING ALCOHOL: 4 OR MORE TIMES A WEEK
HOW MANY STANDARD DRINKS CONTAINING ALCOHOL DO YOU HAVE ON A TYPICAL DAY: 7 TO 9

## 2024-02-16 NOTE — TELEPHONE ENCOUNTER
[facility] callled the office with critical sodium lab value of 118. Spoke with Sandra LONG, recommends the patient go to the ER.   I spoke with the patient and his sister, Mona, advised both that Sandra LONG recommends the ER. Patient will go to [facility] within the hour.

## 2024-02-17 ENCOUNTER — APPOINTMENT (OUTPATIENT)
Dept: ULTRASOUND IMAGING | Age: 64
DRG: 897 | End: 2024-02-17
Payer: COMMERCIAL

## 2024-02-17 PROBLEM — F10.932 ALCOHOL WITHDRAWAL SYNDROME WITH PERCEPTUAL DISTURBANCE (HCC): Status: ACTIVE | Noted: 2024-02-17

## 2024-02-17 LAB
ALBUMIN SERPL-MCNC: 2.7 G/DL (ref 3.4–5)
ALP SERPL-CCNC: 507 U/L (ref 40–129)
ALT SERPL-CCNC: 301 U/L (ref 10–40)
ANION GAP SERPL CALCULATED.3IONS-SCNC: 10 MMOL/L (ref 3–16)
ANION GAP SERPL CALCULATED.3IONS-SCNC: 11 MMOL/L (ref 3–16)
ANION GAP SERPL CALCULATED.3IONS-SCNC: 9 MMOL/L (ref 3–16)
AST SERPL-CCNC: 540 U/L (ref 15–37)
BASOPHILS # BLD: 0.1 K/UL (ref 0–0.2)
BASOPHILS NFR BLD: 2.3 %
BILIRUB DIRECT SERPL-MCNC: 0.9 MG/DL (ref 0–0.3)
BILIRUB INDIRECT SERPL-MCNC: 0.3 MG/DL (ref 0–1)
BILIRUB SERPL-MCNC: 1.2 MG/DL (ref 0–1)
BUN SERPL-MCNC: 3 MG/DL (ref 7–20)
BUN SERPL-MCNC: 4 MG/DL (ref 7–20)
BUN SERPL-MCNC: 5 MG/DL (ref 7–20)
CALCIUM SERPL-MCNC: 7.7 MG/DL (ref 8.3–10.6)
CALCIUM SERPL-MCNC: 7.9 MG/DL (ref 8.3–10.6)
CALCIUM SERPL-MCNC: 8.1 MG/DL (ref 8.3–10.6)
CALCIUM SERPL-MCNC: 8.2 MG/DL (ref 8.3–10.6)
CALCIUM SERPL-MCNC: 8.3 MG/DL (ref 8.3–10.6)
CHLORIDE SERPL-SCNC: 90 MMOL/L (ref 99–110)
CHLORIDE SERPL-SCNC: 93 MMOL/L (ref 99–110)
CHLORIDE SERPL-SCNC: 94 MMOL/L (ref 99–110)
CHLORIDE SERPL-SCNC: 95 MMOL/L (ref 99–110)
CHLORIDE SERPL-SCNC: 96 MMOL/L (ref 99–110)
CK SERPL-CCNC: 18 U/L (ref 39–308)
CO2 SERPL-SCNC: 22 MMOL/L (ref 21–32)
CO2 SERPL-SCNC: 23 MMOL/L (ref 21–32)
CO2 SERPL-SCNC: 25 MMOL/L (ref 21–32)
CREAT SERPL-MCNC: <0.5 MG/DL (ref 0.8–1.3)
DEPRECATED RDW RBC AUTO: 16.2 % (ref 12.4–15.4)
EKG ATRIAL RATE: 107 BPM
EKG DIAGNOSIS: NORMAL
EKG P AXIS: 79 DEGREES
EKG P-R INTERVAL: 174 MS
EKG Q-T INTERVAL: 352 MS
EKG QRS DURATION: 88 MS
EKG QTC CALCULATION (BAZETT): 469 MS
EKG R AXIS: 57 DEGREES
EKG T AXIS: 71 DEGREES
EKG VENTRICULAR RATE: 107 BPM
EOSINOPHIL # BLD: 0.1 K/UL (ref 0–0.6)
EOSINOPHIL NFR BLD: 1.6 %
GFR SERPLBLD CREATININE-BSD FMLA CKD-EPI: >60 ML/MIN/{1.73_M2}
GLUCOSE BLD-MCNC: 136 MG/DL (ref 70–99)
GLUCOSE BLD-MCNC: 149 MG/DL (ref 70–99)
GLUCOSE BLD-MCNC: 174 MG/DL (ref 70–99)
GLUCOSE BLD-MCNC: 237 MG/DL (ref 70–99)
GLUCOSE SERPL-MCNC: 132 MG/DL (ref 70–99)
GLUCOSE SERPL-MCNC: 138 MG/DL (ref 70–99)
GLUCOSE SERPL-MCNC: 148 MG/DL (ref 70–99)
GLUCOSE SERPL-MCNC: 160 MG/DL (ref 70–99)
GLUCOSE SERPL-MCNC: 244 MG/DL (ref 70–99)
HCT VFR BLD AUTO: 36.1 % (ref 40.5–52.5)
HGB BLD-MCNC: 12.3 G/DL (ref 13.5–17.5)
LYMPHOCYTES # BLD: 1.6 K/UL (ref 1–5.1)
LYMPHOCYTES NFR BLD: 33.3 %
MAGNESIUM SERPL-MCNC: 1.7 MG/DL (ref 1.8–2.4)
MAGNESIUM SERPL-MCNC: 1.8 MG/DL (ref 1.8–2.4)
MAGNESIUM SERPL-MCNC: 1.9 MG/DL (ref 1.8–2.4)
MCH RBC QN AUTO: 34.2 PG (ref 26–34)
MCHC RBC AUTO-ENTMCNC: 34 G/DL (ref 31–36)
MCV RBC AUTO: 100.5 FL (ref 80–100)
MONOCYTES # BLD: 0.4 K/UL (ref 0–1.3)
MONOCYTES NFR BLD: 8 %
NEUTROPHILS # BLD: 2.7 K/UL (ref 1.7–7.7)
NEUTROPHILS NFR BLD: 54.8 %
OSMOLALITY UR: 490 MOSM/KG (ref 390–1070)
PERFORMED ON: ABNORMAL
PLATELET # BLD AUTO: 132 K/UL (ref 135–450)
PMV BLD AUTO: 7.4 FL (ref 5–10.5)
POTASSIUM SERPL-SCNC: 3.3 MMOL/L (ref 3.5–5.1)
POTASSIUM SERPL-SCNC: 3.4 MMOL/L (ref 3.5–5.1)
POTASSIUM SERPL-SCNC: 3.5 MMOL/L (ref 3.5–5.1)
POTASSIUM SERPL-SCNC: 3.6 MMOL/L (ref 3.5–5.1)
POTASSIUM SERPL-SCNC: 3.7 MMOL/L (ref 3.5–5.1)
PROT SERPL-MCNC: 5.1 G/DL (ref 6.4–8.2)
RBC # BLD AUTO: 3.59 M/UL (ref 4.2–5.9)
SODIUM SERPL-SCNC: 124 MMOL/L (ref 136–145)
SODIUM SERPL-SCNC: 127 MMOL/L (ref 136–145)
SODIUM SERPL-SCNC: 128 MMOL/L (ref 136–145)
SODIUM SERPL-SCNC: 128 MMOL/L (ref 136–145)
SODIUM SERPL-SCNC: 131 MMOL/L (ref 136–145)
SODIUM UR-SCNC: <20 MMOL/L
TSH SERPL DL<=0.005 MIU/L-ACNC: 1.98 UIU/ML (ref 0.27–4.2)
WBC # BLD AUTO: 4.9 K/UL (ref 4–11)

## 2024-02-17 PROCEDURE — 84466 ASSAY OF TRANSFERRIN: CPT

## 2024-02-17 PROCEDURE — 99233 SBSQ HOSP IP/OBS HIGH 50: CPT | Performed by: INTERNAL MEDICINE

## 2024-02-17 PROCEDURE — 76705 ECHO EXAM OF ABDOMEN: CPT

## 2024-02-17 PROCEDURE — 82390 ASSAY OF CERULOPLASMIN: CPT

## 2024-02-17 PROCEDURE — 97165 OT EVAL LOW COMPLEX 30 MIN: CPT

## 2024-02-17 PROCEDURE — 2580000003 HC RX 258: Performed by: INTERNAL MEDICINE

## 2024-02-17 PROCEDURE — 83516 IMMUNOASSAY NONANTIBODY: CPT

## 2024-02-17 PROCEDURE — 97116 GAIT TRAINING THERAPY: CPT

## 2024-02-17 PROCEDURE — 94640 AIRWAY INHALATION TREATMENT: CPT

## 2024-02-17 PROCEDURE — 6360000002 HC RX W HCPCS: Performed by: INTERNAL MEDICINE

## 2024-02-17 PROCEDURE — 83735 ASSAY OF MAGNESIUM: CPT

## 2024-02-17 PROCEDURE — 80076 HEPATIC FUNCTION PANEL: CPT

## 2024-02-17 PROCEDURE — 84443 ASSAY THYROID STIM HORMONE: CPT

## 2024-02-17 PROCEDURE — 97535 SELF CARE MNGMENT TRAINING: CPT

## 2024-02-17 PROCEDURE — 82103 ALPHA-1-ANTITRYPSIN TOTAL: CPT

## 2024-02-17 PROCEDURE — 82977 ASSAY OF GGT: CPT

## 2024-02-17 PROCEDURE — 80048 BASIC METABOLIC PNL TOTAL CA: CPT

## 2024-02-17 PROCEDURE — 6370000000 HC RX 637 (ALT 250 FOR IP): Performed by: INTERNAL MEDICINE

## 2024-02-17 PROCEDURE — 2060000000 HC ICU INTERMEDIATE R&B

## 2024-02-17 PROCEDURE — 97530 THERAPEUTIC ACTIVITIES: CPT

## 2024-02-17 PROCEDURE — 97161 PT EVAL LOW COMPLEX 20 MIN: CPT

## 2024-02-17 PROCEDURE — 85025 COMPLETE CBC W/AUTO DIFF WBC: CPT

## 2024-02-17 PROCEDURE — 82550 ASSAY OF CK (CPK): CPT

## 2024-02-17 PROCEDURE — 83540 ASSAY OF IRON: CPT

## 2024-02-17 PROCEDURE — 36415 COLL VENOUS BLD VENIPUNCTURE: CPT

## 2024-02-17 PROCEDURE — 94761 N-INVAS EAR/PLS OXIMETRY MLT: CPT

## 2024-02-17 PROCEDURE — 86015 ACTIN ANTIBODY EACH: CPT

## 2024-02-17 PROCEDURE — 6370000000 HC RX 637 (ALT 250 FOR IP): Performed by: REGISTERED NURSE

## 2024-02-17 PROCEDURE — 93010 ELECTROCARDIOGRAM REPORT: CPT | Performed by: INTERNAL MEDICINE

## 2024-02-17 PROCEDURE — 80074 ACUTE HEPATITIS PANEL: CPT

## 2024-02-17 PROCEDURE — 86038 ANTINUCLEAR ANTIBODIES: CPT

## 2024-02-17 RX ORDER — CHLORDIAZEPOXIDE HYDROCHLORIDE 10 MG/1
10 CAPSULE, GELATIN COATED ORAL 3 TIMES DAILY
Status: DISCONTINUED | OUTPATIENT
Start: 2024-02-17 | End: 2024-02-19 | Stop reason: HOSPADM

## 2024-02-17 RX ORDER — DEXTROSE MONOHYDRATE 50 MG/ML
INJECTION, SOLUTION INTRAVENOUS CONTINUOUS
Status: DISCONTINUED | OUTPATIENT
Start: 2024-02-17 | End: 2024-02-17

## 2024-02-17 RX ORDER — POTASSIUM CHLORIDE 20 MEQ/1
40 TABLET, EXTENDED RELEASE ORAL ONCE
Status: DISCONTINUED | OUTPATIENT
Start: 2024-02-17 | End: 2024-02-19

## 2024-02-17 RX ORDER — 0.9 % SODIUM CHLORIDE 0.9 %
500 INTRAVENOUS SOLUTION INTRAVENOUS ONCE
Status: COMPLETED | OUTPATIENT
Start: 2024-02-17 | End: 2024-02-17

## 2024-02-17 RX ORDER — DESMOPRESSIN ACETATE 4 UG/ML
1 INJECTION, SOLUTION INTRAVENOUS; SUBCUTANEOUS ONCE
Status: COMPLETED | OUTPATIENT
Start: 2024-02-17 | End: 2024-02-17

## 2024-02-17 RX ADMIN — Medication 1 TABLET: at 08:29

## 2024-02-17 RX ADMIN — Medication 100 MG: at 08:29

## 2024-02-17 RX ADMIN — ENOXAPARIN SODIUM 40 MG: 100 INJECTION SUBCUTANEOUS at 08:29

## 2024-02-17 RX ADMIN — Medication 3 MG: at 20:24

## 2024-02-17 RX ADMIN — LISINOPRIL 10 MG: 10 TABLET ORAL at 08:29

## 2024-02-17 RX ADMIN — SODIUM CHLORIDE 500 ML: 9 INJECTION, SOLUTION INTRAVENOUS at 21:05

## 2024-02-17 RX ADMIN — Medication 2 PUFF: at 20:40

## 2024-02-17 RX ADMIN — SODIUM CHLORIDE, PRESERVATIVE FREE 10 ML: 5 INJECTION INTRAVENOUS at 20:28

## 2024-02-17 RX ADMIN — DESMOPRESSIN ACETATE 1 MCG: 4 INJECTION, SOLUTION INTRAVENOUS; SUBCUTANEOUS at 04:00

## 2024-02-17 RX ADMIN — POTASSIUM BICARBONATE 40 MEQ: 782 TABLET, EFFERVESCENT ORAL at 14:23

## 2024-02-17 RX ADMIN — DESMOPRESSIN ACETATE 40 MG: 0.2 TABLET ORAL at 08:29

## 2024-02-17 RX ADMIN — DEXTROSE MONOHYDRATE: 50 INJECTION, SOLUTION INTRAVENOUS at 03:37

## 2024-02-17 RX ADMIN — CHLORDIAZEPOXIDE HYDROCHLORIDE 10 MG: 10 CAPSULE ORAL at 14:25

## 2024-02-17 RX ADMIN — CHLORDIAZEPOXIDE HYDROCHLORIDE 10 MG: 10 CAPSULE ORAL at 08:29

## 2024-02-17 RX ADMIN — DEXTROSE MONOHYDRATE: 50 INJECTION, SOLUTION INTRAVENOUS at 08:28

## 2024-02-17 RX ADMIN — Medication 2 PUFF: at 07:30

## 2024-02-17 RX ADMIN — RISPERIDONE 1 MG: 1 TABLET ORAL at 08:29

## 2024-02-17 RX ADMIN — RISPERIDONE 1 MG: 1 TABLET ORAL at 20:27

## 2024-02-17 RX ADMIN — CHLORDIAZEPOXIDE HYDROCHLORIDE 10 MG: 10 CAPSULE ORAL at 20:27

## 2024-02-17 NOTE — CONSULTS
CONSULTATION  Kaleb Persaud is a 63 y.o. male asked to see us in consultation by  Patricia Vieira APRN - CNP & Ava Murillo DO for evaluation of abnormal LFTs.    Mr. Persaud is a 63-year-old male with past medical history of centrilobular emphysema, tricuspid valve regurgitation, bipolar disorder, duodenitis, esophageal ulcer, depression, alcohol abuse, anxiety, HTN, and CAD.  He was admitted in December for hyponatremia  and discharged with home health.  His health care nurse visited him yesterday and woody labs which were revealing for sodium of 118 so he was directed to the ER.  He says he fell about 2 weeks ago and hit his head.  CT head, cervical spine and facial bones showed no acute injury.    He continues to drink alcohol daily, about 1/5th liquor.  He denies abdominal pain, nausea, vomiting, diarrhea and blood in the stool.  No illicit drug use. No history of hepatitis.    His sodium on arrival was 123; it is improved to 131 today.  Macrocytic anemia, thrombocytopenia and hypoalbuminemia noted.  LFTs are abnormal with direct hyperbilirubinemia at 1.2, ,  alk phos 507.  Ethanol level was 48.    Ultrasound of the liver reports cholelithiasis and increased echogenicity evidence of diffuse hepatocellular disease.  CT a/p in 2022 and 2023 report normal imaging of the liver.  He had an echocardiogram in 2021 showing mild pulmonary hypertension, moderate-severe tricuspid regurgitation and preserved ejection fraction of 55-60%.    He takes atorvastatin but has been on this for  years.    He was last seen in the hospital in 2021 by Dr. Orta for anemia and dyspepsia.  He underwent an EGD and colonoscopy with findings of an esophageal ulcer and a cecal polyp.  Duodenal and esophageal biopsies were benign.  His polyp was a tubular adenoma.          Medications Prior to Admission: potassium 
puff into the lungs in the morning and 1 puff in the evening. 3 each 3    lisinopril (PRINIVIL;ZESTRIL) 10 MG tablet Take 1 tablet by mouth daily 90 tablet 1    pantoprazole (PROTONIX) 40 MG tablet TAKE 1 TABLET BY MOUTH TWICE DAILY BEFORE MEALS 180 tablet 3    risperiDONE (RISPERDAL) 1 MG tablet TAKE 1 TABLET BY MOUTH TWICE DAILY 180 tablet 1    thiamine 100 MG tablet Take 1 tablet by mouth daily 30 tablet 3    melatonin 5 MG TBDP disintegrating tablet Take 1 tablet by mouth nightly (Patient not taking: Reported on 1/3/2024)      hydrOXYzine HCl (ATARAX) 50 MG tablet Take 1 tablet by mouth every 8 hours as needed for Anxiety 180 tablet 1    albuterol sulfate HFA (PROVENTIL;VENTOLIN;PROAIR) 108 (90 Base) MCG/ACT inhaler Inhale 2 puffs into the lungs every 6 hours as needed for Wheezing 18 g 3    ferrous sulfate (IRON 325) 325 (65 Fe) MG tablet Take 1 tablet by mouth daily (with breakfast)      vitamin B-12 (CYANOCOBALAMIN) 100 MCG tablet Take 2.5 tablets by mouth daily (Patient not taking: Reported on 12/29/2023)      Ascorbic Acid (VITAMIN C) 500 MG CAPS Take 1 capsule by mouth daily      Vitamin D, Cholecalciferol, 25 MCG (1000 UT) CAPS Take 2,000 Units by mouth daily (Patient not taking: Reported on 12/29/2023)      Multiple Vitamins-Minerals (MULTIVITAMIN WITH MINERALS) tablet Take 1 tablet by mouth daily (Patient not taking: Reported on 1/3/2024) 30 tablet 3       Allergies:  Sulfa antibiotics    Social History:    Social History     Socioeconomic History    Marital status:      Spouse name: Not on file    Number of children: Not on file    Years of education: Not on file    Highest education level: Not on file   Occupational History    Not on file   Tobacco Use    Smoking status: Every Day     Current packs/day: 2.00     Average packs/day: 2.0 packs/day for 52.8 years (105.7 ttl pk-yrs)     Types: Cigarettes     Start date: 4/13/1971    Smokeless tobacco: Never   Vaping Use    Vaping Use: Never used

## 2024-02-17 NOTE — ED NOTES
Pt Brought to room 03 from [facility], pt sitting up in bed eyes open, AAOx3, NAD, resp e/u on RA, bed locked lowest position, rails up x2, call light within reach, family at bedside, pt was told to come to ED for low sodium, pt also states drinks about 6-10 beers a day last drink around noon, trying to quit alcohol, having trouble finding outpatient services for this and is wondering if this is something we can assist with while he is here in hospital, MD aware.

## 2024-02-17 NOTE — ED PROVIDER NOTES
I independently examined and evaluated Kaleb Persaud.  I personally saw the patient and performed a substantive portion of the visit including all aspects of the medical decision making.    In brief, this 63-year-old male with history of alcoholism is presenting with low sodium and outpatient labs.  Patient is that he has been feeling generally weak for the last few days and was advised to come in to the hospital due to a sodium of 118 on outpatient labs today.  Denies any nausea, vomiting, pain, fevers, chills.    Focused exam revealed   General: Alert, no acute distress, patient resting comfortably   Skin: warm, intact, no pallor noted   Head: Normocephalic, atraumatic   Eye: Normal conjunctiva   Cardiac: Normal peripheral perfusion  Respiratory: No acute distress   Musculoskeletal: No deformity, full ROM.   Neurological: alert and oriented, normal sensory and motor observed.   Psychiatric: Cooperative    ED course: Workup obtained and patient's hyponatremia is actually significantly elevated compared to the outpatient labs, now 124, previously 118.  He does have an acute on chronic transaminitis consistent with acute alcoholic hepatitis.  Due to bruising to face concern for possible trauma, CT head, facial bones, C-spine was obtained and shows no acute process.  Chest x-ray is unremarkable.  As his sodium is already correcting, will hold off on any IV fluids at this time, plan for admission for further treatment and evaluation of his now moderate hyponatremia.    ECG    The Ekg interpreted by me shows sinus tachycardia with a rate of 107 bpm.  Normal axis.  No acute injury pattern.  , QRS 88, QTc 469.    No significant change from prior EKG dated 12/29/2023    All diagnostic, treatment, and disposition decisions were made by myself in conjunction with the advanced practice provider/resident.    I personally saw the patient and made/approved the management plan and take responsibility for the patient 
found to be 118 and his self corrected to 124.  In the absence of not wanting to increase sodium too quickly we are holding fluids at this time this was communicated with hospital medicine team.  I did discuss with the patient plan for disposition of admission and he was in agreement with this.  Consult was placed to hospital medicine via [employer].  Orders placed for admission and care transitioned at this time    Social Determinants Significantly Affecting Health : Patient is a current daily drinker he is requesting admission for alcohol detoxification    Is this patient to be included in the SEP-1 Core Measure due to severe sepsis or septic shock?   No   Exclusion criteria - the patient is NOT to be included for SEP-1 Core Measure due to:  Infection is not suspected    Discharge Time out:  CC Reviewed Yes   Test Results Yes     Vitals:    02/16/24 2015   BP: (!) 129/92   Pulse: 94   Resp:    Temp:    SpO2:               FINAL IMPRESSION      1. Alcohol withdrawal syndrome with perceptual disturbance (HCC)    2. Hyponatremia          DISPOSITION/PLAN   DISPOSITION Admitted 02/16/2024 08:38:16 PM      PATIENT REFERREDTO:  No follow-up provider specified.    DISCHARGE MEDICATIONS:  New Prescriptions    No medications on file       DISCONTINUED MEDICATIONS:  Discontinued Medications    No medications on file              (Please note that portions ofthis note were completed with a voice recognition program.  Efforts were made to edit the dictations but occasionally words are mis-transcribed.)    ED Red CNP (electronically signed)        Alessandra Lewis APRN - CNP  02/16/24 2043

## 2024-02-17 NOTE — H&P
Hospital Medicine History & Physical      PCP: Patricia Vieira, ED - CNP    Date of Admission: 2/16/2024    Date of Service: Pt seen/examined on 2/16/2024    Pt seen/examined face to face on and admitted as inpatient with expected LOS to be two days but can change depending on diagnostic work up and treatment response.     Chief Complaint:    Chief Complaint   Patient presents with    Abnormal Lab     Patient reports home health woody labs today, advised to come to ER R/T sodium 118.             ASSESSMENT AND PLAN:    Active Hospital Problems    Diagnosis Date Noted    Alcohol dependence with withdrawal (HCC) [F10.239] 02/16/2024     Acute severe hyponatremia:  Last sodium 118  Currently 124  Patient did not receive any fluids or diet  Without anything corrected to 127  Nephrology consulted stat for high risk of overcorrection while patient in the ED  Neurochecks every 2 hours  BMP every 6 hours    -Repeat sodium came back at 127  Ordered desmopressin, D5 water with recheck    Alcohol dependence with intoxication  High risk of withdrawal,  CIWA protocol initiated     Transaminitis:  Tylenol negative, hepatitis panel ordered  Ultrasound liver ordered    Hypocalcemia: Replacing    Bipolar: Continue medication  Essential Hypertension: Reviewed patient's medications and plan is to continue home medication  Hyperlipidemia: Controlled on home Statin. Outpatient PCP follow up post-discharge  GERD: Continue home medication  Asthma: Controlled, not in exacerbation, continue home medication    .Due to the above diagnosis makes the patient higher risk for morbidity and mortality requiring testing and treatment      Discussion with the primary ER physician in regards to symptoms, history, physical exam, diagnosis and treatment, collaborative decision was to admit the patient.    Diet: Regular diet    DVT Prophylaxis: lovenox    Dispo:   Expected LOS of two days      History Of Present Illness:      63 y.o. male who

## 2024-02-17 NOTE — FLOWSHEET NOTE
02/16/24 2230   Vital Signs   Temp 96.9 °F (36.1 °C)   Temp Source Oral   Pulse 94   Heart Rate Source Monitor   Peak Flow 97 L/min   BP (!) 136/94   MAP (Calculated) 108   BP Location Right upper arm   BP Method Automatic   Oxygen Therapy   SpO2 96 %   O2 Device None (Room air)   Height and Weight   Height 1.702 m (5' 7\")   Weight - Scale 61.2 kg (135 lb)   BSA (Calculated - sq m) 1.7 sq meters   BMI (Calculated) 21.2     Pt came to PCU in stable condition

## 2024-02-18 LAB
ALBUMIN SERPL-MCNC: 2.7 G/DL (ref 3.4–5)
ALP SERPL-CCNC: 459 U/L (ref 40–129)
ALT SERPL-CCNC: 219 U/L (ref 10–40)
ANION GAP SERPL CALCULATED.3IONS-SCNC: 7 MMOL/L (ref 3–16)
ANION GAP SERPL CALCULATED.3IONS-SCNC: 9 MMOL/L (ref 3–16)
ANION GAP SERPL CALCULATED.3IONS-SCNC: 9 MMOL/L (ref 3–16)
AST SERPL-CCNC: 266 U/L (ref 15–37)
BILIRUB DIRECT SERPL-MCNC: 0.8 MG/DL (ref 0–0.3)
BILIRUB INDIRECT SERPL-MCNC: 0.4 MG/DL (ref 0–1)
BILIRUB SERPL-MCNC: 1.2 MG/DL (ref 0–1)
BUN SERPL-MCNC: 3 MG/DL (ref 7–20)
BUN SERPL-MCNC: <2 MG/DL (ref 7–20)
BUN SERPL-MCNC: <2 MG/DL (ref 7–20)
CALCIUM SERPL-MCNC: 7.6 MG/DL (ref 8.3–10.6)
CALCIUM SERPL-MCNC: 7.7 MG/DL (ref 8.3–10.6)
CALCIUM SERPL-MCNC: 8.3 MG/DL (ref 8.3–10.6)
CHLORIDE SERPL-SCNC: 100 MMOL/L (ref 99–110)
CHLORIDE SERPL-SCNC: 98 MMOL/L (ref 99–110)
CHLORIDE SERPL-SCNC: 98 MMOL/L (ref 99–110)
CO2 SERPL-SCNC: 24 MMOL/L (ref 21–32)
CO2 SERPL-SCNC: 24 MMOL/L (ref 21–32)
CO2 SERPL-SCNC: 28 MMOL/L (ref 21–32)
CREAT SERPL-MCNC: 0.6 MG/DL (ref 0.8–1.3)
CREAT SERPL-MCNC: <0.5 MG/DL (ref 0.8–1.3)
CREAT SERPL-MCNC: <0.5 MG/DL (ref 0.8–1.3)
FERRITIN SERPL IA-MCNC: 2770 NG/ML (ref 30–400)
FOLATE SERPL-MCNC: 8.85 NG/ML (ref 4.78–24.2)
GFR SERPLBLD CREATININE-BSD FMLA CKD-EPI: >60 ML/MIN/{1.73_M2}
GGT SERPL-CCNC: 2794 U/L (ref 8–61)
GLUCOSE BLD-MCNC: 117 MG/DL (ref 70–99)
GLUCOSE BLD-MCNC: 151 MG/DL (ref 70–99)
GLUCOSE BLD-MCNC: 157 MG/DL (ref 70–99)
GLUCOSE BLD-MCNC: 186 MG/DL (ref 70–99)
GLUCOSE SERPL-MCNC: 144 MG/DL (ref 70–99)
GLUCOSE SERPL-MCNC: 144 MG/DL (ref 70–99)
GLUCOSE SERPL-MCNC: 162 MG/DL (ref 70–99)
HAV IGM SERPL QL IA: NORMAL
HBV CORE IGM SERPL QL IA: NORMAL
HBV SURFACE AG SERPL QL IA: NORMAL
HCV AB SERPL QL IA: NORMAL
IRON SATN MFR SERPL: NORMAL % (ref 20–50)
IRON SERPL-MCNC: 86 UG/DL (ref 59–158)
MAGNESIUM SERPL-MCNC: 1.7 MG/DL (ref 1.8–2.4)
MAGNESIUM SERPL-MCNC: 1.8 MG/DL (ref 1.8–2.4)
PERFORMED ON: ABNORMAL
POTASSIUM SERPL-SCNC: 3.5 MMOL/L (ref 3.5–5.1)
PROT SERPL-MCNC: 5 G/DL (ref 6.4–8.2)
SODIUM SERPL-SCNC: 128 MMOL/L (ref 136–145)
SODIUM SERPL-SCNC: 131 MMOL/L (ref 136–145)
SODIUM SERPL-SCNC: 131 MMOL/L (ref 136–145)
SODIUM SERPL-SCNC: 135 MMOL/L (ref 136–145)
TIBC SERPL-MCNC: NORMAL UG/DL (ref 260–445)
TRANSFERRIN SERPL-MCNC: 62 MG/DL (ref 200–360)
VIT B12 SERPL-MCNC: 1582 PG/ML (ref 211–911)

## 2024-02-18 PROCEDURE — 94761 N-INVAS EAR/PLS OXIMETRY MLT: CPT

## 2024-02-18 PROCEDURE — 82607 VITAMIN B-12: CPT

## 2024-02-18 PROCEDURE — 2580000003 HC RX 258: Performed by: INTERNAL MEDICINE

## 2024-02-18 PROCEDURE — 83735 ASSAY OF MAGNESIUM: CPT

## 2024-02-18 PROCEDURE — 2060000000 HC ICU INTERMEDIATE R&B

## 2024-02-18 PROCEDURE — 80048 BASIC METABOLIC PNL TOTAL CA: CPT

## 2024-02-18 PROCEDURE — 84295 ASSAY OF SERUM SODIUM: CPT

## 2024-02-18 PROCEDURE — 82728 ASSAY OF FERRITIN: CPT

## 2024-02-18 PROCEDURE — 99232 SBSQ HOSP IP/OBS MODERATE 35: CPT | Performed by: INTERNAL MEDICINE

## 2024-02-18 PROCEDURE — 80076 HEPATIC FUNCTION PANEL: CPT

## 2024-02-18 PROCEDURE — 94640 AIRWAY INHALATION TREATMENT: CPT

## 2024-02-18 PROCEDURE — 6370000000 HC RX 637 (ALT 250 FOR IP): Performed by: REGISTERED NURSE

## 2024-02-18 PROCEDURE — 6370000000 HC RX 637 (ALT 250 FOR IP): Performed by: INTERNAL MEDICINE

## 2024-02-18 PROCEDURE — 82746 ASSAY OF FOLIC ACID SERUM: CPT

## 2024-02-18 PROCEDURE — 36415 COLL VENOUS BLD VENIPUNCTURE: CPT

## 2024-02-18 PROCEDURE — 6360000002 HC RX W HCPCS: Performed by: INTERNAL MEDICINE

## 2024-02-18 RX ORDER — DEXTROSE MONOHYDRATE 50 MG/ML
INJECTION, SOLUTION INTRAVENOUS CONTINUOUS
Status: DISCONTINUED | OUTPATIENT
Start: 2024-02-18 | End: 2024-02-18

## 2024-02-18 RX ADMIN — Medication 2 PUFF: at 20:15

## 2024-02-18 RX ADMIN — ENOXAPARIN SODIUM 40 MG: 100 INJECTION SUBCUTANEOUS at 08:31

## 2024-02-18 RX ADMIN — LISINOPRIL 10 MG: 10 TABLET ORAL at 08:31

## 2024-02-18 RX ADMIN — Medication 1 TABLET: at 08:31

## 2024-02-18 RX ADMIN — RISPERIDONE 1 MG: 1 TABLET ORAL at 08:31

## 2024-02-18 RX ADMIN — CHLORDIAZEPOXIDE HYDROCHLORIDE 10 MG: 10 CAPSULE ORAL at 08:31

## 2024-02-18 RX ADMIN — CHLORDIAZEPOXIDE HYDROCHLORIDE 10 MG: 10 CAPSULE ORAL at 14:34

## 2024-02-18 RX ADMIN — DEXTROSE MONOHYDRATE: 50 INJECTION, SOLUTION INTRAVENOUS at 03:58

## 2024-02-18 RX ADMIN — Medication 3 MG: at 21:25

## 2024-02-18 RX ADMIN — Medication 2 PUFF: at 07:51

## 2024-02-18 RX ADMIN — DESMOPRESSIN ACETATE 40 MG: 0.2 TABLET ORAL at 08:31

## 2024-02-18 RX ADMIN — SODIUM CHLORIDE, PRESERVATIVE FREE 10 ML: 5 INJECTION INTRAVENOUS at 21:26

## 2024-02-18 RX ADMIN — CHLORDIAZEPOXIDE HYDROCHLORIDE 10 MG: 10 CAPSULE ORAL at 21:25

## 2024-02-18 RX ADMIN — RISPERIDONE 1 MG: 1 TABLET ORAL at 21:25

## 2024-02-18 RX ADMIN — PANTOPRAZOLE SODIUM 40 MG: 40 TABLET, DELAYED RELEASE ORAL at 06:15

## 2024-02-18 RX ADMIN — Medication 100 MG: at 08:31

## 2024-02-18 NOTE — FLOWSHEET NOTE
02/17/24 2015   Vital Signs   Temp 97.4 °F (36.3 °C)   Temp Source Oral   Pulse 92   Heart Rate Source Monitor   Respirations 18   BP (!) 89/59   MAP (Calculated) 69   BP Location Right upper arm   BP Method Automatic   Patient Position Semi fowlers   Oxygen Therapy   SpO2 98 %   O2 Device None (Room air)     Pt resting in bed with call light and bedside table within reach. Shift assessment added. Bed alarm is set

## 2024-02-18 NOTE — ACP (ADVANCE CARE PLANNING)
Advance Care Planning     General Advance Care Planning (ACP) Conversation    Date of Conversation: 2/16/2024  Conducted with: Patient with Decision Making Capacity    Healthcare Decision Maker:    Primary Decision Maker: Mona Roibson - Brother/Sister - [phone]    Primary Decision Maker: Leon Persaud - Brother/Sister - [phone]    Primary Decision Maker: Patrice Persaud - Brother/Sister - [phone]  Click here to complete Healthcare Decision Makers including selection of the Healthcare Decision Maker Relationship (ie \"Primary\").      Content/Action Overview:      Pt elects Full Code.    Ava Mandujano RN

## 2024-02-18 NOTE — PLAN OF CARE
Problem: Discharge Planning  Goal: Discharge to home or other facility with appropriate resources  2/18/2024 1610 by Jeremias Stapleton, RN  Outcome: Progressing  2/18/2024 1000 by Jeremias Stapleton RN  Outcome: Progressing     Problem: Safety - Adult  Goal: Free from fall injury  2/18/2024 1610 by Jeremias Stapleton, RN  Outcome: Progressing  2/18/2024 1000 by Jeremias Stapleton RN  Outcome: Progressing

## 2024-02-18 NOTE — CARE COORDINATION
Case Management Assessment  Initial Evaluation    Date/Time of Evaluation: 2/18/2024 2:42 PM  Assessment Completed by: Ava Mandujano RN    If patient is discharged prior to next notation, then this note serves as note for discharge by case management.    Patient Name: Kaleb Persaud                   YOB: 1960  Diagnosis: Hyponatremia [E87.1]  Alcohol dependence with withdrawal (HCC) [F10.239]  Alcohol withdrawal syndrome with perceptual disturbance (HCC) [F10.932]                   Date / Time: 2/16/2024  6:31 PM    Patient Admission Status: Inpatient   Readmission Risk (Low < 19, Mod (19-27), High > 27): Readmission Risk Score: 19.3    Current PCP: Patricia Vieira APRN - CNP  PCP verified by CM? Yes    Chart Reviewed: Yes      History Provided by: Patient  Patient Orientation: Alert and Oriented    Patient Cognition: Alert    Hospitalization in the last 30 days (Readmission):  No    If yes, Readmission Assessment in CM Navigator will be completed.    Advance Directives:      Code Status: Full Code   Patient's Primary Decision Maker is: Legal Next of Kin    Primary Decision Maker: Mona Robison - Brother/Sister - [phone]    Primary Decision Maker: Leon Persaud - Brother/Sister - [phone]    Primary Decision Maker: Patrice Persaud - Brother/Sister - [phone]    Discharge Planning:    Patient lives with: Alone Type of Home: House  Primary Care Giver: Self  Patient Support Systems include: Family Members   Current Financial resources: Other (Comment) ([employer])  Current community resources: ECF/Home Care  Current services prior to admission: Home Care            Current DME:              Type of Home Care services:  Nursing Services    ADLS  Prior functional level: Independent in ADLs/IADLs  Current functional level: Independent in ADLs/IADLs    PT AM-PAC: 22 /24  OT AM-PAC: 22 /24    Family can provide assistance at DC: Yes  Would you like Case Management to discuss the

## 2024-02-18 NOTE — PLAN OF CARE
Problem: Discharge Planning  Goal: Discharge to home or other facility with appropriate resources  2/18/2024 1000 by Jeremias Stapleton RN  Outcome: Progressing  2/18/2024 0131 by Fabricio Manuel, RN  Outcome: Progressing     Problem: Safety - Adult  Goal: Free from fall injury  2/18/2024 1000 by Jeremias Stapleton, RN  Outcome: Progressing  2/18/2024 0131 by Fabricio Manuel, RN  Outcome: Progressing

## 2024-02-18 NOTE — PLAN OF CARE
Problem: Discharge Planning  Goal: Discharge to home or other facility with appropriate resources  2/18/2024 0131 by Fabricio Manuel RN  Outcome: Progressing  2/17/2024 1634 by Jeremias Stapleton RN  Outcome: Progressing     Problem: Safety - Adult  Goal: Free from fall injury  2/18/2024 0131 by Fabricio Manuel, RN  Outcome: Progressing  2/17/2024 1634 by Jeremias Stapleton RN  Outcome: Progressing

## 2024-02-19 VITALS
HEART RATE: 95 BPM | BODY MASS INDEX: 21.46 KG/M2 | RESPIRATION RATE: 18 BRPM | OXYGEN SATURATION: 98 % | TEMPERATURE: 97.4 F | SYSTOLIC BLOOD PRESSURE: 112 MMHG | HEIGHT: 67 IN | WEIGHT: 136.7 LBS | DIASTOLIC BLOOD PRESSURE: 76 MMHG

## 2024-02-19 PROBLEM — D69.6 THROMBOCYTOPENIA, UNSPECIFIED (HCC): Status: ACTIVE | Noted: 2024-02-19

## 2024-02-19 LAB
ANA SER QL IA: NEGATIVE
ANION GAP SERPL CALCULATED.3IONS-SCNC: 8 MMOL/L (ref 3–16)
BUN SERPL-MCNC: 3 MG/DL (ref 7–20)
CALCIUM SERPL-MCNC: 8.2 MG/DL (ref 8.3–10.6)
CHLORIDE SERPL-SCNC: 103 MMOL/L (ref 99–110)
CO2 SERPL-SCNC: 26 MMOL/L (ref 21–32)
CREAT SERPL-MCNC: <0.5 MG/DL (ref 0.8–1.3)
GFR SERPLBLD CREATININE-BSD FMLA CKD-EPI: >60 ML/MIN/{1.73_M2}
GLUCOSE BLD-MCNC: 123 MG/DL (ref 70–99)
GLUCOSE SERPL-MCNC: 119 MG/DL (ref 70–99)
MAGNESIUM SERPL-MCNC: 1.7 MG/DL (ref 1.8–2.4)
PERFORMED ON: ABNORMAL
POTASSIUM SERPL-SCNC: 3.5 MMOL/L (ref 3.5–5.1)
SODIUM SERPL-SCNC: 137 MMOL/L (ref 136–145)

## 2024-02-19 PROCEDURE — 80048 BASIC METABOLIC PNL TOTAL CA: CPT

## 2024-02-19 PROCEDURE — 2580000003 HC RX 258: Performed by: INTERNAL MEDICINE

## 2024-02-19 PROCEDURE — 94761 N-INVAS EAR/PLS OXIMETRY MLT: CPT

## 2024-02-19 PROCEDURE — 6370000000 HC RX 637 (ALT 250 FOR IP): Performed by: INTERNAL MEDICINE

## 2024-02-19 PROCEDURE — 94640 AIRWAY INHALATION TREATMENT: CPT

## 2024-02-19 PROCEDURE — 6370000000 HC RX 637 (ALT 250 FOR IP): Performed by: REGISTERED NURSE

## 2024-02-19 PROCEDURE — 36415 COLL VENOUS BLD VENIPUNCTURE: CPT

## 2024-02-19 PROCEDURE — 83735 ASSAY OF MAGNESIUM: CPT

## 2024-02-19 PROCEDURE — 99238 HOSP IP/OBS DSCHRG MGMT 30/<: CPT | Performed by: INTERNAL MEDICINE

## 2024-02-19 PROCEDURE — 6360000002 HC RX W HCPCS: Performed by: INTERNAL MEDICINE

## 2024-02-19 RX ORDER — CHLORDIAZEPOXIDE HYDROCHLORIDE 10 MG/1
10 CAPSULE, GELATIN COATED ORAL 3 TIMES DAILY
Qty: 9 CAPSULE | Refills: 0 | Status: SHIPPED | OUTPATIENT
Start: 2024-02-19 | End: 2024-02-22

## 2024-02-19 RX ORDER — POTASSIUM CHLORIDE 20 MEQ/1
40 TABLET, EXTENDED RELEASE ORAL ONCE
Status: COMPLETED | OUTPATIENT
Start: 2024-02-19 | End: 2024-02-19

## 2024-02-19 RX ADMIN — POTASSIUM CHLORIDE 40 MEQ: 1500 TABLET, EXTENDED RELEASE ORAL at 09:02

## 2024-02-19 RX ADMIN — Medication 100 MG: at 08:58

## 2024-02-19 RX ADMIN — CHLORDIAZEPOXIDE HYDROCHLORIDE 10 MG: 10 CAPSULE ORAL at 08:58

## 2024-02-19 RX ADMIN — LISINOPRIL 10 MG: 10 TABLET ORAL at 08:59

## 2024-02-19 RX ADMIN — SODIUM CHLORIDE, PRESERVATIVE FREE 10 ML: 5 INJECTION INTRAVENOUS at 09:00

## 2024-02-19 RX ADMIN — PANTOPRAZOLE SODIUM 40 MG: 40 TABLET, DELAYED RELEASE ORAL at 05:35

## 2024-02-19 RX ADMIN — Medication 2 PUFF: at 08:02

## 2024-02-19 RX ADMIN — ENOXAPARIN SODIUM 40 MG: 100 INJECTION SUBCUTANEOUS at 08:59

## 2024-02-19 RX ADMIN — RISPERIDONE 1 MG: 1 TABLET ORAL at 08:59

## 2024-02-19 RX ADMIN — Medication 1 TABLET: at 08:58

## 2024-02-19 NOTE — FLOWSHEET NOTE
02/19/24 0854   Vital Signs   Temp 97.4 °F (36.3 °C)   Temp Source Oral   Pulse 95   Heart Rate Source Monitor   Respirations 18   /76   MAP (Calculated) 88   BP Location Right upper arm   BP Method Automatic   Patient Position Supine   Pain Assessment   Pain Assessment None - Denies Pain   Oxygen Therapy   SpO2 98 %   O2 Device None (Room air)     AM assessment complete. Pt a&o x4. Denies any pain or discomfort. CIWA 0. Denies any s/s of withdraw. No edema. LCTA. Pt is ready to d/c home. Call light and bedside table within reach. Will continue to monitor.

## 2024-02-19 NOTE — FLOWSHEET NOTE
02/19/24 0025   Vital Signs   Temp 98 °F (36.7 °C)   Temp Source Oral   Pulse 82   Heart Rate Source Monitor   Respirations 18   /80   MAP (Calculated) 94   BP Location Right upper arm   BP Method Automatic   Patient Position Supine   Oxygen Therapy   O2 Device None (Room air)     Pt in bed eyes closed VSS no distress. Call light within reach

## 2024-02-19 NOTE — CARE COORDINATION
DC order noted. Pt dc'ing home with sister. Aware that he is able to call to get into rehab once home. Sister to provide transport home. [facility] ordered via special touch (SN, PT--per special touch). No additional DC or DME needs identified.     IMM 2/19

## 2024-02-19 NOTE — DISCHARGE INSTR - COC
Therapy:        Elimination:  Continence:   Bowel: {YES / NO:}  Bladder: {YES / NO:}  Urinary Catheter: {Urinary Catheter:732548036}   Colostomy/Ileostomy/Ileal Conduit: {YES / NO:}       Date of Last BM: ***    Intake/Output Summary (Last 24 hours) at 2024 1007  Last data filed at 2024 0903  Gross per 24 hour   Intake 2750.27 ml   Output 2250 ml   Net 500.27 ml     I/O last 3 completed shifts:  In: 2590.3 [P.O.:822; I.V.:1768.3]  Out: 4450 [Urine:4450]    Safety Concerns:     { SHARATH Safety Concerns:095157251}    Impairments/Disabilities:      { SHARATH Impairments/Disabilities:533781516}    Nutrition Therapy:  Current Nutrition Therapy:   { SHARATH Diet List:312021384}    Routes of Feeding: {[facility] Other Feedings:606823201}  Liquids: {Slp liquid thickness:10221}  Daily Fluid Restriction: {[facility] Yes amt example:880300977}  Last Modified Barium Swallow with Video (Video Swallowing Test): {Done Not Done Date:}    Treatments at the Time of Hospital Discharge:   Respiratory Treatments: ***  Oxygen Therapy:  {Therapy; copd oxygen:72895}  Ventilator:    { CC Vent List:250684481}    Rehab Therapies: {THERAPEUTIC INTERVENTION:8212107098}  Weight Bearing Status/Restrictions: {[facility] Weight Bearin}  Other Medical Equipment (for information only, NOT a DME order):  {EQUIPMENT:232607258}  Other Treatments: ***    Patient's personal belongings (please select all that are sent with patient):  {[facility] Belongings:941813711}    RN SIGNATURE:  {Esignature:824393201}    CASE MANAGEMENT/SOCIAL WORK SECTION    Inpatient Status Date: ***    Readmission Risk Assessment Score:  Readmission Risk              Risk of Unplanned Readmission:  31           Discharging to Facility/ Agency   Name:   Address:  Phone:  Fax:    Dialysis Facility (if applicable)   Name:  Address:  Dialysis Schedule:  Phone:  Fax:    / signature: {Esignature:715866746}    PHYSICIAN

## 2024-02-19 NOTE — FLOWSHEET NOTE
02/18/24 2034   Vital Signs   Temp 97.6 °F (36.4 °C)   Temp Source Oral   Pulse 84   Heart Rate Source Monitor   Respirations 18   /76   MAP (Calculated) 86   BP Location Right upper arm   BP Method Automatic   Patient Position Supine   Oxygen Therapy   SpO2 99 %   O2 Device None (Room air)     Pt stated had no supper because he did not like the food . Snack given pt drinking water. VSS no distress noted call light within reach able to ambulate to the BR with supervision gait steady.

## 2024-02-19 NOTE — PROGRESS NOTES
PROGRESS NOTE    HPI: Kaleb Persaud is a(n)63 y.o. male admitted for work-up and treatment for Hyponatremia [E87.1]  Alcohol dependence with withdrawal (HCC) [F10.239]  Alcohol withdrawal syndrome with perceptual disturbance (HCC) [F10.932].     We are following for elevated LFTs.    Subjective:     He has no new complaints today.      Objective:     I/O last 3 completed shifts:  In: 540 [P.O.:540]  Out: 3425 [Urine:3425]      /68   Pulse 84   Temp 97.4 °F (36.3 °C) (Oral)   Resp 18   Ht 1.702 m (5' 7\")   Wt 61.5 kg (135 lb 9.3 oz)   SpO2 98%   PF 97 L/min   BMI 21.24 kg/m²     Physical Exam:  HEENT: anicteric sclera, oropharyngeal membranes pink and moist.  Cor: RRR  Lungs: non-labored, no respiratory distress  Abdomen: soft, NT. No ascites.  No hepatomegaly or splenomegaly  Extremities: no edema  Neuro: alert and oriented x 3, no asterixis      Results:   Lab Results   Component Value Date     (H) 02/18/2024     (H) 02/18/2024    GGT 2,794 (H) 02/17/2024    ALKPHOS 459 (H) 02/18/2024    BILIDIR 0.8 (H) 02/18/2024    PROT 5.0 (L) 02/18/2024    LABALBU 2.7 (L) 02/18/2024    INR 0.94 02/16/2024    LIPASE 48.0 12/29/2023     Lab Results   Component Value Date    WBC 4.9 02/17/2024    HGB 12.3 (L) 02/17/2024    HCT 36.1 (L) 02/17/2024    .5 (H) 02/17/2024     (L) 02/17/2024     BUN/Cr/glu/ALT/AST/amyl/lip:  <2/0.6/--/219/266/--/-- (02/18 1103)  US LIVER    Result Date: 2/17/2024  Cholelithiasis.  No cholecystitis Increased echogenicity throughout the liver, suggesting diffuse hepatocellular disease such as fatty infiltration     CT CERVICAL SPINE WO CONTRAST    Result Date: 2/16/2024  No acute fractures. Cervical fusion hardware and multilevel degenerative changes     CT HEAD WO CONTRAST    Result Date: 2/16/2024  No acute intracranial abnormality. No acute traumatic injury of the facial bones.     CT FACIAL BONES WO 
    [facility]  Nephrology follow-up note           Reason for Consult: Hyponatremia  Requesting Physician:  Dr. Vaughn    Sub/interval history  Resting, denies any new complaints  Sodium 135    Last 24 h uop 2.475 L    ROS: No chest pain/shortness of breath/fever/nausea/vomiting  PSFH: + visitor    Scheduled Meds:   chlordiazePOXIDE  10 mg Oral TID    potassium chloride  40 mEq Oral Once    thiamine  100 mg Oral Daily    sodium chloride flush  10 mL IntraVENous 2 times per day    enoxaparin  40 mg SubCUTAneous Daily    melatonin  3 mg Oral Nightly    multivitamin  1 tablet Oral Daily    atorvastatin  40 mg Oral Daily    budesonide-formoterol  2 puff Inhalation BID RT    lisinopril  10 mg Oral Daily    pantoprazole  40 mg Oral QAM AC    risperiDONE  1 mg Oral BID     Continuous Infusions:   sodium chloride      sodium chloride       PRN Meds:.sodium chloride, sodium chloride flush, sodium chloride, potassium chloride **OR** potassium alternative oral replacement **OR** potassium chloride, magnesium sulfate, promethazine **OR** ondansetron, acetaminophen **OR** acetaminophen, albuterol sulfate HFA    History of Present Illness on 2/17/2024:    63 y.o. yo male with PMH of  hypertension, hyperlipidemia, GERD, COPD, bipolar disorder who is admitted for hyponatremia  Patient was recently admitted in December 2023 for hyponatremia with sodium of 121.  He has history of drinking a lot of fluid including beer.    Physical exam:   Constitutional:  VITALS:  /68   Pulse 84   Temp 97.4 °F (36.3 °C) (Oral)   Resp 18   Ht 1.702 m (5' 7\")   Wt 61.5 kg (135 lb 9.3 oz)   SpO2 98%   PF 97 L/min   BMI 21.24 kg/m²   Gen: alert, awake  Neck: No JVD  Skin: Unremarkable  Cardiovascular:  S1, S2 without m/r/g   Respiratory: CTA B without w/r/r; respiratory effort normal  Abdomen:  soft, nt, nd,   Extremities: no lower extremity edema  Neuro/Psy: AAoriented times 3 ; moves all 4 ext    Data/  Recent Labs     02/16/24  8157 
    [employer]  Nephrology follow-up note           Reason for Consult: Hyponatremia  Requesting Physician:  Dr. Vaughn    Sub/interval history  Resting, denies any new complaints  Sodium 137  Kidney function normal  Says he is ready to go home.   Discussed diet.      ROS: No chest pain/shortness of breath/fever/nausea/vomiting  PSFH: no visitor    Scheduled Meds:   chlordiazePOXIDE  10 mg Oral TID    thiamine  100 mg Oral Daily    sodium chloride flush  10 mL IntraVENous 2 times per day    enoxaparin  40 mg SubCUTAneous Daily    melatonin  3 mg Oral Nightly    multivitamin  1 tablet Oral Daily    atorvastatin  40 mg Oral Daily    budesonide-formoterol  2 puff Inhalation BID RT    lisinopril  10 mg Oral Daily    pantoprazole  40 mg Oral QAM AC    risperiDONE  1 mg Oral BID     Continuous Infusions:   sodium chloride      sodium chloride       PRN Meds:.sodium chloride, sodium chloride flush, sodium chloride, potassium chloride **OR** potassium alternative oral replacement **OR** potassium chloride, magnesium sulfate, promethazine **OR** ondansetron, acetaminophen **OR** acetaminophen, albuterol sulfate HFA    History of Present Illness on 2/17/2024:    63 y.o. yo male with PMH of  hypertension, hyperlipidemia, GERD, COPD, bipolar disorder who is admitted for hyponatremia  Patient was recently admitted in December 2023 for hyponatremia with sodium of 121.  He has history of drinking a lot of fluid including beer.    Physical exam:   Constitutional:  VITALS:  /76   Pulse 95   Temp 97.4 °F (36.3 °C) (Oral)   Resp 18   Ht 1.702 m (5' 7\")   Wt 62 kg (136 lb 11.2 oz)   SpO2 98%   PF 97 L/min   BMI 21.41 kg/m²   Gen: alert, awake  Neck: No JVD  Skin: Unremarkable  Cardiovascular:  S1, S2 without m/r/g   Respiratory: CTA B without w/r/r; respiratory effort normal  Abdomen:  soft, nt, nd,   Extremities: no lower extremity edema  Neuro/Psy: AAoriented times 3 ; moves all 4 ext    Data/  Recent Labs     
   02/16/24 2300   RT Protocol   History Pulmonary Disease 1   Respiratory pattern 0   Breath sounds 2   Cough 0   Indications for Bronchodilator Therapy On home bronchodilators   Bronchodilator Assessment Score 3     RT Inhaler-Nebulizer Bronchodilator Protocol Note    There is a bronchodilator order in the chart from a provider indicating to follow the RT Bronchodilator Protocol and there is an “Initiate RT Inhaler-Nebulizer Bronchodilator Protocol” order as well (see protocol at bottom of note).    CXR Findings:  No results found.    The findings from the last RT Protocol Assessment were as follows:   History Pulmonary Disease: Smoker 15 pack years or more  Respiratory Pattern: Regular pattern and RR 12-20 bpm  Breath Sounds: Slightly diminished and/or crackles  Cough: Strong, spontaneous, non-productive  Indication for Bronchodilator Therapy: On home bronchodilators  Bronchodilator Assessment Score: 3    Aerosolized bronchodilator medication orders have been revised according to the RT Inhaler-Nebulizer Bronchodilator Protocol below.    Respiratory Therapist to perform RT Therapy Protocol Assessment initially then follow the protocol.  Repeat RT Therapy Protocol Assessment PRN for score 0-3 or on second treatment, BID, and PRN for scores above 3.    No Indications - adjust the frequency to every 6 hours PRN wheezing or bronchospasm, if no treatments needed after 48 hours then discontinue using Per Protocol order mode.     If indication present, adjust the RT bronchodilator orders based on the Bronchodilator Assessment Score as indicated below.  Use Inhaler orders unless patient has one or more of the following: on home nebulizer, not able to hold breath for 10 seconds, is not alert and oriented, cannot activate and use MDI correctly, or respiratory rate 25 breaths per minute or more, then use the equivalent nebulizer order(s) with same Frequency and PRN reasons based on the score.  If a patient is on this 
Care handed off to Althea LIZ  
Consult has been added to Kayley Natarajan RN  on 2/17/24. . 7:01 AM    Jennifer Sidhu  2/17/2024  
Consult has been called to Debra Villar on 2/17/24. Spoke with voicemail. 8:56 AM    Jennifer Sidhu  2/17/2024  
Current Na 128. No new orders. Call neprho if next sodium level is >130.   
Current Na is 131. Nephro consulted based off of parameter   
D5 discontinued per Nephrology.  Patient BG trending down.  Neuro checks WNL. Reported dizziness when ambulating. Bed alarm in place. Call light within reach.   
Dr. Manuel with nephrology consulted for overcorrection of fluid. D5 and desmopressin ordered. Next Na draw at 0630  
Hand off report given to AGUSTO Mcdowell.   Patient is stable showing no signs of distress and has no current needs at this time.   Call light is in reach and bed is in lowest position.    Care is transferred at this time.     
Hand off report given to AGUSTO Mcdowell.   Patient is stable showing no signs of distress and has no current needs at this time.   Call light is in reach and bed is in lowest position.    Care is transferred at this time.     
IM Progress Note    Admit Date:  2/16/2024  1    Interval history:  alcohol abuse, hyponatremia sent in by PCP for abn labs      Subjective:  Mr. Persaud seen up in bed, feels ok today   No detox symptoms  Drinks about 6- 10 beers a night  Reports he falls a lot      Objective:   /82   Pulse 82   Temp 97.1 °F (36.2 °C) (Oral)   Resp 18   Ht 1.702 m (5' 7\")   Wt 61.3 kg (135 lb 3.2 oz)   SpO2 98%   PF 97 L/min   BMI 21.18 kg/m²     Intake/Output Summary (Last 24 hours) at 2/17/2024 0808  Last data filed at 2/17/2024 0758  Gross per 24 hour   Intake --   Output 1225 ml   Net -1225 ml       Physical Exam:        General: elderly male appropriate for age   Awake, alert and oriented. Appears to be not in any distress  Mucous Membranes:  Pink , anicteric  Resolving left periorbital ecchymoses  Neck: No JVD, no carotid bruit, no thyromegaly  Chest:  Clear to auscultation bilaterally, no added sounds  Cardiovascular:  RRR S1S2 heard, no murmurs or gallops  Abdomen:  Soft, undistended, non tender, no organomegaly, BS present  Extremities: No edema or cyanosis. Distal pulses well felt  Scattered healing scratch marks on both LE  Neurological : grossly normal  Mentation normal. Speech clear  No tremors on outstretched hands      Medications:   Scheduled Medications:    chlordiazePOXIDE  10 mg Oral TID    thiamine  100 mg Oral Daily    sodium chloride flush  10 mL IntraVENous 2 times per day    enoxaparin  40 mg SubCUTAneous Daily    melatonin  3 mg Oral Nightly    nicotine  1 patch TransDERmal Daily    multivitamin  1 tablet Oral Daily    atorvastatin  40 mg Oral Daily    budesonide-formoterol  2 puff Inhalation BID RT    lisinopril  10 mg Oral Daily    pantoprazole  40 mg Oral QAM AC    risperiDONE  1 mg Oral BID     I   dextrose      sodium chloride      sodium chloride       sodium chloride, sodium chloride flush, sodium chloride, potassium chloride **OR** potassium alternative oral replacement **OR** potassium 
Inpatient Occupational Therapy Evaluation and Treatment    Unit: PCU  Date:  2/17/2024  Patient Name:    Kaleb Persaud  Admitting diagnosis:  Hyponatremia [E87.1]  Alcohol dependence with withdrawal (HCC) [F10.239]  Alcohol withdrawal syndrome with perceptual disturbance (HCC) [F10.932]  Admit Date:  2/16/2024  Precautions/Restrictions/WB Status/ Lines/ Wounds/ Oxygen: Fall risk, Bed/chair alarm, Lines (IV), and Telemetry    Pt seen for cotreatment this date due to patient safety    Treatment Time:  6394-6998  Treatment Number:  1  Timed Code Treatment Minutes: 25 minutes  Total Treatment Minutes:  35  minutes    Patient Goals for Therapy: \"to go to alcohol rehab \"          Discharge Recommendations: Home Independently  DME needs for discharge: Needs Met       Therapy recommendations for staff:   Stand by assist for ambulation with use of gait belt and without AD (pt may prefer to use RW) to/from bathroom     History of Present Illness: 63 y.o. male who presents via private car for  hyponatremia, alcohol detox. Onset was chronic. Duration has been since the onset. Context includes patient presents to the emergency department today with his sister.  Patient has a history of daily drinking and states he generally drinks 6-12 beers per day.  Patient was recently admitted back in December for hyponatremia and upon discharge was set up with home health.  Home health came today to draw his labs and monitor his sodium and sodium was found to be 118 so they advised that he come to the ER.  Patient does also endorse that he had a mechanical fall approximately 2 weeks ago where he struck his head but denied any dizziness or lightheadedness associated with this however does have bruising to his face.  He denies any chest pain, palpitations or swelling his extremities.  He denies shortness of breath, cough congestion or fevers.  He denies abdominal pain, nausea, vomiting, diarrhea or constipation and denies urinary symptoms.  
Inpatient Physical Therapy Evaluation, Treatment, & Discharge Summary    Unit: PCU  Date:  2/17/2024  Patient Name:    Kaleb Persaud  Admitting diagnosis:  Hyponatremia [E87.1]  Alcohol dependence with withdrawal (HCC) [F10.239]  Alcohol withdrawal syndrome with perceptual disturbance (HCC) [F10.932]  Admit Date:  2/16/2024  Precautions/Restrictions/WB Status/ Lines/ Wounds/ Oxygen: Fall risk, Bed/chair alarm, Lines (IV), and Telemetry      Pt seen for cotreatment this date due to patient safety.    Treatment Time:  1531-8032  Treatment Number:  1   Timed Code Treatment Minutes: 25 minutes  Total Treatment Minutes:  35  minutes    Patient Stated Goals for Therapy: Family goals for pt to go to rehab for alcohol use; pt in agreement.          Discharge Recommendations: Home independently - preference is to get to a rehab facility for alcohol use  DME needs for discharge: Needs Met (pt declining need for RW)       Therapy recommendation for EMS Transport: can transport by wheelchair    Therapy recommendations for staff:   Stand by assist for ambulation with use of gait belt and without AD (pt may prefer to use RW) to/from bathroom    History of Present Illness:   Per Internal Medicine note on 2/16/24:  \"63 y.o. male who presented to [facility] with past medical history of bipolar type I, CAD, hypertension, hyperlipidemia, anxiety, arthritis presented to ED with chief complaint of abnormal lab     Patient reported that he came via private vehicle due to alcohol detox and due to hyponatremia.  Patient reports that he came to the emergency department with his sister has been drinking daily on 6-12 beers per day and was admitted back in December hyponatremia upon discharge patient was sent to home health.  While patient has home health they normally draw labs and his labs are due today showed a sodium of 118 and told from to the ER immediately.  Patient otherwise denied having any chest pain shortness of breath 
Na continues to rise to 131. D5 orders have . Nephro messaged for orders    
Nephrology called said to have patient drink 3 glasses of water and retest sodium.  Call if sodium is over 131.   
Patient admitted to room 325 from ED. Patient oriented to room, call light, bed rails, phone, lights and bathroom. Patient instructed about the schedule of the day including: vital sign frequency, lab draws, possible tests, frequency of MD and staff rounds, daily weights, I &O's and prescribed diet.  Telemetry box in place, patient aware of placement and reason. Bed locked, in lowest position, side rails up 2/4, call light within reach.        Recliner Assessment  Patient is able to demonstrate the ability to move from a reclining position to an upright position within the recliner.       4 Eyes Skin Assessment     The patient is being assess for   Admission    I agree that 2 RN's have performed a thorough Head to Toe Skin Assessment on the patient. ALL assessment sites listed below have been assessed.      Areas assessed for pressure by both nurses:   [x]   Head, Face, and Ears   [x]   Shoulders, Back, and Chest, Abdomen  [x]   Arms, Elbows, and Hands   [x]   Coccyx, Sacrum, and Ischium  [x]   Legs, Feet, and Heels  Contusion below left eye, scattered bruising        Skin Assessed Under all Medical Devices by both nurses:  NA               All Mepilex Borders were peeled back and area peeked at by both nurses:  No: NA  Please list where Mepilex Borders are located:  NA             **SHARE this note so that the co-signing nurse is able to place an eSignature**    Co-signer eSignature: Electronically signed by Ale Aranda RN on 2/16/24 at 11:17 PM EST    Does the Patient have Skin Breakdown related to pressure?  No       Phil Prevention initiated:  No   Wound Care Orders initiated:  No      [facility] nurse consulted for Pressure Injury (Stage 3,4, Unstageable, DTI, NWPT, Complex wounds)and New or Established Ostomies:  No      Primary Nurse eSignature: Electronically signed by Fabricio Manuel RN on 2/16/24 at 11:13 PM EST        
Patient assessed and AM medications given.  Educated patient on sodium level.  Ambulated to restroom. Tolerated well. Family updated per patient request.  CIWA score remains zero and Neuro checks continue to be negative. Call light within reach.   
Patient assessed and AM medications given.  Vitals stable.  D5 infusion restered Per MD.  125ml/hr.  Patient requesting to eat.  MD notified.  GI consult in place.  Call light within reach.   
Patient educated on discharge instructions as well as new medications use, dosage, administration and possible side effects.  Patient verified knowledge. IV removed without difficulty and dry dressing in place. Telemetry monitor removed and returned to CMU. Pt left facility in stable condition to Home with all of their personal belongings.    
Patient is having family bring in outside food.  Nursing is unable to track sodium content accurately. RN educated patient at 5:40pm to only drink the 3 glasses of water ordered by nephro until after blood draw at 6:00pm.  Patient verbalized understanding. Patient proceeded to have dinner tray, Pepsi, and most of a diet coke. Educated patient and family at bedside. Verbalized understanding.   
Patient stable.  Report given to AGUSTO Regalado  
Patient stable.  Report given to oncoming RN  
Per nephrology check Na again at 2200. 500mL bolus NS one time for BP  
Spoke with Dr James regarding GI note suggesting to hold or d/c atorvastatin but it is still ordered. He stated that it should be d/c'd d/t enzyme elevation. Order held and he d/c'd on discharge.  
t here for hyponatremia of 118 evening of 2/16. Pt Na overcorrected too quickly and D5 used but D5 now DC. 0600 today Na 131, 1100 today Na 128, 1830 today Na 124. Nephrology note has goal Na 126 by 1100 on 2/18 BP is 89/59 in addition.   
chloride flush, sodium chloride, potassium chloride **OR** potassium alternative oral replacement **OR** potassium chloride, magnesium sulfate, promethazine **OR** ondansetron, acetaminophen **OR** acetaminophen, albuterol sulfate HFA    Lab Data:  Recent Labs     02/16/24  1839 02/17/24  0643   WBC 6.6 4.9   HGB 13.2* 12.3*   HCT 37.4* 36.1*   MCV 99.0 100.5*    132*       Recent Labs     02/17/24  1848 02/17/24  2150 02/18/24  0249   * 128* 131*   K 3.7 3.5 3.5   CL 90* 95* 98*   CO2 25 23 24   BUN 3* 3* 3*   CREATININE <0.5* <0.5* <0.5*       Recent Labs     02/17/24  0643   CKTOTAL 18*       Coagulation:   Lab Results   Component Value Date/Time    INR 0.94 02/16/2024 06:39 PM     Cardiac markers:   Lab Results   Component Value Date/Time    CKTOTAL 18 02/17/2024 06:43 AM    TROPONINI <0.01 11/09/2021 12:52 AM         Lab Results   Component Value Date     (H) 02/17/2024     (H) 02/17/2024    ALKPHOS 507 (H) 02/17/2024    BILITOT 1.2 (H) 02/17/2024       Lab Results   Component Value Date    INR 0.94 02/16/2024    INR 0.97 12/29/2023    INR 1.08 02/05/2023    PROTIME 12.6 02/16/2024    PROTIME 12.9 12/29/2023    PROTIME 14.0 02/05/2023       Radiology      US LIVER   Final Result   Cholelithiasis.  No cholecystitis      Increased echogenicity throughout the liver, suggesting diffuse   hepatocellular disease such as fatty infiltration         CT FACIAL BONES WO CONTRAST   Final Result   No acute intracranial abnormality.      No acute traumatic injury of the facial bones.         CT CERVICAL SPINE WO CONTRAST   Final Result   No acute fractures.      Cervical fusion hardware and multilevel degenerative changes         CT HEAD WO CONTRAST   Final Result   No acute intracranial abnormality.      No acute traumatic injury of the facial bones.         XR CHEST (2 VW)   Final Result   No acute abnormality.                 Assessment & Plan:      Acute severe hyponatremia:  Last sodium 118-

## 2024-02-19 NOTE — DISCHARGE SUMMARY
Awake, alert and oriented. Appears to be not in any distress  Mucous Membranes:  Pink , anicteric  Resolving left periorbital ecchymoses  Neck: No JVD, no carotid bruit, no thyromegaly  Chest:  Clear to auscultation bilaterally, no added sounds  Cardiovascular:  RRR S1S2 heard, no murmurs or gallops  Abdomen:  Soft, undistended, non tender, no organomegaly, BS present  Extremities: No edema or cyanosis. Distal pulses well felt  Scattered healing scratch marks on both LE  Neurological : grossly normal  Mentation normal. Speech clear  No tremors on outstretched hands    Hospital Course  Acute severe hyponatremia - resolved  Last sodium 118- could be lab error vs beer hypopotomania  Has chronic low Na and drinks heavily  Currently 124 >131  Nephrology consulted  for high risk of overcorrection and started on D5 fluids now - can stop today   Mentation stable   BMP every 6 hours  TSH stable         Alcohol dependence with intoxication  High risk of withdrawal,  Minimal symptoms , now on librium, thiamine  Doing well   Recommend cessation   Social work consulted, planned for inpt rehab      Transaminitis: very high LFT noted  Tylenol negative, hepatitis panel ordered  Ultrasound liver with diffuse hepatosis  GI consulted   Improving LFT noted        Bipolar: Continue medication- risperdal     Essential Hypertension: on lisinopril     Hyperlipidemia: holding statins for abn LFT     GERD: on PPI      Asthma: Controlled, not in exacerbation, continue home medication      CBC:   Recent Labs     02/16/24 1839 02/17/24  0643   WBC 6.6 4.9   HGB 13.2* 12.3*   HCT 37.4* 36.1*   MCV 99.0 100.5*    132*     BMP:   Recent Labs     02/18/24  0806 02/18/24  1103 02/18/24  1754 02/19/24  0543   * 135* 128* 137   K 3.5 3.5  --  3.5   CL 98* 100  --  103   CO2 24 28  --  26   BUN <2* <2*  --  3*   CREATININE <0.5* 0.6*  --  <0.5*     LIVER PROFILE:   Recent Labs     02/16/24  1839 02/17/24  0643 02/18/24  1103   *

## 2024-02-20 ENCOUNTER — CARE COORDINATION (OUTPATIENT)
Dept: CASE MANAGEMENT | Age: 64
End: 2024-02-20

## 2024-02-20 ENCOUNTER — TELEPHONE (OUTPATIENT)
Dept: FAMILY MEDICINE CLINIC | Age: 64
End: 2024-02-20

## 2024-02-20 DIAGNOSIS — R79.89 ABNORMAL LFTS: Primary | ICD-10-CM

## 2024-02-20 LAB — SMA IGG SER-ACNC: 12 UNITS (ref 0–19)

## 2024-02-20 PROCEDURE — 1111F DSCHRG MED/CURRENT MED MERGE: CPT | Performed by: NURSE PRACTITIONER

## 2024-02-20 NOTE — TELEPHONE ENCOUNTER
Care Transitions Initial Follow Up Call    Outreach made within 2 business days of discharge: Yes    Patient: Kaleb Persaud Patient : 1960   MRN: [id number]  Reason for Admission: There are no discharge diagnoses documented for the most recent discharge.  Discharge Date: 24       Spoke with: Kaleb    Discharge department/facility: [facility] [place]    TCM Interactive Patient Contact:  Was patient able to fill all prescriptions: Yes  Was patient instructed to bring all medications to the follow-up visit: Yes  Is patient taking all medications as directed in the discharge summary? Yes  Does patient understand their discharge instructions: Yes  Does patient have questions or concerns that need addressed prior to 7-14 day follow up office visit: no    Scheduled appointment with PCP within 7-14 days    Follow Up  Future Appointments   Date Time Provider Department Center   2024  1:30 PM Patricia Vieira, ED - JENNIFER Henley LPN

## 2024-02-20 NOTE — CARE COORDINATION
protein intake. Reviewed protein choices in food. He likes fish. Plans to eat dinner at [employer].     Sister will take him to appointment with PCP. Reviewed date/time HFU. He denies needs. Has CTN contact info as well as HC nurse for prn needs/concerns.     Care Transition Nurse reviewed discharge instructions, medical action plan, and red flags with patient who verbalized understanding. The patient was given an opportunity to ask questions and does not have any further questions or concerns at this time. Were discharge instructions available to patient? Yes. Reviewed appropriate site of care based on symptoms and resources available to patient including: PCP  Specialist  Home health. The patient agrees to contact the PCP office for questions related to their healthcare.     Advance Care Planning:   Does patient have an Advance Directive: reviewed and current.    Medication reconciliation was performed with patient, who verbalizes understanding of administration of home medications. Medications reviewed, 1111F entered: yes    Was patient discharged with a pulse oximeter? no    Non-face-to-face services provided:  Obtained and reviewed discharge summary and/or continuity of care documents  Education of patient/family/caregiver/guardian to support self-management-   Assessment and support for treatment adherence and medication management-     Offered patient enrollment in the Remote Patient Monitoring (RPM) program for in-home monitoring: Patient is not eligible for RPM program.    Discussed follow-up appointments. If no appointment was previously scheduled, appointment scheduling offered: Yes.   Is follow up appointment scheduled within 7 days of discharge? Yes.    Follow Up  Future Appointments   Date Time Provider Department Center   2/26/2024  1:30 PM Patricia Vieira, ED - CNP [facility]  [facility]     Care Transition Nurse provided contact information.    Plan for follow up in 7-10 days  based

## 2024-02-21 DIAGNOSIS — E87.1 HYPONATREMIA: ICD-10-CM

## 2024-02-21 DIAGNOSIS — F10.19 ALCOHOL ABUSE WITH ALCOHOL-INDUCED DISORDER (HCC): Primary | ICD-10-CM

## 2024-02-21 DIAGNOSIS — R63.4 WEIGHT LOSS: ICD-10-CM

## 2024-02-22 LAB
A1AT SERPL-MCNC: 119 MG/DL (ref 90–200)
CERULOPLASMIN SERPL-MCNC: 13 MG/DL (ref 15–30)

## 2024-02-23 LAB — MITOCHONDRIA M2 AB SER IA-ACNC: 0.6 U/ML (ref 0–4)

## 2024-02-26 ENCOUNTER — OFFICE VISIT (OUTPATIENT)
Dept: FAMILY MEDICINE CLINIC | Age: 64
End: 2024-02-26

## 2024-02-26 VITALS
RESPIRATION RATE: 18 BRPM | BODY MASS INDEX: 23.4 KG/M2 | WEIGHT: 149.4 LBS | SYSTOLIC BLOOD PRESSURE: 100 MMHG | DIASTOLIC BLOOD PRESSURE: 68 MMHG | OXYGEN SATURATION: 94 % | HEART RATE: 88 BPM

## 2024-02-26 DIAGNOSIS — E87.1 HYPONATREMIA: ICD-10-CM

## 2024-02-26 DIAGNOSIS — K70.9 ALCOHOLIC LIVER DISEASE (HCC): ICD-10-CM

## 2024-02-26 DIAGNOSIS — E83.42 HYPOMAGNESEMIA: ICD-10-CM

## 2024-02-26 DIAGNOSIS — Z09 HOSPITAL DISCHARGE FOLLOW-UP: ICD-10-CM

## 2024-02-26 DIAGNOSIS — F10.19 ALCOHOL ABUSE WITH ALCOHOL-INDUCED DISORDER (HCC): Primary | ICD-10-CM

## 2024-02-26 DIAGNOSIS — D69.6 THROMBOCYTOPENIA, UNSPECIFIED (HCC): ICD-10-CM

## 2024-02-26 DIAGNOSIS — F10.19 ALCOHOL ABUSE WITH ALCOHOL-INDUCED DISORDER (HCC): ICD-10-CM

## 2024-02-26 NOTE — PROGRESS NOTES
Post-Discharge Transitional Care Follow Up      Kaleb Persaud   YOB: 1960    Date of Office Visit:  2/26/2024  Date of Hospital Admission: 2/16/24  Date of Hospital Discharge: 2/19/24  Readmission Risk Score (high >=14%. Medium >=10%):Readmission Risk Score: 18.3      Care management risk score Rising risk (score 2-5) and Complex Care (Scores >=6): No Risk Score On File     Non face to face  following discharge, date last encounter closed (first attempt may have been earlier): 02/20/2024     Call initiated 2 business days of discharge: Yes     Alcohol abuse with alcohol-induced disorder (HCC)  -     [place] DISCHARGE MEDS RECONCILED W/ CURRENT OUTPATIENT MED LIST  -     Comprehensive Metabolic Panel; Future  Thrombocytopenia, unspecified (HCC)  -     [place] DISCHARGE MEDS RECONCILED W/ CURRENT OUTPATIENT MED LIST  Hyponatremia  -     [place] DISCHARGE MEDS RECONCILED W/ CURRENT OUTPATIENT MED LIST  -     Comprehensive Metabolic Panel; Future  Hospital discharge follow-up  -     [place] DISCHARGE MEDS RECONCILED W/ CURRENT OUTPATIENT MED LIST  Alcoholic liver disease (HCC)  -     Comprehensive Metabolic Panel; Future  Hypomagnesemia  -     Magnesium; Future    Has appointment with Jimy at [facility] later this week.     Encouraged to continue increased food intake.         Medical Decision Making: moderate complexity  Return in about 4 weeks (around 3/25/2024) for hyponatremia, abnormal LFT.           Subjective:   HPI    Inpatient course: Discharge summary reviewed- see chart.    Interval history/Current status:     Hospitalized for hyponatremia. Felt to be dilutional related to increased beer intake. Today states he is 10 days without beer. Feels he has gained 15 pounds however he is up 5 pounds over the past 24 days. Now with food at home and eating better.     Patient Active Problem List   Diagnosis    Alcohol abuse with alcohol-induced disorder (HCC)    Symptomatic anemia    Abnormal LFTs    Syncope

## 2024-02-27 ENCOUNTER — TELEPHONE (OUTPATIENT)
Dept: FAMILY MEDICINE CLINIC | Age: 64
End: 2024-02-27

## 2024-02-27 LAB
ALBUMIN SERPL-MCNC: 3.4 G/DL (ref 3.4–5)
ALBUMIN/GLOB SERPL: 1.4 {RATIO} (ref 1.1–2.2)
ALP SERPL-CCNC: 252 U/L (ref 40–129)
ALT SERPL-CCNC: 32 U/L (ref 10–40)
ANION GAP SERPL CALCULATED.3IONS-SCNC: 6 MMOL/L (ref 3–16)
AST SERPL-CCNC: 25 U/L (ref 15–37)
BILIRUB SERPL-MCNC: 0.3 MG/DL (ref 0–1)
BUN SERPL-MCNC: 8 MG/DL (ref 7–20)
CALCIUM SERPL-MCNC: 9.2 MG/DL (ref 8.3–10.6)
CHLORIDE SERPL-SCNC: 103 MMOL/L (ref 99–110)
CO2 SERPL-SCNC: 31 MMOL/L (ref 21–32)
CREAT SERPL-MCNC: 0.6 MG/DL (ref 0.8–1.3)
GFR SERPLBLD CREATININE-BSD FMLA CKD-EPI: >60 ML/MIN/{1.73_M2}
GLUCOSE SERPL-MCNC: 87 MG/DL (ref 70–99)
MAGNESIUM SERPL-MCNC: 2 MG/DL (ref 1.8–2.4)
POTASSIUM SERPL-SCNC: 4.7 MMOL/L (ref 3.5–5.1)
PROT SERPL-MCNC: 5.9 G/DL (ref 6.4–8.2)
SODIUM SERPL-SCNC: 140 MMOL/L (ref 136–145)

## 2024-02-28 ENCOUNTER — CARE COORDINATION (OUTPATIENT)
Dept: CASE MANAGEMENT | Age: 64
End: 2024-02-28

## 2024-02-28 NOTE — CARE COORDINATION
Care Transitions Follow Up Call    Patient Current Location: Home: [address] [place] [id number]    Care Transition Nurse contacted the patient by telephone to follow up after recent hospital admission.     Patient: Kaleb Persaud  Patient : 1960   MRN: [id number]  Reason for Admission: 3 days -> acute severe hyponatremia, alcohol dependence w/ intoxication, transaminitis-very high LFT noted, bipolar, essential HTN, HLD, GERD, asthma-controlled no AE -> home w/ [facility], encouraged sodium intake and moderation of fluid intake 64 ounces/day, increase protein, limit alcohol; f/up GI Dr Orta or Sobia Collado APRN   Discharge Date: 24 RARS: Readmission Risk Score: 18.3    Needs to be reviewed by the provider   Additional needs identified to be addressed with provider: No         Method of communication with provider: none.    Patient reports he feels improved. States he has increased weight by 15# since discharge. States his intake is good. Denies n/v/d, abd pain, weakness, HA, edema, SOB. He hasn't had beer in almost 2 weeks. He decided not to go to [facility]. Plans to attend AA meetings locally. Denies needs and politely ended call.     Addressed changes since last contact:   HFU with labs completed - note reviewed  Discussed follow-up appointments. If no appointment was previously scheduled, appointment scheduling offered: Yes.   Is follow up appointment scheduled within 7 days of discharge? Yes.    Follow Up  No future appointments.  External follow up appointment(s): GI?-still needs to schedule. States he is aware and has number. Declines CTN assistance.     Care Transition Nurse reviewed medical action plan and red flags with patient and discussed any barriers to care and/or understanding of plan of care after discharge. Discussed appropriate site of care based on symptoms and resources available to patient including: PCP  Specialist. The patient agrees to contact the PCP

## 2024-03-04 ENCOUNTER — TELEPHONE (OUTPATIENT)
Dept: FAMILY MEDICINE CLINIC | Age: 64
End: 2024-03-04

## 2024-03-04 DIAGNOSIS — T14.8XXA BRUISING: ICD-10-CM

## 2024-03-04 DIAGNOSIS — E87.1 HYPONATREMIA: ICD-10-CM

## 2024-03-04 DIAGNOSIS — D50.9 IRON DEFICIENCY ANEMIA, UNSPECIFIED IRON DEFICIENCY ANEMIA TYPE: ICD-10-CM

## 2024-03-04 DIAGNOSIS — D69.6 THROMBOCYTOPENIA, UNSPECIFIED (HCC): ICD-10-CM

## 2024-03-04 DIAGNOSIS — F10.19 ALCOHOL ABUSE WITH ALCOHOL-INDUCED DISORDER (HCC): Primary | ICD-10-CM

## 2024-03-04 NOTE — TELEPHONE ENCOUNTER
Received phone call from Celena with [facility]. She states that they are doing the restart of home care and are asking if there are any labs you would like to add and have drawn on the pt. She stated that she went to see the pt on Saturday 03/02/24 and he does have what appear to be \" scattered bruising\" on both legs she stated Friday night pt stated both legs were really swollen Saturday they were not as swollen, no SOB, not hot to the touch and the pt stated that he has not been scratching himself. She stated that the left leg is worse than the right. Celena is going back out to see the patient on Thursday this week please advise.     Blfxrs-[phone]

## 2024-03-06 ENCOUNTER — CARE COORDINATION (OUTPATIENT)
Dept: CASE MANAGEMENT | Age: 64
End: 2024-03-06

## 2024-03-06 DIAGNOSIS — L30.9 DERMATITIS: Primary | ICD-10-CM

## 2024-03-06 NOTE — CARE COORDINATION
Care Transitions Follow Up Call    Patient Current Location: Home: [address] [place] [id number]    Care Transition Nurse contacted the patient by telephone to follow up after recent hospital admission.      Patient: Kaleb Persaud  Patient : 1960   MRN: [id number]  Reason for Admission: 3 days -> acute severe hyponatremia, alcohol dependence w/ intoxication, transaminitis-very high LFT noted, bipolar, essential HTN, HLD, GERD, asthma-controlled no AE -> home w/ [facility], encouraged sodium intake and moderation of fluid intake 64 ounces/day, increase protein, limit alcohol; f/up GI Dr Orta or Sobia Collado APRN    Discharge Date: 24 RARS: Readmission Risk Score: 18.3    Needs to be reviewed by the provider   Additional needs identified to be addressed with provider: Yes  Patient requests referral to dermatology for red rash on his skin - back, legs, arms. States some are scabs.  Denies allergic reaction. He is going to schedule with GI-Dr Orta. HC nurse to visit tomorrow per chart notes.          Method of communication with provider: chart routing.    Spoke with patient reports feeling good. He has not scheduled with GI Dr Orta or his NP Sobia. CTN provided office number ([phone]). Offered to connect call but he declined. He plans to ask PCP for referral to a dermatologist.     He states he has no longer had alcohol since hospital discharge. Congratulated him on this accomplishment. Has not been weighing and guesses he has put on another 10# because he eats a lot. He states his LE were swollen on Friday but have gone down and normal for him now. Able to wear his normal shoes and socks. Denies SOB, YADAV, orthopnea. States he is taking Mucinex for a cough which is non productive. States he also has runny nose. Denies allergies-has 6 cats, 1 dog and denies seasonal allergies. Reviewed that HC nurse has orders for labwork tomorrow. He voices understanding.

## 2024-03-07 ENCOUNTER — HOSPITAL ENCOUNTER (OUTPATIENT)
Age: 64
Setting detail: SPECIMEN
Discharge: HOME | End: 2024-03-07
Payer: COMMERCIAL

## 2024-03-07 LAB
ALBUMIN SERPL-MCNC: 4.1 G/DL (ref 3.4–5)
ALBUMIN/GLOB SERPL: 1.9 {RATIO} (ref 1.1–2.2)
ALP SERPL-CCNC: 161 U/L (ref 40–129)
ALT SERPL-CCNC: 16 U/L (ref 10–40)
ANION GAP SERPL CALCULATED.3IONS-SCNC: 12 MMOL/L (ref 3–16)
ANISOCYTOSIS BLD QL SMEAR: ABNORMAL
AST SERPL-CCNC: 25 U/L (ref 15–37)
BASOPHILS # BLD: 0 K/UL (ref 0–0.2)
BASOPHILS NFR BLD: 0 %
BILIRUB SERPL-MCNC: 0.3 MG/DL (ref 0–1)
BUN SERPL-MCNC: 8 MG/DL (ref 7–20)
CALCIUM SERPL-MCNC: 8.8 MG/DL (ref 8.3–10.6)
CHLORIDE SERPL-SCNC: 100 MMOL/L (ref 99–110)
CO2 SERPL-SCNC: 23 MMOL/L (ref 21–32)
CREAT SERPL-MCNC: 0.6 MG/DL (ref 0.8–1.3)
DACRYOCYTES BLD QL SMEAR: ABNORMAL
DEPRECATED RDW RBC AUTO: 17.3 % (ref 12.4–15.4)
EOSINOPHIL # BLD: 0.3 K/UL (ref 0–0.6)
EOSINOPHIL NFR BLD: 3 %
GFR SERPLBLD CREATININE-BSD FMLA CKD-EPI: >60 ML/MIN/{1.73_M2}
GLUCOSE SERPL-MCNC: 127 MG/DL (ref 70–99)
HCT VFR BLD AUTO: 38.2 % (ref 40.5–52.5)
HGB BLD-MCNC: 12.8 G/DL (ref 13.5–17.5)
LYMPHOCYTES # BLD: 2.2 K/UL (ref 1–5.1)
LYMPHOCYTES NFR BLD: 25 %
MACROCYTES BLD QL SMEAR: ABNORMAL
MCH RBC QN AUTO: 34 PG (ref 26–34)
MCHC RBC AUTO-ENTMCNC: 33.5 G/DL (ref 31–36)
MCV RBC AUTO: 101.5 FL (ref 80–100)
MONOCYTES # BLD: 0.8 K/UL (ref 0–1.3)
MONOCYTES NFR BLD: 9 %
NEUTROPHILS # BLD: 5.6 K/UL (ref 1.7–7.7)
NEUTROPHILS NFR BLD: 61 %
NEUTS BAND NFR BLD MANUAL: 2 % (ref 0–7)
PATH INTERP BLD-IMP: NO
PLATELET # BLD AUTO: 569 K/UL (ref 135–450)
PLATELET BLD QL SMEAR: ABNORMAL
PMV BLD AUTO: 7.1 FL (ref 5–10.5)
POIKILOCYTOSIS BLD QL SMEAR: ABNORMAL
POLYCHROMASIA BLD QL SMEAR: ABNORMAL
POTASSIUM SERPL-SCNC: 4.4 MMOL/L (ref 3.5–5.1)
PROT SERPL-MCNC: 6.3 G/DL (ref 6.4–8.2)
RBC # BLD AUTO: 3.76 M/UL (ref 4.2–5.9)
REASON FOR REJECTION: NORMAL
REJECTED TEST: NORMAL
SLIDE REVIEW: ABNORMAL
SODIUM SERPL-SCNC: 135 MMOL/L (ref 136–145)
STOMATOCYTES BLD QL SMEAR: ABNORMAL
TARGETS BLD QL SMEAR: ABNORMAL
WBC # BLD AUTO: 8.9 K/UL (ref 4–11)

## 2024-03-07 PROCEDURE — 80053 COMPREHEN METABOLIC PANEL: CPT

## 2024-03-07 PROCEDURE — 36415 COLL VENOUS BLD VENIPUNCTURE: CPT

## 2024-03-07 PROCEDURE — 85025 COMPLETE CBC W/AUTO DIFF WBC: CPT

## 2024-03-07 NOTE — TELEPHONE ENCOUNTER
Left message for patient.     Referral info:    [facility]  Located by Toni Sullivan in [facility]    [phone]

## 2024-03-11 ENCOUNTER — TELEPHONE (OUTPATIENT)
Dept: FAMILY MEDICINE CLINIC | Age: 64
End: 2024-03-11

## 2024-03-11 DIAGNOSIS — E87.6 HYPOKALEMIA: ICD-10-CM

## 2024-03-11 RX ORDER — POTASSIUM CHLORIDE 20 MEQ/1
20 TABLET, EXTENDED RELEASE ORAL DAILY
Qty: 90 TABLET | Refills: 0 | Status: SHIPPED | OUTPATIENT
Start: 2024-03-11

## 2024-03-11 RX ORDER — POTASSIUM CHLORIDE 20 MEQ/1
20 TABLET, EXTENDED RELEASE ORAL DAILY
Qty: 30 TABLET | Refills: 1 | Status: SHIPPED | OUTPATIENT
Start: 2024-03-11 | End: 2024-03-11

## 2024-03-11 NOTE — TELEPHONE ENCOUNTER
Refill Request     CONFIRM preferred pharmacy with the patient.    If Mail Order Rx - Pend for 90 day refill.      Last Seen: Last Seen Department: 2/26/2024  Last Seen by PCP: 2/26/2024    Last Written: 1/26/2024    If no future appointment scheduled:  Review the last OV with PCP and review information for follow-up visit,  Route STAFF MESSAGE with patient name to the  Pool for scheduling with the following information:            -  Timing of next visit           -  Visit type ie Physical, OV, etc           -  Diagnoses/Reason ie. COPD, HTN - Do not use MEDICATION, Follow-up or CHECK UP - Give reason for visit      Next Appointment:   No future appointments.    Message sent to  to schedule appt with patient?  YES-   Return in about 4 weeks (around 3/25/2024) for hyponatremia, abnormal LFT.      Requested Prescriptions     Pending Prescriptions Disp Refills    potassium chloride (KLOR-CON M) 20 MEQ extended release tablet [Pharmacy Med Name: POTASSIUM CL 20MEQ ER TABLETS] 30 tablet 0     Sig: TAKE 1 TABLET BY MOUTH DAILY

## 2024-03-12 ENCOUNTER — CARE COORDINATION (OUTPATIENT)
Dept: CASE MANAGEMENT | Age: 64
End: 2024-03-12

## 2024-03-12 NOTE — CARE COORDINATION
office for questions related to their healthcare.      Patients top risk factors for readmission: medical condition-see above  Interventions to address risk factors: Education of patient/family/caregiver/guardian to support self-management-     Care Transition Nurse provided contact information for future needs.   Plan for follow-up call in 7-10 days based on severity of symptoms and risk factors.    Diane Rodriguez RN  Care Transition Nurse  [phone] mobile

## 2024-03-20 ENCOUNTER — TELEPHONE (OUTPATIENT)
Dept: FAMILY MEDICINE CLINIC | Age: 64
End: 2024-03-20

## 2024-03-20 ENCOUNTER — CARE COORDINATION (OUTPATIENT)
Dept: CASE MANAGEMENT | Age: 64
End: 2024-03-20

## 2024-03-20 DIAGNOSIS — M54.50 LUMBAR BACK PAIN: Primary | ICD-10-CM

## 2024-03-20 RX ORDER — TIZANIDINE 2 MG/1
2 TABLET ORAL 2 TIMES DAILY PRN
Qty: 20 TABLET | Refills: 0 | Status: SHIPPED | OUTPATIENT
Start: 2024-03-20

## 2024-03-20 NOTE — TELEPHONE ENCOUNTER
Received phone call from Wendy with [facility]  She stated that the pt has hurt his back somehow, she stated that pts ROM is limited and pt is very uncomfortable and is in a lot of pain. He has been taking tylenol without any relief and she is asking if you can prescribe pt a muscle relaxer.     Pts vital signs today were: BP: 154/102 HR: 115    Pain does not radiate anywhere,just in lower back left side and right side.     Please advise    Mquuiud-[phone]

## 2024-03-20 NOTE — CARE COORDINATION
Care Transitions Follow Up Call    Patient: Kaleb Persaud  Patient : 1960   MRN: [id number]  Reason for Admission: 3 days -> acute severe hyponatremia, alcohol dependence w/ intoxication, transaminitis-very high LFT noted, bipolar, essential HTN, HLD, GERD, asthma-controlled no AE -> home w/ [facility], encouraged sodium intake and moderation of fluid intake 64 ounces/day, increase protein, limit alcohol; f/up GI Dr Orta or Sobia Collado APRN -> end 3/20   Discharge Date: 24 RARS: Readmission Risk Score: 18.3    Notes reviewed prior to outreach. CTN attempted follow-up outreach to patient. Message left including CTN contact information. Handoff to ACM for continued care coordination.     Follow Up  Future Appointments   Date Time Provider Department Center   2024  9:30 AM Patricia Vieira, APRN - CNP SA[facility]IL Rodriguez, RN  Care Transition Nurse  [phone] mobile      no Olumiant Counseling: I discussed with the patient the risks of Olumiant therapy including but not limited to upper respiratory tract infections, shingles, cold sores, and nausea. Live vaccines should be avoided.  This medication has been linked to serious infections; higher rate of mortality; malignancy and lymphoproliferative disorders; major adverse cardiovascular events; thrombosis; gastrointestinal perforations; neutropenia; lymphopenia; anemia; liver enzyme elevations; and lipid elevations.

## 2024-03-20 NOTE — TELEPHONE ENCOUNTER
I ordered tizanidine to his pharmacy. He should also  Apply ice pack 4 times daily for 15-20 minutes. Wrap in towel, etc to avoid the coldness directly to the skin.

## 2024-03-21 ENCOUNTER — CARE COORDINATION (OUTPATIENT)
Dept: CARE COORDINATION | Age: 64
End: 2024-03-21

## 2024-03-25 DIAGNOSIS — E87.1 HYPONATREMIA: ICD-10-CM

## 2024-03-25 DIAGNOSIS — R79.89 ABNORMAL LFTS: ICD-10-CM

## 2024-03-25 DIAGNOSIS — D50.9 IRON DEFICIENCY ANEMIA, UNSPECIFIED IRON DEFICIENCY ANEMIA TYPE: ICD-10-CM

## 2024-03-25 DIAGNOSIS — F10.19 ALCOHOL ABUSE WITH ALCOHOL-INDUCED DISORDER (HCC): Primary | ICD-10-CM

## 2024-03-26 ENCOUNTER — CARE COORDINATION (OUTPATIENT)
Dept: CARE COORDINATION | Age: 64
End: 2024-03-26

## 2024-03-26 NOTE — CARE COORDINATION
ACM outreached to patient who said he started drinking again last week. Patient is drinking close to a 12 pack a day. ACM encouraged meetings but patient declined at this time. Patient also declined any treatment options for rehab. He said he was sober for 13 years and he did that because of God. ACM allowed patient to talk but he declined wanting to discuss anymore at this time. ACM will resolve episode as patient declined care management.

## 2024-03-28 ENCOUNTER — TELEPHONE (OUTPATIENT)
Dept: FAMILY MEDICINE CLINIC | Age: 64
End: 2024-03-28

## 2024-03-28 DIAGNOSIS — F33.0 MILD EPISODE OF RECURRENT MAJOR DEPRESSIVE DISORDER (HCC): Primary | ICD-10-CM

## 2024-03-28 RX ORDER — BUPROPION HYDROCHLORIDE 150 MG/1
150 TABLET ORAL EVERY MORNING
Qty: 30 TABLET | Refills: 5 | Status: SHIPPED | OUTPATIENT
Start: 2024-03-28

## 2024-03-28 NOTE — TELEPHONE ENCOUNTER
CARRIE Dallas, called the office stating that the patient was taking Zoloft for depression but was taken off of it due to low sodium levels. Celena and the patient are requesting a new antidepressant. [facility], Please advise, thanks.     Please call the patient with an update, thanks.

## 2024-03-28 NOTE — TELEPHONE ENCOUNTER
I ordered Wellbutrin to his pharmacy that he can take 1 daily in the morning for depression.  Please remind Kaleb and let his home health provider know that I ordered follow-up labs.  Given he is starting the Wellbutrin, they should be drawn at least 1 week after he starts the Wellbutrin.

## 2024-04-03 ENCOUNTER — TELEPHONE (OUTPATIENT)
Dept: FAMILY MEDICINE CLINIC | Age: 64
End: 2024-04-03

## 2024-04-03 NOTE — TELEPHONE ENCOUNTER
Please schedule Kaleb for follow-up visit in the office that I can assess this discomfort that he is having.

## 2024-04-03 NOTE — TELEPHONE ENCOUNTER
Patient is still experiencing pain in his neck, back and shoulder from 3/20/2024 and is now requesting xrays to be done. Please advise.

## 2024-04-08 ENCOUNTER — OFFICE VISIT (OUTPATIENT)
Dept: FAMILY MEDICINE CLINIC | Age: 64
End: 2024-04-08
Payer: COMMERCIAL

## 2024-04-08 VITALS
HEART RATE: 106 BPM | WEIGHT: 153.8 LBS | OXYGEN SATURATION: 97 % | BODY MASS INDEX: 24.09 KG/M2 | DIASTOLIC BLOOD PRESSURE: 70 MMHG | SYSTOLIC BLOOD PRESSURE: 110 MMHG

## 2024-04-08 DIAGNOSIS — M89.8X1 PAIN OF LEFT SCAPULA: Primary | ICD-10-CM

## 2024-04-08 DIAGNOSIS — F10.19 ALCOHOL ABUSE WITH ALCOHOL-INDUCED DISORDER (HCC): ICD-10-CM

## 2024-04-08 DIAGNOSIS — E87.1 HYPONATREMIA: ICD-10-CM

## 2024-04-08 DIAGNOSIS — R10.9 FLANK PAIN: ICD-10-CM

## 2024-04-08 PROCEDURE — 81002 URINALYSIS NONAUTO W/O SCOPE: CPT | Performed by: NURSE PRACTITIONER

## 2024-04-08 PROCEDURE — 3074F SYST BP LT 130 MM HG: CPT | Performed by: NURSE PRACTITIONER

## 2024-04-08 PROCEDURE — 3078F DIAST BP <80 MM HG: CPT | Performed by: NURSE PRACTITIONER

## 2024-04-08 PROCEDURE — 99214 OFFICE O/P EST MOD 30 MIN: CPT | Performed by: NURSE PRACTITIONER

## 2024-04-08 RX ORDER — PREDNISONE 10 MG/1
TABLET ORAL
Qty: 21 TABLET | Refills: 0 | Status: SHIPPED | OUTPATIENT
Start: 2024-04-08

## 2024-04-08 NOTE — PROGRESS NOTES
Kaleb Persaud (:  1960) is a 63 y.o. male,Established patient, here for evaluation of the following chief complaint(s):  Neck Pain (Goes into back sxs 3 weeks, mostly L side for back as well , can't turn head very well)         ASSESSMENT/PLAN:  1. Pain of left scapula  -     XR SCAPULA LEFT (COMPLETE); Future  -     predniSONE (DELTASONE) 10 MG tablet; 2 tablets 2 times daily for 3 days, 1 tablet 2 times daily for 3 days, 1 tablet daily., Disp-21 tablet, R-0Normal  2. Flank pain  -     POCT Urinalysis no Micro  3. Alcohol abuse with alcohol-induced disorder (HCC)  4. Hyponatremia    Vague today about symptoms.  Somewhat short with answers but he then apologizes and states that is because he has not been able to get any sleep.    Encouraged to continue follow-up with chiropractor    Xray of cervical spine stable 2024.     He did have a fall  and was evaluated at the hospital following this.  Consideration for scapular injury with late onset.    Will treat with tapering dose of prednisone and monitor for improvement    Encouraged to decrease alcohol intake.  He is aware of the importance of doing this.    Labs ordered previously for further evaluation of anemia and hyponatremia.  Reprinted today and asked to have drawn while he was here in the office.      No follow-ups on file.         Subjective   SUBJECTIVE/OBJECTIVE:  HPI      3/20/2024 received call from Home Health provider that Kaleb was having back pain. Ordered tizanidine to the pharmacy. 4/3 received call that back pain is not improving and requesting xrays. Asked for appointment    Seen by chiropractor on [facility], sister sees them. Today wearing a TENS unit. Will follow up Thursday.     Today states pain is involving all of the left side of his back. Describes as sharp pain that is located in differing places. Not able to sleep. Taking tylenol OTC. Denies relief with tizanidine.     3/28 with call from Home health regarding

## 2024-04-16 ENCOUNTER — TELEPHONE (OUTPATIENT)
Dept: FAMILY MEDICINE CLINIC | Age: 64
End: 2024-04-16

## 2024-04-16 NOTE — TELEPHONE ENCOUNTER
Received phone call from Nora with [facility] in regards to the patient is due for his recert and she stated that they can not get ahold of the patient they will not answer phone calls return calls or answer the door when they come. Because of this, they are D/C patient due to he is due for recert and they can't do that.    Yxqlnm-[phone]

## 2024-05-01 ENCOUNTER — TELEPHONE (OUTPATIENT)
Dept: FAMILY MEDICINE CLINIC | Age: 64
End: 2024-05-01

## 2024-05-01 NOTE — TELEPHONE ENCOUNTER
LMOM for pt to return phone call tot he office regarding dermatology referral. Please help pt to sched or please CLOSE out the open referral if pt wishes to no longer proceed with it. \\    [facility]  Located by Toni Sullivan in [facility]    [phone]

## 2024-06-02 DIAGNOSIS — E87.6 HYPOKALEMIA: ICD-10-CM

## 2024-06-02 NOTE — TELEPHONE ENCOUNTER
Refill Request     CONFIRM preferred pharmacy with the patient.    If Mail Order Rx - Pend for 90 day refill.      Last Seen: Last Seen Department: 4/8/2024  Last Seen by PCP: 4/8/2024    Last Written: Potassium 3/11/23 90 tabs 0 refills     If no future appointment scheduled:  Review the last OV with PCP and review information for follow-up visit,  Route STAFF MESSAGE with patient name to the  Pool for scheduling with the following information:            -  Timing of next visit           -  Visit type ie Physical, OV, etc           -  Diagnoses/Reason ie. COPD, HTN - Do not use MEDICATION, Follow-up or CHECK UP - Give reason for visit      Next Appointment:   No future appointments.    Message sent to  to schedule appt with patient?  NO      Requested Prescriptions     Pending Prescriptions Disp Refills    potassium chloride (KLOR-CON M) 20 MEQ extended release tablet [Pharmacy Med Name: POTASSIUM CL 20MEQ ER TABLETS] 90 tablet 0     Sig: TAKE 1 TABLET BY MOUTH DAILY

## 2024-06-03 RX ORDER — POTASSIUM CHLORIDE 20 MEQ/1
20 TABLET, EXTENDED RELEASE ORAL DAILY
Qty: 90 TABLET | Refills: 0 | Status: SHIPPED | OUTPATIENT
Start: 2024-06-03

## 2024-07-20 DIAGNOSIS — G62.9 NEUROPATHY: ICD-10-CM

## 2024-07-20 NOTE — TELEPHONE ENCOUNTER
Refill Request     CONFIRM preferred pharmacy with the patient.    If Mail Order Rx - Pend for 90 day refill.      Last Seen: Last Seen Department: 4/8/2024  Last Seen by PCP: 4/8/2024    Last Written: 8/24/2023    If no future appointment scheduled:  Review the last OV with PCP and review information for follow-up visit,  Route STAFF MESSAGE with patient name to the  Pool for scheduling with the following information:            -  Timing of next visit           -  Visit type ie Physical, OV, etc           -  Diagnoses/Reason ie. COPD, HTN - Do not use MEDICATION, Follow-up or CHECK UP - Give reason for visit      Next Appointment:   No future appointments.    Message sent to  to schedule appt with patient?  NO      Requested Prescriptions     Pending Prescriptions Disp Refills    gabapentin (NEURONTIN) 300 MG capsule [Pharmacy Med Name: GABAPENTIN 300MG CAPSULES] 90 capsule 2     Sig: TAKE ONE CAPSULE BY MOUTH EVERY MORNING AND 2 AT BEDTIME

## 2024-07-22 RX ORDER — GABAPENTIN 300 MG/1
CAPSULE ORAL
Qty: 90 CAPSULE | Refills: 2 | OUTPATIENT
Start: 2024-07-22 | End: 2024-10-18

## 2024-08-15 ENCOUNTER — TELEPHONE (OUTPATIENT)
Dept: FAMILY MEDICINE CLINIC | Age: 64
End: 2024-08-15

## 2024-08-15 NOTE — TELEPHONE ENCOUNTER
Patient called into the office stating that he has rash like almost like blood clots on his bilateral legs, bilateral arms, and his back.  Patient denies pain, warmth.  Patient states that the rash is red and itchy.  Patient denies fever, chills, shortness of breath.  Patient states that it has been going on for about 2 months.  Patient scheduled for 8/16/24.  Patient advised to arrive 15 minutes early for  check in.  Patient verbalized understanding.

## 2024-10-25 ENCOUNTER — CARE COORDINATION (OUTPATIENT)
Dept: CASE MANAGEMENT | Age: 64
End: 2024-10-25

## 2024-10-25 NOTE — H&P
V2.0  History and Physical      Name:  Kaleb Persaud /Age/Sex: 1960  (64 y.o. male)   MRN & CSN:  [id number] & [id number] Encounter Date/Time: 10/25/2024 1:03 AM EDT   Location:   PCP: Patricia Vieira APRN - CNP       Hospital Day: 2    Assessment and Plan:   Kaleb Persaud is a 64 y.o. male with a pmh of hypertension and chronic alcohol dependence who presents with Hyponatremia    1.Hyponatremia  -Patient noted with sodium level-108  -Hx of chronic hyponatremia with sodium level ranges between 127-132  -Chronic alcoholic.  Hyponatremia in the setting of chronic alcohol abuse likely hypo-osmolar hyponatremia due beer potomania/ decrease solute intake history of limited  -?  Symptomatic hyponatremia given intermittent confusion  -Admit to intensive care unit.  -Obtain urine sodium, TSH, cortisol level, urine osmolarity as well as serum osmolarity, rule out SIADH  -Started on hypertonic saline which will be continued. Goal sodium correction in 24 hours not more than by 8 mmol/L  -Nephrology input appreciated.  -Monitor BMP every 3 hours  -Continue with other supportive management.    2.Hyperkalemia  -Patient noted with potassium level-5.6.  -Serum creatinine within normal limit.  -Hyperkalemia in the setting of JCARLOS  -Patient is on lisinopril likely contributory.  -No EKG changes.  -Lokelma x 1 dose  -Follow-up with BMP    3.Normal anion gap metabolic acidosis  -Bicarb level elevated at 16.  Anion gap-37  -Received sodium bicarb IV x 1 dose  -Follow-up with BMP    4.JCARLOS  -Patient serum creatinine today 1.3, up from 0.6  -JCARLOS likely prerenal azotemia   -Nephrology on board.  Input appreciated.  -Hold lisinopril.  Avoid nephrotoxic medications.  -Renally dose all medications.  Follow-up with BMP.    5.Alcohol hepatitis  -Liver enzymes elevated with AST elevated at 171, ALT 79, alkaline phosphatase 337  -Liver synthetic function remains intact.  -MELDs score-23 points.  No indication for  SPINE BONES/ALIGNMENT: Prior C4-C7 ACDF with trace anterolisthesis of C3 on C4 and moderate reversal of the normal cervical lordosis.  Vertebral body heights are well maintained. DEGENERATIVE CHANGES: Moderate to severe multilevel spondylosis with disc space narrowing, osteophytosis and facet arthropathy. SOFT TISSUES: There is no prevertebral soft tissue swelling.     No acute intracranial abnormality.  Right posterior scalp small laceration. Moderate generalized cerebral volume loss and chronic microvascular ischemic changes. Moderate mucosal thickening and opacification of the left maxillary sinus with mild bony wall thickening consistent with chronic sinusitis. No acute fracture in the cervical spine. Prior C4-C7 ACDF.  Trace anterolisthesis of C3 on C4 and moderate reversal of the normal cervical lordosis. Moderate to severe multilevel spondylosis.         Electronically signed by Hubert Owens MD on 10/25/2024 at 1:03 AM

## 2024-10-25 NOTE — PROGRESS NOTES
Arrived to place PICC line with bedside RN Crystal. Pre-procedure and timeout done with RN, discussed limitations of placement and allergies. Consent confirmed. Vital signs stable. Labs, allergies, medications, and code status reviewed. No contraindications noted.    Procedure explained to pt, including the risk and benefits of the procedure. All questions answered. Pt verbalizes understanding of the procedure and states no more questions.     Pt's basilic, brachial, cephalic are all easily collapsible with no indication for a clot. Vein selected is large enough for catheter. Pt tolerated sterile procedure well, with no difficulty accessing brachial vein, when accessed - blood was free flowing and non-pulsatile. Guidewire, introducer, and catheter went in smoothly.     PICC line verified with ECG:  Please replace all existing IV tubing with new IV tubing prior to using the PICC for current IV infusions.  Please remove any PIVs from PICC arm.  All of the above may be sources of infection or an increase chance of a clot.      Post procedure - reorganized pt table, placed pt in lowest position, with call light and educated on line care. Instructed pt/RN not to use arm for at least 30min to avoid bleeding. Reported off to bedside RN.        [phone]

## 2024-10-25 NOTE — CONSULTS
[facility] Nurse  Consult Note       NAME:  Kaleb Persaud  MEDICAL RECORD NUMBER:  [id number]  AGE: 64 y.o.   GENDER: male  : 1960  TODAY'S DATE:  10/25/2024    Subjective I want a diet coke. I am done eating my lunch.   Reason for WOCN Evaluation and Assessment: wound B/L upper and lower extremities     Kaleb Persaud is a 64 y.o. male referred by:   [x] Physician  [x] Nursing  [] Other:     Wound Identification:  Wound Type: Pre-existing skin tears, abrasions, excoriations bilateral arms and legs. Closed laceration posterior head with 4 stapes.      Contributing Factors: edema, chronic pressure, decreased mobility, shear force, and history of falls.    Wound History: 64-year-old male presenting for evaluation of fall, head laceration, alcohol intoxication. He arrived via EMS. His brother had reportedly called EMS. He does admit to drinking alcohol today. He has significant skin tears, lacerations to the back of his head, bilateral lower extremities. The skin tears of the lower extremities do appear old, scabbed over.   Current Wound Care Treatment:  roll and ace wraps    Patient Goal of Care:  [x] Wound Healing  [] Odor Control  [] Palliative Care  [] Pain Control   [] Other:         PAST MEDICAL HISTORY        Diagnosis Date    Anxiety     Arthritis     Bipolar 1 disorder (HCC)     CAD (coronary artery disease)     Hyperlipidemia     Hypertension        PAST SURGICAL HISTORY    Past Surgical History:   Procedure Laterality Date    COLONOSCOPY N/A 11/10/2021    COLONOSCOPY POLYPECTOMY SNARE/COLD BIOPSY [facility]    ELBOW SURGERY Right     scraped the bone    ENDOSCOPY, COLON, DIAGNOSTIC      HERNIA REPAIR Right     inguinal hernia repair    NECK SURGERY N/A     Fusion [id number] by a safe falling on him    NECK SURGERY N/A 2016    C4-6 revision of fusion    SHOULDER SURGERY Left 2017    reconstruction    SHOULDER

## 2024-10-25 NOTE — PROGRESS NOTES
4 Eyes Skin Assessment     NAME:  Kaleb Persaud  YOB: 1960  MEDICAL RECORD NUMBER:  [id number]    The patient is being assessed for  Admission    I agree that at least one RN has performed a thorough Head to Toe Skin Assessment on the patient. ALL assessment sites listed below have been assessed.      Areas assessed by both nurses:    Head, Face, Ears, Shoulders, Back, Chest, Arms, Elbows, Hands, Sacrum. Buttock, Coccyx, Ischium, Legs. Feet and Heels, and Under Medical Devices         Does the Patient have a Wound? Yes wound(s) were present on assessment. LDA wound assessment was Initiated and completed by RN       Phil Prevention initiated by RN: Yes  Wound Care Orders initiated by RN: Yes    Pressure Injury (Stage 3,4, Unstageable, DTI, NWPT, and Complex wounds) if present, place Wound referral order by RN under : No    New Ostomies, if present place, Ostomy referral order under : No     Nurse 1 eSignature: Electronically signed by LOGAN PRIETO RN on 10/25/24 at 5:54 AM EDT    **SHARE this note so that the co-signing nurse can place an eSignature**    Nurse 2 eSignature: Electronically signed by Tk Sanchez RN on 10/25/24 at 6:48 AM EDT

## 2024-10-25 NOTE — PLAN OF CARE
10/25/2024 0101)  Achieves maximal functionality and self care: Encourage and assist patient to increase activity and self care with guidance from physical therapy/occupational therapy     Problem: Cardiovascular - Adult  Goal: Absence of cardiac dysrhythmias or at baseline  Outcome: Progressing     Problem: Skin/Tissue Integrity - Adult  Goal: Incisions, wounds, or drain sites healing without S/S of infection  Outcome: Progressing  Flowsheets (Taken 10/25/2024 0101)  Incisions, Wounds, or Drain Sites Healing Without Sign and Symptoms of Infection:   ADMISSION and DAILY: Assess and document risk factors for pressure ulcer development   TWICE DAILY: Assess and document skin integrity   TWICE DAILY: Assess and document dressing/incision, wound bed, drain sites and surrounding tissue     Problem: Skin/Tissue Integrity - Adult  Goal: Oral mucous membranes remain intact  Outcome: Progressing  Flowsheets (Taken 10/25/2024 0101)  Oral Mucous Membranes Remain Intact: Assess oral mucosa and hygiene practices     Problem: Musculoskeletal - Adult  Goal: Return mobility to safest level of function  Outcome: Progressing  Flowsheets (Taken 10/25/2024 0101)  Return Mobility to Safest Level of Function:   Assess patient stability and activity tolerance for standing, transferring and ambulating with or without assistive devices   Assist with transfers and ambulation using safe patient handling equipment as needed   Obtain physical therapy/occupational therapy consults as needed     Problem: Gastrointestinal - Adult  Goal: Maintains adequate nutritional intake  Outcome: Progressing  Flowsheets (Taken 10/25/2024 0101)  Maintains adequate nutritional intake:   Monitor percentage of each meal consumed   Identify factors contributing to decreased intake, treat as appropriate   Assist with meals as needed     Problem: Genitourinary - Adult  Goal: Absence of urinary retention  Outcome: Progressing  Flowsheets (Taken 10/25/2024  0101)  Absence of urinary retention:   Assess patient’s ability to void and empty bladder   Monitor intake/output and perform bladder scan as needed     Problem: Infection - Adult  Goal: Absence of infection during hospitalization  Outcome: Progressing  Flowsheets (Taken 10/25/2024 0101)  Absence of infection during hospitalization: Monitor lab/diagnostic results     Problem: Metabolic/Fluid and Electrolytes - Adult  Goal: Electrolytes maintained within normal limits  Outcome: Progressing  Flowsheets (Taken 10/25/2024 0101)  Electrolytes maintained within normal limits:   Monitor labs and assess patient for signs and symptoms of electrolyte imbalances   Administer electrolyte replacement as ordered   Monitor response to electrolyte replacements, including repeat lab results as appropriate

## 2024-10-25 NOTE — CARE COORDINATION
Case Management Assessment  Initial Evaluation    Date/Time of Evaluation: 10/25/2024 3:05 PM  Assessment Completed by: Courtney Monaco RN    If patient is discharged prior to next notation, then this note serves as note for discharge by case management.    Patient Name: Kaleb Persaud                   YOB: 1960  Diagnosis: Hyponatremia [E87.1]  JCARLOS (acute kidney injury) (HCC) [N17.9]  Altered mental status, unspecified altered mental status type [R41.82]                   Date / Time: 10/24/2024  7:22 PM    Patient Admission Status: Inpatient   Readmission Risk (Low < 19, Mod (19-27), High > 27): Readmission Risk Score: 19.8    Current PCP: Patricia Vieira APRN - CNP  PCP verified by CM? Yes    Chart Reviewed: Yes      History Provided by: Patient  Patient Orientation: Alert and Oriented    Patient Cognition: Alert    Hospitalization in the last 30 days (Readmission):  No    If yes, Readmission Assessment in CM Navigator will be completed.    Advance Directives:      Code Status: Full Code   Patient's Primary Decision Maker is: Legal Next of Kin    Primary Decision Maker: Mona Robison - Brother/Sister - [phone]    Primary Decision Maker: Leon Persaud - Brother/Sister - [phone]    Primary Decision Maker: Patrice Persaud - Brother/Sister - [phone]    Discharge Planning:    Patient lives with: Family Members Type of Home: House  Primary Care Giver: Family  Patient Support Systems include: Family Members   Current Financial resources: None  Current community resources: None  Current services prior to admission: Emergency Call  System, Durable Medical Equipment            Current DME: Shower Chair            Type of Home Care services:  None    ADLS  Prior functional level: Assistance with the following:, Shopping, Housework  Current functional level: Assistance with the following:, Shopping, Housework, Mobility, Cooking, Bathing, Dressing    PT AM-PAC:   /24  OT AM-PAC:   /24    Family can

## 2024-10-25 NOTE — ED PROVIDER NOTES
jaundiced.      Comments: Innumerable skin tears of the lower extremities bilaterally that appear old, scabbed over, and unclean   Neurological:      General: No focal deficit present.      Mental Status: He is alert and oriented to person, place, and time.      Comments: Appears intoxicated, no focal neurologic deficit          WORKUP     LABS  I have reviewed all labs for this visit.   Results for orders placed or performed during the hospital encounter of 10/24/24   CBC with Auto Differential   Result Value Ref Range    WBC 17.0 (H) 4.0 - 11.0 K/uL    RBC 2.47 (L) 4.20 - 5.90 M/uL    Hemoglobin 8.8 (L) 13.5 - 17.5 g/dL    Hematocrit 26.0 (L) 40.5 - 52.5 %    .1 (H) 80.0 - 100.0 fL    MCH 35.4 (H) 26.0 - 34.0 pg    MCHC 33.7 31.0 - 36.0 g/dL    RDW 14.7 12.4 - 15.4 %    Platelets 246 135 - 450 K/uL    MPV 7.4 5.0 - 10.5 fL    Neutrophils % 84.4 %    Lymphocytes % 6.5 %    Monocytes % 8.4 %    Eosinophils % 0.0 %    Basophils % 0.7 %    Neutrophils Absolute 14.4 (H) 1.7 - 7.7 K/uL    Lymphocytes Absolute 1.1 1.0 - 5.1 K/uL    Monocytes Absolute 1.4 (H) 0.0 - 1.3 K/uL    Eosinophils Absolute 0.0 0.0 - 0.6 K/uL    Basophils Absolute 0.1 0.0 - 0.2 K/uL   CMP w/ Reflex to MG   Result Value Ref Range    Sodium 108 (LL) 136 - 145 mmol/L    Potassium reflex Magnesium 5.6 (H) 3.5 - 5.1 mmol/L    Chloride 79 (L) 99 - 110 mmol/L    CO2 16 (L) 21 - 32 mmol/L    Anion Gap 13 3 - 16    Glucose 132 (H) 70 - 99 mg/dL    BUN 5 (L) 7 - 20 mg/dL    Creatinine 1.3 0.8 - 1.3 mg/dL    Est, Glom Filt Rate 61 >60    Calcium 7.7 (L) 8.3 - 10.6 mg/dL    Total Protein 5.0 (L) 6.4 - 8.2 g/dL    Albumin 2.7 (L) 3.4 - 5.0 g/dL    Albumin/Globulin Ratio 1.2 1.1 - 2.2    Total Bilirubin 2.1 (H) 0.0 - 1.0 mg/dL    Alkaline Phosphatase 337 (H) 40 - 129 U/L    ALT 79 (H) 10 - 40 U/L     (H) 15 - 37 U/L   ETOH   Result Value Ref Range    Ethanol Lvl None Detected mg/dL   Urinalysis with Reflex to Culture    Specimen: Urine   Result  Value Ref Range    Color, UA Yellow Straw/Yellow    Clarity, UA SL CLOUDY (A) Clear    Glucose, Ur 250 (A) Negative mg/dL    Bilirubin, Urine SMALL (A) Negative    Ketones, Urine Negative Negative mg/dL    Specific Gravity, UA >=1.030 1.005 - 1.030    Blood, Urine Negative Negative    pH, Urine 5.5 5.0 - 8.0    Protein, UA Negative Negative mg/dL    Urobilinogen, Urine 1.0 <2.0 E.U./dL    Nitrite, Urine Negative Negative    Leukocyte Esterase, Urine Negative Negative    Microscopic Examination Not Indicated     Urine Type NotGiven     Urine Reflex to Culture Not Indicated    Acetaminophen Level   Result Value Ref Range    Acetaminophen Level <5 (L) 10 - 30 ug/mL   Salicylate   Result Value Ref Range    Salicylate Lvl 0.5 (L) 15.0 - 30.0 mg/dL   CK   Result Value Ref Range    Total CK 1,090 (H) 39 - 308 U/L   Basic Metabolic Panel w/ Reflex to MG   Result Value Ref Range    Sodium 109 (LL) 136 - 145 mmol/L    Potassium reflex Magnesium 5.6 (H) 3.5 - 5.1 mmol/L    Chloride 78 (L) 99 - 110 mmol/L    CO2 19 (L) 21 - 32 mmol/L    Anion Gap 12 3 - 16    Glucose 128 (H) 70 - 99 mg/dL    BUN 6 (L) 7 - 20 mg/dL    Creatinine 1.3 0.8 - 1.3 mg/dL    Est, Glom Filt Rate 61 >60    Calcium 7.8 (L) 8.3 - 10.6 mg/dL   Blood gas, venous   Result Value Ref Range    pH, Chuck 7.437 7.350 - 7.450    pCO2, Chuck 27.8 (L) 40.0 - 50.0 mmHg    PO2, Chuck 51.2 (H) 25.0 - 40.0 mmHg    HCO3, Venous 18.3 (L) 23.0 - 29.0 mmol/L    Base Excess, Chuck -4.9 (L) -3.0 - 3.0 mmol/L    O2 Sat, Chuck 86 Not Established %    Carboxyhemoglobin 4.0 (H) 0.0 - 1.5 %    MetHgb, Chuck 0.1 <1.5 %    TC02 (Calc), Chuck 19 Not Established mmol/L    O2 Therapy Unknown    Urine Drug Screen   Result Value Ref Range    Amphetamine Screen, Ur Neg Negative <1000ng/mL    Barbiturate Screen, Ur Neg Negative <200 ng/mL    Benzodiazepine Screen, Urine Neg Negative <200 ng/mL    Cannabinoid Scrn, Ur Neg Negative <50 ng/mL    Cocaine Metabolite Screen, Urine Neg Negative <300 ng/mL

## 2024-10-25 NOTE — ED NOTES
Placed a consult to Nephrology @ 2023  RE: hyponatremia per MD Hugh  Nephrology called back @ 2051

## 2024-10-25 NOTE — ED PROVIDER NOTES
THIS IS MY RABIA SUPERVISORY AND SHARED VISIT NOTE:    I personally saw the patient and made/approved the management plan and take responsibility for the patient management.    History: 64-year-old male presenting for evaluation of fall, head laceration, alcohol intoxication.  He arrived via EMS.  His brother had reportedly called EMS.  He does admit to drinking alcohol today.  He has significant skin tears, lacerations to the back of his head, bilateral lower extremities.  The skin tears of the lower extremities do appear old, scabbed over.     Exam: Appears intoxicated, no focal neurologic deficit, there is scabbed over skin lacerations/tears to the bilateral lower extremities.  There is approximately 3 cm posterior scalp laceration no active bleeding.  There is no respiratory distress    MDM: 64-year-old male presenting for evaluation of head laceration, alcohol intoxication.  Arrives mildly tachycardic otherwise stable vital signs.  He has no focal neurologic deficit.  Will obtain broad laboratory evaluation, CT imaging of the head, neck for further evaluation     I am concerned that patient has not been able to take care of himself for quite some time.    Patient's sodium is severely low at 108.  I have ordered 125 cc of hypertonic saline 3%.  Have consulted nephrology further evaluation and management.  They recommend on holding off on banana bag due to the sodium content.  They do want the patient to start eating.  Repeat sodium levels have been ordered every 2-4 hours.  He will require admission to ICU for continued management.  He has not had any seizure-like activity.  CT head is without acute intracranial process.    I personally saw the patient and independently provided 65 minutes of non-concurrent critical care out of the total shared critical care time provided.       No results found.      I, Dr. Reese, am the primary clinician of record.     Comment: Please note this report has been produced using

## 2024-10-25 NOTE — PROGRESS NOTES
Shift: [id number]    Admitting diagnosis: Hyponatremia, Hyperkalemia, JCARLOS    Presentation to hospital: Lethargic/ Altered mental status    Surgery: no     Nursing assessment at handoff  stable    Emergency Contact/POA: Mona Robison (Sister)  Family updated: no    Most recent vitals: /77   Pulse (!) 104   Temp 98.1 °F (36.7 °C) (Oral)   Resp 18   Ht 1.702 m (5' 7.01\")   Wt 62.7 kg (138 lb 3.7 oz)   SpO2 98%   BMI 21.64 kg/m²      Rhythm: Sinus Tachycardia  Sinus Tachycardia`    NC/HFNC- 0 lpm, on Room air  Respiratory support: - No ventilator support    Vent days: Day 0    Increased O2 requirements: no    Admission weight Weight - Scale: 62.7 kg (138 lb 3.7 oz)  Today's weight   Wt Readings from Last 1 Encounters:   10/25/24 62.7 kg (138 lb 3.7 oz)         UOP >30ml/hr: no (unmeasurable, not on foleys/ext. Cath)    De Santiago need assessed each shift: no    Restraints: no  Order current and documentation up to date?    Lines/Drains  LDA Insertion Date Discontinued Date Dressing Changes   PIV x3 10/24/25     TLC       Arterial       De Santiago       Vas Cath      ETT       Surgical drains        Night Shift Hospitalist Interventions    Problem(Brief) Date Time Intervention Physician contacted                                               Drip rates at handoff:    sodium chloride      3% sodium chloride 50 mL/hr (10/25/24 1722)       Hospital Course Daily Updates:  Admit Day# 0 10/25/24  -Received hypertonic saline in ER   -CT head, chest and abdomen done  -Paracentesis   -PICC line placed    Lab Data:   CBC:   Recent Labs     10/24/24  1942   WBC 17.0*   HGB 8.8*   HCT 26.0*   .1*        BMP:    Recent Labs     10/25/24  1050 10/25/24  1550   * 112*   K 4.6 4.3   CO2 20* 18*   BUN 7 9   CREATININE 1.0 1.1     LIVR:   Recent Labs     10/24/24  1942 10/25/24  1155   * 168*   ALT 79* 80*     PT/INR:   Recent Labs     10/25/24  1155   INR 1.94*     APTT: No results for input(s): \"APTT\" in the

## 2024-10-25 NOTE — PROGRESS NOTES
Nephrology Progress Note   [email or web address]      Sub/interval history  Patient is in ICU, having problems with blood draws and IV access  Last sodium was drawn at 3 AM which was 111    Last 24 h uop unable to chart because of incontinence but patient is voiding    ROS: Confused  PSFH: + visitor    Scheduled Meds:   ascorbic acid  500 mg Oral Daily    buPROPion  150 mg Oral QAM    ferrous sulfate  325 mg Oral Daily with breakfast    budesonide-formoterol  2 puff Inhalation BID RT    pantoprazole  40 mg Oral QAM AC    risperiDONE  0.5 mg Oral Nightly    sodium chloride flush  5-40 mL IntraVENous 2 times per day    enoxaparin  40 mg SubCUTAneous Daily    multivitamin  1 tablet Oral Daily    folic acid  1 mg Oral Daily    nicotine  1 patch TransDERmal Daily    [Held by provider] thiamine  100 mg Oral Daily    thiamine (B-1) 500 mg in sodium chloride 0.9 % 100 mL IVPB  500 mg IntraVENous Q12H    mupirocin   Each Nostril BID    albuterol  10 mg Nebulization Once     Continuous Infusions:   sodium chloride      3% sodium chloride 20 mL/hr (10/25/24 0951)     PRN Meds:.albuterol sulfate HFA, hydrOXYzine HCl, sodium chloride flush, sodium chloride, potassium chloride **OR** potassium alternative oral replacement **OR** potassium chloride, magnesium sulfate, ondansetron **OR** ondansetron, polyethylene glycol, LORazepam **OR** LORazepam **OR** LORazepam **OR** LORazepam **OR** LORazepam **OR** LORazepam **OR** LORazepam **OR** LORazepam    Objective/     Vitals:    10/25/24 0600 10/25/24 0700 10/25/24 0800 10/25/24 0900   BP:  (!) 133/56 (!) 137/107 (!) 98/50   Pulse:  (!) 113 (!) 114 (!) 112   Resp:   18 19   Temp:   97.6 °F (36.4 °C)    TempSrc:   Oral    SpO2:  100% 100%    Weight: 62.7 kg (138 lb 3.7 oz)      Height: 1.702 m (5' 7.01\")        24HR INTAKE/OUTPUT:    Intake/Output Summary (Last 24 hours) at 10/25/2024 1014  Last data filed at 10/25/2024 0400  Gross per 24 hour   Intake 100 ml   Output --   Net 100 ml  confused   Will adjust further IV fluid based on subsequent labs  -PICC line for multiple lab draws along with need for IV fluid   -Goal sodium of 116-117 by 8 PM on 10/25 and further correction  -Serial labs to 3 hours as ordered  -Lokelma as ordered  -Stop potassium supplement  -Bladder scan to rule out urinary retention  -Ergocalciferol 50,000 units weekly        Krzysztof Mckeon MD  Office: [phone]  Fax:    [phone]  [facility]

## 2024-10-25 NOTE — CONSULTS
CONSULTATION  Kaleb Persaud is a 64 y.o. male asked to see us in consultation by  Patricia Vieira, ED Villarreal CNP & Lisette Flores MD for evaluation of cirrhosis.    Mr. Gutierrez is a 64-year-old male with past history of alcohol abuse, diverticulitis, GERD, bipolar disease, CAD, HLD, HTN and alcoholic hepatitis who presented to the ER after falling at home.  His mental status was altered on admission.  He was admitted with hyponatremia of 108.  Our service has been consulted for cirrhosis.  Patient was recently seen by us in February of this year for elevated LFTs and thrombocytopenia with clinical picture consistent with alcoholic hepatitis.  He is scheduled for follow-up appointment with Dr. Gutierrez OPVIVIAN but did not follow-up.  He had drained 1/5 of liquor daily at that time but now drinks maybe 6 to 12 pack of beer daily.  His serological workup was unremarkable aside from a slightly low history of +13.  CT without contrast on admission shows a nodular appearing liver with moderate to large volume ascites and cholelithiasis.    He underwent an EGD and colonoscopy in 2021 by Dr. Orta for evaluation of anemia and dyspepsia which demonstrated an esophageal ulcer and a cecal polyp.  Duodenal and esophageal biopsies were benign.  His polyp was a tubular adenoma.         Medications Prior to Admission: potassium chloride (KLOR-CON M) 20 MEQ extended release tablet, TAKE 1 TABLET BY MOUTH DAILY  predniSONE (DELTASONE) 10 MG tablet, 2 tablets 2 times daily for 3 days, 1 tablet 2 times daily for 3 days, 1 tablet daily.  buPROPion (WELLBUTRIN XL) 150 MG extended release tablet, Take 1 tablet by mouth every morning  tiZANidine (ZANAFLEX) 2 MG tablet, Take 1 tablet by mouth 2 times daily as needed (back pain)  fluticasone-salmeterol (ADVAIR) 100-50 MCG/ACT AEPB diskus inhaler, Inhale 1 puff into the lungs in the  morning and 1 puff in the evening.  lisinopril (PRINIVIL;ZESTRIL) 10 MG tablet, Take 1 tablet by mouth daily  pantoprazole (PROTONIX) 40 MG tablet, TAKE 1 TABLET BY MOUTH TWICE DAILY BEFORE MEALS  risperiDONE (RISPERDAL) 1 MG tablet, TAKE 1 TABLET BY MOUTH TWICE DAILY  thiamine 100 MG tablet, Take 1 tablet by mouth daily  hydrOXYzine HCl (ATARAX) 50 MG tablet, Take 1 tablet by mouth every 8 hours as needed for Anxiety  albuterol sulfate HFA (PROVENTIL;VENTOLIN;PROAIR) 108 (90 Base) MCG/ACT inhaler, Inhale 2 puffs into the lungs every 6 hours as needed for Wheezing  ferrous sulfate (IRON 325) 325 (65 Fe) MG tablet, Take 1 tablet by mouth daily (with breakfast)  Ascorbic Acid (VITAMIN C) 500 MG CAPS, Take 1 capsule by mouth daily    Medication:   ascorbic acid  500 mg Oral Daily    buPROPion  150 mg Oral QAM    ferrous sulfate  325 mg Oral Daily with breakfast    budesonide-formoterol  2 puff Inhalation BID RT    pantoprazole  40 mg Oral QAM AC    risperiDONE  0.5 mg Oral Nightly    sodium chloride flush  5-40 mL IntraVENous 2 times per day    enoxaparin  40 mg SubCUTAneous Daily    multivitamin  1 tablet Oral Daily    folic acid  1 mg Oral Daily    nicotine  1 patch TransDERmal Daily    [Held by provider] thiamine  100 mg Oral Daily    thiamine (B-1) 500 mg in sodium chloride 0.9 % 100 mL IVPB  500 mg IntraVENous Q12H    mupirocin   Each Nostril BID      sodium chloride         Allergies:  Allergies   Allergen Reactions    Sulfa Antibiotics Rash       Immunizations:  Immunization History   Administered Date(s) Administered    COVID-19, PFIZER PURPLE top, DILUTE for use, (age 12 y+), 30mcg/0.3mL 04/15/2021, 05/06/2021    Influenza, FLUARIX, FLULAVAL, FLUZONE (age 6 mo+) and AFLURIA, (age 3 y+), Quadv PF, 0.5mL 11/07/2021    Influenza, FLUCELVAX, (age 6 mo+), MDCK, Quadv PF, 0.5mL 01/12/2024    TDaP, ADACEL (age 10y-64y), BOOSTRIX (age 10y+), IM, 0.5mL 04/13/2016, 10/24/2024       Family history, past medical history,

## 2024-10-25 NOTE — ED NOTES
RN called Dr Estrada's office @0030 regarding critical result of Na2+ 110. Rn spoke to Mckayla who transferred the result. Dr. Estrada called back and confirmed sodium level 110. No new orders at this time, don't continue the 3% Sodium infusion.

## 2024-10-25 NOTE — FLOWSHEET NOTE
Spoke with Lab this morning.  They stated that there were 5 different lab techs both night shift and day shift in room this morning trying to draw blood but it was unsuccessful.  Messaged doctor to inform

## 2024-10-25 NOTE — PROGRESS NOTES
Shift: [id number]    Admitting diagnosis: Hyponatremia, Hyperkalemia, JCARLOS    Presentation to hospital: Lethargic/ Altered mental status    Surgery: no     Nursing assessment at handoff  stable    Emergency Contact/POA: Mona Robison (Sister)  Family updated: no    Most recent vitals: BP (!) 134/119   Pulse (!) 117   Temp 97.5 °F (36.4 °C) (Oral)   Resp 23   Ht 1.702 m (5' 7.01\")   Wt 62.7 kg (138 lb 3.7 oz)   SpO2 100%   BMI 21.64 kg/m²      Rhythm: Sinus Tachycardia  Sinus Tachycardia`    NC/HFNC- 0 lpm, on Room air  Respiratory support: - No ventilator support    Vent days: Day 0    Increased O2 requirements: no    Admission weight    Today's weight   Wt Readings from Last 1 Encounters:   10/25/24 62.7 kg (138 lb 3.7 oz)         UOP >30ml/hr: no (unmeasurable, not on foleys/ext. Cath)    De [place] need assessed each shift: no    Restraints: no  Order current and documentation up to date?    Lines/Drains  LDA Insertion Date Discontinued Date Dressing Changes   PIV x3 10/24/25     TLC       Arterial       De [place]       Vas Cath      ETT       Surgical drains        Night Shift Hospitalist Interventions    Problem(Brief) Date Time Intervention Physician contacted                                               Drip rates at handoff:    sodium chloride      sodium chloride 5 mL/hr at 10/25/24 0300       Hospital Course Daily Updates:  Admit Day# 0 10/25/24  -Received hypertonic saline in ER   -CT head, chest and abdomen done    Lab Data:   CBC:   Recent Labs     10/24/24  1942   WBC 17.0*   HGB 8.8*   HCT 26.0*   .1*        BMP:    Recent Labs     10/24/24  2330 10/25/24  0255   * 111*   K 5.6* 5.5*   CO2 19* 20*   BUN 6* 7   CREATININE 1.2 1.2     LIVR:   Recent Labs     10/24/24  1942   *   ALT 79*     PT/INR: No results for input(s): \"INR\" in the last 72 hours.    Invalid input(s): \"PROT\"  APTT: No results for input(s): \"APTT\" in the last 72 hours.  ABG: No results for input(s): \"PHART\",

## 2024-10-25 NOTE — CONSULTS
Comprehensive Nutrition Assessment    Type and Reason for Visit:  Consult (Poor intake/appetite 5 or more days)    Nutrition Recommendations/Plan:   Liberalize diet to encourage PO intakes.  Start ONS TID.     Malnutrition Assessment:  Malnutrition Status:  At risk for malnutrition (10/25/24 1506)    Context:  Chronic Illness     Findings of the 6 clinical characteristics of malnutrition:  Energy Intake:  Mild decrease in energy intake   Weight Loss:  Mild weight loss (specify amount and time period) (-9.8% x 6 months)     Body Fat Loss:  Unable to assess     Muscle Mass Loss:  Unable to assess    Fluid Accumulation:  No significant fluid accumulation     Strength:  Not Performed    Nutrition Assessment:    Consult \"Poor intake/appetite 5 or more days\". Pt with PMH of HTN who presented with AMS, hyponatremia. Noted pt with wt loss over the past 6 months (-9.8%). Suspect poor PO intakes PTA. No PO intakes since admitted on Low Na diet. Will trial ONS and encourage PO intakes.    Nutrition Related Findings:    BM 10/24; Glu 221 Wound Type: Skin Tears       Current Nutrition Intake & Therapies:    Average Meal Intake: 0%  Average Supplements Intake: None Ordered  ADULT DIET; Regular; Low Sodium (2 gm)    Anthropometric Measures:  Height: 170.2 cm (5' 7.01\")  Ideal Body Weight (IBW): 148 lbs (67 kg)       Current Body Weight: 62.7 kg (138 lb 3.7 oz), 93.4 % IBW. Weight Source: Not Specified  Current BMI (kg/m2): 21.6                          BMI Categories: Normal Weight (BMI 18.5-24.9)    Estimated Daily Nutrient Needs:  Energy Requirements Based On: Kcal/kg  Weight Used for Energy Requirements: Current  Energy (kcal/day): 8116-7695 kcal (25-30 kcal/kg 63 kg CBW)  Weight Used for Protein Requirements: Current  Protein (g/day): 63-82 gm (1-1.3 gm/kg 63 kg CBW)  Method Used for Fluid Requirements: 1 ml/kcal  Fluid (ml/day):      Nutrition Diagnosis:   Inadequate oral intake related to cognitive or neurological

## 2024-10-25 NOTE — PROGRESS NOTES
0100- admitted from ER care of hyponatremia. (Na 108)  AO x3, on room air. Sinus Tachycardia.  Oriented to unit/room. Safety measures done. Call light within reach.

## 2024-10-25 NOTE — CONSULTS
status: Never Used   Substance and Sexual Activity    Alcohol use: Yes     Alcohol/week: 70.0 standard drinks of alcohol     Types: 70 Cans of beer per week    Drug use: Yes     Frequency: 2.0 times per week     Types: Marijuana (Weed)    Sexual activity: Not Currently     Partners: Female   Other Topics Concern    Not on file   Social History Narrative    Not on file     Social Determinants of Health     Financial Resource Strain: Low Risk  (1/12/2024)    Overall Financial Resource Strain (CARDIA)     Difficulty of Paying Living Expenses: Not hard at all   Food Insecurity: No Food Insecurity (2/16/2024)    Hunger Vital Sign     Worried About Running Out of Food in the Last Year: Never true     Ran Out of Food in the Last Year: Never true   Transportation Needs: No Transportation Needs (2/16/2024)    PRAPARE - Transportation     Lack of Transportation (Medical): No     Lack of Transportation (Non-Medical): No   Physical Activity: Inactive (2/2/2024)    Exercise Vital Sign     Days of Exercise per Week: 0 days     Minutes of Exercise per Session: 0 min   Stress: Not on file   Social Connections: Not on file   Intimate Partner Violence: Not on file   Housing Stability: Low Risk  (2/16/2024)    Housing Stability Vital Sign     Unable to Pay for Housing in the Last Year: No     Number of Places Lived in the Last Year: 1     Unstable Housing in the Last Year: No       Family History:       Problem Relation Age of Onset    Heart Failure Mother         heart disease age 63    Other Father         dementia    Cancer Father         prostate    Diabetes Brother     No Known Problems Brother     Heart Disease Brother         heart valve defective at birth, replaced age 25    No Known Problems Brother          REVIEW OF SYSTEMS:    Review of system as above in HPI    PHYSICAL EXAM:    Vitals:    BP (!) 149/103   Pulse (!) 111   Temp 98.1 °F (36.7 °C)   Resp 23   SpO2 100%   No intake/output data recorded.  No intake/output

## 2024-10-25 NOTE — CARE COORDINATION
Spoke to IP Luz CRUZ and informed patient is RPM eligible dx COPD HTN  Shavonne Potts, RN   [phone]  Gilma Sherman, RN  Phone [phone]

## 2024-10-25 NOTE — PROGRESS NOTES
[facility] Medicine Progress Note  V 10.25      Date of Admission: 10/24/2024  Hospital Day: 2    Chief Admission Complaint:  AMS    Subjective:  Patient seen at bedside. Patient is AO3 but lethargic. Patient no pain anywhere, sob, fevers, chills, trouble urinating. Per nurse no acute events over night.    Presenting Admission History:       Kaleb Persaud is a 64 y.o. male, chronic alcoholic who was brought to the emergency room for evaluation following fall.  Patient is intermittently confused which makes history limited.  I was unable to obtain any meaningful history from the patient.  Apparently, patient was found down at home.  He was noted to be confused.  Patient is complaining of generalized weakness otherwise denies any other complaint.  He denies chest pain, no shortness of breath, no fever, no chills, no nausea, no vomiting, no diarrhea.     On presentation to the emergency room, patient was noted to be afebrile, temperature 98.1 °F, pulse rate of 111 bpm, respiratory rate of 23, blood pressure 149/103 mmHg.  Patient saturating 100% on room air.  Labs significant for sodium 138, potassium 5.6, blood rate 79, bicarb 16, anion gap 13, glucose 132, CK level 1090.  Albumin 2.7, , ALT 79, alkaline phosphatase 337 and total bilirubin 2.1.  WBC 17.0, anemia with hemoglobin 8.8 and hematocrit 26.0.  .1.  CT scan of the brain as well as CT scan of the cervical spine with unrevealing.  Patient was started on hypertonic saline in the emergency room.  He has been admitted to the hospital for further evaluation and management.    Assessment/Plan:      Current Principal Problem:  Hyponatremia    Acute encephalopathy likely secondary to metabolic derangements  Acute on chronic hyponatremia  -Patient noted with sodium level-108  -Hx of chronic hyponatremia with sodium level ranges between 127-132  -Chronic alcoholic.  Hyponatremia in the setting of chronic alcohol abuse likely hypo-osmolar hyponatremia due  obtained.  Plan:  Continue to monitor  Follow-up with CBC     Hx of bipolar disorder  Continue home medications     Hx of essential hypertension  Resume home antihypertensive medication     Hx of hyperlipidemia  Continue to monitor    Ongoing threat to life and/or bodily function without ongoing treatment due to:  Electrolyte abnormalities, Alcohol withdrawal    Consults:      IP CONSULT TO NEPHROLOGY  IP CONSULT TO SOCIAL WORK  IP CONSULT TO GI  IP CONSULT TO DIETITIAN  IP CONSULT TO NEPHROLOGY  IP CONSULT TO VASCULAR ACCESS TEAM        --------------------------------------------------      Medications:         Infusion Medications    sodium chloride      3% sodium chloride 20 mL/hr (10/25/24 0983)     Scheduled Medications    ascorbic acid  500 mg Oral Daily    buPROPion  150 mg Oral QAM    ferrous sulfate  325 mg Oral Daily with breakfast    budesonide-formoterol  2 puff Inhalation BID RT    pantoprazole  40 mg Oral QAM AC    risperiDONE  0.5 mg Oral Nightly    sodium chloride flush  5-40 mL IntraVENous 2 times per day    enoxaparin  40 mg SubCUTAneous Daily    multivitamin  1 tablet Oral Daily    folic acid  1 mg Oral Daily    nicotine  1 patch TransDERmal Daily    [Held by provider] thiamine  100 mg Oral Daily    thiamine (B-1) 500 mg in sodium chloride 0.9 % 100 mL IVPB  500 mg IntraVENous Q12H    mupirocin   Each Nostril BID    vitamin D  50,000 Units Oral Weekly     PRN Meds: albuterol sulfate HFA, hydrOXYzine HCl, sodium chloride flush, sodium chloride, potassium chloride **OR** potassium alternative oral replacement **OR** potassium chloride, magnesium sulfate, ondansetron **OR** ondansetron, polyethylene glycol, LORazepam **OR** LORazepam **OR** LORazepam **OR** LORazepam **OR** LORazepam **OR** LORazepam **OR** LORazepam **OR** LORazepam      Physical Exam Performed:      General appearance:  No apparent distress  Respiratory:  Normal respiratory effort.   Cardiovascular:  Regular rate and

## 2024-10-26 PROBLEM — R29.2 ABSENT GAG REFLEX: Status: ACTIVE | Noted: 2024-10-26

## 2024-10-26 PROBLEM — G93.40 ACUTE ENCEPHALOPATHY: Status: ACTIVE | Noted: 2024-10-26

## 2024-10-26 PROBLEM — H55.00 NYSTAGMUS: Status: ACTIVE | Noted: 2024-10-26

## 2024-10-26 PROBLEM — R06.82 TACHYPNEA: Status: ACTIVE | Noted: 2024-10-26

## 2024-10-26 NOTE — PROGRESS NOTES
Shift: [id number]    Admitting diagnosis: Hyponatremia, Hyperkalemia, JCARLOS    Presentation to hospital: Lethargic/ Altered mental status    Surgery: no     Nursing assessment at handoff  stable    Emergency Contact/POA: Mona Robison (Sister)  Family updated: no    Most recent vitals: /69   Pulse (!) 113   Temp 97.4 °F (36.3 °C) (Oral)   Resp 25   Ht 1.702 m (5' 7.01\")   Wt 62.7 kg (138 lb 3.7 oz)   SpO2 99%   BMI 21.64 kg/m²      Rhythm: Sinus Tachycardia  Sinus Tachycardia`    NC/HFNC- 0 lpm, on Room air  Respiratory support: - No ventilator support    Vent days: Day 0    Increased O2 requirements: no    Admission weight Weight - Scale: 62.7 kg (138 lb 3.7 oz)  Today's weight   Wt Readings from Last 1 Encounters:   10/25/24 62.7 kg (138 lb 3.7 oz)         UOP >30ml/hr: no (unmeasurable, not on foleys/ext. Cath)    De [place] need assessed each shift: no    Restraints: no  Order current and documentation up to date?    Lines/Drains  LDA Insertion Date Discontinued Date Dressing Changes   PIV x3 10/24/25     TLC       Arterial       De [place]       Vas Cath      ETT       Surgical drains        Night Shift Hospitalist Interventions    Problem(Brief) Date Time Intervention Physician contacted                                               Drip rates at handoff:    sodium chloride      [Held by provider] 3% sodium chloride Stopped (10/25/24 2005)       Hospital Course Daily Updates:  Admit Day# 0 10/25/24  -Received hypertonic saline in ER   -CT head, chest and abdomen done  -Paracentesis   -PICC line placed    10/25 NIGHTS  - UO   - BLE WOUND CARE PERFORMED R/T PT PULLING OFF DRESSINGS  - CIWA SCORED BETWEEN 5 TO 16, ATIVAN EFFECTIVE  -     Lab Data:   CBC:   Recent Labs     10/24/24  1942   WBC 17.0*   HGB 8.8*   HCT 26.0*   .1*        BMP:    Recent Labs     10/25/24  1836 10/25/24  2230   * 116*   K 4.4 4.1   CO2 19* 22   BUN 9 9   CREATININE 1.0 0.9     LIVR:   Recent Labs     10/24/24  1942  10/25/24  1155   * 168*   ALT 79* 80*     PT/INR:   Recent Labs     10/25/24  1155   INR 1.94*     APTT: No results for input(s): \"APTT\" in the last 72 hours.  ABG: No results for input(s): \"PHART\", \"EZK6CQF\", \"PO2ART\" in the last 72 hours.  Consults (if GI or Nephrology- which group?)-  Hospitalist, Nephro

## 2024-10-26 NOTE — PROGRESS NOTES
PROGRESS NOTE  S:64 yrs Patient  admitted on 10/24/2024 with Hyponatremia [E87.1]  JCARLOS (acute kidney injury) (HCC) [N17.9]  Altered mental status, unspecified altered mental status type [R41.82] .  Today he remains confused and non-verbal. Not able to take medications due to altered mental status      Exam:   Vitals:    10/26/24 1145   BP:    Pulse: (!) 107   Resp: 28   Temp:    SpO2: 97%      General : appears older than stated age and delirious  HEENT: Sclera clear, anicteric  Neck: no adenopathy and supple, symmetrical, trachea midline  Heart: regular rate and rhythm  Abdomen:  soft, distended, tympanic  Extremities: edema none      Medications: Reviewed    Labs:  CBC:   Recent Labs     10/24/24  1942 10/26/24  0404 10/26/24  0732   WBC 17.0* 15.0*  --    HGB 8.8* 7.2* 8.9*   HCT 26.0* 21.3*  --    .1* 105.0*  --     153  --      BMP:   Recent Labs     10/25/24  2230 10/26/24  0053 10/26/24  0404   * 118* 117*   K 4.1 4.1 4.0   CL 86* 88* 88*   CO2 22 23 21   PHOS  --   --  2.2*   BUN 9 9 9   CREATININE 0.9 0.9 0.8     LIVER PROFILE:   Recent Labs     10/24/24  1942 10/25/24  1155 10/26/24  0404   * 168* 128*   ALT 79* 80* 71*   BILIDIR  --  1.7* 1.2*   BILITOT 2.1* 2.4* 1.7*   ALKPHOS 337* 284* 249*     PT/INR:   Recent Labs     10/25/24  1155   INR 1.94*       Impression:64 year old male with history of HTN, HLD, CAD, GERD, diverticulitis, alcohol abuse, bipolar do and cirrhosis decompensated by encephalopathy admitted with severe hyponatremia and AMS, likely metabolic encephalopathy vs encephalitis     Recommendation:  Continue supportive care  Neurology consult for encephalopathy workup  Nephrology consult for hyponatremia  Continue lactulose and xifaxan  Check KUB to assess for ileus  May need lactulose enema      Henry Orta MD, MD  12:05 PM 10/26/2024

## 2024-10-26 NOTE — PROGRESS NOTES
Nephrology Progress Note   [email or web address]      Sub/interval history  Patient's mental status has worsened, he is not able to participate on conversation  Latest sodium of 119 on POC    Last 24 h uop unable to chart because of incontinence but patient is voiding    ROS: Unable to obtain  PSFH: No visitor    Scheduled Meds:   vancomycin  1,000 mg IntraVENous Q12H    pantoprazole  40 mg IntraVENous Daily    thiamine (B-1) 500 mg in sodium chloride 0.9 % 100 mL IVPB  500 mg IntraVENous TID    ampicillin IV  2,000 mg IntraVENous 6 times per day    acyclovir  10 mg/kg IntraVENous Q8H    [START ON 10/27/2024] cefTRIAXone (ROCEPHIN) IV  2,000 mg IntraVENous Q24H    sodium chloride flush  5-40 mL IntraVENous 2 times per day    lactulose enema   Rectal Once    ascorbic acid  500 mg Oral Daily    buPROPion  150 mg Oral QAM    ferrous sulfate  325 mg Oral Daily with breakfast    budesonide-formoterol  2 puff Inhalation BID RT    risperiDONE  0.5 mg Oral Nightly    sodium chloride flush  5-40 mL IntraVENous 2 times per day    enoxaparin  40 mg SubCUTAneous Daily    multivitamin  1 tablet Oral Daily    folic acid  1 mg Oral Daily    nicotine  1 patch TransDERmal Daily    [Held by provider] thiamine  100 mg Oral Daily    mupirocin   Each Nostril BID    vitamin D  50,000 Units Oral Weekly    lactulose  20 g Oral BID    rifAXIMin  400 mg Oral TID    bacitracin-polymyxin b   Topical BID     Continuous Infusions:   sodium chloride      sodium chloride       PRN Meds:.sodium chloride flush, sodium chloride, albuterol sulfate HFA, hydrOXYzine HCl, sodium chloride flush, sodium chloride, potassium chloride **OR** potassium alternative oral replacement **OR** potassium chloride, magnesium sulfate, ondansetron **OR** ondansetron, polyethylene glycol, LORazepam **OR** LORazepam **OR** LORazepam **OR** LORazepam **OR** LORazepam **OR** LORazepam **OR** LORazepam **OR** LORazepam    Objective/     Vitals:    10/26/24 1000 10/26/24 1100

## 2024-10-26 NOTE — PROGRESS NOTES
Shift: [id number]    Admitting diagnosis: Hyponatremia, Hyperkalemia, JCARLOS    Presentation to hospital: Lethargic/ Altered mental status    Surgery: no     Nursing assessment at handoff  stable    Emergency Contact/POA: Mona Robison (Sister)  Family updated: no    Most recent vitals: BP (!) 122/92   Pulse (!) 113   Temp (!) 100.8 °F (38.2 °C) (Axillary)   Resp 24   Ht 1.702 m (5' 7.01\")   Wt 63.4 kg (139 lb 12.4 oz)   SpO2 97%   BMI 21.89 kg/m²      Rhythm: Sinus Tachycardia  Sinus Tachycardia`    NC/HFNC- 0 lpm, on Room air  Respiratory support: - No ventilator support    Vent days: Day 0    Increased O2 requirements: no    Admission weight Weight - Scale: 62.7 kg (138 lb 3.7 oz)  Today's weight   Wt Readings from Last 1 Encounters:   10/26/24 63.4 kg (139 lb 12.4 oz)         UOP >30ml/hr: no (unmeasurable, not on foleys/ext. Cath)    De [place] need assessed each shift: no    Restraints: no  Order current and documentation up to date?    Lines/Drains  LDA Insertion Date Discontinued Date Dressing Changes   PIV x3 10/24/25     TLC       Arterial       De [place]       Vas Cath      ETT       Surgical drains        Night Shift Hospitalist Interventions    Problem(Brief) Date Time Intervention Physician contacted                                               Drip rates at handoff:    sodium chloride      [Held by provider] 3% sodium chloride Stopped (10/25/24 1958)       Hospital Course Daily Updates:  Admit Day# 0 10/25/24  -Received hypertonic saline in ER   -CT head, chest and abdomen done  -Paracentesis   -PICC line placed    Admit Day# 0 10/25/24 Nights  Takeover patient around 0500: noted distended bladder--bladder scanned >490ml, straight cath 500ml  -Patient was lethagic with restless legs upon assessment but after straight cath, patient calms down  -GCS 9 (E2V2M5) from GCS 14: MD made aware    Lab Data:   CBC:   Recent Labs     10/24/24  1942 10/26/24  0404   WBC 17.0* 15.0*   HGB 8.8* 7.2*   HCT 26.0* 21.3*   MCV

## 2024-10-26 NOTE — PROGRESS NOTES
[facility] Medicine Progress Note  V 10.25      Date of Admission: 10/24/2024  Hospital Day: 3    Chief Admission Complaint:  AMS    Subjective:  Patient seen at bedside. Patient is lethargic and somnolent. Patient no pain anywhere, sob, fevers, chills, trouble urinating. Per nurse no acute events over night.    Presenting Admission History:       Kaleb Persaud is a 64 y.o. male, chronic alcoholic who was brought to the emergency room for evaluation following fall.  Patient is intermittently confused which makes history limited.  I was unable to obtain any meaningful history from the patient.  Apparently, patient was found down at home.  He was noted to be confused.  Patient is complaining of generalized weakness otherwise denies any other complaint.  He denies chest pain, no shortness of breath, no fever, no chills, no nausea, no vomiting, no diarrhea.     On presentation to the emergency room, patient was noted to be afebrile, temperature 98.1 °F, pulse rate of 111 bpm, respiratory rate of 23, blood pressure 149/103 mmHg.  Patient saturating 100% on room air.  Labs significant for sodium 138, potassium 5.6, blood rate 79, bicarb 16, anion gap 13, glucose 132, CK level 1090.  Albumin 2.7, , ALT 79, alkaline phosphatase 337 and total bilirubin 2.1.  WBC 17.0, anemia with hemoglobin 8.8 and hematocrit 26.0.  .1.  CT scan of the brain as well as CT scan of the cervical spine with unrevealing.  Patient was started on hypertonic saline in the emergency room.  He has been admitted to the hospital for further evaluation and management.    Assessment/Plan:      Current Principal Problem:  Hyponatremia    Acute encephalopathy likely secondary to metabolic derangements  Acute on chronic hyponatremia  -Patient noted with sodium level-108  -Hx of chronic hyponatremia with sodium level ranges between 127-132  -Chronic alcoholic.  Hyponatremia in the setting of chronic alcohol abuse likely hypo-osmolar

## 2024-10-26 NOTE — PROGRESS NOTES
Pulmonary and Critical Care Medicine    CC: Altered mental status, hyponatremia  Date: 10/26/2024  Admit Date:  10/24/2024  Reason for Consultation: Same  Consult Requesting Physician: Lisette Flores MD       HPI     Patient was found down at home.  Brought to the ED where he was found to have rhabdomyolysis with a total CK of 1090.  He also had profound hyponatremia with a sodium of 1.9.  His potassium was 5.6.  He has been admitted to ICU.  Nephrology is managing sodium correction.  His sodium is 119 this morning and rate of correction has been appropriate.  However, the patient remains obtunded and tachypneic.    ROS: ROS is unobtainable due to his critical illness.    PMH:  has a past medical history of Anxiety, Arthritis, Bipolar 1 disorder (HCC), CAD (coronary artery disease), Hyperlipidemia, and Hypertension.   PSH:  has a past surgical history that includes hernia repair (Right, ); shoulder surgery (Left, ); Neck surgery (N/A, ); Elbow surgery (Right, ); Upper gastrointestinal endoscopy (N/A, 11/10/2021); Colonoscopy (N/A, 11/10/2021); shoulder surgery (Right, ); Neck surgery (N/A, ); Tonsillectomy (N/A, ); and Endoscopy, colon, diagnostic.    SH:  reports that he has been smoking cigarettes. He started smoking about 53 years ago. He has a 107.1 pack-year smoking history. He has never used smokeless tobacco. He reports current alcohol use of about 70.0 standard drinks of alcohol per week. He reports current drug use. Frequency: 2.00 times per week. Drug: Marijuana (Weed).   FH: family history includes Cancer in his father; Diabetes in his brother; Heart Disease in his brother; Heart Failure in his mother; No Known Problems in his brother and brother; Other in his father.    Allergies: Sulfa antibiotics     OBJECTIVE DATA       PHYSICAL EXAM:   Temp  Av.6 °F (37 °C)  Min: 97.4 °F (36.3 °C)  Max: 100.8 °F (38.2 °C)  Pulse  Av.7  Min: 103  Max: 118  BP  Min:  [facility]

## 2024-10-26 NOTE — CONSULTS
10/26/24  2245 10/27/24  0155 10/27/24  0435 10/27/24  0623   VANCORANDOM  --   --   --   --   --   --   --  15.7   WBC 17.0*  --  15.0*  --   --   --  15.9*  --    CREATININE 1.3   < > 0.8   < > 0.7* 0.6* 0.7*  --     < > = values in this interval not displayed.   Estimated Creatinine Clearance: 93 mL/min (A) (based on SCr of 0.7 mg/dL (L)).  Current Dose / Plan:   Continue Vancomycin 1000 mg IV q12h.   Kinetics predict an AUC = 541 mg/L*h with an estimated steady-state vancomycin trough = 14.5 mcg/mL.  Will continue to monitor clinical condition and make adjustments to regimen as appropriate.  Armond Tolbert PharmD    10/27/2024 7:49 AM

## 2024-10-26 NOTE — PROGRESS NOTES
Shift: [id number]    Admitting diagnosis: Hyponatremia, Hyperkalemia, JCARLOS    Presentation to hospital: Lethargic/ Altered mental status    Surgery: no     Nursing assessment at handoff  stable    Emergency Contact/POA: Mona Robison (Sister)  Family updated: no    Most recent vitals: /77   Pulse (!) 109   Temp 98.6 °F (37 °C) (Axillary)   Resp 20   Ht 1.702 m (5' 7.01\")   Wt 63.4 kg (139 lb 12.4 oz)   SpO2 97%   BMI 21.89 kg/m²      Rhythm: Sinus Tachycardia  Sinus Tachycardia`    NC/HFNC- 0 lpm, on Room air  Respiratory support: - No ventilator support    Vent days: Day 0    Increased O2 requirements: no    Admission weight Weight - Scale: 62.7 kg (138 lb 3.7 oz)  Today's weight   Wt Readings from Last 1 Encounters:   10/26/24 63.4 kg (139 lb 12.4 oz)         UOP >30ml/hr: no (unmeasurable, not on foleys/ext. Cath)    De Santiago need assessed each shift: no    Restraints: no  Order current and documentation up to date?    Lines/Drains  LDA Insertion Date Discontinued Date Dressing Changes   PIV x3 10/24/25     TLC       Arterial       De Santiago       Vas Cath      ETT       Surgical drains        Night Shift Hospitalist Interventions    Problem(Brief) Date Time Intervention Physician contacted                                               Drip rates at handoff:    sodium chloride         Hospital Course Daily Updates:  Admit Day# 0 10/25/24  -Received hypertonic saline in ER   -CT head, chest and abdomen done  -Paracentesis   -PICC line placed    Admit Day# 0 10/25/24 Nights  Takeover patient around 0500: noted distended bladder--bladder scanned >490ml, straight cath 500ml  -Patient was lethagic with restless legs upon assessment but after straight cath, patient calms down  -GCS 9 (E2V2M5) from GCS 14: MD made aware    Admit Day #1 10/26/24 days  -Pt continues to be restless legs and lethargic, responsive only to pain this AM and improved neuro status throughout the shift. Now pt is opening his eyes when requested

## 2024-10-26 NOTE — CONSULTS
Inpatient Neurology Consult  [facility] Neurology  Deven Evans MD    Kaleb Persaud  1960    Date of Service: 10/26/2024    Referring Physician: Lisette Flores MD    Reason for the consult and CC: encephalopathy    HPI:   Kaleb Persaud is a 64 y.o. male who  has a past medical history of Anxiety, Arthritis, Bipolar 1 disorder (HCC), CAD (coronary artery disease), Hyperlipidemia, and Hypertension. Admitted for hyponatremia, JCARLOS, NAGMA, alcohol hepatitis, hyperbilirubinemia, acute encephalopathy, rhabdomyolysis, malnutrition, leukocytosis of unknown etiology.     He was found down at home by his brother in a confused state with scalp laceration brought to hospital via EMS complaining of generalized weakness. Per discussion with intensivist, he has tachypnea without metabolic explanation.    His brother was at bedside. Patient unable to provide history due to mental state. Brother found him at home. He was responsive to stimuli but unable to speak. Lots of blood around him from scalp laceration. He was on the ground. He last saw his brother two days prior to finding him. He usually talks to his brother on the phone every day and did not hear from him prompting him to check in. Kaleb lives alone. Lots of issues with alcohol in the past. He tried to stop drinking but failed multiple times. He has withdrawn from alcohol in the past. No known history of stroke or seizure.     I personally reviewed and updated social history, past medical history, medications, allergy, surgical history, and family history as documented in the patient's electronic health records.     ROS: 10-14 ROS reviewed with the patient/nurse/family which were unremarkable except mentioned in H&P.    Physical Examination  Vitals:    10/26/24 0700 10/26/24 0800 10/26/24 0900 10/26/24 1000   BP: 113/81 117/75 137/77 118/84   Pulse: (!) 103 (!) 111 (!) 109 (!) 107   Resp: 19 22 20 21   Temp:   98.6 °F (37 °C)    TempSrc:  Axillary

## 2024-10-26 NOTE — PROGRESS NOTES
Speech Language Pathology  Attempt Note - x2    Name: Kaleb Persaud  : 1960  Medical Diagnosis: Hyponatremia [E87.1]  JCARLOS (acute kidney injury) (HCC) [N17.9]  Altered mental status, unspecified altered mental status type [R41.82]      SLP attempted to see pt for bedside swallow evaluation (BSE). Order acknowledged and chart reviewed for completion of eval.. Evaluation unable to be completed due to pt being very lethargic and not appropriate for PO at this time. SLP to re-attempt as pt's condition and schedule allows. RN aware. No charges.    Addendum 1402: SLP re-attempted BSE. Per RN, pt's mental status has not improved and needs to be a hold for today. SLP to re-attempt as pt's condition and schedule allows. No charges.     Thank you,    Mary Beth Paul MA CCC-SLP #[id number]  Speech Language Pathologist

## 2024-10-26 NOTE — PROGRESS NOTES
Physical Therapy & Occupational Therapy  Orders received, chart reviewed. Upon discussion with RN, patient currently not following commands and pending further workup for AMS. Will follow up when patient is able to actively participate in therapy evaluations.     Debra Moyer, PT, DPT  José Miguel Bo, OTR/L

## 2024-10-27 NOTE — PROGRESS NOTES
Referred Sodium result 119 to Dr. Manuel of Nephro. Per MD, call Nephro if result is < 115 or >123. No further order. Plan of care ongoing.

## 2024-10-27 NOTE — PROGRESS NOTES
Department of Pharmacy    Notification received from laboratory of positive blood culture results.    Organism(s) detected: Staph Aureus  Pt. Currently covered on Vancomycin    Jimmy Sims RPH 10/27/2024 5:01 AM

## 2024-10-27 NOTE — PROGRESS NOTES
Shift: [id number]    Admitting diagnosis: Hyponatremia, Hyperkalemia, JCARLOS    Presentation to hospital: Lethargic/ Altered mental status    Surgery: no     Nursing assessment at handoff  stable    Emergency Contact/POA: Mona Robison (Sister)  Family updated: no    Most recent vitals: /79   Pulse (!) 109   Temp 97 °F (36.1 °C) (Axillary)   Resp 19   Ht 1.702 m (5' 7.01\")   Wt 63.4 kg (139 lb 12.4 oz)   SpO2 99%   BMI 21.89 kg/m²      Rhythm: Sinus Tachycardia  Sinus Tachycardia`    NC/HFNC- 0 lpm, on Room air  Respiratory support: - No ventilator support    Vent days: Day 0    Increased O2 requirements: no    Admission weight Weight - Scale: 62.7 kg (138 lb 3.7 oz)  Today's weight   Wt Readings from Last 1 Encounters:   10/26/24 63.4 kg (139 lb 12.4 oz)         UOP >30ml/hr: yes    De Santiago need assessed each shift: yes    Restraints: no  Order current and documentation up to date?    Lines/Drains  LDA Insertion Date Discontinued Date Dressing Changes   PIV x3 10/24/25     TLC       Arterial       De [place]       Vas Cath      ETT       Surgical drains        Night Shift Hospitalist Interventions    Problem(Brief) Date Time Intervention Physician contacted                                               Drip rates at handoff:    sodium chloride      sodium chloride         Hospital Course Daily Updates:  Admit Day# 0 10/25/24  -Received hypertonic saline in ER   -CT head, chest and abdomen done  -Paracentesis   -PICC line placed    Admit Day# 0 10/25/24 Nights  Takeover patient around 0500: noted distended bladder--bladder scanned >490ml, straight cath 500ml  -Patient was lethagic with restless legs upon assessment but after straight cath, patient calms down  -GCS 9 (E2V2M5) from GCS 14: MD made aware    Admit Day #1 10/26/24 days  -Pt continues to be restless legs and lethargic, responsive only to pain this AM and improved neuro status throughout the shift. Now pt is opening his eyes when requested to and following

## 2024-10-27 NOTE — PLAN OF CARE
Problem: Discharge Planning  Goal: Discharge to home or other facility with appropriate resources  Outcome: Progressing  Flowsheets  Taken 10/26/2024 2000 by Sri Bose RN  Discharge to home or other facility with appropriate resources:   Identify barriers to discharge with patient and caregiver   Arrange for needed discharge resources and transportation as appropriate   Identify discharge learning needs (meds, wound care, etc)   Arrange for interpreters to assist at discharge as needed   Refer to discharge planning if patient needs post-hospital services based on physician order or complex needs related to functional status, cognitive ability or social support system     Problem: Safety - Adult  Goal: Free from fall injury  Outcome: Progressing     Problem: Neurosensory - Adult  Goal: Achieves stable or improved neurological status  Outcome: Progressing  Flowsheets (Taken 10/26/2024 2000)  Achieves stable or improved neurological status:   Assess for and report changes in neurological status   Monitor temperature, glucose, and sodium. Initiate appropriate interventions as ordered  Goal: Absence of seizures  Outcome: Progressing  Flowsheets (Taken 10/26/2024 2000)  Absence of seizures:   Monitor for seizure activity.  If seizure occurs, document type and location of movements and any associated apnea   If seizure occurs, turn head to side and suction secretions as needed     Problem: Cardiovascular - Adult  Goal: Absence of cardiac dysrhythmias or at baseline  Outcome: Progressing  Flowsheets (Taken 10/26/2024 2000)  Absence of cardiac dysrhythmias or at baseline: Monitor cardiac rate and rhythm     Problem: Skin/Tissue Integrity - Adult  Goal: Incisions, wounds, or drain sites healing without S/S of infection  Outcome: Progressing  Flowsheets  Taken 10/26/2024 2000 by Sri Bose RN  Incisions, Wounds, or Drain Sites Healing Without Sign and Symptoms of Infection:   ADMISSION and

## 2024-10-27 NOTE — PROGRESS NOTES
Shift: [id number]      Admitting diagnosis: Hyponatremia, Hyperkalemia, JCARLOS    Presentation to hospital: Lethargic/ Altered mental status    Surgery: no     Nursing assessment at handoff  stable    Emergency Contact/POA: Mona Robison (Sister)  Family updated: no    Most recent vitals: /80   Pulse (!) 114   Temp 97.1 °F (36.2 °C) (Axillary)   Resp 26   Ht 1.702 m (5' 7.01\")   Wt 62.6 kg (138 lb 0.1 oz)   SpO2 100%   BMI 21.66 kg/m²      Rhythm: Sinus Tachycardia  Sinus Tachycardia`    NC/HFNC- 0 lpm, on Room air  Respiratory support: - No ventilator support    Vent days: Day 0    Increased O2 requirements: no    Admission weight Weight - Scale: 62.7 kg (138 lb 3.7 oz)  Today's weight   Wt Readings from Last 1 Encounters:   10/27/24 62.6 kg (138 lb 0.1 oz)         UOP >30ml/hr: yes    De Santiago need assessed each shift: yes    Restraints: no  Order current and documentation up to date?    Lines/Drains  LDA Insertion Date Discontinued Date Dressing Changes   PIV x1  PICC right 10/25/24  10/25/24     TLC       Arterial       De Santiago  10/26/24     Vas Cath      ETT       Surgical drains        Night Shift Hospitalist Interventions    Problem(Brief) Date Time Intervention Physician contacted                                               Drip rates at handoff:    sodium chloride 50 mL/hr at 10/27/24 0900    sodium chloride      sodium chloride         Hospital Course Daily Updates:  Admit Day# 0 10/25/24  -Received hypertonic saline in ER   -CT head, chest and abdomen done  -Paracentesis   -PICC line placed    Admit Day# 0 10/25/24 Nights  Takeover patient around 0500: noted distended bladder--bladder scanned >490ml, straight cath 500ml  -Patient was lethagic with restless legs upon assessment but after straight cath, patient calms down  -GCS 9 (E2V2M5) from GCS 14: MD made aware    Admit Day #1 10/26/24 days  -Pt continues to be restless legs and lethargic, responsive only to pain this AM and improved neuro status

## 2024-10-27 NOTE — CONSULTS
Consult Placed     Who: Dr. Javier Rosario  Date: 10/27/24  Time: 1024     Electronically signed by Kath Burciaga RN on 10/27/2024 at 10:24 AM

## 2024-10-27 NOTE — PROGRESS NOTES
[facility] Medicine Progress Note  V 10.25      Date of Admission: 10/24/2024  Hospital Day: 4    Chief Admission Complaint:  AMS    Subjective:  Patient seen at bedside. Patient is lethargic and somnolent. Patient no pain anywhere, sob, fevers, chills, trouble urinating. Per nurse no acute events over night.    Presenting Admission History:       Kaleb Persaud is a 64 y.o. male, chronic alcoholic who was brought to the emergency room for evaluation following fall.  Patient is intermittently confused which makes history limited.  I was unable to obtain any meaningful history from the patient.  Apparently, patient was found down at home.  He was noted to be confused.  Patient is complaining of generalized weakness otherwise denies any other complaint.  He denies chest pain, no shortness of breath, no fever, no chills, no nausea, no vomiting, no diarrhea.     On presentation to the emergency room, patient was noted to be afebrile, temperature 98.1 °F, pulse rate of 111 bpm, respiratory rate of 23, blood pressure 149/103 mmHg.  Patient saturating 100% on room air.  Labs significant for sodium 138, potassium 5.6, blood rate 79, bicarb 16, anion gap 13, glucose 132, CK level 1090.  Albumin 2.7, , ALT 79, alkaline phosphatase 337 and total bilirubin 2.1.  WBC 17.0, anemia with hemoglobin 8.8 and hematocrit 26.0.  .1.  CT scan of the brain as well as CT scan of the cervical spine with unrevealing.  Patient was started on hypertonic saline in the emergency room.  He has been admitted to the hospital for further evaluation and management.    Assessment/Plan:      Current Principal Problem:  Hyponatremia    Acute encephalopathy likely secondary to metabolic derangements  Acute on chronic hyponatremia  -Patient noted with sodium level-108  -Hx of chronic hyponatremia with sodium level ranges between 127-132  -Chronic alcoholic.  Hyponatremia in the setting of chronic alcohol abuse likely hypo-osmolar

## 2024-10-27 NOTE — PROGRESS NOTES
Called MRI to inform ordered MRI stat for this patient wherein order was modified with indication for possible brainstem stroke by Neurology Doctor. Per MRI staff Lesli, stat MRI indication for inpatient is wake up stroke only. Informed Dr. Evans and he is okay to do MRI tomorrow. Plan of care ongoing.

## 2024-10-27 NOTE — PROGRESS NOTES
Nephrology Progress Note   [facility]      Sub/interval history  A bit more awake  Na dropped to 119 and up to 122  Was up to 128 yesterday when d5w had to be given    Last 24 h uop unable to chart because of incontinence but patient is voiding    ROS: Unable to obtain  PSFH: No visitor    Scheduled Meds:   vancomycin  1,000 mg IntraVENous Q12H    potassium phosphate IVPB (PERIPHERAL LINE)  15 mmol IntraVENous Once    lactulose enema   Rectal Once    pantoprazole  40 mg IntraVENous Daily    thiamine (B-1) 500 mg in sodium chloride 0.9 % 100 mL IVPB  500 mg IntraVENous TID    ampicillin IV  2,000 mg IntraVENous 6 times per day    acyclovir  10 mg/kg IntraVENous Q8H    cefTRIAXone (ROCEPHIN) IV  2,000 mg IntraVENous Q24H    sodium chloride flush  5-40 mL IntraVENous 2 times per day    ascorbic acid  500 mg Oral Daily    buPROPion  150 mg Oral QAM    ferrous sulfate  325 mg Oral Daily with breakfast    budesonide-formoterol  2 puff Inhalation BID RT    risperiDONE  0.5 mg Oral Nightly    sodium chloride flush  5-40 mL IntraVENous 2 times per day    enoxaparin  40 mg SubCUTAneous Daily    multivitamin  1 tablet Oral Daily    folic acid  1 mg Oral Daily    nicotine  1 patch TransDERmal Daily    [Held by provider] thiamine  100 mg Oral Daily    mupirocin   Each Nostril BID    vitamin D  50,000 Units Oral Weekly    lactulose  20 g Oral BID    rifAXIMin  400 mg Oral TID    bacitracin-polymyxin b   Topical BID     Continuous Infusions:   sodium chloride 50 mL/hr at 10/27/24 0900    sodium chloride      sodium chloride       PRN Meds:.sodium chloride flush, sodium chloride, albuterol sulfate HFA, hydrOXYzine HCl, sodium chloride flush, sodium chloride, potassium chloride **OR** potassium alternative oral replacement **OR** potassium chloride, magnesium sulfate, ondansetron **OR** ondansetron, polyethylene glycol, LORazepam **OR** LORazepam **OR** LORazepam **OR** LORazepam **OR** LORazepam **OR** LORazepam **OR**

## 2024-10-27 NOTE — PROGRESS NOTES
Speech Language Pathology  Attempt Note     Name: Kaleb Persaud  : 1960  Medical Diagnosis: Hyponatremia [E87.1]  JCARLOS (acute kidney injury) (HCC) [N17.9]  Altered mental status, unspecified altered mental status type [R41.82]      SLP attempted to see pt for bedside swallow evaluation (BSE). Order acknowledged and chart reviewed for completion of eval. Evaluation / treatment unable to be completed due to pt off the floor for MRI. SLP to re-attempt as pt's condition and schedule allows. RN aware. No charges.    Addendum 1528: SLP contacted RN re: re-attempt of BSE. Per RN, pt continues to be off the floor for further imaging. SLP to re-attempt as pt's condition and schedule allows on 10/28. RN aware. No charges.     Thank you,    Marcia Uriarte M.A. CCC-SLP   Speech-Language Pathologist

## 2024-10-27 NOTE — PROGRESS NOTES
Pulmonary and Critical Care Medicine    CC: Altered mental status, hyponatremia  Date: 10/27/2024  Admit Date:  10/24/2024  Reason for Consultation: Same  Consult Requesting Physician: Lisette Flores MD       HPI     Patient was found down at home.  Brought to the ED where he was found to have rhabdomyolysis with a total CK of 1090.  He also had profound hyponatremia with a sodium of 109.  His potassium was 5.6.  He has been admitted to ICU.  Nephrology is managing sodium correction.  His sodium is 119 this morning and rate of correction has been appropriate.  However, the patient remains obtunded and tachypneic.    ROS: ROS is unobtainable due to his critical illness.    PMH:  has a past medical history of Alcohol abuse, Alcohol withdrawal (HCC), Anxiety, Arthritis, Bipolar 1 disorder (HCC), CAD (coronary artery disease), Cannabis abuse, Centrilobular emphysema (HCC), Hyperlipidemia, Hypertension, Lung nodule, Neuropathy, Thrombocytopenia (HCC), Tobacco abuse, and Tuberculosis.   PSH:  has a past surgical history that includes hernia repair (Right, 1971); shoulder surgery (Left, 2017); Neck surgery (N/A, 2002); Elbow surgery (Right, 2002); Upper gastrointestinal endoscopy (N/A, 11/10/2021); Colonoscopy (N/A, 11/10/2021); shoulder surgery (Right, 2002); Neck surgery (N/A, 2016); Tonsillectomy (N/A, 1974); and Endoscopy, colon, diagnostic.    SH:  reports that he has been smoking cigarettes. He started smoking about 53 years ago. He has a 107.1 pack-year smoking history. He has never used smokeless tobacco. He reports current alcohol use of about 70.0 standard drinks of alcohol per week. He reports current drug use. Frequency: 2.00 times per week. Drug: Marijuana (Weed).   FH: family history includes Cancer in his father; Diabetes in his brother; Heart Disease in his brother; Heart Failure in his mother; No Known Problems in his brother and brother; Other in his father.    Allergies: Sulfa antibiotics  glycol, LORazepam **OR** LORazepam **OR** LORazepam **OR** LORazepam **OR** LORazepam **OR** LORazepam **OR** LORazepam **OR** LORazepam     LABS: Reviewed.   Recent Labs     10/26/24  0404 10/26/24  1240 10/27/24  0155 10/27/24  0435 10/27/24  0820   *   < > 120* 121* 122*   K 4.0   < > 3.5 3.6 3.3*   CL 88*   < > 88* 89* 91*   CO2 21   < > 21 21 23   PHOS 2.2*  --   --  1.9*  --    BUN 9   < > 7 7 8   CREATININE 0.8   < > 0.6* 0.7* 0.6*    < > = values in this interval not displayed.     Recent Labs     10/24/24  1942 10/26/24  0404 10/26/24  0732 10/27/24  0435   WBC 17.0* 15.0*  --  15.9*   HGB 8.8* 7.2* 8.9* 7.7*   HCT 26.0* 21.3*  --  23.2*   .1* 105.0*  --  106.0*    153  --  146     Lab Results   Component Value Date/Time    PROTIME 22.2 10/25/2024 11:55 AM    PROTIME 12.6 02/16/2024 06:39 PM    PROTIME 12.9 12/29/2023 08:45 AM    INR 1.94 10/25/2024 11:55 AM    INR 0.94 02/16/2024 06:39 PM    INR 0.97 12/29/2023 08:45 AM     Lab Results   Component Value Date/Time    WXE7ICA 21.5 10/26/2024 07:32 AM    BEART -2 10/26/2024 07:32 AM    H9GVUITB 97 10/26/2024 07:32 AM    PHART 7.501 10/26/2024 07:32 AM    YBM8IHP 27.5 10/26/2024 07:32 AM    PO2ART 77.2 10/26/2024 07:32 AM    ESO5WYP 22 10/26/2024 07:32 AM       Intake/Output Summary (Last 24 hours) at 10/27/2024 0954  Last data filed at 10/27/2024 0912  Gross per 24 hour   Intake 2470.5 ml   Output 1060 ml   Net 1410.5 ml      In: 2470.5 [I.V.:943]  Out: 1060      IMAGES: Reviewed       ASSESSMENT AND PLAN:     Acute alcoholic encephalopathy  Hyponatremia  Rhabdomyolysis    Plan:  Sodium being corrected at the appropriate rate.  He is up to 121 this AM  K is down and being replaced  CT head yesterday without acute findings  MRI ordered and LP under consideration  He is growing Staph brom blood, Likely Staph Aureus  Continue Vanco  Continue the remainder of his ABx until his CNS is cleared.  Discussed with Nephro, hospitalist and RN at the

## 2024-10-27 NOTE — PROGRESS NOTES
ID consult request received.  Chart reviewed.  The patient has been admitted with acute metabolic encephalopathy.  2 sets of blood cultures drawn on admission are growing MSSA.    The patient is currently on IV vancomycin, ampicillin, acyclovir, Ceftriaxone.  I will discontinue these antibiotics.    Start IV nafcillin 2 g every 4 hours for MSSA bacteremia coverage.  This will also provide good CNS penetration until meningitis from MSSA is ruled out.    Will order 2D echo to rule out endocarditis.    Recommend holding off on PICC line until the patient is afebrile for at least 24 hours and blood cultures have been negative for at least 48 and preferably 72 hours.    Midline or regular central venous line okay for now, if needed for IV access.     Patient will be seen tomorrow by Dr. Pablo.  Full ID consult to follow.        Javier Rosario MD, MPH, FACP, FIDSA  10/27/2024, 7:13 PM  Central Office Phone: [phone]  Central Office Fax: [phone]    [facility] Infectious Disease   [address], [address] ([address] [place] [address]  [facility] days:  Tuesday & Thursday AM    [facility] Infectious Disease  [address] , [address] (Medical office Building)  [place], [address]  [facility] Clinic days: Wednesday AM

## 2024-10-27 NOTE — PROGRESS NOTES
Physical Therapy  Facility/Department: [facility] C2 CARD TELEMETRY  Physical Therapy Initial Assessment    Name: Kaleb Persaud  : 1960  MRN: [id number]  Date of Service: 10/27/2024    Discharge Recommendations:  Subacute/Skilled Nursing Facility   PT Equipment Recommendations  Equipment Needed: No  Other: defer    Therapy discharge recommendations are subject to collaboration from the patient’s interdisciplinary healthcare team, including MD and case management recommendations.    Barriers to Home Discharge:   [x] Steps to access home entry or bed/bath:   [x] Unable to transfer, ambulate, or propel wheelchair household distances without assist   [x] Limited available assist at home upon discharge    [] Patient or family requests d/c to post-acute facility    [x] Poor cognition/safety awareness for d/c to home alone    [] Unable to maintain ordered weight bearing status    [x] Patient with salient signs of long-standing immobility   [] Decreased independence with ADLs   [x] Increased risk for falls   [] Other:    If pt is unable to be seen after this session, please let this note serve as discharge summary.  Please see case management note for discharge disposition.  Thank you.        Patient Diagnosis(es): The primary encounter diagnosis was Altered mental status, unspecified altered mental status type. Diagnoses of Hyponatremia and JCARLOS (acute kidney injury) (HCC) were also pertinent to this visit.  Past Medical History:  has a past medical history of Alcohol abuse, Alcohol withdrawal (HCC), Anxiety, Arthritis, Bipolar 1 disorder (HCC), CAD (coronary artery disease), Cannabis abuse, Centrilobular emphysema (HCC), Hyperlipidemia, Hypertension, Lung nodule, Neuropathy, Thrombocytopenia (HCC), Tobacco abuse, and Tuberculosis.  Past Surgical History:  has a past surgical history that includes hernia repair (Right, ); shoulder surgery (Left, ); Neck surgery (N/A, ); Elbow surgery (Right, ); Upper  (sitting position in bed)     Observation/Palpation  Posture: Fair  Gross Assessment  AROM: Generally decreased, functional (L knee extension limited when tested in sitting but able to stand with improved extension, limited assessment d/t cognition)  PROM: Generally decreased, functional (L knee extension limited when tested in sitting but able to stand with improved extension, limited assessment d/t cognition)  Strength: Grossly decreased, non-functional (limited assessment d/t cognition, demos at least 3+/5 BLE)  Coordination: Generally decreased, functional                Bed Mobility Training  Bed Mobility Training: Yes  Overall Level of Assistance: Maximum assistance;Assist X2  Interventions: Verbal cues;Manual cues  Rolling: Maximum assistance;Assist X2  Supine to Sit: Maximum assistance;Assist X2  Sit to Supine: Maximum assistance;Assist X2  Balance  Sitting: Impaired  Sitting - Static: Poor (constant support) (flucutating assist for duration of sitting, at times Gena with cues for anterior trunk lean up to maxA with posterior lean. multidirectional loss of balance)  Sitting - Dynamic: Poor (constant support)  Standing: Impaired  Standing - Static: Constant support;Poor  Standing - Dynamic: Constant support;Poor  Transfer Training  Transfer Training: Yes  Overall Level of Assistance: Maximum assistance;Assist X2  Interventions: Verbal cues;Tactile cues;Safety awareness training  Sit to Stand: Maximum assistance;Assist X2 (partial stand on first attempt, clears surface of EOB but only achieves ~25% upright posture; achieves ~75% upright posture with improved trunk extension on second attempt from EOB)  Stand to Sit: Maximum assistance;Assist X2 (impulsively returns to sitting)  Gait  Gait Training: No (deferred this date d/t poor standing tolerance)                         OutComes Score                                                  AM-PAC - Mobility    AM-PAC Basic Mobility - Inpatient   How much help is

## 2024-10-27 NOTE — PROGRESS NOTES
cognition;Patient limited by fatigue        Vision  Vision:  (inconsistent eye opening during evaluation but able to track therapist, limited assessment)  Hearing  Hearing: Within functional limits  Cognition  Overall Cognitive Status: Exceptions  Arousal/Alertness: Inconsistent responses to stimuli;Delayed responses to stimuli  Following Commands: Follows one step commands with repetition;Follows one step commands with increased time  Attention Span: Unable to maintain attention;Difficulty dividing attention  Memory: Decreased recall of recent events;Decreased short term memory  Safety Judgement: Decreased awareness of need for assistance;Decreased awareness of need for safety  Problem Solving: Assistance required to generate solutions;Assistance required to identify errors made  Insights: Decreased awareness of deficits  Initiation: Requires cues for some  Sequencing: Requires cues for some  Orientation  Overall Orientation Status: Impaired  Orientation Level: Oriented to place;Oriented to person (oriented to month and year with choices; place and person without choices)                  Education Given To: Patient  Education Provided: Role of Therapy;Transfer Training;Plan of Care;Equipment;Precautions;Orientation;Mobility Training  Education Provided Comments: Pt educated on importance of OOB mobility, prevention of complications of bedrest, and general safety during hospitalization  Education Method: Demonstration;Verbal  Barriers to Learning: Cognition  Education Outcome: Unable to verbalize;Unable to demonstrate understanding;Continued education needed  LUE AROM (degrees)  LUE AROM : WFL  Left Hand AROM (degrees)  Left Hand AROM: WFL  RUE AROM (degrees)  RUE AROM : WFL  Right Hand AROM (degrees)  Right Hand AROM: WFL              AM-PAC - ADL  AM-PAC Daily Activity - Inpatient   How much help is needed for putting on and taking off regular lower body clothing?: Total  How much help is needed for bathing  (which includes washing, rinsing, drying)?: Total  How much help is needed for toileting (which includes using toilet, bedpan, or urinal)?: Total  How much help is needed for putting on and taking off regular upper body clothing?: Total  How much help is needed for taking care of personal grooming?: Total  How much help for eating meals?: Total  AM-PAC Inpatient Daily Activity Raw Score: 6  AM-PAC Inpatient ADL T-Scale Score : 17.07  ADL Inpatient CMS 0-100% Score: 100  ADL Inpatient CMS G-Code Modifier : CN      Goals  Short Term Goals  Time Frame for Short Term Goals: Short term goals to be met by 11/03 unless otherwise specified  Short Term Goal 1: Pt will perform feeding tasks at EOB w/ min A and 1-2 VC to sequence by 11/01  Short Term Goal 2: Pt will perform seated grooming tasks w/ CGA and no VC for sequencing  Short Term Goal 3: Pt will tolerate 1 min stance at RW w/ min Ax2 in prep for ADL participation  Short Term Goal 4: Pt will tolerate x15 BUE ther ex  Patient Goals   Patient goals : unable to state      Therapy Time   Individual Concurrent Group Co-treatment   Time In 1124         Time Out 1158         Minutes 34         Timed Code Treatment Minutes: 24 Minutes (+10 min OT eval)       Venecia Vivar OT

## 2024-10-27 NOTE — PROGRESS NOTES
PROGRESS NOTE  S:64 yrs Patient  admitted on 10/24/2024 with Hyponatremia [E87.1]  JCARLOS (acute kidney injury) (HCC) [N17.9]  Altered mental status, unspecified altered mental status type [R41.82] .  Today he is more alert. He had BM with lactulose enema yesterday per RN.  Undergoing neurology workup. Awaiting bedside swallow    Exam:   Vitals:    10/27/24 0900   BP: 139/80   Pulse: (!) 114   Resp: 26   Temp:    SpO2: 100%     General : cachectic, alert and oriented but lethargic  HEENT: Sclera clear, anicteric  Neck: no adenopathy and supple, symmetrical, trachea midline  Heart: regular rate and rhythm  Abdomen:  soft, distendedm, NT  Extremities: no edema     Medications: Reviewed    Labs:  CBC:   Recent Labs     10/24/24  1942 10/26/24  0404 10/26/24  0732 10/27/24  0435   WBC 17.0* 15.0*  --  15.9*   HGB 8.8* 7.2* 8.9* 7.7*   HCT 26.0* 21.3*  --  23.2*   .1* 105.0*  --  106.0*    153  --  146     BMP:   Recent Labs     10/26/24  0404 10/26/24  1240 10/27/24  0155 10/27/24  0435 10/27/24  0820   *   < > 120* 121* 122*   K 4.0   < > 3.5 3.6 3.3*   CL 88*   < > 88* 89* 91*   CO2 21   < > 21 21 23   PHOS 2.2*  --   --  1.9*  --    BUN 9   < > 7 7 8   CREATININE 0.8   < > 0.6* 0.7* 0.6*    < > = values in this interval not displayed.     LIVER PROFILE:   Recent Labs     10/25/24  1155 10/26/24  0404 10/27/24  0435   * 128* 89*   ALT 80* 71* 65*   BILIDIR 1.7* 1.2*  --    BILITOT 2.4* 1.7* 1.3*   ALKPHOS 284* 249* 229*     PT/INR:   Recent Labs     10/25/24  1155   INR 1.94*       KUB:Air-filled nondilated loops of large and small bowel are present throughout the abdomen and pelvis.       Impression:64 year old male with history of HTN, HLD, CAD, GERD, diverticulitis, alcohol abuse, bipolar do and cirrhosis decompensated by encephalopathy admitted with severe hyponatremia and AMS, likely metabolic encephalopathy vs encephalitis

## 2024-10-28 PROBLEM — R78.81 MSSA BACTEREMIA: Status: ACTIVE | Noted: 2024-01-01

## 2024-10-28 PROBLEM — D69.6 THROMBOCYTOPENIA (HCC): Status: ACTIVE | Noted: 2024-10-28

## 2024-10-28 PROBLEM — B95.61 MSSA BACTEREMIA: Status: ACTIVE | Noted: 2024-10-28

## 2024-10-28 PROBLEM — K70.31 ALCOHOLIC CIRRHOSIS OF LIVER WITH ASCITES (HCC): Status: ACTIVE | Noted: 2024-01-01

## 2024-10-28 NOTE — CONSULTS
Infectious Diseases   Consult Note      Reason for Consult:  SA bacteremia    Requesting Physician:  Mark      Date of Admission: 10/24/2024    Subjective:   CHIEF COMPLAINT:  none given       HPI:    Kaleb Persaud is a 64yoM with history of alcohol use disorder, cirrhosis, anxiety, PTSD, arthritis, CAD, HLD, HTN, bipolar disorder   Inguinal hernia repair, cervical spine fusion.                ED 10/24/24 - found down by brother, had a head lac, was confused.  LKW was 2d earlier.   WBC was elevated to 17  CK was 1090, elevated K and hyponatremia.  JCARLOS w creatinine 1.3 with baseline ~0.6.  Ammonia level was wnl     He remained altered despite correction of electrolyte derangements, and neurology was consulted.     Developed fever on 10/26/24  Blood cultures collected om 10/26/24 are positive for MSSA     He had LP today   WBC 0, RBC 13, protein 29, glc 86    Small volume paracentesis on 10/25 with normal WBC count    LFTs are stable   WBC remains elevated     HE was considered by GI despite normal ammonia level.  Rifaximin was ordered but has been held due to SLT concerns.  NGTD was placed today for po meds.        Current abx:  Nafcillin 2g q4, s 10/27    Amp, acyclovir, rocephin, vanc added 10/26       Past Surgical History:       Diagnosis Date    Alcohol abuse     Alcohol withdrawal (HCC)     Anxiety     Arthritis     Bipolar 1 disorder (HCC)     CAD (coronary artery disease)     Cannabis abuse     Centrilobular emphysema (HCC)     Hyperlipidemia     Hypertension     Lung nodule     Neuropathy     Thrombocytopenia (HCC)     Tobacco abuse     Tuberculosis          Procedure Laterality Date    COLONOSCOPY N/A 11/10/2021    COLONOSCOPY POLYPECTOMY SNARE/COLD [facility]SY [facility]    ELBOW SURGERY Right 2002    scraped the bone    ENDOSCOPY, COLON, DIAGNOSTIC      HERNIA REPAIR Right 1971    inguinal hernia repair    NECK SURGERY N/A 2002    Fusion Q1-5-xdeywgc by  Weekly  bacitracin-polymyxin b (POLYSPORIN) ointment, , Topical, BID      Allergies   Allergen Reactions    Sulfa Antibiotics Rash          REVIEW OF SYSTEMS:    CONSTITUTIONAL:  negative for fevers, chills, diaphoresis, activity change, appetite change, fatigue, night sweats and unexpected weight change.   EYES:  negative for blurred vision, eye discharge, visual disturbance and icterus  HEENT:  negative for hearing loss, tinnitus, ear drainage, sinus pressure, nasal congestion, epistaxis and snoring  RESPIRATORY:  No cough, shortness of breath, hemoptysis  CARDIOVASCULAR:  negative for chest pain, palpitations, exertional chest pressure/discomfort, edema, syncope  GASTROINTESTINAL:  negative for nausea, vomiting, diarrhea, constipation, blood in stool and abdominal pain  GENITOURINARY:  negative for frequency, dysuria, urinary incontinence, decreased urine volume, and hematuria  HEMATOLOGIC/LYMPHATIC:  negative for easy bruising, bleeding and lymphadenopathy  ALLERGIC/IMMUNOLOGIC:  negative for recurrent infections, angioedema, anaphylaxis and drug reactions  ENDOCRINE:  negative for weight changes and diabetic symptoms including polyuria, polydipsia and polyphagia  MUSCULOSKELETAL:  negative for acute joint swelling, decreased range of motion and muscle weakness  NEUROLOGICAL:  negative for headaches, slurred speech, unilateral weakness  PSYCHIATRIC/BEHAVIORAL: negative for hallucinations, behavioral problems, confusion and agitation.       Objective:   PHYSICAL EXAM:      VITALS:  /71   Pulse 93   Temp (!) 94.6 °F (34.8 °C) (Bladder)   Resp 16   Ht 1.7 m (5' 6.93\")   Wt 67.3 kg (148 lb 5.9 oz)   SpO2 92%   BMI 23.29 kg/m²      24HR INTAKE/OUTPUT:    Intake/Output Summary (Last 24 hours) at 10/28/2024 1124  Last data filed at 10/28/2024 1000  Gross per 24 hour   Intake 2123.12 ml   Output 830 ml   Net 1293.12 ml     CONSTITUTIONAL:  Awake, alert, cooperative, no apparent distress  Appears stated

## 2024-10-28 NOTE — PROGRESS NOTES
Shift: [id number]      Admitting diagnosis: Hyponatremia, Hyperkalemia, JCARLOS    Presentation to hospital: Lethargic/ Altered mental status    Surgery: no     Nursing assessment at handoff  stable    Emergency Contact/POA: Mona Robison (Sister)  Family updated: no    Most recent vitals: /80   Pulse 96   Temp 97.8 °F (36.6 °C) (Oral)   Resp 16   Ht 1.702 m (5' 7.01\")   Wt 67.3 kg (148 lb 5.9 oz)   SpO2 96%   BMI 23.29 kg/m²      Rhythm: Sinus Tachycardia  Sinus Tachycardia`    NC/HFNC- 0 lpm, on Room air  Respiratory support: - No ventilator support    Vent days: Day 0    Increased O2 requirements: no    Admission weight Weight - Scale: 62.7 kg (138 lb 3.7 oz)  Today's weight   Wt Readings from Last 1 Encounters:   10/28/24 67.3 kg (148 lb 5.9 oz)         UOP >30ml/hr: yes    [place] need assessed each shift: yes    Restraints: no  Order current and documentation up to date?    Lines/Drains  LDA Insertion Date Discontinued Date Dressing Changes   PIV x1  PICC right 10/25/24  10/25/24     TLC       Arterial       [place]  10/26/24     Vas Cath      ETT       Surgical drains        Night Shift Hospitalist Interventions    Problem(Brief) Date Time Intervention Physician contacted          1 POSTIVE CWAW   SEE MAR  NO                                  Drip rates at handoff:    sodium chloride 75 mL/hr at 10/28/24 0635    sodium chloride      sodium chloride         Hospital Course Daily Updates:  Admit Day# 0 10/25/24  -Received hypertonic saline in ER   -CT head, chest and abdomen done  -Paracentesis   -PICC line placed    Admit Day# 0 10/25/24 Nights  Takeover patient around 0500: noted distended bladder--bladder scanned >490ml, straight cath 500ml  -Patient was lethagic with restless legs upon assessment but after straight cath, patient calms down  -GCS 9 (E2V2M5) from GCS 14: MD made aware    Admit Day #1 10/26/24 days  -Pt continues to be restless legs and lethargic, responsive only to pain this AM and improved  10/26/24  0404 10/27/24  0435   * 89*   ALT 71* 65*     PT/INR:   Recent Labs     10/25/24  1155   INR 1.94*     APTT: No results for input(s): \"APTT\" in the last 72 hours.  ABG:   Recent Labs     10/26/24  0732   PHART 7.501*   RWC1AWH 27.5*   PO2ART 77.2     Consults (if GI or Nephrology- which group?)-  Hospitalist, Nephro, neuro, critical care

## 2024-10-28 NOTE — PLAN OF CARE
Problem: Discharge Planning  Goal: Discharge to home or other facility with appropriate resources  10/28/2024 1024 by Lucia Hsu RN  Outcome: Progressing     Problem: Pain  Goal: Verbalizes/displays adequate comfort level or baseline comfort level  10/28/2024 1024 by Lucia Hsu RN  Outcome: Progressing  Flowsheets (Taken 10/28/2024 0900)  Verbalizes/displays adequate comfort level or baseline comfort level: Encourage patient to monitor pain and request assistance     Problem: Safety - Adult  Goal: Free from fall injury  10/28/2024 1024 by Lucia Hsu RN  Outcome: Progressing     Problem: Neurosensory - Adult  Goal: Achieves stable or improved neurological status  Outcome: Not Progressing     Problem: Neurosensory - Adult  Goal: Absence of seizures  10/28/2024 1024 by Lucia Hsu RN  Outcome: Progressing     Problem: Neurosensory - Adult  Goal: Achieves maximal functionality and self care  Outcome: Not Progressing     Problem: Cardiovascular - Adult  Goal: Absence of cardiac dysrhythmias or at baseline  Outcome: Progressing     Problem: Skin/Tissue Integrity - Adult  Goal: Incisions, wounds, or drain sites healing without S/S of infection  Outcome: Progressing  Flowsheets  Taken 10/28/2024 1021 by Lucia Hsu RN  Incisions, Wounds, or Drain Sites Healing Without Sign and Symptoms of Infection: ADMISSION and DAILY: Assess and document risk factors for pressure ulcer development  Taken 10/27/2024 2348 by Letty Muñoz, RN  Incisions, Wounds, or Drain Sites Healing Without Sign and Symptoms of Infection:   Implement wound care per orders   Initiate isolation precautions as appropriate   Initiate pressure ulcer prevention bundle as indicated     Problem: Skin/Tissue Integrity - Adult  Goal: Oral mucous membranes remain intact  Outcome: Progressing  Flowsheets (Taken 10/27/2024 2348 by Letty Muñoz, RN)  Oral Mucous Membranes Remain Intact:   Assess oral mucosa and hygiene  and intervene to maintain adequate nutrition, hydration, elimination, sleep and activity  6. If unable to ensure safety without constant attention obtain sitter and review sitter guidelines with assigned personnel  7. Initiate Psychosocial CNS and Spiritual Care consult, as indicated  Outcome: Not Progressing

## 2024-10-28 NOTE — PROGRESS NOTES
[facility] Medicine Progress Note  V 10.25      Date of Admission: 10/24/2024  Hospital Day: 5    Chief Admission Complaint:  AMS    Subjective:  Patient seen at bedside. Patient is lethargic and somnolent. Patient no pain anywhere, sob, fevers, chills, trouble urinating. Per nurse no acute events over night.    Presenting Admission History:       Kaleb Persaud is a 64 y.o. male, chronic alcoholic who was brought to the emergency room for evaluation following fall.  Patient is intermittently confused which makes history limited.  I was unable to obtain any meaningful history from the patient.  Apparently, patient was found down at home.  He was noted to be confused.  Patient is complaining of generalized weakness otherwise denies any other complaint.  He denies chest pain, no shortness of breath, no fever, no chills, no nausea, no vomiting, no diarrhea.     On presentation to the emergency room, patient was noted to be afebrile, temperature 98.1 °F, pulse rate of 111 bpm, respiratory rate of 23, blood pressure 149/103 mmHg.  Patient saturating 100% on room air.  Labs significant for sodium 138, potassium 5.6, blood rate 79, bicarb 16, anion gap 13, glucose 132, CK level 1090.  Albumin 2.7, , ALT 79, alkaline phosphatase 337 and total bilirubin 2.1.  WBC 17.0, anemia with hemoglobin 8.8 and hematocrit 26.0.  .1.  CT scan of the brain as well as CT scan of the cervical spine with unrevealing.  Patient was started on hypertonic saline in the emergency room.  He has been admitted to the hospital for further evaluation and management.    Assessment/Plan:      Current Principal Problem:  Hyponatremia    Acute encephalopathy likely secondary to metabolic derangements  Acute on chronic hyponatremia  -Patient noted with sodium level-108  -Hx of chronic hyponatremia with sodium level ranges between 127-132  -Chronic alcoholic.  Hyponatremia in the setting of chronic alcohol abuse likely hypo-osmolar    --------------------------------------------------  B. Risk of Treatment (any 1)   [x] Drugs/treatments that require intensive monitoring for toxicity    [x] IV ABX (Vancomycin, Aminoglycosides, etc)     [] Post-Cath/Contrast study requiring serial monitoring  [] IV Narcotic analgesia   [] Aggressive IV diuresis  [x] Hypertonic Saline   [x] Critical electrolyte abnormalities requiring IV replacement  [] Insulin - Scheduled/SSI or Insulin gtt  [] Anticoagulation (Heparin gtt or Coumadin - other anticoagulants in special circumstances)   [] Cardiac Medications (IV Amiodarone, Tikosyn, etc)  [] Hemodialysis  [] Other -  [] Change in code status:    [] Decision to escalate care:    [] Major surgery/procedure with associated risk factors:    --------------------------------------------------  C. Data (any 2)  [x] Data Review (any 3)  [x] Consultant notes from yesterday/today  [x] All available current labs reviewed interpreted for clinical significance   [x] Appropriate follow-up labs were ordered  [] Collateral history obtained:    [] Independent Interpretation of tests (any 1)  [] Telemetry (Rhythm Strip) personally reviewed and interpreted as documented above.      [] Imaging personally reviewed and interpreted, includes:    [x] Discussion (any 1)  [x] Multi-Disciplinary Rounds with Case Management  [] Discussed management of the case with:         Labs:  Personally reviewed on 10/28/2024 and interpreted for clinical significance as documented above.     Recent Labs     10/26/24  0404 10/26/24  0732 10/27/24  0435 10/28/24  0630 10/28/24  0930   WBC 15.0*  --  15.9* 15.6*  --    HGB 7.2*   < > 7.7* 7.1* 7.3*   HCT 21.3*  --  23.2* 21.7* 22.3*     --  146 131*  --     < > = values in this interval not displayed.     Recent Labs     10/26/24  0404 10/26/24  1240 10/27/24  0435 10/27/24  0820 10/27/24  1332 10/27/24  1812 10/28/24  0930   *   < > 121*   < > 124* 126* 129*   K 4.0   < > 3.6   < > 3.8 3.7

## 2024-10-28 NOTE — PROGRESS NOTES
Speech Language Pathology  Attempt Note     Name: Kaleb Persaud  : 1960  Medical Diagnosis: Hyponatremia [E87.1]  JCARLOS (acute kidney injury) (HCC) [N17.9]  Altered mental status, unspecified altered mental status type [R41.82]      SLP attempted to see pt for bedside swallow evaluation (BSE), reviewed pt's chart, spoke with RN.  Pt seen sleeping in bed, had LP completed earlier this date.  Pt to remain supine post-procedure until ~1100 per RN.  SLP to re-attempt as pt's condition and schedule allows.  RN aware.  No charges.    Mary Beth Purdy M.S. CCC-SLP  Speech-language pathologist  [phone]

## 2024-10-28 NOTE — CARE COORDINATION
Attempted to meet with the Pt's family to discuss SNF recs. No one at bedside at this time. Per nursing, they stepped off the floor for a bit. Will re attempt at a later time. Pt is not able to participate in discussion at this time. Obtunded. NG in place for meds.

## 2024-10-28 NOTE — OR NURSING
Image guided lumbar puncture completed. Specimen sent to lab for culture. Pt tolerated procedure without any signs or symptoms of distress. Vital signs stable. Patient to lat flat with HOB raised 15 degrees and bedrest for 2 hours. Patient has a bandage to lower back that is clean, dry and intact. Report to Lucia LIZ . Pt transported back to 235 in stable condition via bed by nurse.     Opening pressure: 9  Closing pressure:   15ml of CSF fluid collect.  Color of CSF fluid: clear    Vital Signs  Vitals:    10/28/24 0800   BP: (!) 140/91   Pulse: 98   Resp: 22   Temp:    SpO2: 98%    (vital signs in table format)

## 2024-10-28 NOTE — PROGRESS NOTES
PULMONARY AND CRITICAL CARE INPATIENT NOTE        Kaleb Persaud   : 1960  MRN: [id number]     Admitting Physician: Hubert Owens MD  Attending Physician: Arias Madrigal MD  PCP: Patricia Vieira APRN - JENNIFER    Admission: 10/24/2024   Date of Service: 10/28/2024    Chief Complaint   Patient presents with    Head Laceration     Pt arrived via EMS. Pt reports 'had a few drinks today', lacerations to the back of head and BLEs.           ASSESSMENT & PLAN       64 y.o. pleasant  male patient with:    Assessment:  Acute encephalopathy, toxic metabolic.  Negative head CT  Scalp laceration  Acute on chronic hyponatremia.Beer Potomania   Sepsis/MSSA  JCARLOS/hyper-K/AGMA  Acute rhabdomyolysis  Alcoholic hepatitis/cirrhosis  Ascites. Neg SBP  Protein energy malnutrition  RLL lung nodule. Needs f.u.  Emphysema  Bipolar disorder  Metabolic syndrome: HTN, HLD  Leg wounds    Plan:              Follow up LP results  F/u echo  IV fluid/sodium level.  Nephrology  Continue abx/ID on board  Hold Wellbutrin  EEG  Lactulose enema bid  NG  Wound care    LOS: Hospital Day: 5     Code:Full Code      Time spent on this patient, excluding procedures time, is ~35 minutes.  Time was spent on reviewing the EMR, reviewing overnight events, interpreting labs and imaging, interviewing and directly examining the patient, ventilator/breathing assistance device/drip review and adjustment, running multidisciplinary rounds and coordinating critical care plan with bedside nurse, and other care providers.          Subjective/Objective       Summary:   64-year-old male patient was admitted on  with acute encephalopathy/found down.    Interval history/Encounter 10/28/2024   Restless needing ativan   Worsening enceph  LP done    Objective    Test Results:  Imaging:  I have reviewed radiology images personally.    MRI BRAIN W WO CONTRAST    Result Date: 10/27/2024  Limited by motion artifact. No acute intracranial

## 2024-10-28 NOTE — PROGRESS NOTES
PROGRESS NOTE    HPI: Kaleb Persaud is a(n)64 y.o. male admitted for work-up and treatment for Hyponatremia [E87.1]  JCARLOS (acute kidney injury) (HCC) [N17.9]  Altered mental status, unspecified altered mental status type [R41.82].     We are following for cirrhosis.    Subjective:     He is obtunded today.  NGT placed as he was unable to take medications with AMS.      Objective:     I/O last 3 completed shifts:  In: 4334.3 [I.V.:2182.3; IV Piggyback:2152]  Out: 1435 [Urine:1435]      /71   Pulse 93   Temp (!) 94.6 °F (34.8 °C) (Bladder)   Resp 16   Ht 1.676 m (5' 6\")   Wt 67.1 kg (148 lb)   SpO2 92%   BMI 23.89 kg/m²     Physical Exam:  HEENT: anicteric sclera, oropharyngeal membranes pink and moist. + NGT.  Cor: RRR  Lungs: non-labored, no respiratory distress  Abdomen: + ascites, no apparent tenderness  Neuro: obtunded.      Results:   Lab Results   Component Value Date    ALT 60 (H) 10/28/2024    AST 68 (H) 10/28/2024    GGT 2,794 (H) 02/17/2024    ALKPHOS 215 (H) 10/28/2024    BILIDIR 1.2 (H) 10/26/2024    INR 1.40 (H) 10/28/2024    LIPASE 48.0 12/29/2023     Lab Results   Component Value Date    WBC 15.6 (H) 10/28/2024    HGB 7.3 (L) 10/28/2024    HCT 22.3 (L) 10/28/2024    .7 (H) 10/28/2024     (L) 10/28/2024     BUN/Cr/glu/ALT/AST/amyl/lip:  9/0.6/--/60/68/--/-- (10/28 0930)  XR ABDOMEN (KUB) (SINGLE AP VIEW)    Result Date: 10/28/2024  Enteric tube with tip and side-port in the proximal stomach     IR LUMBAR PUNCTURE FOR DIAGNOSIS    Result Date: 10/28/2024  Successful fluoroscopic-guided lumbar puncture.  Opening pressure 9.     MRI BRAIN W WO CONTRAST    Result Date: 10/27/2024  Limited by motion artifact. No acute intracranial abnormality.  No mass effect or abnormal intracranial enhancement. 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 Mild parenchymal volume loss. Moderate ventriculomegaly, disproportionate to the

## 2024-10-28 NOTE — PROGRESS NOTES
Comprehensive Nutrition Assessment    Type and Reason for Visit:  Reassess    Nutrition Recommendations/Plan:   Continue Regular diet.  Continue Ensure Plus TID.     Malnutrition Assessment:  Malnutrition Status:  At risk for malnutrition (10/25/24 1506)    Context:  Chronic Illness     Findings of the 6 clinical characteristics of malnutrition:  Energy Intake:  Mild decrease in energy intake   Weight Loss:  Mild weight loss (specify amount and time period) (-9.8% x 6 months)     Fluid Accumulation:  No significant fluid accumulation     Strength:  Not Performed    Nutrition Assessment:    Follow-up. Pt continues on Regular diet with Ensure TID but no PO intakes since admission d/t ongoing encephalopathy. SLP consulted for eval. If pt PO intakes don't improvie over the next 48 hours, would consider possible NG with alternative nutrition. Encourage PO intakes.    Nutrition Related Findings:    BM 10/27; No edema noted; Na 126, Glu 133 Wound Type: Skin Tears       Current Nutrition Intake & Therapies:    Average Meal Intake: 0%  Average Supplements Intake: 0%  ADULT ORAL NUTRITION SUPPLEMENT; Breakfast, Lunch, Dinner; Standard High Calorie/High Protein Oral Supplement  ADULT DIET; Regular    Anthropometric Measures:  Height: 170 cm (5' 6.93\")  Ideal Body Weight (IBW): 148 lbs (67 kg)       Current Body Weight: 67.3 kg (148 lb 5.9 oz), 100.2 % IBW. Weight Source: Bed Scale  Current BMI (kg/m2): 23.3                          BMI Categories: Normal Weight (BMI 18.5-24.9)    Estimated Daily Nutrient Needs:  Energy Requirements Based On: Kcal/kg  Weight Used for Energy Requirements: Current  Energy (kcal/day): 7577-5285 kcal (25-30 kcal/kg 63 kg CBW)  Weight Used for Protein Requirements: Current  Protein (g/day): 63-82 gm (1-1.3 gm/kg 63 kg CBW)  Method Used for Fluid Requirements: 1 ml/kcal  Fluid (ml/day):      Nutrition Diagnosis:   Inadequate oral intake related to cognitive or neurological impairment as evidenced

## 2024-10-28 NOTE — PROGRESS NOTES
Neurology Progress Note    ID: Kaleb Persaud is a 64 y.o. male    : 1960     LOS: 3 days     ASSESSMENT    1.  Prolonged encephalopathy.  2.  Hyponatremia.  3.  Generalized brain atrophy, suspicious underlying dementia.  4.  Vitamin D sufficiency.    The patient is more awake and able to follow simple commands.    But the patient is still very drowsy.    MRI brain showed no acute pathology but significant brain atrophy.  There was no history of dementia based on medical record review.  Normal B12 and ammonia level.    From neurology perspective, this is likely metabolic encephalopathy in the context of sepsis, hyponatremia, and JCARLOS.  There was no acute CVA from the MRI brain.  CSF profiles pending.  EEG pending    PLAN    1.  Agree to continue thiamine supplement.  2.  Continue vitamin D supplement.  3.  EEG.  4.  Correction of JCARLOS, sepsis and hyponatremia per primary team.  5.  Avoid hyponatremia correction more than 8 mEq/day.  6.  Minimize sedation and anticholinergic medication.    Medications:  Scheduled Meds:    nafcillin  2,000 mg IntraVENous Q4H    pantoprazole  40 mg IntraVENous Daily    thiamine (B-1) 500 mg in sodium chloride 0.9 % 100 mL IVPB  500 mg IntraVENous TID    ascorbic acid  500 mg Oral Daily    buPROPion  150 mg Oral QAM    ferrous sulfate  325 mg Oral Daily with breakfast    budesonide-formoterol  2 puff Inhalation BID RT    risperiDONE  0.5 mg Oral Nightly    sodium chloride flush  5-40 mL IntraVENous 2 times per day    enoxaparin  40 mg SubCUTAneous Daily    multivitamin  1 tablet Oral Daily    folic acid  1 mg Oral Daily    nicotine  1 patch TransDERmal Daily    [Held by provider] thiamine  100 mg Oral Daily    mupirocin   Each Nostril BID    vitamin D  50,000 Units Oral Weekly    lactulose  20 g Oral BID    bacitracin-polymyxin b   Topical BID     Continuous Infusions:    sodium chloride 75 mL/hr at 10/28/24 0936    sodium chloride       PRN Meds: sodium chloride, albuterol  sulfate HFA, hydrOXYzine HCl, sodium chloride flush, potassium chloride **OR** potassium alternative oral replacement **OR** potassium chloride, magnesium sulfate, ondansetron **OR** ondansetron, polyethylene glycol, LORazepam **OR** LORazepam **OR** LORazepam **OR** LORazepam **OR** LORazepam **OR** LORazepam **OR** LORazepam **OR** LORazepam    OBJECTIVE  Vital signs in the last 24 hours:  Vitals:    10/28/24 0900   BP: 104/75   Pulse:    Resp:    Temp: (!) 94.6 °F (34.8 °C)   SpO2:        Intake/Output last 3 shifts:  I/O last 3 completed shifts:  In: 4334.3 [I.V.:2182.3; IV Piggyback:2152]  Out: 1435 [Urine:1435]    Intake/Output this shift:  I/O this shift:  In: -   Out: 60 [Urine:60]    Yesterday's Weight:  Wt Readings from Last 1 Encounters:   10/28/24 67.3 kg (148 lb 5.9 oz)       SUBJECTIVE  The patient remains drowsy.    ROS:  Negative except in subjective.    Neurological examination:  MENTAL STATUS:  Alert but disoriented to time, place, or person.  LANG/SPEECH: No dysarthria.  CRANIAL NERVES: No facial asymmetry.  MOTOR: No pronator drift or leg drift.  REFLEXES: Generalized 2+.  SENSORY: Grossly intact.  COORD: No tremor.    Labs and Imaging:  I reviewed labs and brain imaging.    50 minutes were spent with the patient with greater than 50% of the time spent in counseling and coordination of care.    Mildred Christianson MD

## 2024-10-28 NOTE — OR NURSING
Pt arrived for image guided lumbar puncture by Babita. Procedure explained including the risk and benefits of the procedure. All questions answered,Consent confirmed with sister. Vital signs stable. Labs, allergies, medications, and code status reviewed. No contraindications noted. Patient was placed prone on the IR table. Time out completed prior to procedure start.     Vital Signs  Vitals:    10/28/24 0800   BP: (!) 140/91   Pulse: 98   Resp: 22   Temp:    SpO2: 98%    (vital signs in table format)      Allergies  Sulfa antibiotics (allergies)    Labs  Lab Results   Component Value Date    INR 1.40 (H) 10/28/2024    PROTIME 17.3 (H) 10/28/2024     Lab Results   Component Value Date    CREATININE 0.7 (L) 10/27/2024    BUN 8 10/27/2024     (L) 10/27/2024    K 3.7 10/27/2024    CL 93 (L) 10/27/2024    CO2 19 (L) 10/27/2024     Lab Results   Component Value Date    WBC 15.6 (H) 10/28/2024    HGB 7.1 (L) 10/28/2024    HCT 21.7 (L) 10/28/2024    .7 (H) 10/28/2024     (L) 10/28/2024

## 2024-10-28 NOTE — PROCEDURES
PROCEDURE NOTE  Date: 10/28/2024   Name: Kaleb Persaud  YOB: 1960    Procedures    Pt only able to tolerate 25 mins of 2 hour EEG. Pt is restless and pulling leads/NG tube during study. Pt is unable to be redirected. Uploaded what was able to be obtained to .

## 2024-10-28 NOTE — PLAN OF CARE
Problem: Discharge Planning  Goal: Discharge to home or other facility with appropriate resources  Outcome: Progressing  Flowsheets (Taken 10/27/2024 0800 by Melissa Petersen, RN)  Discharge to home or other facility with appropriate resources:   Identify barriers to discharge with patient and caregiver   Arrange for needed discharge resources and transportation as appropriate   Identify discharge learning needs (meds, wound care, etc)   Arrange for interpreters to assist at discharge as needed   Refer to discharge planning if patient needs post-hospital services based on physician order or complex needs related to functional status, cognitive ability or social support system     Problem: Pain  Goal: Verbalizes/displays adequate comfort level or baseline comfort level  Outcome: Progressing     Problem: Safety - Adult  Goal: Free from fall injury  Outcome: Progressing     Problem: Neurosensory - Adult  Goal: Absence of seizures  Outcome: Progressing     Problem: Skin/Tissue Integrity - Adult  Goal: Incisions, wounds, or drain sites healing without S/S of infection  Recent Flowsheet Documentation  Taken 10/27/2024 0800 by Melissa Petersen RN  Incisions, Wounds, or Drain Sites Healing Without Sign and Symptoms of Infection:   ADMISSION and DAILY: Assess and document risk factors for pressure ulcer development   TWICE DAILY: Assess and document skin integrity  Goal: Oral mucous membranes remain intact  Recent Flowsheet Documentation  Taken 10/27/2024 0800 by Melissa Petersen, RN  Oral Mucous Membranes Remain Intact:   Assess oral mucosa and hygiene practices   Implement preventative oral hygiene regimen   Implement oral medicated treatments as ordered

## 2024-10-28 NOTE — PROGRESS NOTES
Nephrology Progress Note   Logic Instrument.SwitchNote      Sub/interval history  No new complaints.   Na dropped to 121 to 126   Drowsy today post Lp    Last 24 h uop 950 cc  Had d5w yesterday     ROS: Unable to obtain  PSFH: No visitor    Scheduled Meds:   nafcillin  2,000 mg IntraVENous Q4H    pantoprazole  40 mg IntraVENous Daily    thiamine (B-1) 500 mg in sodium chloride 0.9 % 100 mL IVPB  500 mg IntraVENous TID    ascorbic acid  500 mg Oral Daily    buPROPion  150 mg Oral QAM    ferrous sulfate  325 mg Oral Daily with breakfast    budesonide-formoterol  2 puff Inhalation BID RT    risperiDONE  0.5 mg Oral Nightly    sodium chloride flush  5-40 mL IntraVENous 2 times per day    enoxaparin  40 mg SubCUTAneous Daily    multivitamin  1 tablet Oral Daily    folic acid  1 mg Oral Daily    nicotine  1 patch TransDERmal Daily    [Held by provider] thiamine  100 mg Oral Daily    mupirocin   Each Nostril BID    vitamin D  50,000 Units Oral Weekly    lactulose  20 g Oral BID    bacitracin-polymyxin b   Topical BID     Continuous Infusions:   sodium chloride 75 mL/hr at 10/28/24 0936    sodium chloride       PRN Meds:.sodium chloride, albuterol sulfate HFA, hydrOXYzine HCl, sodium chloride flush, potassium chloride **OR** potassium alternative oral replacement **OR** potassium chloride, magnesium sulfate, ondansetron **OR** ondansetron, polyethylene glycol, LORazepam **OR** LORazepam **OR** LORazepam **OR** LORazepam **OR** LORazepam **OR** LORazepam **OR** LORazepam **OR** LORazepam    Objective/     Vitals:    10/28/24 0849 10/28/24 0854 10/28/24 0859 10/28/24 0900   BP:    104/75   Pulse: 96 97 98    Resp: 17 17 22    Temp:    (!) 94.6 °F (34.8 °C)   TempSrc:    Bladder   SpO2: 95% 96% 92%    Weight:       Height:         24HR INTAKE/OUTPUT:    Intake/Output Summary (Last 24 hours) at 10/28/2024 0944  Last data filed at 10/28/2024 0900  Gross per 24 hour   Intake 2674.91 ml   Output 910 ml   Net 1764.91 ml     Gen: Thin

## 2024-10-28 NOTE — PROGRESS NOTES
Occupational Therapy  Facility/Department: [facility] C2 CARD TELEMETRY  Daily Treatment Note  NAME: Kaleb Persaud  : 1960  MRN: [id number]    Date of Service: 10/28/2024    Discharge Recommendations:  Subacute/Skilled Nursing Facility  OT Equipment Recommendations  Equipment Needed: No  Other: Defer      Patient Diagnosis(es): The primary encounter diagnosis was Altered mental status, unspecified altered mental status type. Diagnoses of Hyponatremia, JCARLOS (acute kidney injury) (HCC), and MSSA bacteremia were also pertinent to this visit.     Assessment   Assessment: Pt supine in bed at start of session. Pt tolerates OT session poorly. Pt completes rolling in bed with total assist x2 - Pt put in chair position in bed ~3-5 minutes, followed 50% of commands but unable to be aroused enough to complete EOB activity this date. Pt requires total assist to wash face. Pt is limited by fatigue, weakness, endurance, alertness - OT recommends SNF to increase pt's independence with ADLs/ mobility. Pt will continue to benefit from continued skilled OT services at this time. Co-tx collaboration this date to safely meet goals and will have better occupational performance outcomes with in a co-treatment than 1:1 session.  Activity Tolerance: Patient limited by fatigue  Discharge Recommendations: Subacute/Skilled Nursing Facility  Equipment Needed: No  Other: Defer     Plan  Occupational Therapy Plan  Times Per Week: 4-6x/wk  Current Treatment Recommendations: Strengthening;ROM;Balance training;Functional mobility training;Endurance training;Pain management;Safety education & training;Patient/Caregiver education & training;Equipment evaluation, education, & procurement;Self-Care / ADL;Co-Treatment;Positioning;Coordination training;Cognitive reorientation    Restrictions  Restrictions/Precautions  Restrictions/Precautions: General Precautions;Fall Risk  Position Activity Restriction  Other position/activity restrictions: rm  feeding tasks at EOB w/ min A and 1-2 VC to sequence by 11/01  Short Term Goal 2: Pt will perform seated grooming tasks w/ CGA and no VC for sequencing  Short Term Goal 3: Pt will tolerate 1 min stance at RW w/ min Ax2 in prep for ADL participation  Short Term Goal 4: Pt will tolerate x15 BUE ther ex  Patient Goals   Patient goals : unable to state    AM-PAC - ADL  AM-PAC Daily Activity - Inpatient   How much help is needed for putting on and taking off regular lower body clothing?: Total  How much help is needed for bathing (which includes washing, rinsing, drying)?: Total  How much help is needed for toileting (which includes using toilet, bedpan, or urinal)?: Total  How much help is needed for putting on and taking off regular upper body clothing?: Total  How much help is needed for taking care of personal grooming?: Total  How much help for eating meals?: Total  AM-PAC Inpatient Daily Activity Raw Score: 6  AM-PAC Inpatient ADL T-Scale Score : 17.07  ADL Inpatient CMS 0-100% Score: 100  ADL Inpatient CMS G-Code Modifier : CN    Therapy Time   Individual Concurrent Group Co-treatment   Time In       1341   Time Out       1353   Minutes       12   Timed Code Treatment Minutes: 12 Minutes       Nahomi Manuel OT

## 2024-10-28 NOTE — PROGRESS NOTES
Spoke to IR RN re: pt/inr 17.3 & 1.4 prior to attempt for LP. Kaylen states that pt is ok to take per LISHA LIZ-Domi

## 2024-10-28 NOTE — PROGRESS NOTES
Physical Therapy  Facility/Department: [facility] C2 CARD TELEMETRY  Daily Treatment Note  NAME: Kaleb Persaud  : 1960  MRN: [id number]    Date of Service: 10/28/2024    Discharge Recommendations:  Subacute/Skilled Nursing Facility   PT Equipment Recommendations  Equipment Needed: No  Other: defer    Therapy discharge recommendations take into account each patient's current medical complexities and are subject to input/oversight from the patient's healthcare team.   Barriers to Home Discharge:   [x] Steps to access home entry or bed/bath:   [x] Unable to transfer, ambulate, or propel wheelchair household distances without assist   [x] Limited available assist at home upon discharge    [x] Patient or family requests d/c to post-acute facility    [x] Poor cognition/safety awareness for d/c to home alone    []Unable to maintain ordered weight bearing status    [] Patient with salient signs of long-standing immobility   [x] Patient is at risk for falls    [] Other:    If pt is unable to be seen after this session, please let this note serve as discharge summary.  Please see case management note for discharge disposition.  Thank you.    Patient Diagnosis(es): The primary encounter diagnosis was Altered mental status, unspecified altered mental status type. Diagnoses of Hyponatremia, JCARLOS (acute kidney injury) (HCC), and MSSA bacteremia were also pertinent to this visit.    Assessment  Assessment: Pt presents to [facility] for hyponatremia. Pt presents below baseline for PT treatment with decreased mobility and level of arousal. Pt tolerated session poor. Pt unable to open eyes or follow commands consistently to complete further mobility. Pt HR in chair position in bed increased to 122 which also limited session. Pt repositioned in bed. Pt would continue to benefit from skilled PT to aid in the above deficits and a safe DC SNF when medically appropriate.    Pt seen as co-treat with OT due to complexity and level of assist in

## 2024-10-29 NOTE — PROGRESS NOTES
[facility] Medicine Progress Note  V 10.25      Date of Admission: 10/24/2024  Hospital Day: 6    Chief Admission Complaint:  AMS    Subjective:  Patient seen at bedside. Patient is lethargic and somnolent. He is more interactive this morning and able to follow commands.    Presenting Admission History:       Kaleb Persaud is a 64 y.o. male, chronic alcoholic who was brought to the emergency room for evaluation following fall.  Patient is intermittently confused which makes history limited.  I was unable to obtain any meaningful history from the patient.  Apparently, patient was found down at home.  He was noted to be confused.  Patient is complaining of generalized weakness otherwise denies any other complaint.  He denies chest pain, no shortness of breath, no fever, no chills, no nausea, no vomiting, no diarrhea.     On presentation to the emergency room, patient was noted to be afebrile, temperature 98.1 °F, pulse rate of 111 bpm, respiratory rate of 23, blood pressure 149/103 mmHg.  Patient saturating 100% on room air.  Labs significant for sodium 138, potassium 5.6, blood rate 79, bicarb 16, anion gap 13, glucose 132, CK level 1090.  Albumin 2.7, , ALT 79, alkaline phosphatase 337 and total bilirubin 2.1.  WBC 17.0, anemia with hemoglobin 8.8 and hematocrit 26.0.  .1.  CT scan of the brain as well as CT scan of the cervical spine with unrevealing.  Patient was started on hypertonic saline in the emergency room.  He has been admitted to the hospital for further evaluation and management.    Assessment/Plan:      Current Principal Problem:  Hyponatremia    Acute encephalopathy likely secondary to metabolic derangements  Acute on chronic hyponatremia  JCARLOS  -Patient noted with sodium level-108  -Hx of chronic hyponatremia with sodium level ranges between 127-132  -Chronic alcoholic.  Hyponatremia in the setting of chronic alcohol abuse likely hypo-osmolar hyponatremia due beer potomania/ decrease  3.7   CL 89*   < > 92*   < > 99  --  99 101 102   CO2 21   < > 19*   < > 20*  --  19* 19* 19*   BUN 7   < > 8   < > 9  --  13 16 18   CREATININE 0.7*   < > 0.7*   < > 0.6*  --  1.1 1.6* 1.9*   CALCIUM 7.6*   < > 7.3*   < > 7.5*  --  7.8* 7.9* 8.1*   MG 1.89  --  1.86  --  1.90  --   --   --   --    PHOS 1.9*  --  3.2  --   --   --  2.3*  --   --     < > = values in this interval not displayed.     No results for input(s): \"PROBNP\", \"TROPHS\" in the last 72 hours.  No results for input(s): \"LABA1C\" in the last 72 hours.  Recent Labs     10/27/24  0435 10/28/24  0930 10/29/24  0445   AST 89* 68* 67*   ALT 65* 60* 57*   BILIDIR  --   --  2.5*   BILITOT 1.3* 1.2* 3.2*   ALKPHOS 229* 215* 234*     Recent Labs     10/28/24  0630   INR 1.40*       Urine Cultures: No results found for: \"LABURIN\"  Blood Cultures:   Lab Results   Component Value Date/Time    BC  10/26/2024 09:09 AM     mecA gene not detected  See additional report for complete BCID panel.      BC POSITIVE for  Isolated two of two sets   10/26/2024 09:09 AM     Lab Results   Component Value Date/Time    BLOODCULT2  10/26/2024 09:09 AM     POSITIVE for  No further workup  Isolated two of two sets  Refer to culture [id number] for sensitivity results       Organism:   Lab Results   Component Value Date/Time    ORG Staph aureus DNA Detected 10/26/2024 09:09 AM    ORG Staphylococcus aureus 10/26/2024 09:09 AM    ORG Staphylococcus aureus 10/26/2024 09:09 AM         Jose Sol MD

## 2024-10-29 NOTE — CARE COORDINATION
Chart reviewed. LOS day # 4. Met at the bedside with the Pt's Dtr who is in town from [place]. Discussed current condition and dc plan that is TBD at this time pending clinical course. Pt remains agitated and confused. Prolonged encephalopathy. MRI of brain shows significant brain atrophy suspicious for underlying dementia, although no history of. NG in place, TF's initiated. Hyponatremia improving. Cr. Worsening today. UO low. Nephrology following. Will follow.     Plan is TBD pending progress at this time. Pt has SNF recs from yesterday, however required total care and AMPAC score is 6. Pt may be more appropriate for LTACH placement if meets criteria. Select LTACH to complete screening.

## 2024-10-29 NOTE — PLAN OF CARE
Problem: Discharge Planning  Goal: Discharge to home or other facility with appropriate resources  10/28/2024 2326 by Carlos Enrique Man RN  Outcome: Progressing  10/28/2024 1024 by Lucia Hsu RN  Outcome: Progressing  Flowsheets (Taken 10/28/2024 0800)  Discharge to home or other facility with appropriate resources: Identify barriers to discharge with patient and caregiver     Problem: Pain  Goal: Verbalizes/displays adequate comfort level or baseline comfort level  10/28/2024 2326 by Carlos Enrique Man RN  Outcome: Progressing  10/28/2024 1024 by Lucia Hsu RN  Outcome: Progressing  Flowsheets (Taken 10/28/2024 0900)  Verbalizes/displays adequate comfort level or baseline comfort level: Encourage patient to monitor pain and request assistance     Problem: Safety - Adult  Goal: Free from fall injury  10/28/2024 2326 by Carlos Enrique Man RN  Outcome: Progressing  10/28/2024 1024 by Lucia Hsu RN  Outcome: Progressing     Problem: Neurosensory - Adult  Goal: Achieves stable or improved neurological status  10/28/2024 2326 by Carlos Enrique Man RN  Outcome: Progressing  10/28/2024 1024 by Lucia Hsu RN  Outcome: Not Progressing  Flowsheets (Taken 10/28/2024 0800)  Achieves stable or improved neurological status: Assess for and report changes in neurological status  Goal: Absence of seizures  10/28/2024 2326 by Carlos Enrique Man RN  Outcome: Progressing  10/28/2024 1024 by Lucia Hsu RN  Outcome: Progressing  Flowsheets (Taken 10/28/2024 0800)  Absence of seizures: Monitor for seizure activity.  If seizure occurs, document type and location of movements and any associated apnea  Goal: Achieves maximal functionality and self care  10/28/2024 2326 by Carlos Enrique Man RN  Outcome: Progressing  10/28/2024 1024 by Lucia Hsu RN  Outcome: Not Progressing  Flowsheets (Taken 10/28/2024 0800)  Achieves maximal functionality and self care: Monitor swallowing and airway patency with patient fatigue and changes in  Magda, Carlos Enrique, RN  Outcome: Progressing  10/28/2024 1024 by Lucia Hsu RN  Outcome: Progressing     Problem: Respiratory - Adult  Goal: Achieves optimal ventilation and oxygenation  10/28/2024 2326 by Carlos Enrique Man RN  Outcome: Progressing  10/28/2024 1024 by Lucia Hsu RN  Outcome: Progressing  Flowsheets (Taken 10/28/2024 0800)  Achieves optimal ventilation and oxygenation: Assess for changes in respiratory status     Problem: Anxiety  Goal: Will report anxiety at manageable levels  Description: INTERVENTIONS:  1. Administer medication as ordered  2. Teach and rehearse alternative coping skills  3. Provide emotional support with 1:1 interaction with staff  10/28/2024 2326 by Carlos Enrique Man RN  Outcome: Progressing  10/28/2024 1024 by Lucia Hsu RN  Outcome: Progressing  Flowsheets (Taken 10/28/2024 0800)  Will report anxiety at manageable levels: Administer medication as ordered     Problem: Coping  Goal: Pt/Family able to verbalize concerns and demonstrate effective coping strategies  Description: INTERVENTIONS:  1. Assist patient/family to identify coping skills, available support systems and cultural and spiritual values  2. Provide emotional support, including active listening and acknowledgement of concerns of patient and caregivers  3. Reduce environmental stimuli, as able  4. Instruct patient/family in relaxation techniques, as appropriate  5. Assess for spiritual pain/suffering and initiate Spiritual Care, Psychosocial Clinical Specialist consults as needed  10/28/2024 2326 by Carlos Enrique Man RN  Outcome: Progressing  10/28/2024 1024 by Lucia Hsu RN  Outcome: Progressing  Flowsheets (Taken 10/28/2024 0800)  Patient/family able to verbalize anxieties, fears, and concerns, and demonstrate effective coping:   Assist patient/family to identify coping skills, available support systems and cultural and spiritual values   Provide emotional support, including active listening and

## 2024-10-29 NOTE — PROGRESS NOTES
Neurology Progress Note    ID: Kaleb Persaud is a 64 y.o. male    : 1960     LOS: 4 days     ASSESSMENT    1.  Prolonged encephalopathy.  2.  Hyponatremia.  3.  Generalized brain atrophy, suspicious underlying dementia.  4.  Vitamin D sufficiency.    The patient is more awake and able to follow simple commands.    But the patient is still very drowsy.    MRI brain showed no acute pathology but significant brain atrophy.  There was no history of dementia based on medical record review.  Normal B12 and ammonia level.    From neurology perspective, this is likely metabolic encephalopathy in the context of sepsis, hyponatremia, and JCARLOS.  There was no acute CVA from the MRI brain.  CSF showed no sign of CNS infection.  EEG showed mild degree of encephalopathy without seizure tendency.    10/29/24: The patient remains encephalopathic.  There is no significant change when compared to yesterday.  EEG did not  any active seizure.  The patient may take a longer time to recover than usual due to underlying brain atrophy from MRI brain.  The patient may have dementia.    PLAN    1.  Continue thiamine supplement.  2.  Continue vitamin D supplement.  3.  Minimize sedation and anticholinergic medication.  4.  Correction of JCARLOS, sepsis and hyponatremia per primary team.  5.  Avoid hyponatremia correction more than 8 mEq/day.    Medications:  Scheduled Meds:    [START ON 2024] thiamine  100 mg Oral Daily    lactulose  20 g Per NG tube TID    nafcillin  2,000 mg IntraVENous Q4H    pantoprazole  40 mg IntraVENous Daily    thiamine (B-1) 500 mg in sodium chloride 0.9 % 100 mL IVPB  500 mg IntraVENous TID    ascorbic acid  500 mg Oral Daily    [Held by provider] buPROPion  150 mg Oral QAM    ferrous sulfate  325 mg Oral Daily with breakfast    risperiDONE  0.5 mg Oral Nightly    sodium chloride flush  5-40 mL IntraVENous 2 times per day    enoxaparin  40 mg SubCUTAneous Daily    multivitamin  1 tablet Oral Daily     folic acid  1 mg Oral Daily    nicotine  1 patch TransDERmal Daily    mupirocin   Each Nostril BID    vitamin D  50,000 Units Oral Weekly    bacitracin-polymyxin b   Topical BID     Continuous Infusions:    sodium bicarbonate 75 mEq in sodium chloride 0.45 % 1,000 mL infusion 75 mL/hr at 10/29/24 1142    sodium chloride Stopped (10/28/24 2136)     PRN Meds: budesonide-formoterol, sodium chloride, albuterol sulfate HFA, hydrOXYzine HCl, sodium chloride flush, magnesium sulfate, ondansetron **OR** ondansetron, polyethylene glycol, LORazepam **OR** LORazepam **OR** LORazepam **OR** LORazepam **OR** LORazepam **OR** LORazepam **OR** LORazepam **OR** LORazepam    OBJECTIVE  Vital signs in the last 24 hours:  Vitals:    10/29/24 1400   BP: 136/85   Pulse: (!) 112   Resp: 22   Temp:    SpO2: 93%       Intake/Output last 3 shifts:  I/O last 3 completed shifts:  In: 3074.7 [I.V.:1776.8; IV Piggyback:1297.9]  Out: 562 [Urine:562]    Intake/Output this shift:  I/O this shift:  In: 88.4 [I.V.:10; NG/GT:60; IV Piggyback:18.4]  Out: 65 [Urine:65]    Yesterday's Weight:  Wt Readings from Last 1 Encounters:   10/29/24 66.4 kg (146 lb 6.2 oz)       SUBJECTIVE  The patient remains drowsy.    ROS:  Negative except in subjective.    Neurological examination:  MENTAL STATUS:  Alert but disoriented to time, place, or person.  LANG/SPEECH: No dysarthria.  CRANIAL NERVES: No facial asymmetry.  MOTOR: No pronator drift or leg drift.  REFLEXES: Generalized 2+.  SENSORY: Grossly intact.  COORD: No tremor.    Labs and Imaging:  I reviewed labs and brain imaging.    50 minutes were spent with the patient with greater than 50% of the time spent in counseling and coordination of care.    Mildred Christianson MD

## 2024-10-29 NOTE — PROGRESS NOTES
Speech Language Pathology  Attempt Note     Name: Kaleb Persaud  : 1960  Medical Diagnosis: Hyponatremia [E87.1]  JCARLOS (acute kidney injury) (HCC) [N17.9]  Altered mental status, unspecified altered mental status type [R41.82]      SLP attempted to see pt for bedside swallow evaluation (BSE), reviewed pt's chart, spoke with RN.  Pt will briefly open eyes in response to SLP, however does not demonstrate adequate SEEMA to participate in evaluation at this time.  Note baseline audible congestion -- pt unable to successfully follow commands to complete cued cough.  Pt has been attempted 4 consecutive days, not appropriate for evaluation at this time.  ST to sign off at this time, please re-refer ST once pt is able and appropriate.  No charges.    Mary Beth Purdy M.S. CCC-SLP  Speech-language pathologist  [phone]

## 2024-10-29 NOTE — PROGRESS NOTES
Shift: [id number]      Admitting diagnosis: Hyponatremia, Hyperkalemia, JCARLOS    Presentation to hospital: Lethargic/ Altered mental status    Surgery: no     Nursing assessment at handoff  stable    Emergency Contact/POA: Mona Robison (Sister)  Family updated: no    Most recent vitals: BP (!) 134/96   Pulse (!) 112   Temp 97.4 °F (36.3 °C) (Bladder)   Resp 19   Ht 1.676 m (5' 6\")   Wt 67.1 kg (148 lb)   SpO2 92%   BMI 23.89 kg/m²      Rhythm: Sinus Tachycardia  Sinus Tachycardia`    NC/HFNC- 0 lpm, on Room air  Respiratory support: - No ventilator support    Vent days: Day 0    Increased O2 requirements: no    Admission weight Weight - Scale: 62.7 kg (138 lb 3.7 oz)  Today's weight   Wt Readings from Last 1 Encounters:   10/28/24 67.1 kg (148 lb)         UOP >30ml/hr: yes    De Santiago need assessed each shift: yes    Restraints: no  Order current and documentation up to date?    Lines/Drains  LDA Insertion Date Discontinued Date Dressing Changes   PIV x1  PICC right 10/25/24  10/25/24 10/28/24    TLC       Arterial       De Santiago  10/26/24     Vas Cath      ETT       Surgical drains        Night Shift Hospitalist Interventions    Problem(Brief) Date Time Intervention Physician contacted          1 POSTIVE CWAW   SEE MAR  NO                                  Drip rates at handoff:    sodium chloride 75 mL/hr at 10/28/24 1745    sodium chloride         Hospital Course Daily Updates:  Admit Day# 0 10/25/24  -Received hypertonic saline in ER   -CT head, chest and abdomen done  -Paracentesis   -PICC line placed    Admit Day# 0 10/25/24 Nights  Takeover patient around 0500: noted distended bladder--bladder scanned >490ml, straight cath 500ml  -Patient was lethagic with restless legs upon assessment but after straight cath, patient calms down  -GCS 9 (E2V2M5) from GCS 14: MD made aware    Admit Day #1 10/26/24 days  -Pt continues to be restless legs and lethargic, responsive only to pain this AM and improved neuro status

## 2024-10-29 NOTE — PROGRESS NOTES
Secure messaged nephro re: bladder scan of 681ml & concern that it is ascites not urine. No new orders at this time.

## 2024-10-29 NOTE — PROGRESS NOTES
Nephrology Progress Note   [employer]      Sub/interval history      Drowsy still  Post LP uop remained low. Cr started to worsen . Lowest bp was 90s   Mentation remains poor.  Not interactive but moving around in bed irregularly.  Family at bedside.  Last 24 h uop 180s cc       ROS: Unable to obtain  PSFH: No visitor    Scheduled Meds:   [START ON 11/1/2024] thiamine  100 mg Oral Daily    lactulose  20 g Per NG tube TID    nafcillin  2,000 mg IntraVENous Q4H    pantoprazole  40 mg IntraVENous Daily    thiamine (B-1) 500 mg in sodium chloride 0.9 % 100 mL IVPB  500 mg IntraVENous TID    ascorbic acid  500 mg Oral Daily    [Held by provider] buPROPion  150 mg Oral QAM    ferrous sulfate  325 mg Oral Daily with breakfast    risperiDONE  0.5 mg Oral Nightly    sodium chloride flush  5-40 mL IntraVENous 2 times per day    enoxaparin  40 mg SubCUTAneous Daily    multivitamin  1 tablet Oral Daily    folic acid  1 mg Oral Daily    nicotine  1 patch TransDERmal Daily    mupirocin   Each Nostril BID    vitamin D  50,000 Units Oral Weekly    bacitracin-polymyxin b   Topical BID     Continuous Infusions:   sodium bicarbonate 75 mEq in sodium chloride 0.45 % 1,000 mL infusion      sodium chloride Stopped (10/28/24 2136)     PRN Meds:.budesonide-formoterol, sodium chloride, albuterol sulfate HFA, hydrOXYzine HCl, sodium chloride flush, magnesium sulfate, ondansetron **OR** ondansetron, polyethylene glycol, LORazepam **OR** LORazepam **OR** LORazepam **OR** LORazepam **OR** LORazepam **OR** LORazepam **OR** LORazepam **OR** LORazepam    Objective/     Vitals:    10/29/24 0500 10/29/24 0600 10/29/24 0700 10/29/24 0747   BP: 123/74 119/70 133/70    Pulse: (!) 117 (!) 111 (!) 113 (!) 114   Resp: 27 19 22 22   Temp:       TempSrc:       SpO2: 93% 93% 93% 95%   Weight:  66.4 kg (146 lb 6.2 oz)     Height:         24HR INTAKE/OUTPUT:    Intake/Output Summary (Last 24 hours) at 10/29/2024 1007  Last data filed at 10/29/2024  ordered from noon  -De Santiago catheter  -Will expand further workup as indicated  -Recommend switching from PPI  -Ergocalciferol 50,000 units weekly    Santos Fournier MD   Office: [phone]  Fax:    [phone]  [facility]

## 2024-10-29 NOTE — CONSULTS
Brief Nutrition Assessment    Nutrition Recommendations/Plan:   Recommend TF initiation. Order: \"Tube feed continuous\".  Initiate Jevity 1.5  (standard with fiber formula) at 10 mL/hr and as tolerated, increase by 10 mL/hr q 6 hours until goal of 55 mL/hr is met via N/G  Recommend 60 mL H20 flush q 4 hours.  Monitor IVF infusion, Na labs and need for adjustments in water flush  Monitor closely and correct lytes (K, Phos, Mg) before progressing TF to goal d/t risk of refeeding syndrome (hx extended inadequate nutritional intake).  Monitor TF tolerance (abd distention, bowel habits, N/V, cramping)  Monitor nutrition adequacy, pertinent labs, bowel habits, wt changes, and clinical progress     Nutrition Assessment:    Consulted for tube feeding ordering and management. Pt remains confused today, unable to eat. NG placed yesterday for meds. Now plans to start TF today. High risk for refeeding syndrome d/t poor nutrition x4 days. Recommend slow initiation of TF and close monitoring of electrolytes, replacing as needed. Will monitor.    Nutrition Related Findings:    Na 135. K 3.7. Phos 2.3 on 10/28.     Current Nutrition Intake & Therapies:    Average Meal Intake: 0%  Average Supplements Intake: 0%  ADULT ORAL NUTRITION SUPPLEMENT; Breakfast, Lunch, Dinner; Standard High Calorie/High Protein Oral Supplement  ADULT DIET; Regular  Current Tube Feeding (TF) Orders:  Feeding Route: Nasogastric  Formula: Standard with Fiber  Schedule: Continuous  Goal TF & Flush Orders Provides: Jevity 1.5 at goal rate of 55 mL/hr x20 hours to provide 1100 mL TV, 1650 kcal, 70 g protein, 836 mL free water. +60 mL free water q 4 hrs    Estimated Daily Nutrient Needs:  Energy Requirements Based On: Kcal/kg  Weight Used for Energy Requirements: Current  Energy (kcal/day): 7400-5707 kcal (25-30 kcal/kg 63 kg CBW)  Weight Used for Protein Requirements: Current  Protein (g/day): 63-82 gm (1-1.3 gm/kg 63 kg CBW)  Method Used for Fluid Requirements:

## 2024-10-29 NOTE — PROGRESS NOTES
anterolisthesis of C3 on C4 and moderate reversal of the normal cervical lordosis. Moderate to severe multilevel spondylosis.     CT CERVICAL SPINE WO CONTRAST    Result Date: 10/24/2024  No acute intracranial abnormality.  Right posterior scalp small laceration. Moderate generalized cerebral volume loss and chronic microvascular ischemic changes. Moderate mucosal thickening and opacification of the left maxillary sinus with mild bony wall thickening consistent with chronic sinusitis. No acute fracture in the cervical spine. Prior C4-C7 ACDF.  Trace anterolisthesis of C3 on C4 and moderate reversal of the normal cervical lordosis. Moderate to severe multilevel spondylosis.         Impression:  64-year-old male with past history of alcohol abuse, diverticulitis, GERD, bipolar disease, CAD, HLD, HTN, colon polyps and alcoholic hepatitis who presented to the ER after falling at home.  His mental status was altered on admission.  He was admitted with hyponatremia of 108.  GI consulted for liver disease.    Cirrhosis, likely secondary to alcohol abuse.  Serological work-up in past unremarkable, ceruloplasmin was slightly low at 13.  MELD 19.  - He had an echocardiogram in 2021 showing mild pulmonary hypertension, moderate-severe tricuspid regurgitation and preserved ejection fraction of 55-60%. Repeat echo - poor image quality.  - No varices EGD 2021.  - AFP pending, no hepatoma US.  - Diagnostic paracentesis this admission - SAAG >1.1 with low ascitic protein consistent with cirrhosis.    2. AMS.  Suspect metabolic.  On lactulose and rifaximin via NGT for possible HE contributing. Now having bowel movements without change.  Neurology consulted.  MRI brain reviewed.  EEG - mild degree non-specific cerebral dysfunction.    3. Leukocytosis.  Developed fever 10/26.  Blood cultures + S. Aureus.  Echo without vegetations. LP pending.  Diagnostic paracentesis negative SBP.  ID recommending PICC be removed today.    4.  JCARLOS  Hyponatremia, 108 on admission. Now corrected.  Hyperkalemia. Improving.  Metabolic acidosis. Improved.  Nephrology following.    5. Macrocytic anemia.  Vitamin B12 and folate okay.  Trending down - Hgb 7 today.  No overt bleeding.    Plan:  Continue lactulose, rifaximin via NGT.  Follow-up AFP.  Alcohol cessation.  Monitor CBC and for overt signs GI bleeding.  Will need outpatient variceal screening. Also due for colonoscopy this coming year.     Please do not hesitate to call with questions or concerns.      Electronically signed by: ED Naik 10/29/2024 1:30 PM     (Office) [phone]  (Fax) [phone]  Available via [employer]

## 2024-10-29 NOTE — PROGRESS NOTES
Shift: [id number]    Admitting diagnosis: Hyponatremia, Hyperkalemia, JCARLOS    Presentation to hospital: Lethargic/ Altered mental status    Surgery: no     Nursing assessment at handoff  stable    Emergency Contact/POA: Mona Robison (Sister)  Family updated: no    Most recent vitals: /70   Pulse (!) 111   Temp 98.4 °F (36.9 °C) (Bladder)   Resp 19   Ht 1.676 m (5' 6\")   Wt 66.4 kg (146 lb 6.2 oz)   SpO2 93%   BMI 23.63 kg/m²      Rhythm: Sinus Tachycardia  Sinus Tachycardia`    NC/HFNC- 0 lpm, on Room air  Respiratory support: - No ventilator support    Vent days: Day 0    Increased O2 requirements: no    Admission weight Weight - Scale: 62.7 kg (138 lb 3.7 oz)  Today's weight   Wt Readings from Last 1 Encounters:   10/29/24 66.4 kg (146 lb 6.2 oz)         UOP >30ml/hr: No    De Santiago need assessed each shift: yes    Restraints: no  Order current and documentation up to date?    Lines/Drains  LDA Insertion Date Discontinued Date Dressing Changes   PIV x1  PICC right 10/25/24  10/25/24 10/28/24    TLC       Arterial       De Santiago  10/26/24     Vas Cath      ETT       Surgical drains        Night Shift Hospitalist Interventions    Problem(Brief) Date Time Intervention Physician contacted          1 POSTIVE CWAW   SEE MAR  NO                                  Drip rates at handoff:    sodium chloride Stopped (10/28/24 2136)       Hospital Course Daily Updates:  Admit Day# 0 10/25/24  -Received hypertonic saline in ER   -CT head, chest and abdomen done  -Paracentesis   -PICC line placed    Admit Day# 0 10/25/24 Nights  Takeover patient around 0500: noted distended bladder--bladder scanned >490ml, straight cath 500ml  -Patient was lethagic with restless legs upon assessment but after straight cath, patient calms down  -GCS 9 (E2V2M5) from GCS 14: MD made aware    Admit Day #1 10/26/24 days  -Pt continues to be restless legs and lethargic, responsive only to pain this AM and improved neuro status throughout the shift.  Now pt is opening his eyes when requested to and following some commands.  -neuro consulted for worsening encephalopathy this AM,  -CTA of head and neck completed, EEG, MRI and LP ordered  -blood cultures sent and Ampicillin, acyclovir, Vancomycin and Ceftriaxone started  -liver US ordered  -lactulose enema with 1 large BM  -abdalla catheter inserted for urine retention  -UOP:475 ml    Admit Day #1 10/26/24 Nights  -For MRI brain 10/27  -no much change in mental status  -wound care done  -blood culture (+) claire: MD aware    Admit Day #2 10/27/24 days  -neurology to consult directly with IR regarding LP procedure after completion of MRI  - MRI tech will call RN when a spot becomes available today  -potassium phosphate replacment   -ID consult placed for positive blood cultures (MSSA) IV nafcillin and echo ordered, all other abx d/c'd  -stood up with AX2 at side of bed with PT/OT  -3% NaCL ordered  as needed per Na+ levels, goal Na+ is 128-130 by 8pm 10/27  -lactulose enemas while NPO  -UO: 575 ml    aDMIT DAY 3 NIGHTS   -INCREASING MENTAL STATUS. ASKING FOR DIET COKE AND A BEER   -GOT AGITATED AND REQUIRED ATIVAN 2MG X1  -SECRETIONS POST PHARYNGEAL, PT ABLE TO CLEAR EFFECTIVELY   -R LEG LEFT OPEN TO AIR, L LEG CLEANSED, REDRESSED, REWRAPPED WITH ACE   -PT RESTING WELL THIS AM  -AWAITING STRICTYLY TIMED LABS THIS AM FOR PENDING LP, WILL ALSO HAVE ECHO AND EEG TODAY     Admit Day 3 Days 10/28/24  -increased lethargy. Oriented to self & place. Otherwise intermittent  -EEG (standard one) unable to complete 2hr study. Preliminary report = abn cerebral dysfunction  -LP done in IR. Pt tolerated = CSF clear & neg per ID @this time  -Echo done @bedside = poor visualization = unclear EF  -Speech therapy unable to eval for swallow d/t decreased mentation = Ngt placed  -Albumin per nephro  -Na goal = 128-130. This am 129, afternoon = 127. Notify nephro for <124, >130  -blood cultures positive for staph  -Urine cultures sent

## 2024-10-29 NOTE — PROGRESS NOTES
PULMONARY AND CRITICAL CARE INPATIENT NOTE        Kaleb Persaud   : 1960  MRN: [id number]     Admitting Physician: Hubert Owens MD  Attending Physician: Arias Madrigal MD  PCP: Patricia Vieira APRN - CNP    Admission: 10/24/2024   Date of Service: 10/29/2024    Chief Complaint   Patient presents with    Head Laceration     Pt arrived via EMS. Pt reports 'had a few drinks today', lacerations to the back of head and BLEs.           ASSESSMENT & PLAN       64 y.o. pleasant  male patient with:    Assessment:  Acute encephalopathy, toxic metabolic.  Negative head CT.  Negative LP.  Negative EEG for seizures  Scalp laceration  Acute on chronic hyponatremia.Beer Potomania   Sepsis/MSSA  JCARLOS/hyper-K/AGMA  Acute rhabdomyolysis  Alcoholic hepatitis/cirrhosis  Ascites. Neg SBP  Protein energy malnutrition  RLL lung nodule. Needs f.u.  Emphysema  Bipolar disorder  Metabolic syndrome: HTN, HLD  Leg wounds    Plan:              Echo reviewed.  Likely normal LV function but poor windows  IV fluid/sodium level.  Nephrology  Continue abx/ID on board  Hold Wellbutrin to avoid seizures  EEG reviewed.  No seizures but mild generalized slowing.  Lactulose enema bid  NG for feeding  Wound care    LOS: Hospital Day: 6     Code:Full Code      Time spent on this patient, excluding procedures time, is ~35 minutes.  Time was spent on reviewing the EMR, reviewing overnight events, interpreting labs and imaging, interviewing and directly examining the patient, ventilator/breathing assistance device/drip review and adjustment, running multidisciplinary rounds and coordinating critical care plan with bedside nurse, and other care providers.          Subjective/Objective       Summary:   64-year-old male patient was admitted on  with acute encephalopathy/found down.    Interval history/Encounter 10/29/2024   Critically ill and encephalopathic.  No fever recorded.  Remains on 2 L of oxygen this morning and  Exam:  General appearance: In no apparent distress.  HEENT: Dry mucous membranes  Cardiac: Normal S1 and S2.  Lungs: Good air entry bilaterally.  Abdomen: Soft.  Distended/ascites  Back & Extremities: Symmetric pulses with good perfusion.  Neurological: Lethargic, obtunded      ________________________________________________________  Electronically signed by:  Chemo Llanes MD,FACP    10/29/2024    1:12 PM.     [facility]  [address], [place] [address]   Phone (office): [phone]   Patient

## 2024-10-30 NOTE — PROGRESS NOTES
Nephrology Progress Note   [employer]      Sub/interval history      Still mentation remains poor   Cr continues to worsen  Last 24 h uop 150s cc       ROS: Unable to obtain  PSFH: No visitor    Scheduled Meds:   albumin human 25%  25 g IntraVENous BID    furosemide  60 mg IntraVENous Once    [START ON 11/1/2024] thiamine  100 mg Oral Daily    lactulose  20 g Per NG tube TID    nafcillin  2,000 mg IntraVENous Q4H    pantoprazole  40 mg IntraVENous Daily    thiamine (B-1) 500 mg in sodium chloride 0.9 % 100 mL IVPB  500 mg IntraVENous TID    [Held by provider] ascorbic acid  500 mg Oral Daily    [Held by provider] buPROPion  150 mg Oral QAM    ferrous sulfate  325 mg Oral Daily with breakfast    risperiDONE  0.5 mg Oral Nightly    sodium chloride flush  5-40 mL IntraVENous 2 times per day    enoxaparin  40 mg SubCUTAneous Daily    multivitamin  1 tablet Oral Daily    folic acid  1 mg Oral Daily    nicotine  1 patch TransDERmal Daily    vitamin D  50,000 Units Oral Weekly    bacitracin-polymyxin b   Topical BID     Continuous Infusions:   sodium chloride Stopped (10/28/24 2136)     PRN Meds:.budesonide-formoterol, sodium chloride, albuterol sulfate HFA, hydrOXYzine HCl, sodium chloride flush, magnesium sulfate, ondansetron **OR** ondansetron, polyethylene glycol, LORazepam **OR** LORazepam **OR** LORazepam **OR** LORazepam **OR** LORazepam **OR** LORazepam **OR** LORazepam **OR** LORazepam    Objective/     Vitals:    10/30/24 0400 10/30/24 0500 10/30/24 0600 10/30/24 0635   BP: 100/65 112/63 114/63    Pulse: (!) 106 (!) 110 (!) 114    Resp: 17 20 23    Temp: 98.2 °F (36.8 °C)   98.6 °F (37 °C)   TempSrc: Axillary   Axillary   SpO2: 97% 96%     Weight:       Height:         24HR INTAKE/OUTPUT:    Intake/Output Summary (Last 24 hours) at 10/30/2024 0904  Last data filed at 10/30/2024 0703  Gross per 24 hour   Intake 1310.98 ml   Output 150 ml   Net 1160.98 ml     Gen: Thin built  Neck: No JVD  Skin:  pressure  -pending  ANGELINE, ANCA,    -Continue albumin today, will do higher dose of Lasix challenge in the morning.  -If continues to progress at the same course, will likely need RRT in the next 48 to 72 hours.  -Recommend involving palliative care as well  -Will expand further workup as indicated  -Recommend switching from PPI  -Ergocalciferol 50,000 units weekly  -poor candidate for rrt but if continues to worsen  , will discuss family wishes    Santos Fournier MD   Office: [phone]  Fax:    [phone]  [facility]

## 2024-10-30 NOTE — PROGRESS NOTES
Occupational Therapy/ Physical Therapy     RN reports pt not appropriate for OT/PT tx at this time. Pt lethargic, does not follow commands, and only briefly opening eyes to verbal stimuli. Will follow up as schedule allows/ as pt's medical status allows.      Nahomi Manuel, OTR/L  Emmanuel Bowles PT, DPT

## 2024-10-30 NOTE — PROGRESS NOTES
PROGRESS NOTE    HPI: Kaleb Persaud is a(n)64 y.o. male admitted for work-up and treatment for Hyponatremia [E87.1]  JCARLOS (acute kidney injury) (HCC) [N17.9]  Altered mental status, unspecified altered mental status type [R41.82].     We are following for cirrhosis.    Subjective:     Encephalopathy remains unchanged. He opens eyes to name.  No acute events overnight.  TF were started  yesterday.        Objective:     I/O last 3 completed shifts:  In: 2770.7 [I.V.:791.3; NG/GT:449; IV Piggyback:1530.4]  Out: 227 [Urine:227]      /83   Pulse (!) 102   Temp 99.2 °F (37.3 °C) (Bladder)   Resp 22   Ht 1.676 m (5' 6\")   Wt 66.9 kg (147 lb 7.8 oz)   SpO2 98%   BMI 23.81 kg/m²     Physical Exam:  HEENT: anicteric sclera, oropharyngeal membranes pink and moist. + NGT.  Cor: tachy  Lungs:   Abdomen: + ascites, no apparent tenderness  Neuro: obtunded, opens eyes to name.      Results:   Lab Results   Component Value Date    ALT 49 (H) 10/30/2024    AST 53 (H) 10/30/2024    GGT 2,794 (H) 02/17/2024    ALKPHOS 241 (H) 10/30/2024    BILIDIR 2.5 (H) 10/29/2024    INR 1.40 (H) 10/28/2024    LIPASE 48.0 12/29/2023     Lab Results   Component Value Date    WBC 10.8 10/30/2024    HGB 7.0 (L) 10/30/2024    HCT 20.4 (LL) 10/30/2024    .9 (H) 10/30/2024     10/30/2024     BUN/Cr/glu/ALT/AST/amyl/lip:  24/3.2/--/--/--/--/-- (10/30 1008)  XR ABDOMEN (KUB) (SINGLE AP VIEW)    Result Date: 10/28/2024  Enteric tube with tip and side-port in the proximal stomach     IR LUMBAR PUNCTURE FOR DIAGNOSIS    Result Date: 10/28/2024  Successful fluoroscopic-guided lumbar puncture.  Opening pressure 9.     MRI BRAIN W WO CONTRAST    Result Date: 10/27/2024  Limited by motion artifact. No acute intracranial abnormality.  No mass effect or abnormal intracranial enhancement. 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 Mild parenchymal volume loss. Moderate  cervical spine. Prior C4-C7 ACDF.  Trace anterolisthesis of C3 on C4 and moderate reversal of the normal cervical lordosis. Moderate to severe multilevel spondylosis.     CT CERVICAL SPINE WO CONTRAST    Result Date: 10/24/2024  No acute intracranial abnormality.  Right posterior scalp small laceration. Moderate generalized cerebral volume loss and chronic microvascular ischemic changes. Moderate mucosal thickening and opacification of the left maxillary sinus with mild bony wall thickening consistent with chronic sinusitis. No acute fracture in the cervical spine. Prior C4-C7 ACDF.  Trace anterolisthesis of C3 on C4 and moderate reversal of the normal cervical lordosis. Moderate to severe multilevel spondylosis.         Impression:  64-year-old male with past history of alcohol abuse, diverticulitis, GERD, bipolar disease, CAD, HLD, HTN, colon polyps and alcoholic hepatitis who presented to the ER after falling at home.  His mental status was altered on admission.  He was admitted with hyponatremia of 108.  GI consulted for liver disease.    Cirrhosis, likely secondary to alcohol abuse.  Serological work-up in past unremarkable, ceruloplasmin was slightly low at 13.  MELD 19.  - He had an echocardiogram in 2021 showing mild pulmonary hypertension, moderate-severe tricuspid regurgitation and preserved ejection fraction of 55-60%. Repeat echo - poor image quality.  - No varices EGD 2021.  - AFP normal, no hepatoma US.  - Diagnostic paracentesis this admission - SAAG >1.1 with low ascitic protein consistent with cirrhosis.    2. AMS.  Suspect metabolic.  On lactulose and rifaximin via NGT for possible HE contributing. Now having bowel movements without change.  Neurology consulted - questionable dementia.  MRI brain reviewed - brain atrophy.  EEG - mild degree non-specific cerebral dysfunction.  He has been receiving aggressive thiamine replacement.    3. Leukocytosis. Improved.  Developed fever 10/26 but afebrile

## 2024-10-30 NOTE — PROGRESS NOTES
legs and lethargic, responsive only to pain this AM and improved neuro status throughout the shift. Now pt is opening his eyes when requested to and following some commands.  -neuro consulted for worsening encephalopathy this AM,  -CTA of head and neck completed, EEG, MRI and LP ordered  -blood cultures sent and Ampicillin, acyclovir, Vancomycin and Ceftriaxone started  -liver US ordered  -lactulose enema with 1 large BM  -abdalla catheter inserted for urine retention  -UOP:475 ml    Admit Day #1 10/26/24 Nights  -For MRI brain 10/27  -no much change in mental status  -wound care done  -blood culture (+) claire: MD aware    Admit Day #2 10/27/24 days  -neurology to consult directly with IR regarding LP procedure after completion of MRI  - MRI tech will call RN when a spot becomes available today  -potassium phosphate replacment   -ID consult placed for positive blood cultures (MSSA) IV nafcillin and echo ordered, all other abx d/c'd  -stood up with AX2 at side of bed with PT/OT  -3% NaCL ordered  as needed per Na+ levels, goal Na+ is 128-130 by 8pm 10/27  -lactulose enemas while NPO  -UO: 575 ml    aDMIT DAY 3 NIGHTS   -INCREASING MENTAL STATUS. ASKING FOR DIET COKE AND A BEER   -GOT AGITATED AND REQUIRED ATIVAN 2MG X1  -SECRETIONS POST PHARYNGEAL, PT ABLE TO CLEAR EFFECTIVELY   -R LEG LEFT OPEN TO AIR, L LEG CLEANSED, REDRESSED, REWRAPPED WITH ACE   -PT RESTING WELL THIS AM  -AWAITING STRICTYLY TIMED LABS THIS AM FOR PENDING LP, WILL ALSO HAVE ECHO AND EEG TODAY     Admit Day 3 Days 10/28/24  -increased lethargy. Oriented to self & place. Otherwise intermittent  -EEG (standard one) unable to complete 2hr study. Preliminary report = abn cerebral dysfunction  -LP done in IR. Pt tolerated = CSF clear & neg per ID @this time  -Echo done @bedside = poor visualization = unclear EF  -Speech therapy unable to eval for swallow d/t decreased mentation = Ngt placed  -Albumin per nephro  -Na goal = 128-130. This am 129, afternoon

## 2024-10-30 NOTE — PROGRESS NOTES
Neurology Progress Note    ID: Kaleb Persaud is a 64 y.o. male    : 1960     LOS: 5 days     ASSESSMENT    1.  Prolonged encephalopathy.  2.  Hyponatremia.  3.  Generalized brain atrophy, suspicious underlying dementia.  4.  Vitamin D sufficiency.    The patient is more awake and able to follow simple commands.    But the patient is still very drowsy.    MRI brain showed no acute pathology but significant brain atrophy.  There was no history of dementia based on medical record review.  Normal B12 and ammonia level.    From neurology perspective, this is likely metabolic encephalopathy in the context of sepsis, hyponatremia, and JCARLOS.  There was no acute CVA from the MRI brain.  CSF showed no sign of CNS infection.  EEG showed mild degree of encephalopathy without seizure tendency.    10/29/24: The patient remains encephalopathic.  There is no significant change when compared to yesterday.  EEG did not  any active seizure.    10/30/24: The patient remains the same when compared to yesterday.  The patient is able to follow simple commands minimally.  There has been no seizure-like activity reported.    The patient may take a longer time to recover than usual due to underlying brain atrophy from MRI brain.  The patient may have dementia.    PLAN    1.  Continue thiamine supplement.  2.  Continue vitamin D supplement.  3.  Minimize sedation and anticholinergic medication.  4.  Correction of JCARLOS, sepsis and hyponatremia per primary team.  5.  Avoid hyponatremia correction more than 8 mEq/day.    At this point, there is nothing else neurology can offer.  Neurology will follow up the patient as needed from now on.    Medications:  Scheduled Meds:    albumin human 25%  25 g IntraVENous BID    furosemide  60 mg IntraVENous Once    [START ON 2024] thiamine  100 mg Oral Daily    lactulose  20 g Per NG tube TID    nafcillin  2,000 mg IntraVENous Q4H    pantoprazole  40 mg IntraVENous Daily    thiamine (B-1)

## 2024-10-30 NOTE — PROGRESS NOTES
Patients labs unable to be drawn at this time. Pt stuck by two different lab personnel. Nursing staff unable to obtain second peripheral access line. Pt was stuck three times by two different nurses and Donnie Boykin NP. Order for midline placed.    Electronically signed by Helen Giron RN on 10/29/2024 at 11:30PM.

## 2024-10-30 NOTE — PROGRESS NOTES
Shift: 5986-9200 10/29/24    Admitting diagnosis: Hyponatremia, Hyperkalemia, JCARLOS    Presentation to hospital: Lethargic/ Altered mental status    Surgery: no     Nursing assessment at handoff  stable    Emergency Contact/POA: Mona Robison (Sister)  Family updated: no    Most recent vitals: /83   Pulse (!) 108   Temp 97.4 °F (36.3 °C) (Bladder)   Resp 23   Ht 1.676 m (5' 6\")   Wt 66.4 kg (146 lb 6.2 oz)   SpO2 92%   BMI 23.63 kg/m²      Rhythm: Sinus Tachycardia  Sinus Tachycardia`    NC/HFNC- 0 lpm, on Room air  Respiratory support: - No ventilator support    Vent days: Day 0    Increased O2 requirements: no    Admission weight Weight - Scale: 62.7 kg (138 lb 3.7 oz)  Today's weight   Wt Readings from Last 1 Encounters:   10/29/24 66.4 kg (146 lb 6.2 oz)         UOP >30ml/hr: No    [place] need assessed each shift: yes    Restraints: no  Order current and documentation up to date?    Lines/Drains  LDA Insertion Date Discontinued Date Dressing Changes   PIV x1  PIV  PICC right 10/25/24  10/29 X2  10/25/24 10/28/24 X2    10/29/24    TLC       Arterial       De [place]  10/26/24     Vas Cath      ETT       Surgical drains        Night Shift Hospitalist Interventions    Problem(Brief) Date Time Intervention Physician contacted          1 POSTIVE CWAW   SEE MAR  NO                                  Drip rates at handoff:    sodium chloride Stopped (10/28/24 2136)   -Bicarb @75ml/hr    Hospital Course Daily Updates:  Admit Day# 0 10/25/24  -Received hypertonic saline in ER   -CT head, chest and abdomen done  -Paracentesis   -PICC line placed    Admit Day# 0 10/25/24 Nights  Takeover patient around 0500: noted distended bladder--bladder scanned >490ml, straight cath 500ml  -Patient was lethagic with restless legs upon assessment but after straight cath, patient calms down  -GCS 9 (E2V2M5) from GCS 14: MD made aware    Admit Day #1 10/26/24 days  -Pt continues to be restless legs and lethargic, responsive only to pain

## 2024-10-30 NOTE — ACP (ADVANCE CARE PLANNING)
Advance Care Planning     Palliative Team Advance Care Planning (ACP) Conversation    Date of Conversation: 10/30/24    Individuals present for the conversation: Legal healthcare agent named below and Daughter Sisi     ACP documents on file prior to discussion:  -Power of  for Healthcare  -Living Will    Previously completed document/s not on file:  N/A    Healthcare Decision Maker:    Primary Decision Maker: Mona Robison - Brother/Sister - [phone]    Secondary Decision Maker: Patrice Persaud - Brother/Sister - [phone]    Supplemental (Other) Decision Maker: HungLeon - Brother/Sister - [phone]     Conversation Summary: Reviewed the HCPOA and Living Will in this EMR and pt designated his sister, Mona Robison, as his healthcare proxy.   Unable to reach Mona, verified w/ shift RN that Mona and pts dtr are presently @ [facility].      Arrived to [facility].  The family had the benefit of medical update from Pulm NP and they understand \"his kidneys are getting worse and dialysis isn't recommended.\"   Mona is aware of the option, since she isn't thinking she'd want to commit pt to dialysis given his px, to shift to comfort care but wants to d/w her brother's before making any decision.  We did not specifically discuss comfort care, wanting to respect their need to speak further as a family.  Mona is receptive to writer f/u    Resuscitation Status:   Code Status: Full Code  have reviewed the elements of resuscitation w/ Mona and pts dtr, Sisi.  Mona, as pts HCPOA, wishes to amend pts code to reflect DNR/DNI though we are not aborting current plan of care.  Mona acknowledged the difficulty Sisi is having w/ pts poor px and decline, yet she is supporting Mona in the decision to amend code.      Documentation Completed:  -No new documents completed.    Social/Spiritual:  Mona shared pt is of a [employer] bree tradition though, since moving back to [place], is not affiliated w/ any particular parish.  She would like to have us

## 2024-10-30 NOTE — PLAN OF CARE
Problem: Discharge Planning  Goal: Discharge to home or other facility with appropriate resources  Outcome: Progressing  Flowsheets (Taken 10/30/2024 0900)  Discharge to home or other facility with appropriate resources:   Identify barriers to discharge with patient and caregiver   Arrange for needed discharge resources and transportation as appropriate   Identify discharge learning needs (meds, wound care, etc)     Problem: Pain  Goal: Verbalizes/displays adequate comfort level or baseline comfort level  Outcome: Progressing     Problem: Safety - Adult  Goal: Free from fall injury  Outcome: Progressing     Problem: Neurosensory - Adult  Goal: Achieves stable or improved neurological status  Outcome: Progressing  Flowsheets (Taken 10/30/2024 0800)  Achieves stable or improved neurological status:   Assess for and report changes in neurological status   Maintain blood pressure and fluid volume within ordered parameters to optimize cerebral perfusion and minimize risk of hemorrhage   Initiate measures to prevent increased intracranial pressure  Goal: Absence of seizures  Outcome: Progressing  Flowsheets (Taken 10/30/2024 0800)  Absence of seizures:   Monitor for seizure activity.  If seizure occurs, document type and location of movements and any associated apnea   If seizure occurs, turn head to side and suction secretions as needed   Administer anticonvulsants as ordered  Goal: Achieves maximal functionality and self care  Outcome: Progressing  Flowsheets (Taken 10/30/2024 0800)  Achieves maximal functionality and self care:   Monitor swallowing and airway patency with patient fatigue and changes in neurological status   Encourage and assist patient to increase activity and self care with guidance from physical therapy/occupational therapy     Problem: Cardiovascular - Adult  Goal: Absence of cardiac dysrhythmias or at baseline  Outcome: Progressing  Flowsheets (Taken 10/30/2024 0800)  Absence of cardiac  refocusing and direction  4. Decrease environmental stimuli, including noise as appropriate  5. Monitor and intervene to maintain adequate nutrition, hydration, elimination, sleep and activity  6. If unable to ensure safety without constant attention obtain sitter and review sitter guidelines with assigned personnel  7. Initiate Psychosocial CNS and Spiritual Care consult, as indicated  Outcome: Progressing  Flowsheets (Taken 10/30/2024 0800)  Effect of thought disturbance (confusion, delirium, dementia, or psychosis) are managed with adequate functional status:   Assess for contributors to thought disturbance, including medications, impaired vision or hearing, underlying metabolic abnormalities, dehydration, psychiatric diagnoses, notify LIP   [place] high risk fall precautions, as indicated   Provide frequent short contacts to provide reality reorientation, refocusing and direction     Problem: ABCDS Injury Assessment  Goal: Absence of physical injury  Outcome: Progressing

## 2024-10-30 NOTE — ACP (ADVANCE CARE PLANNING)
Advance Care Planning     General Advance Care Planning (ACP) Conversation    Date of Conversation: 10/30/2024  Conducted with: Legal next of kin  Other persons present: Daughter at bedside. Reviewed HPOA docs. Discussed code status/HPOA contacts listed in system and answered questions.Confirmed due to family being out of town this weekend.    Healthcare Decision Maker:   Primary Decision Maker: Mona Robison - Brother/Sister - [phone]    Primary Decision Maker: Leon Persaud - Brother/Sister - [phone]    Primary Decision Maker: Patrice Persaud - Brother/Sister - [phone]     Today we documented Decision Maker(s) consistent with ACP documents on file.  Content/Action Overview:  Has ACP document(s) on file - reflects the patient's care preferences  Reviewed DNR/DNI and patient elects Full Code (Attempt Resuscitation)      Length of Voluntary ACP Conversation in minutes:  <16 minutes (Non-Billable)    Courtney Monaco RN

## 2024-10-30 NOTE — CONSULTS
Palliative Care Initial Note  Palliative Care Admit date: 10/30/24    ACP/palcare referral noted

## 2024-10-30 NOTE — PLAN OF CARE
Problem: Discharge Planning  Goal: Discharge to home or other facility with appropriate resources  Outcome: Progressing     Problem: Pain  Goal: Verbalizes/displays adequate comfort level or baseline comfort level  Outcome: Progressing  Flowsheets  Taken 10/29/2024 1600  Verbalizes/displays adequate comfort level or baseline comfort level: Assess pain using appropriate pain scale  Taken 10/29/2024 1200  Verbalizes/displays adequate comfort level or baseline comfort level: Assess pain using appropriate pain scale     Problem: Safety - Adult  Goal: Free from fall injury  Outcome: Progressing     Problem: Neurosensory - Adult  Goal: Achieves stable or improved neurological status  Outcome: Progressing     Problem: Neurosensory - Adult  Goal: Absence of seizures  Outcome: Progressing     Problem: Neurosensory - Adult  Goal: Achieves maximal functionality and self care  Outcome: Progressing     Problem: Cardiovascular - Adult  Goal: Absence of cardiac dysrhythmias or at baseline  Outcome: Progressing     Problem: Skin/Tissue Integrity - Adult  Goal: Incisions, wounds, or drain sites healing without S/S of infection  Outcome: Progressing  Flowsheets (Taken 10/29/2024 1600)  Incisions, Wounds, or Drain Sites Healing Without Sign and Symptoms of Infection:   ADMISSION and DAILY: Assess and document risk factors for pressure ulcer development   TWICE DAILY: Assess and document skin integrity     Problem: Skin/Tissue Integrity - Adult  Goal: Oral mucous membranes remain intact  Outcome: Progressing  Flowsheets (Taken 10/29/2024 1600)  Oral Mucous Membranes Remain Intact: Assess oral mucosa and hygiene practices     Problem: Musculoskeletal - Adult  Goal: Return mobility to safest level of function  Outcome: Progressing     Problem: Gastrointestinal - Adult  Goal: Maintains adequate nutritional intake  Outcome: Progressing     Problem: Genitourinary - Adult  Goal: Absence of urinary retention  Outcome: Progressing

## 2024-10-30 NOTE — PROGRESS NOTES
Infectious Disease Follow up Notes    CC :  complicated MSSA bacteremia      Antibiotics:  Nafcillin 2g q4     Admit Date:   10/24/2024  Hospital Day: 7    Subjective:   He remains AF   On 2L NC  No acute interval changes   Remains encephalopathic and does not contribute to the history      Objective:     Patient Vitals for the past 8 hrs:   BP Temp Temp src Pulse Resp SpO2   10/30/24 0937 115/88 -- -- -- -- --   10/30/24 0635 -- 98.6 °F (37 °C) Axillary -- -- --   10/30/24 0600 114/63 -- -- (!) 114 23 --   10/30/24 0500 112/63 -- -- (!) 110 20 96 %   10/30/24 0400 100/65 98.2 °F (36.8 °C) Axillary (!) 106 17 97 %   10/30/24 0300 (!) 115/90 -- -- (!) 110 24 97 %       EXAM:  General:   eyes open.  Nonverbal.  Does not follow commands     HEENT:   icteric sclera   PERRL    NECK:   Supple, symmetrical       LUNGS:   coarse upper airway noise and ineffective airway clearance   CV:   RRR     ABD: More distended, soft, +BS, no apparent tenderness     EXT: Wounds dressed  No stigmata of emboli           LINE:    RUE midline placed 10/30       Scheduled Meds:   albumin human 25%  25 g IntraVENous BID    potassium chloride  20 mEq IntraVENous Once    [START ON 10/31/2024] enoxaparin  30 mg SubCUTAneous Daily    [START ON 11/1/2024] thiamine  100 mg Oral Daily    lactulose  20 g Per NG tube TID    nafcillin  2,000 mg IntraVENous Q4H    pantoprazole  40 mg IntraVENous Daily    thiamine (B-1) 500 mg in sodium chloride 0.9 % 100 mL IVPB  500 mg IntraVENous TID    [Held by provider] ascorbic acid  500 mg Oral Daily    [Held by provider] buPROPion  150 mg Oral QAM    ferrous sulfate  325 mg Oral Daily with breakfast    risperiDONE  0.5 mg Oral Nightly    sodium chloride flush  5-40 mL IntraVENous 2 times per day    multivitamin  1 tablet Oral Daily    folic acid  1 mg Oral Daily    nicotine  1 patch TransDERmal Daily    vitamin D  50,000 Units Oral

## 2024-10-30 NOTE — PROGRESS NOTES
PULMONARY AND CRITICAL CARE INPATIENT NOTE        Kaleb Persaud   : 1960  MRN: [id number]     Admitting Physician: Hubert Owens MD  Attending Physician: Arias Madrigal MD  PCP: Patricia Vieira APRN - CNP    Admission: 10/24/2024   Date of Service: 10/30/2024    Chief Complaint   Patient presents with    Head Laceration     Pt arrived via EMS. Pt reports 'had a few drinks today', lacerations to the back of head and BLEs.           ASSESSMENT & PLAN       64 y.o. pleasant  male patient with:    Assessment:  Acute encephalopathy, toxic metabolic.  Negative head CT.  Negative LP.  Negative EEG for seizures  Scalp laceration  Acute on chronic hyponatremia.Beer Potomania   Sepsis/MSSA  JCARLOS/hyper-K/AGMA  Acute rhabdomyolysis  Alcoholic hepatitis/cirrhosis  Ascites. Neg SBP  Protein energy malnutrition  RLL lung nodule. Needs f.u.  Emphysema  Bipolar disorder  Metabolic syndrome: HTN, HLD  Leg wounds    Plan:                Continue abx/ID on board  GI f/u for paracentesis. Will avoid large volume to avoid worsening HRS  Albumin   Urine Na  Hold Wellbutrin to avoid seizures  Lactulose by NG  NG for feeding  Wound care  GOC discussion w family    LOS: Hospital Day: 7     Code:Full Code      Time spent on this patient, excluding procedures time, is ~35 minutes.  Time was spent on reviewing the EMR, reviewing overnight events, interpreting labs and imaging, interviewing and directly examining the patient, ventilator/breathing assistance device/drip review and adjustment, running multidisciplinary rounds and coordinating critical care plan with bedside nurse, and other care providers.        Subjective/Objective       Summary:   64-year-old male patient was admitted on  with acute encephalopathy/found down.    Interval history/Encounter 10/30/2024   Distended stomach/ascites   O2 rate 2L  Low u/o  No fever  Creatinine is worse   Cbc stable       Objective    Test Results:  Imaging:  I

## 2024-10-30 NOTE — CARE COORDINATION
Pt meets criteria for [facility] at d/c. Per MD, PC consult ordered and pending at this time. Kidney failure is worsening at this time. CRRT may be needed. Pt continues to be confused and agitated at times. Planning paracentesis. Followed by IM, Nephrology, GI, ID, and Neuro.     He is from home alone. IPTA. Hx of alcohol abuse. NG in place with TF's initiated yesterday. Poor overall prognosis at this time. Will follow.

## 2024-10-30 NOTE — PROGRESS NOTES
Shift: 2644-3700 10/30/24    Admitting diagnosis: Hyponatremia, Hyperkalemia, JCARLOS    Presentation to hospital: Lethargic/ Altered mental status    Surgery: no     Nursing assessment at handoff  stable    Emergency Contact/POA: Mona Robison (Sister)  Family updated: no    Most recent vitals: /74   Pulse (!) 101   Temp 98.6 °F (37 °C) (Bladder)   Resp 17   Ht 1.676 m (5' 6\")   Wt 66.9 kg (147 lb 7.8 oz)   SpO2 (!) 86%   BMI 23.81 kg/m²      Rhythm: Sinus Tachycardia     NC/HFNC- 2 lpm  Respiratory support: - No ventilator support    Vent days: Day 0    Increased O2 requirements: no    Admission weight Weight - Scale: 62.7 kg (138 lb 3.7 oz)  Today's weight   Wt Readings from Last 1 Encounters:   10/30/24 66.9 kg (147 lb 7.8 oz)         UOP >30ml/hr: No (total UOP overnight = 85mL)    [place] need assessed each shift: yes    Restraints: no  Order current and documentation up to date?    Lines/Drains  LDA Insertion Date Discontinued Date Dressing Changes   PIV x1  PIV  PICC right 10/25/24  10/29   10/29  10/25/24 10/28/24 X2    10/29/24    TLC (midline) 10/30/24     Arterial       [place]  10/26/24     Vas Cath      ETT       Surgical drains        Night Shift Hospitalist Interventions    Problem(Brief) Date Time Intervention Physician contacted          1 POSTIVE CWAW   SEE MAR  NO                                  Drip rates at handoff:    sodium chloride Stopped (10/28/24 2136)   -Bicarb @75ml/hr (stopped)    Hospital Course Daily Updates:  Admit Day# 0 10/25/24  -Received hypertonic saline in ER   -CT head, chest and abdomen done  -Paracentesis   -PICC line placed    Admit Day# 0 10/25/24 Nights  Takeover patient around 0500: noted distended bladder--bladder scanned >490ml, straight cath 500ml  -Patient was lethagic with restless legs upon assessment but after straight cath, patient calms down  -GCS 9 (E2V2M5) from GCS 14: MD made aware    Admit Day #1 10/26/24 days  -Pt continues to be restless legs and  Notify nephro for <124, >130  -blood cultures positive for staph  -Urine cultures sent today    Admit Day 4 Nights 10/28/2024  - 77mL UOP   - Cr. Elevated to 1.6  - Nephro made aware, no new orders  - On 2L NC  - Still obtund   - NA levels within range, NS Stopped     Admit Day 4 Days 10/29/24  -75ml uo. = bladder scan = 681ml. Ascites vs urine; abdalla flushed X2 = nephro aware  -still on 2L NC   -decreased responses = to painful stimuli only  -neuro stating brain atrophy & poss increased dementia  -lactulose cont w/3 BM's  -PICC  removed poss r/t bacteremia     Admit Day 4 Nights 10/29/24:  - total UOP = 85mL  - midline inserted due to hard stick (lab not able to draw labs- tried three times, two nurses tried twice, one NP tried twice)  - same neuro status overnight (intermittently will follow commands)    Admit Day 5 Days 10/20/24  - Paracentesis completed, 3L taken out.  - Code status changed to Limited X4  - Lactulose with 1 very large BM   - U/O: 250 mL  - very lethargic, only brief eye contact with minimal responses.        Latest Reference Range & Units 10/28/24 06:30   Prothrombin Time 11.9 - 14.9 sec 17.3 (H)   INR 0.85 - 1.15  1.40 (H)   (H): Data is abnormally high    Annette LIZ     Lab Data:   CBC:   Recent Labs     10/29/24  0445 10/30/24  0405   WBC 14.0* 10.8   HGB 7.0* 7.0*   HCT 21.2* 20.4*   .9* 106.9*    159     BMP:    Recent Labs     10/30/24  0405 10/30/24  1008    138   K 3.3* 3.5   CO2 20* 19*   BUN 23* 24*   CREATININE 2.8* 3.2*     LIVR:   Recent Labs     10/29/24  0445 10/30/24  0405   AST 67* 53*   ALT 57* 49*     PT/INR:   Recent Labs     10/28/24  0630   INR 1.40*     APTT: No results for input(s): \"APTT\" in the last 72 hours.  ABG:   No results for input(s): \"PHART\", \"SQB8KAQ\", \"PO2ART\" in the last 72 hours.    Consults (if GI or Nephrology- which group?)-  Hospitalist, Nephro, neuro, critical care, GI

## 2024-10-30 NOTE — PLAN OF CARE
Problem: Discharge Planning  Goal: Discharge to home or other facility with appropriate resources  10/29/2024 2028 by Helen Giron RN  Outcome: Progressing     Problem: Pain  Goal: Verbalizes/displays adequate comfort level or baseline comfort level  10/29/2024 2028 by Helen Giron RN  Outcome: Progressing     Problem: Safety - Adult  Goal: Free from fall injury  10/29/2024 2028 by Helen Giron RN  Outcome: Progressing  Flowsheets (Taken 10/29/2024 2009)  Free From Fall Injury: Instruct family/caregiver on patient safety     Problem: Neurosensory - Adult  Goal: Achieves stable or improved neurological status  10/29/2024 2028 by Helen Giron RN  Outcome: Progressing     Problem: Neurosensory - Adult  Goal: Absence of seizures  10/29/2024 2028 by Helen Giron RN  Outcome: Progressing     Problem: Neurosensory - Adult  Goal: Achieves maximal functionality and self care  10/29/2024 2028 by Helen Giron RN  Outcome: Progressing     Problem: Cardiovascular - Adult  Goal: Absence of cardiac dysrhythmias or at baseline  10/29/2024 2028 by Helen Giron RN  Outcome: Progressing     Problem: Skin/Tissue Integrity - Adult  Goal: Incisions, wounds, or drain sites healing without S/S of infection  10/29/2024 2028 by Helen Giron RN  Outcome: Progressing  Flowsheets (Taken 10/29/2024 2009)  Incisions, Wounds, or Drain Sites Healing Without Sign and Symptoms of Infection: ADMISSION and DAILY: Assess and document risk factors for pressure ulcer development     Problem: Gastrointestinal - Adult  Goal: Maintains adequate nutritional intake  10/29/2024 2028 by Helen Giron RN  Outcome: Progressing     Problem: Metabolic/Fluid and Electrolytes - Adult  Goal: Electrolytes maintained within normal limits  10/29/2024 2028 by Helen Giron RN  Outcome: Progressing     Problem: Respiratory - Adult  Goal: Achieves optimal ventilation and oxygenation  10/29/2024 2028 by Helen Giron RN  Outcome:

## 2024-10-30 NOTE — PROGRESS NOTES
Arrived to place midline with bedside RN. Pre-procedure and timeout done with RN, discussed limitations of placement and allergies. Vital signs stable. Labs, allergies, medications, and code status reviewed. No contraindications noted.    Procedure explained to pt, including the risk and benefits of the procedure. All questions answered. Pt verbalizes understanding of the procedure and states no more questions.     Pt's basilic, brachial, cephalic are all easily collapsible with no indication for a clot. Vein selected is large enough for catheter (please see image below). Pt tolerated sterile procedure well, with no difficulty accessing basilic vein, when accessed - blood was free flowing and non-pulsatile. Guidewire, introducer, and catheter went in smoothly. ML placement verification with blood return and flushes easily.      Nurses:  OK to use Midline.    Please replace all existing IV tubing with new IV tubing prior to using the ML for current IV infusions.  Please remove any PIVs from ML arm.  All of the above may be sources of infection or an increase chance of a clot.      Post procedure - reorganized pt table, placed pt in lowest position, with call light and educated on line care. Instructed pt/RN not to use arm for at least 30min to avoid bleeding. Reported off to bedside RN.      If you have any questions please call number below and dispatch will direct you to the PICC RN that is on call.    [phone]

## 2024-10-31 PROBLEM — E44.0 MODERATE MALNUTRITION (HCC): Chronic | Status: ACTIVE | Noted: 2024-10-31

## 2024-10-31 NOTE — PROGRESS NOTES
Comprehensive Nutrition Assessment    Type and Reason for Visit:  Reassess    Nutrition Recommendations/Plan:   Continue TF of Jevity 1.5 at 10 mL/hr. Increase as tolerated by 10 mL/hr q 4 hrs to goal rate of 55 mL/hr via NG. Monitor need for renal formula   Continue 60 mL FWF q 4 hrs. Monitor need for adjustments   Monitor GOC and nutrition POC  Monitor nutrition adequacy, pertinent labs, bowel habits, wt changes, and clinical progress     Malnutrition Assessment:  Malnutrition Status:  Moderate malnutrition (10/31/24 1405)    Context:  Chronic Illness     Findings of the 6 clinical characteristics of malnutrition:  Energy Intake:  Mild decrease in energy intake  Weight Loss:  Mild weight loss (specify amount and time period) (-9.8% x 6 months)     Body Fat Loss:  Mild body fat loss Orbital   Muscle Mass Loss:  Mild muscle mass loss Temples (temporalis), Clavicles (pectoralis & deltoids)  Fluid Accumulation:  No significant fluid accumulation     Strength:  Not Performed    Nutrition Assessment:    Follow up: Pt remains nutritionally at risk AEB continued TF. S/p paracentesis on 10/30 with 3 L removed. Worsening kidney function. Discussed with RN. TFs restarted this morning, current on Jevity 1.5 at 10 mL/hr via NG. Hypokalemic and hypophosphatemia today, replacements ordered. Family at bedside report pt wanting to eat food. Palliative care following, plans for further GOC discussion. Will monitor.    Nutrition Related Findings:    +Lactulose and rifaximin. Na 142. K 3.2. Phos 1.7. Active BS. BM on 10/30. Ascites. Brideled NG. +1 pitting BLE edema. Wound Type: Surgical Incision, Skin Tears       Current Nutrition Intake & Therapies:    Average Meal Intake: 0%  Average Supplements Intake: 0%  ADULT ORAL NUTRITION SUPPLEMENT; Breakfast, Lunch, Dinner; Standard High Calorie/High Protein Oral Supplement  ADULT DIET; Regular  ADULT TUBE FEEDING; Nasogastric; Standard with Fiber; Continuous; 10; Yes; 10; Q 6 hours;

## 2024-10-31 NOTE — CARE COORDINATION
Chart review day 6- from home alone, hx alcohol abuse. Kidney function worsening. Sister, Mona is HCPOA. Paracentesis yesterday. Code status changed to DNR CC today. Cm will follow for DCP needs.

## 2024-10-31 NOTE — PROGRESS NOTES
PULMONARY AND CRITICAL CARE INPATIENT NOTE        Kaleb Persaud   : 1960  MRN: [id number]     Admitting Physician: Hubert Owens MD  Attending Physician: Arias Madrigal MD  PCP: Patricia Vieira APRN - JENNIFER    Admission: 10/24/2024   Date of Service: 10/31/2024    Chief Complaint   Patient presents with    Head Laceration     Pt arrived via EMS. Pt reports 'had a few drinks today', lacerations to the back of head and BLEs.           ASSESSMENT & PLAN       64 y.o. pleasant  male patient with:    Assessment:  Acute encephalopathy, toxic metabolic.  Negative head CT.  Negative LP.  Negative EEG for seizures  Scalp laceration  Acute on chronic hyponatremia.Beer Potomania   Sepsis/MSSA  JCARLOS/hyper-K/AGMA  Acute rhabdomyolysis  Alcoholic hepatitis/cirrhosis  Ascites. Neg SBP  Protein energy malnutrition  RLL lung nodule. Needs f.u.  Emphysema  Bipolar disorder  Metabolic syndrome: HTN, HLD  Leg wounds    Plan:              Continue abx/ID on board  Lactulose by NG  Rifaximin  NG feeding  Neohrology on board  Wound care  GOC discussion w family    LOS: Hospital Day: 8     Code:Full Code    Subjective/Objective       Summary:   64-year-old male patient was admitted on  with acute encephalopathy/found down.    Interval history/Encounter 10/31/2024   3 L of ascites drained by IR yesterday  GOC discussed with family with worse kidneys.  Minimal urine output      Objective    Test Results:  Imaging:  I have reviewed radiology images personally.    MRI BRAIN W WO CONTRAST    Result Date: 10/27/2024  Limited by motion artifact. No acute intracranial abnormality.  No mass effect or abnormal intracranial enhancement. 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 Mild parenchymal volume loss. Moderate ventriculomegaly, disproportionate to the degree of volume loss, likely with superimposed moderate communicating hydrocephalus, stable. Mild to moderate chronic microvascular disease. Left

## 2024-10-31 NOTE — PROGRESS NOTES
[facility] Medicine Progress Note  V 10.25      Date of Admission: 10/24/2024  Hospital Day: 8    Chief Admission Complaint:  AMS    Subjective:  Patient seen at bedside. Patient is able to follow commands and respond to questions. He is not fully oriented.    Presenting Admission History:       Kaleb Persaud is a 64 y.o. male, chronic alcoholic who was brought to the emergency room for evaluation following fall.  Patient is intermittently confused which makes history limited.  I was unable to obtain any meaningful history from the patient.  Apparently, patient was found down at home.  He was noted to be confused.  Patient is complaining of generalized weakness otherwise denies any other complaint.  He denies chest pain, no shortness of breath, no fever, no chills, no nausea, no vomiting, no diarrhea.     On presentation to the emergency room, patient was noted to be afebrile, temperature 98.1 °F, pulse rate of 111 bpm, respiratory rate of 23, blood pressure 149/103 mmHg.  Patient saturating 100% on room air.  Labs significant for sodium 138, potassium 5.6, blood rate 79, bicarb 16, anion gap 13, glucose 132, CK level 1090.  Albumin 2.7, , ALT 79, alkaline phosphatase 337 and total bilirubin 2.1.  WBC 17.0, anemia with hemoglobin 8.8 and hematocrit 26.0.  .1.  CT scan of the brain as well as CT scan of the cervical spine with unrevealing.  Patient was started on hypertonic saline in the emergency room.  He has been admitted to the hospital for further evaluation and management.    Assessment/Plan:      Acute encephalopathy likely secondary to metabolic derangements  Acute on chronic hyponatremia  JCARLOS  -Patient noted with sodium level-108  -Hx of chronic hyponatremia with sodium level ranges between 127-132  -Chronic alcoholic.  Hyponatremia in the setting of chronic alcohol abuse likely hypo-osmolar hyponatremia due beer potomania/ decrease solute intake history of limited  -Symptomatic hyponatremia  given intermittent confusion  Given hypertonic saline  - Sodium has improved to 135  - Creatinine worsening.1.9->2.7->2.8->3.2->3.7. Unclear etiology ATN vs AIN from antibiotics vs IR GN.    Plan:  Nephrology consulted, appreciate recommendations  Continue with other supportive management  Neurochecks in place  Neurology consulted. EEG findings were consistent with mild degree of generalized non-specific cerebral dysfunction.  Palliative care consulted. Appreciate recommendations.    Sepsis  SIRS positive: HR, WBC, RR  BC: Staph Aureus  WBC improving    Plan:  ID consulted  LP performed ruling out meningitis.  Continue nafcillin. Will require IV abx after DC.  Continue to maintain hemodynamic status  Maintain oxygen saturation as needed > 92%     Hyperkalemia, improving  JCARLOS, improving  Normal anion gap metabolic acidosis, improving  -Patient noted with potassium level-5.6.  -Serum creatinine within normal limit.  -Hyperkalemia in the setting of JCARLOS  -Patient is on lisinopril likely contributory  -No EKG changes  Received sodium bicarb IV x 1 dose    Plan:  Nephrology following, appreciate recommendations  Follow-up with BMP  Replenish or adjust, treat electrolytes as needed  Avoid nephrotoxic medications  IVF  Encourage oral hydration  Renally dose medications as needed  Held ACE/ARB     Alcohol hepatitis  -Liver enzymes elevated with AST elevated at 171, ALT 79, alkaline phosphatase 337  -Liver synthetic function remains intact.  -MELDs score-23 points.  No indication for steroids  Paracentesis performed 10/30    Plan:  Trend LFTs  CIWA protocol in place  Continue to monitor for signs of withdrawal  Continue Thiamine, MVI, Folic acid  Consulted Gastroenterology, appreciate recommendations. Continue lactulose and rifaximin.     Protein calorie malnutrition  -Albumin level-2.7    Plan:  Nutrition consult  Encourage increase oral intake     Macrocytic anemia  -Hemoglobin and hematocrit stable  -Macrocytosis likely

## 2024-10-31 NOTE — PROGRESS NOTES
Patient seen today during ICU rounds, more alert today with family at the bedside.  S/P paracentesis 10/30, continues to receive TF via NG with very coarse congested cough and remains strictly NPO.     Multiple family discussions with patient's sister ( POA) and his daughter. Family has elected to focus on patient's comfort after discussing with GI, patient is not a candidate for transplant evaluation.     Will remove NG as patient wants it out, ok to provide pleasure PO trials with comfort measure philosophy.  DNR-CC, comfort medications ordered and discussed with RN.  Updated case management, hospice is appropriate but family may need more time as patient is more alert today with family visiting.

## 2024-10-31 NOTE — PROGRESS NOTES
Physical and Occupational Therapy    Attempted to see patient for therapy. Pt continues to be inappropriate for therapy at this time due to confusion and lethargy. Will sign off. Please re-order therapy when medically appropriate.    Radha Leong, PT [id number]  Nahomi Manuel, OTR/L

## 2024-10-31 NOTE — PROGRESS NOTES
Physician Progress Note      PATIENT:               YOLANDA HERMAN  CSN #:                  [id number]  :                       1960  ADMIT DATE:       10/24/2024 7:22 PM  DISCH DATE:  RESPONDING  PROVIDER #:        ARTIE WHITE          QUERY TEXT:    Pt admitted with hyponatremia.  Noted documentation of MSSA bacteremia/sepsis   source favored to be skin/soft tissue infection on 10/28 ID consult note and   subsequent ID progress notes.  If possible, please document in progress notes   and discharge summary:    The medical record reflects the following:  Risk Factors: 64 year old male w/ multiple wounds noted  Clinical Indicators: 10/24 WBC 17, -111, RR 15-24. 10/24 ED provider   note- Comments: Innumerable skin tears of the lower extremities bilaterally   that appear old, scabbed over, and unclean. 10/26 BC- +Staphylococcus aureus.   10/28 ID consult note- MSSA bacteremia, \"complicated, \" no clear primary   source, though skin/soft tissue course favored... LP has ruled out meningitis.    No evidence of SBP.  Treatment: Labs, ID consult, wound care, ampicillin, nafcillin  Options provided:  -- MSSA sepsis likely due to skin/soft tissue infection confirmed present on   admission  -- MSSA sepsis likely due to skin/soft tissue infection ruled out  -- Defer to Infectious Disease consultant documentation regarding source of   MSSA sepsis likely being due to skin/soft tissue infection  -- Other - I will add my own diagnosis  -- Disagree - Not applicable / Not valid  -- Disagree - Clinically unable to determine / Unknown  -- Refer to Clinical Documentation Reviewer    PROVIDER RESPONSE TEXT:    I defer to Infectious Disease consultant regarding documentation of MSSA   sepsis likely being due to skin/soft tissue infection.    Query created by: Chastity Fields on 10/31/2024 2:48 PM      Electronically signed by:  ARTIE WHITE 10/31/2024 2:57 PM

## 2024-10-31 NOTE — PROGRESS NOTES
Nephrology Progress Note   [email or web address]      Sub/interval history      Still mentation remains poor   Cr continues to worsen  Last 24 h uop 550s cc     Pt is a bit more awake today . Speaking with family but remains confused.     ROS: Unable to obtain  PSFH: No visitor    Scheduled Meds:   albumin human 25%  25 g IntraVENous BID    enoxaparin  30 mg SubCUTAneous Daily    [START ON 11/1/2024] thiamine  100 mg Oral Daily    lactulose  20 g Per NG tube TID    nafcillin  2,000 mg IntraVENous Q4H    pantoprazole  40 mg IntraVENous Daily    thiamine (B-1) 500 mg in sodium chloride 0.9 % 100 mL IVPB  500 mg IntraVENous TID    [Held by provider] ascorbic acid  500 mg Oral Daily    [Held by provider] buPROPion  150 mg Oral QAM    ferrous sulfate  325 mg Oral Daily with breakfast    risperiDONE  0.5 mg Oral Nightly    sodium chloride flush  5-40 mL IntraVENous 2 times per day    multivitamin  1 tablet Oral Daily    folic acid  1 mg Oral Daily    nicotine  1 patch TransDERmal Daily    vitamin D  50,000 Units Oral Weekly     Continuous Infusions:   sodium chloride Stopped (10/28/24 2136)     PRN Meds:.budesonide-formoterol, sodium chloride, albuterol sulfate HFA, hydrOXYzine HCl, sodium chloride flush, magnesium sulfate, ondansetron **OR** ondansetron, polyethylene glycol    Objective/     Vitals:    10/31/24 0300 10/31/24 0400 10/31/24 0500 10/31/24 0600   BP: 106/69 111/77 108/73 123/74   Pulse: 96 94 92 92   Resp: 22 24 20 18   Temp:  97.4 °F (36.3 °C)  98.2 °F (36.8 °C)   TempSrc:  Oral  Bladder   SpO2:   99% 97%   Weight:       Height:         24HR INTAKE/OUTPUT:    Intake/Output Summary (Last 24 hours) at 10/31/2024 0849  Last data filed at 10/31/2024 0548  Gross per 24 hour   Intake 1771.39 ml   Output 550 ml   Net 1221.39 ml     Gen: Thin built  Neck: No JVD  Skin: Unremarkable  Cardiovascular:  S1, S2 without m/r/g   Respiratory: CTA B without w/r/r; respiratory effort normal  Abdomen:  soft, nt, nd,

## 2024-10-31 NOTE — PROGRESS NOTES
PROGRESS NOTE    HPI: Kaleb Persuad is a(n)64 y.o. male admitted for work-up and treatment for Hyponatremia [E87.1]  JCARLOS (acute kidney injury) (HCC) [N17.9]  Altered mental status, unspecified altered mental status type [R41.82].     We are following for cirrhosis.    Subjective:     He is more alert today. Answers some questions and follows commands,      Objective:     I/O last 3 completed shifts:  In: 3012.4 [NG/GT:1495; IV Piggyback:1517.4]  Out: 635 [Urine:635]      /74   Pulse 92   Temp 98.2 °F (36.8 °C) (Bladder)   Resp 18   Ht 1.676 m (5' 6\")   Wt 66.9 kg (147 lb 7.8 oz)   SpO2 97%   BMI 23.81 kg/m²     Physical Exam:  HEENT: anicteric sclera, oropharyngeal membranes pink and moist. + NGT.  Cor: tachy  Abdomen: + ascites, no apparent tenderness  Neuro: alert, follows commands      Results:   Lab Results   Component Value Date    ALT 49 (H) 10/30/2024    AST 53 (H) 10/30/2024    GGT 2,794 (H) 02/17/2024    ALKPHOS 241 (H) 10/30/2024    BILIDIR 2.5 (H) 10/29/2024    INR 1.40 (H) 10/28/2024    LIPASE 48.0 12/29/2023     Lab Results   Component Value Date    WBC 10.8 10/30/2024    HGB 7.0 (L) 10/30/2024    HCT 20.4 (LL) 10/30/2024    .9 (H) 10/30/2024     10/30/2024     BUN/Cr/glu/ALT/AST/amyl/lip:  28/3.7/--/--/--/--/-- (10/31 0055)  XR ABDOMEN (KUB) (SINGLE AP VIEW)    Result Date: 10/28/2024  Enteric tube with tip and side-port in the proximal stomach     IR LUMBAR PUNCTURE FOR DIAGNOSIS    Result Date: 10/28/2024  Successful fluoroscopic-guided lumbar puncture.  Opening pressure 9.     MRI BRAIN W WO CONTRAST    Result Date: 10/27/2024  Limited by motion artifact. No acute intracranial abnormality.  No mass effect or abnormal intracranial enhancement. 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 Mild parenchymal volume loss. Moderate ventriculomegaly, disproportionate to the degree of volume loss, likely with superimposed  S. Aureus.  Echo without vegetations. LP benign.  Ascitic fluid negative SBP x 2.  PICC line removed 10/29.    4. JCARLOS, oliguric. Worsening.  On albumin. IV lasix yesterday but holding today. May require HD.  Nephrology managing.  There is concern etiology is HRS.    5. Hyponatremia, 108 on admission. Now corrected.  Hyperkalemia. Improving.  Metabolic acidosis. Improved.    6. Macrocytic anemia.  Vitamin B12 and folate okay.  Trending down. No repeat CBC today.  No overt bleeding.    Plan:   Suspect cirrhosis has progressed to acute liver failure.  Discussed with family and did reach out to hepatology at  at their request. He is not a candidate for urgent transplant evaluation.  Family in discussion with palliative care.  Until goals of care finalized, continue lactulose and rifaximin.  Continue TF, aspiration precautions.  Repeat labs in AM - INR, CBC, LFTs.     Please do not hesitate to call with questions or concerns.      Electronically signed by: ED Naik 10/31/2024 2:08 PM     (Office) [phone]  (Fax) [phone]  Available via perfect serve

## 2024-11-01 NOTE — DISCHARGE SUMMARY
V2.0  Discharge Summary    Name:  Kaleb Persaud /Age/Sex: 1960 (64 y.o. male)   Admit Date: 10/24/2024  Discharge Date: 24    MRN & CSN:  [id number] & [id number] Encounter Date and Time 24 9:46 AM EST    Attending:  No att. providers found Discharging Provider: Jose Sol MD       Hospital Course:     Brief HPI: Kaleb Persaud is a 64 y.o. male who presented with AMS    Brief Problem Based Course:     1.  Acute encephalopathy secondary to metabolic derangement and possible infection.  Patient appears to be improving with antibiotics. Continue supportive care. Secondary to dementia and/or chronic alcohol use?  Patient still remains lethargic.  Continue supportive care.  2.  Hyponatremia.  Patient's initial sodium was 108.  Sodium improving now 142 @ 10/31/2024  . Slowly improving. Nephrology following.  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 Nephrology consulted and appreciated.  Available consultant notes from yesterday and today were reviewed on 10/31/2024, w/ plan for continued inpatient w/up and management.  3.  Sepsis.  Patient with indicators for sepsis including tachycardia, leukocytosis and tachypnea.  Blood cultures positive for Staph aureus.  Patient currently on nafcillin.  Discussed with Dr. Palbo-ID. PICC line removed.   6. JCARLOS.  Worsening renal function.  Creatinine continues to elevate currently at 3.7.  Palliative care did discuss possible dialysis with family.  At this time they are reluctant to start dialysis.  Nephrology consulted and appreciated.  Available consultant notes from yesterday and today were reviewed on 10/31/2024, w/ plan for continued inpatient w/up and management.   5.  Alcohol hepatitis with new onset cirrhosis.  Repeat ammonia level.  Patient on CIWA scale.  Patient also on thiamine and multivitamin.  The patient is on lactulose.  Appreciate GI recommendations. Ammonia level normal.  Status post paracentesis yesterday.  6.  Protein calorie

## 2024-11-01 NOTE — PROGRESS NOTES
Noted hospice meeting later today and family wishes for comfort care. Will sign off.    Electronically signed by: ED Naik 11/1/2024 1:52 PM           (O) [phone]  (F) [phone]  Available via perfect serve

## 2024-11-01 NOTE — PROGRESS NOTES
[facility] Medicine Progress Note  V 10.25      Date of Admission: 10/24/2024  Hospital Day: 9    Chief Admission Complaint:  AMS    Subjective:  Patient seen at bedside. Patient is able to follow commands and respond to questions.    Presenting Admission History:       Kaleb Persaud is a 64 y.o. male, chronic alcoholic who was brought to the emergency room for evaluation following fall.  Patient is intermittently confused which makes history limited.  I was unable to obtain any meaningful history from the patient.  Apparently, patient was found down at home.  He was noted to be confused.  Patient is complaining of generalized weakness otherwise denies any other complaint.  He denies chest pain, no shortness of breath, no fever, no chills, no nausea, no vomiting, no diarrhea.     On presentation to the emergency room, patient was noted to be afebrile, temperature 98.1 °F, pulse rate of 111 bpm, respiratory rate of 23, blood pressure 149/103 mmHg.  Patient saturating 100% on room air.  Labs significant for sodium 138, potassium 5.6, blood rate 79, bicarb 16, anion gap 13, glucose 132, CK level 1090.  Albumin 2.7, , ALT 79, alkaline phosphatase 337 and total bilirubin 2.1.  WBC 17.0, anemia with hemoglobin 8.8 and hematocrit 26.0.  .1.  CT scan of the brain as well as CT scan of the cervical spine with unrevealing.  Patient was started on hypertonic saline in the emergency room.  He has been admitted to the hospital for further evaluation and management.    Assessment/Plan:      Acute encephalopathy likely secondary to metabolic derangements  Acute on chronic hyponatremia  JCARLOS  -Patient's orientation is improving  Nephrology consulted, appreciate recommendations  -Patient noted with sodium level-108  -Hx of chronic hyponatremia with sodium level ranges between 127-132  -Chronic alcoholic.  Hyponatremia in the setting of chronic alcohol abuse likely hypo-osmolar hyponatremia due beer potomania/ decrease  interpreted for clinical significance   [x] Appropriate follow-up labs were ordered  [] Collateral history obtained:    [] Independent Interpretation of tests (any 1)  [] Telemetry (Rhythm Strip) personally reviewed and interpreted as documented above.      [] Imaging personally reviewed and interpreted, includes:    [x] Discussion (any 1)  [x] Multi-Disciplinary Rounds with Case Management  [] Discussed management of the case with:         Labs:  Personally reviewed on 11/1/2024 and interpreted for clinical significance as documented above.     Recent Labs     10/30/24  0405   WBC 10.8   HGB 7.0*   HCT 20.4*        Recent Labs     10/30/24  0240 10/30/24  0405 10/30/24  1008 10/31/24  0055 11/01/24  0802    137 138 142 140   K 3.4* 3.3* 3.5 3.2* 4.0    104 105 109 111*   CO2 19* 20* 19* 19* 17*   BUN 22* 23* 24* 28* 37*   CREATININE 2.7* 2.8* 3.2* 3.7* 4.6*   CALCIUM 7.8* 7.7* 7.9* 7.8* 7.5*   MG  --  2.28  --   --   --    PHOS 2.5  --  2.3* 1.7*  --      No results for input(s): \"PROBNP\", \"TROPHS\" in the last 72 hours.  No results for input(s): \"LABA1C\" in the last 72 hours.  Recent Labs     10/30/24  0405 11/01/24  0802   AST 53* 45*   ALT 49* 38   BILITOT 4.0* 3.0*   ALKPHOS 241* 207*     No results for input(s): \"INR\", \"LACTA\", \"TSH\" in the last 72 hours.      Urine Cultures: No results found for: \"LABURIN\"  Blood Cultures:   Lab Results   Component Value Date/Time    BC  10/26/2024 09:09 AM     mecA gene not detected  See additional report for complete BCID panel.      BC POSITIVE for  Isolated two of two sets   10/26/2024 09:09 AM     Lab Results   Component Value Date/Time    BLOODCULT2  10/26/2024 09:09 AM     POSITIVE for  No further workup  Isolated two of two sets  Refer to culture [id number] for sensitivity results       Organism:   Lab Results   Component Value Date/Time    ORG Staph aureus DNA Detected 10/26/2024 09:09 AM    ORG Staphylococcus aureus 10/26/2024 09:09 AM    ORG

## 2024-11-01 NOTE — DISCHARGE INSTR - COC
Continuity of Care Form    Patient Name: Kaleb Persaud   :  1960  MRN:  [id number]    Admit date:  10/24/2024  Discharge date:  2024      Code Status Order: DNR-CC   Advance Directives:   Advance Care Flowsheet Documentation             Admitting Physician:  Hubert Owens MD  PCP: Patricia Vieira APRN - CNP    Discharging Nurse: Sherry Prasad  Discharging Hospital Unit/Room#: 0238/0238-01  Discharging Unit Phone Number: [phone]    Emergency Contact:   Extended Emergency Contact Information  Primary Emergency Contact: Mona Robison  Home Phone: [phone]  Mobile Phone: [phone]  Relation: Brother/Sister  Preferred language: English  Secondary Emergency Contact: Leon Persaud  Home Phone: [phone]  Relation: Brother/Sister   needed? No    Past Surgical History:  Past Surgical History:   Procedure Laterality Date    COLONOSCOPY N/A 11/10/2021    COLONOSCOPY POLYPECTOMY SNARE/COLD BIOPSY performed by Henry Orta MD at [facility] ENDOSCOPY    ELBOW SURGERY Right     scraped the bone    ENDOSCOPY, COLON, DIAGNOSTIC      HERNIA REPAIR Right     inguinal hernia repair    NECK SURGERY N/A     Fusion [id number]-[id number] by a safe falling on him    NECK SURGERY N/A     C4-6 revision of fusion    SHOULDER SURGERY Left 2017    reconstruction    SHOULDER SURGERY Right     rotator cuff    TONSILLECTOMY N/A     UPPER GASTROINTESTINAL ENDOSCOPY N/A 11/10/2021    EGD BIOPSY performed by Henry Orta MD at [facility] ENDOSCOPY       Immunization History:   Immunization History   Administered Date(s) Administered    COVID-19, PFIZER PURPLE top, DILUTE for use, (age 12 y+), 30mcg/0.3mL 04/15/2021, 2021    Influenza, FLUARIX, FLULAVAL, FLUZONE (age 6 mo+) and AFLURIA, (age 3 y+), Quadv PF, 0.5mL 2021    Influenza, FLUCELVAX, (age 6 mo+), MDCK, Quadv PF, 0.5mL 2024    TDaP, ADACEL (age 10y-64y), BOOSTRIX (age 10y+), IM, 0.5mL

## 2024-11-01 NOTE — PROGRESS NOTES
Speech Language Pathology  Attempt Note     Name: Kaleb Persaud  : 1960  Medical Diagnosis: Hyponatremia [E87.1]  JCARLOS (acute kidney injury) (HCC) [N17.9]  Altered mental status, unspecified altered mental status type [R41.82]      SLP attempted to see pt for bedside swallow evaluation (BSE). Order acknowledged and chart reviewed for completion of eval. RN reports pt able to open his eyes but unable to maintain adequate SEEMA and not appropriate for PO. SLP to re-attempt as pt's condition and schedule allows. No charges.      Mckayla Pena M.A. CCC-SLP  Speech Language Pathologist

## 2024-11-01 NOTE — PROGRESS NOTES
Nephrology Progress Note   Lemon.Fastr      Sub/interval history    Family discussion noted.  Patient is now DNR CC.   Cr continues to worsen  Urine output not charted overnight.     Pt is a bit more awake today . Speaking with family but remains confused.     ROS: Unable to obtain  PSFH: No visitor    Scheduled Meds:   scopolamine  1 patch TransDERmal Q72H    nafcillin  2,000 mg IntraVENous Q4H    sodium chloride flush  5-40 mL IntraVENous 2 times per day     Continuous Infusions:   sodium chloride Stopped (10/28/24 2136)     PRN Meds:.morphine, LORazepam, budesonide-formoterol, sodium chloride, albuterol sulfate HFA, hydrOXYzine HCl, sodium chloride flush, ondansetron **OR** ondansetron, polyethylene glycol    Objective/     Vitals:    10/31/24 2200 10/31/24 2345 11/01/24 0100 11/01/24 0300   BP: 114/73 105/66 105/80 (!) 130/95   Pulse: 84 80 76 92   Resp: 12 11 21 16   Temp:  97.6 °F (36.4 °C)  97.2 °F (36.2 °C)   TempSrc:  Bladder  Bladder   SpO2:  95%  94%   Weight:       Height:         24HR INTAKE/OUTPUT:  No intake or output data in the 24 hours ending 11/01/24 1003    Gen: Thin built  Neck: No JVD  Skin: Unremarkable  Cardiovascular:  S1, S2 without m/r/g   Respiratory: CTA B without w/r/r; respiratory effort normal  Abdomen:  soft, nt, nd,   Extremities: no lower extremity edema  Neuro/Psy:  tracking     Data/  Recent Labs     10/30/24  0405   WBC 10.8   HGB 7.0*   HCT 20.4*   .9*        Recent Labs     10/30/24  0240 10/30/24  0405 10/30/24  1008 10/31/24  0055 11/01/24  0802    137 138 142 140   K 3.4* 3.3* 3.5 3.2* 4.0    104 105 109 111*   CO2 19* 20* 19* 19* 17*   GLUCOSE 164* 153* 197* 198* 101*   PHOS 2.5  --  2.3* 1.7*  --    MG  --  2.28  --   --   --    BUN 22* 23* 24* 28* 37*   CREATININE 2.7* 2.8* 3.2* 3.7* 4.6*   LABGLOM 25* 24* 21* 17* 13*     CT chest abdomen and pelvis without contrast  IMPRESSION:  1. No acute abnormality in the chest.  2. Nodular liver  neurology consulted, brainstem/cerebellar dysfunction on exam  Should not be related with hyponatremia with appropriate correction  Neurology on board.     Anemia of chronic illness  Hemoglobin 8.8-->7.1     MSSA bacteremia  per primary team    Severe vitamin D deficiency  25 hydroxy vitamin D was 8    Hypophosphatemia  Replace      Plan  -Status post albumin bid.  -Gentle bicarb today  -renal function worsening but uop improving still yesterday.  No repeat today.  --ve  ANGELINE, ANCA,   -no urgent need of rrt today  -Patient continues to progress to requiring dialysis but given family discussion, family is leaning towards palliative care given liver cirrhosis and other comorbidities  -Will expand further workup as indicated and family wishes  -Recommend switching from PPI  -Ergocalciferol 50,000 units weekly  -poor candidate for rrt but if continues to worsen  , will discuss family wishes  -family so far refusing hd if needed, but will continue to discuss as pt progresses    -Addendum-planning to go on hospice      Santos Fournier MD   Office: [phone]  Fax:    [phone]  [employer]

## 2024-11-01 NOTE — CARE COORDINATION
CASE MANAGEMENT DISCHARGE SUMMARY      Discharge to: [facility]         IMM given: (date) NA       Transportation:        Medical Transport explained to pt/family. Pt/family voice no agency preference.    Agency used: [facility]   time: 3pm   Ambulance form completed: Yes    Confirmed discharge plan with:     Patient: yes      Family:  yes            RN, name: Sherry

## 2024-11-01 NOTE — PROGRESS NOTES
[facility]    Referral received.  Meeting set up for 11 am with sister Mona.  Updated Irina with palliative care.

## 2024-11-01 NOTE — PROGRESS NOTES
PULMONARY AND CRITICAL CARE INPATIENT NOTE        Kaleb Persaud   : 1960  MRN: [id number]     Admitting Physician: Hubert Owens MD  Attending Physician: Arias Madrigal MD  PCP: Patricia Vieira APRN - JENNIFER    Admission: 10/24/2024   Date of Service: 2024    Chief Complaint   Patient presents with    Head Laceration     Pt arrived via EMS. Pt reports 'had a few drinks today', lacerations to the back of head and BLEs.           ASSESSMENT & PLAN       64 y.o. pleasant  male patient with:    Assessment:  Acute encephalopathy, toxic metabolic.  Negative head CT.  Negative LP.  Negative EEG for seizures  Scalp laceration  Acute on chronic hyponatremia.Beer Potomania   Sepsis/MSSA  JCARLOS/hyper-K/AGMA  Acute rhabdomyolysis  Alcoholic hepatitis/cirrhosis  Ascites. Neg SBP  Protein energy malnutrition  RLL lung nodule. Needs f.u.  Emphysema  Bipolar disorder  Metabolic syndrome: HTN, HLD  Leg wounds    Plan:              Remains on Abx    Awaiting family decision on hospice     LOS: Hospital Day: 9     Code:Full Code    Subjective/Objective       Summary:   64-year-old male patient was admitted on  with acute encephalopathy/found down.    Interval history/Encounter 2024   2L O2  On nafcillin  NG Dced  Kidney function worse        Objective    Test Results:  Imaging:  I have reviewed radiology images personally.    MRI BRAIN W WO CONTRAST    Result Date: 10/27/2024  Limited by motion artifact. No acute intracranial abnormality.  No mass effect or abnormal intracranial enhancement. 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 Mild parenchymal volume loss. Moderate ventriculomegaly, disproportionate to the degree of volume loss, likely with superimposed moderate communicating hydrocephalus, stable. Mild to moderate chronic microvascular disease. Left maxillary sinusitis.     US LIVER    Result Date: 10/26/2024  1. Hepatic cirrhosis. 2. Small to moderate ascites.     XR ABDOMEN  (KUB) (SINGLE AP VIEW)    Result Date: 10/26/2024  Nonobstructive bowel gas pattern.     CTA HEAD NECK W WO CONTRAST    Result Date: 10/26/2024  1. No acute intracranial abnormality. 2. Global parenchymal volume loss with chronic microvascular ischemic changes. 3.  The ventricular dilatation is out of proportion to the cortical sulci. While this may be due to predominantly central parenchymal volume loss, a component of normal pressure hydrocephalus cannot be excluded. 4. No flow limiting stenosis of the cervical carotid/vertebral arteries. 5. There is suggestion of occlusion involving the V4 segment of the intracranial right vertebral artery due to atherosclerosis. 6. Atherosclerosis contributes to mild-to-moderate stenosis of the right supraclinoid ICA. 7. Otherwise, no significant stenosis or large vessel occlusion of the zyicni-ln-Xmmaxn.          PFTS:        Cardiology:      Sleep:        Physical Exam:  General appearance: In no apparent distress.  HEENT: Dry mucous membranes  Cardiac: Normal S1 and S2.  Lungs: Good air entry bilaterally.  Abdomen: Soft.  Distended/ascites  Back & Extremities: Symmetric pulses with good perfusion.  Neurological: Lethargic, encephalopathic       ________________________________________________________  Electronically signed by:  Chemo Llanes MD,FACP    11/1/2024    6:24 AM.     [facility]  [address], [place] [address]   Phone (office): [phone]   Patient

## 2024-11-01 NOTE — PROGRESS NOTES
[facility] [place]    Met with patient's sister/ HCPRAJIV Dunn and daughter Sondra to discuss hospice philosophy and services.  They are agreeable to plan is for [facility].  Discharge orders in place and transport set up with [facility] for 3pm.  Updated Medical team.  Thank you for allowing [facility] to SERVE this patient and family.

## 2024-11-01 NOTE — PROGRESS NOTES
from alcohol abuse  -Obtain iron profile, B12, folate level and ferritin    Plan:  Follow-up with CBC     Hx of bipolar disorder  Continue home medications     Hx of essential hypertension  Resume home antihypertensive medication     Hx of hyperlipidemia  Continue to monitor    Ongoing threat to life and/or bodily function without ongoing treatment due to:  Electrolyte abnormalities, Alcohol withdrawal    Consults:      IP CONSULT TO NEPHROLOGY  IP CONSULT TO SOCIAL WORK  IP CONSULT TO GI  IP CONSULT TO DIETITIAN  IP CONSULT TO NEPHROLOGY  IP CONSULT TO VASCULAR ACCESS TEAM  IP CONSULT TO CRITICAL CARE  PHARMACY TO DOSE VANCOMYCIN  IP CONSULT TO NEUROLOGY  IP CONSULT TO INTERVENTIONAL RADIOLOGY  IP CONSULT TO DIETITIAN  IP CONSULT TO INFECTIOUS DISEASES  IP CONSULT TO DIETITIAN  IP CONSULT TO DIETITIAN  IP CONSULT TO PALLIATIVE CARE        --------------------------------------------------      Medications:         Infusion Medications    sodium chloride Stopped (10/28/24 2136)     Scheduled Medications    scopolamine  1 patch TransDERmal Q72H    nafcillin  2,000 mg IntraVENous Q4H    sodium chloride flush  5-40 mL IntraVENous 2 times per day     PRN Meds: morphine, LORazepam, budesonide-formoterol, sodium chloride, albuterol sulfate HFA, hydrOXYzine HCl, sodium chloride flush, ondansetron **OR** ondansetron, polyethylene glycol      Physical Exam Performed:      General appearance:  No apparent distress  Respiratory:  Normal respiratory effort.   Cardiovascular:  Regular rate and rhythm.  Abdomen:  Soft, non-tender, distended. Ascites.  Musculoskelatal:  No edema  Neurologic:  Non-focal, somnolent, non-verbal at bedside  Psychiatric:  Somnolent  BP (!) 130/95   Pulse 92   Temp 97.2 °F (36.2 °C) (Bladder)   Resp 16   Ht 1.676 m (5' 6\")   Wt 66.9 kg (147 lb 7.8 oz)   SpO2 94%   BMI 23.81 kg/m²     Telemetry:      Personally reviewed and interpreted telemetry (Rhythm Strip) on 11/1/2024 with the following

## 2024-11-01 NOTE — PLAN OF CARE
Palliative Care Progress Note  Palliative Care Admit date: 10/30/24    Advance Directives: DNR-CC    Plan of care/goals:  F/u w/ Mona, pts sis/hcpoa, via phone.  While family had the benefit of pts improved mentation yesterday, today he is less awake and takes effort to arouse.  We discussed the option of involving hospice in pts care and Mona wanted ref called to [facility] (done).   Pt was admitted from home.  Note pt having c/o LE pain and  ongoing worsening renal  function.  Family has declined option for  dialysis.     Plan:  [facility] will meet w/ family @ 1100      Reason for consult:  _X_ Advance Care Planning  _X_ Transition of Care Planning  ___ Psychosocial/Spiritual Support  ___ Symptom Management                                                                                                                                                                                                                                                                  Irina Cooley RN

## 2024-11-04 NOTE — TELEPHONE ENCOUNTER
Care Transitions Initial Follow Up Call    Outreach made within 2 business days of discharge: Yes    Patient: Kaleb Persaud Patient : 1960   MRN: [id number]  Reason for Admission: Hyponatremia   Discharge Date: 24       Spoke with: Patient admitted to inpatient Hospice.  TCM not appropriate at this time.     Discharge department/facility: [facility]    Scheduled appointment with PCP within 7-14 days    Follow Up  No future appointments.    Chiquis Henley LPN

## (undated) DEVICE — Device: Brand: DISPOSABLE ELECTROSURGICAL SNARE

## (undated) DEVICE — ENDO CARRY-ON PROCEDURE KIT INCLUDES SUCTION TUBING, LUBRICANT, GAUZE, BIOHAZARD STICKER, TRANSPORT PAD AND INTERCEPT BEDSIDE KIT.: Brand: ENDO CARRY-ON PROCEDURE KIT

## (undated) DEVICE — TRAP POLYP ETRAP

## (undated) DEVICE — FORCEP BX STD CAP 240CM RAD JAW 4

## (undated) DEVICE — ELECTRODE,RADIOTRANSLUCENT,FOAM,3PK: Brand: MEDLINE

## (undated) DEVICE — YANKAUER,BULB TIP,W/O VENT,RIGID,STERILE: Brand: MEDLINE

## (undated) DEVICE — CANNULA,OXY,ADULT,SUPERSOFT,W/7'TUB,SC: Brand: MEDLINE INDUSTRIES, INC.

## (undated) DEVICE — CONMED SCOPE SAVER BITE BLOCK, 20X27 MM: Brand: SCOPE SAVER

## (undated) DEVICE — ENDOSCOPIC KIT 2 12 FT OP4 DE2 GWN SYR